# Patient Record
Sex: FEMALE | Race: WHITE | Employment: OTHER
[De-identification: names, ages, dates, MRNs, and addresses within clinical notes are randomized per-mention and may not be internally consistent; named-entity substitution may affect disease eponyms.]

---

## 2017-01-05 ENCOUNTER — PROCEDURE VISIT (OUTPATIENT)
Dept: FAMILY MEDICINE CLINIC | Facility: CLINIC | Age: 72
End: 2017-01-05

## 2017-01-05 VITALS
DIASTOLIC BLOOD PRESSURE: 82 MMHG | SYSTOLIC BLOOD PRESSURE: 134 MMHG | HEIGHT: 63 IN | OXYGEN SATURATION: 98 % | HEART RATE: 68 BPM | WEIGHT: 171 LBS | BODY MASS INDEX: 30.3 KG/M2

## 2017-01-05 DIAGNOSIS — H61.23 BILATERAL IMPACTED CERUMEN: ICD-10-CM

## 2017-01-05 DIAGNOSIS — J18.9 PNEUMONIA OF RIGHT LOWER LOBE DUE TO INFECTIOUS ORGANISM: Primary | ICD-10-CM

## 2017-01-05 PROCEDURE — 69210 REMOVE IMPACTED EAR WAX UNI: CPT | Performed by: INTERNAL MEDICINE

## 2017-01-05 PROCEDURE — 99213 OFFICE O/P EST LOW 20 MIN: CPT | Performed by: INTERNAL MEDICINE

## 2017-01-05 RX ORDER — CEPHALEXIN 500 MG/1
500 CAPSULE ORAL 3 TIMES DAILY
Qty: 30 CAPSULE | Refills: 0 | Status: SHIPPED | OUTPATIENT
Start: 2017-01-05 | End: 2017-02-20 | Stop reason: ALTCHOICE

## 2017-01-05 RX ORDER — AZITHROMYCIN 250 MG/1
TABLET, FILM COATED ORAL
Qty: 11 TABLET | Refills: 0 | Status: SHIPPED | OUTPATIENT
Start: 2017-01-05 | End: 2017-02-20 | Stop reason: ALTCHOICE

## 2017-01-05 ASSESSMENT — ENCOUNTER SYMPTOMS
RHINORRHEA: 0
ABDOMINAL PAIN: 0
CHEST TIGHTNESS: 0
CONSTIPATION: 0
SHORTNESS OF BREATH: 0
BLOOD IN STOOL: 0
DIARRHEA: 0
COUGH: 1
SORE THROAT: 0
NAUSEA: 0

## 2017-01-17 DIAGNOSIS — E78.2 MIXED HYPERLIPIDEMIA: ICD-10-CM

## 2017-01-17 DIAGNOSIS — J43.9 PULMONARY EMPHYSEMA, UNSPECIFIED EMPHYSEMA TYPE (HCC): ICD-10-CM

## 2017-01-17 DIAGNOSIS — M85.80 OSTEOPENIA: ICD-10-CM

## 2017-01-17 RX ORDER — ALBUTEROL SULFATE 90 UG/1
2 AEROSOL, METERED RESPIRATORY (INHALATION) 4 TIMES DAILY
Qty: 1 INHALER | Refills: 5 | Status: SHIPPED | OUTPATIENT
Start: 2017-01-17 | End: 2017-04-05

## 2017-01-17 RX ORDER — PRAVASTATIN SODIUM 20 MG
20 TABLET ORAL DAILY
Qty: 90 TABLET | Refills: 3 | Status: SHIPPED | OUTPATIENT
Start: 2017-01-17 | End: 2017-12-26

## 2017-01-17 RX ORDER — ALENDRONATE SODIUM 70 MG/1
70 TABLET ORAL
Qty: 12 TABLET | Refills: 3 | Status: SHIPPED | OUTPATIENT
Start: 2017-01-17 | End: 2017-02-18 | Stop reason: SDUPTHER

## 2017-02-18 DIAGNOSIS — I10 ESSENTIAL HYPERTENSION: ICD-10-CM

## 2017-02-18 DIAGNOSIS — M85.80 OSTEOPENIA: ICD-10-CM

## 2017-02-18 RX ORDER — OLMESARTAN MEDOXOMIL AND HYDROCHLOROTHIAZIDE 20/12.5 20; 12.5 MG/1; MG/1
1 TABLET ORAL DAILY
Qty: 90 TABLET | Refills: 3 | Status: SHIPPED | OUTPATIENT
Start: 2017-02-18 | End: 2017-12-26

## 2017-02-18 RX ORDER — ALENDRONATE SODIUM 70 MG/1
70 TABLET ORAL
Qty: 12 TABLET | Refills: 3 | Status: SHIPPED | OUTPATIENT
Start: 2017-02-18 | End: 2018-08-09

## 2017-02-20 ENCOUNTER — OFFICE VISIT (OUTPATIENT)
Dept: FAMILY MEDICINE CLINIC | Facility: CLINIC | Age: 72
End: 2017-02-20

## 2017-02-20 VITALS
HEART RATE: 64 BPM | WEIGHT: 170 LBS | BODY MASS INDEX: 30.12 KG/M2 | SYSTOLIC BLOOD PRESSURE: 134 MMHG | DIASTOLIC BLOOD PRESSURE: 60 MMHG | HEIGHT: 63 IN

## 2017-02-20 DIAGNOSIS — M81.0 OSTEOPOROSIS: ICD-10-CM

## 2017-02-20 DIAGNOSIS — I10 ESSENTIAL HYPERTENSION: Primary | ICD-10-CM

## 2017-02-20 DIAGNOSIS — E78.2 MIXED HYPERLIPIDEMIA: ICD-10-CM

## 2017-02-20 DIAGNOSIS — I82.5Y2 CHRONIC DEEP VEIN THROMBOSIS (DVT) OF PROXIMAL VEIN OF LEFT LOWER EXTREMITY (HCC): ICD-10-CM

## 2017-02-20 DIAGNOSIS — I35.0 NONRHEUMATIC AORTIC VALVE STENOSIS: ICD-10-CM

## 2017-02-20 DIAGNOSIS — E55.9 VITAMIN D DEFICIENCY: ICD-10-CM

## 2017-02-20 PROCEDURE — 99214 OFFICE O/P EST MOD 30 MIN: CPT | Performed by: INTERNAL MEDICINE

## 2017-02-20 PROCEDURE — G8484 FLU IMMUNIZE NO ADMIN: HCPCS | Performed by: INTERNAL MEDICINE

## 2017-02-20 PROCEDURE — G8417 CALC BMI ABV UP PARAM F/U: HCPCS | Performed by: INTERNAL MEDICINE

## 2017-02-20 PROCEDURE — 1090F PRES/ABSN URINE INCON ASSESS: CPT | Performed by: INTERNAL MEDICINE

## 2017-02-20 PROCEDURE — 4040F PNEUMOC VAC/ADMIN/RCVD: CPT | Performed by: INTERNAL MEDICINE

## 2017-02-20 PROCEDURE — G8427 DOCREV CUR MEDS BY ELIG CLIN: HCPCS | Performed by: INTERNAL MEDICINE

## 2017-02-20 PROCEDURE — 4005F PHARM THX FOR OP RXD: CPT | Performed by: INTERNAL MEDICINE

## 2017-02-20 PROCEDURE — 1123F ACP DISCUSS/DSCN MKR DOCD: CPT | Performed by: INTERNAL MEDICINE

## 2017-02-20 PROCEDURE — G8399 PT W/DXA RESULTS DOCUMENT: HCPCS | Performed by: INTERNAL MEDICINE

## 2017-02-20 PROCEDURE — 1036F TOBACCO NON-USER: CPT | Performed by: INTERNAL MEDICINE

## 2017-02-20 PROCEDURE — 3017F COLORECTAL CA SCREEN DOC REV: CPT | Performed by: INTERNAL MEDICINE

## 2017-02-20 PROCEDURE — 3014F SCREEN MAMMO DOC REV: CPT | Performed by: INTERNAL MEDICINE

## 2017-02-20 ASSESSMENT — ENCOUNTER SYMPTOMS
RHINORRHEA: 0
ABDOMINAL PAIN: 0
CHEST TIGHTNESS: 0
COUGH: 0
CONSTIPATION: 0
SHORTNESS OF BREATH: 0
DIARRHEA: 0
SORE THROAT: 0
BLOOD IN STOOL: 0
NAUSEA: 0

## 2017-03-06 ENCOUNTER — TELEPHONE (OUTPATIENT)
Dept: FAMILY MEDICINE CLINIC | Facility: CLINIC | Age: 72
End: 2017-03-06
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
## 2017-03-21 RX ORDER — TRIAMCINOLONE ACETONIDE 1 MG/G
CREAM TOPICAL
Qty: 80 G | Refills: 1 | Status: SHIPPED | OUTPATIENT
Start: 2017-03-21 | End: 2017-04-05

## 2017-03-31 ENCOUNTER — HOSPITAL ENCOUNTER (INPATIENT)
Age: 72
LOS: 3 days | Discharge: HOME | DRG: 066 | End: 2017-04-03
Attending: EMERGENCY MEDICINE | Admitting: INTERNAL MEDICINE
Payer: MEDICARE

## 2017-03-31 ENCOUNTER — APPOINTMENT (OUTPATIENT)
Dept: MRI IMAGING | Age: 72
DRG: 066 | End: 2017-03-31
Payer: MEDICARE

## 2017-03-31 ENCOUNTER — HOSPITAL ENCOUNTER (EMERGENCY)
Age: 72
Discharge: OTHER INSTITUTION | End: 2017-03-31
Attending: EMERGENCY MEDICINE
Payer: MEDICARE

## 2017-03-31 ENCOUNTER — APPOINTMENT (OUTPATIENT)
Dept: CT IMAGING | Age: 72
End: 2017-03-31
Payer: MEDICARE

## 2017-03-31 ENCOUNTER — APPOINTMENT (OUTPATIENT)
Dept: CT IMAGING | Age: 72
DRG: 066 | End: 2017-03-31
Payer: MEDICARE

## 2017-03-31 ENCOUNTER — APPOINTMENT (OUTPATIENT)
Dept: GENERAL RADIOLOGY | Age: 72
End: 2017-03-31
Payer: MEDICARE

## 2017-03-31 VITALS
HEIGHT: 63 IN | RESPIRATION RATE: 18 BRPM | DIASTOLIC BLOOD PRESSURE: 47 MMHG | BODY MASS INDEX: 30.12 KG/M2 | WEIGHT: 170 LBS | TEMPERATURE: 97.9 F | HEART RATE: 63 BPM | OXYGEN SATURATION: 100 % | SYSTOLIC BLOOD PRESSURE: 132 MMHG

## 2017-03-31 DIAGNOSIS — R20.2 PARESTHESIA: Primary | ICD-10-CM

## 2017-03-31 DIAGNOSIS — I63.9 CEREBROVASCULAR ACCIDENT (CVA), UNSPECIFIED MECHANISM (HCC): ICD-10-CM

## 2017-03-31 DIAGNOSIS — M81.0 OSTEOPOROSIS: ICD-10-CM

## 2017-03-31 DIAGNOSIS — R20.0 RIGHT FACIAL NUMBNESS: ICD-10-CM

## 2017-03-31 DIAGNOSIS — D18.00 CAVERNOMA: ICD-10-CM

## 2017-03-31 DIAGNOSIS — R20.0 NUMBNESS OF RIGHT LOWER EXTREMITY: Primary | ICD-10-CM

## 2017-03-31 DIAGNOSIS — G45.9 TRANSIENT CEREBRAL ISCHEMIA, UNSPECIFIED TYPE: ICD-10-CM

## 2017-03-31 PROBLEM — Z86.718 HISTORY OF DVT (DEEP VEIN THROMBOSIS): Status: ACTIVE | Noted: 2017-03-31

## 2017-03-31 LAB
ABSOLUTE EOS #: 0.1 K/UL (ref 0–0.4)
ABSOLUTE LYMPH #: 1.6 K/UL (ref 1.1–2.7)
ABSOLUTE MONO #: 0.5 K/UL (ref 0–1)
ANION GAP SERPL CALCULATED.3IONS-SCNC: 11 MMOL/L (ref 9–17)
BASOPHILS # BLD: 1 % (ref 0–2)
BASOPHILS ABSOLUTE: 0 K/UL (ref 0–0.2)
BUN BLDV-MCNC: 20 MG/DL (ref 8–23)
BUN/CREAT BLD: 24 (ref 9–20)
CALCIUM SERPL-MCNC: 8.9 MG/DL (ref 8.6–10.4)
CHLORIDE BLD-SCNC: 102 MMOL/L (ref 98–107)
CO2: 27 MMOL/L (ref 20–31)
CREAT SERPL-MCNC: 0.82 MG/DL (ref 0.5–0.9)
DIFFERENTIAL TYPE: YES
EOSINOPHILS RELATIVE PERCENT: 2 % (ref 0–5)
GFR AFRICAN AMERICAN: >60 ML/MIN
GFR NON-AFRICAN AMERICAN: >60 ML/MIN
GFR SERPL CREATININE-BSD FRML MDRD: ABNORMAL ML/MIN/{1.73_M2}
GFR SERPL CREATININE-BSD FRML MDRD: ABNORMAL ML/MIN/{1.73_M2}
GLUCOSE BLD-MCNC: 103 MG/DL (ref 70–99)
HCT VFR BLD CALC: 37.3 % (ref 36–46)
HEMOGLOBIN: 12.5 G/DL (ref 12–16)
INR BLD: 1
LYMPHOCYTES # BLD: 32 % (ref 15–40)
MCH RBC QN AUTO: 29.1 PG (ref 26–34)
MCHC RBC AUTO-ENTMCNC: 33.5 G/DL (ref 31–37)
MCV RBC AUTO: 87 FL (ref 80–100)
MONOCYTES # BLD: 10 % (ref 4–8)
PARTIAL THROMBOPLASTIN TIME: 24.2 SEC (ref 21–33)
PDW BLD-RTO: 13.7 % (ref 12.1–15.2)
PLATELET # BLD: 156 K/UL (ref 140–450)
PLATELET ESTIMATE: ABNORMAL
PMV BLD AUTO: ABNORMAL FL (ref 6–12)
POTASSIUM SERPL-SCNC: 4.3 MMOL/L (ref 3.7–5.3)
PROTHROMBIN TIME: 10.7 SEC (ref 9–11.6)
RBC # BLD: 4.28 M/UL (ref 4–5.2)
RBC # BLD: ABNORMAL 10*6/UL
SEG NEUTROPHILS: 55 % (ref 47–75)
SEGMENTED NEUTROPHILS ABSOLUTE COUNT: 2.8 K/UL (ref 2.5–7)
SODIUM BLD-SCNC: 140 MMOL/L (ref 135–144)
WBC # BLD: 5 K/UL (ref 3.5–11)
WBC # BLD: ABNORMAL 10*3/UL

## 2017-03-31 PROCEDURE — 71010 XR CHEST LIMITED ONE VIEW: CPT

## 2017-03-31 PROCEDURE — 85610 PROTHROMBIN TIME: CPT

## 2017-03-31 PROCEDURE — 80048 BASIC METABOLIC PNL TOTAL CA: CPT

## 2017-03-31 PROCEDURE — 99223 1ST HOSP IP/OBS HIGH 75: CPT | Performed by: PSYCHIATRY & NEUROLOGY

## 2017-03-31 PROCEDURE — 70551 MRI BRAIN STEM W/O DYE: CPT

## 2017-03-31 PROCEDURE — 99285 EMERGENCY DEPT VISIT HI MDM: CPT

## 2017-03-31 PROCEDURE — 93005 ELECTROCARDIOGRAM TRACING: CPT

## 2017-03-31 PROCEDURE — 6360000004 HC RX CONTRAST MEDICATION: Performed by: EMERGENCY MEDICINE

## 2017-03-31 PROCEDURE — 2060000000 HC ICU INTERMEDIATE R&B

## 2017-03-31 PROCEDURE — 70498 CT ANGIOGRAPHY NECK: CPT

## 2017-03-31 PROCEDURE — 70450 CT HEAD/BRAIN W/O DYE: CPT

## 2017-03-31 PROCEDURE — 99223 1ST HOSP IP/OBS HIGH 75: CPT | Performed by: INTERNAL MEDICINE

## 2017-03-31 PROCEDURE — 85025 COMPLETE CBC W/AUTO DIFF WBC: CPT

## 2017-03-31 PROCEDURE — 2580000003 HC RX 258: Performed by: INTERNAL MEDICINE

## 2017-03-31 PROCEDURE — 70496 CT ANGIOGRAPHY HEAD: CPT

## 2017-03-31 PROCEDURE — 85730 THROMBOPLASTIN TIME PARTIAL: CPT

## 2017-03-31 PROCEDURE — 6370000000 HC RX 637 (ALT 250 FOR IP): Performed by: INTERNAL MEDICINE

## 2017-03-31 PROCEDURE — 2580000003 HC RX 258: Performed by: EMERGENCY MEDICINE

## 2017-03-31 RX ORDER — SODIUM CHLORIDE 9 MG/ML
INJECTION, SOLUTION INTRAVENOUS CONTINUOUS
Status: DISCONTINUED | OUTPATIENT
Start: 2017-03-31 | End: 2017-03-31 | Stop reason: HOSPADM

## 2017-03-31 RX ORDER — ONDANSETRON 2 MG/ML
4 INJECTION INTRAMUSCULAR; INTRAVENOUS EVERY 6 HOURS PRN
Status: DISCONTINUED | OUTPATIENT
Start: 2017-03-31 | End: 2017-04-03 | Stop reason: HOSPADM

## 2017-03-31 RX ORDER — ASPIRIN 81 MG/1
81 TABLET ORAL DAILY
Status: DISCONTINUED | OUTPATIENT
Start: 2017-04-01 | End: 2017-04-02

## 2017-03-31 RX ORDER — ATORVASTATIN CALCIUM 20 MG/1
20 TABLET, FILM COATED ORAL NIGHTLY
Status: DISCONTINUED | OUTPATIENT
Start: 2017-03-31 | End: 2017-04-03

## 2017-03-31 RX ORDER — ACETAMINOPHEN 325 MG/1
650 TABLET ORAL EVERY 4 HOURS PRN
Status: DISCONTINUED | OUTPATIENT
Start: 2017-03-31 | End: 2017-04-03 | Stop reason: HOSPADM

## 2017-03-31 RX ORDER — LEVETIRACETAM 500 MG/1
500 TABLET ORAL 2 TIMES DAILY
Status: DISCONTINUED | OUTPATIENT
Start: 2017-03-31 | End: 2017-04-03 | Stop reason: HOSPADM

## 2017-03-31 RX ORDER — ASPIRIN 81 MG/1
81 TABLET ORAL DAILY
Status: DISCONTINUED | OUTPATIENT
Start: 2017-04-01 | End: 2017-03-31

## 2017-03-31 RX ORDER — SODIUM CHLORIDE 0.9 % (FLUSH) 0.9 %
10 SYRINGE (ML) INJECTION EVERY 12 HOURS SCHEDULED
Status: DISCONTINUED | OUTPATIENT
Start: 2017-03-31 | End: 2017-03-31

## 2017-03-31 RX ORDER — SODIUM CHLORIDE 0.9 % (FLUSH) 0.9 %
10 SYRINGE (ML) INJECTION PRN
Status: DISCONTINUED | OUTPATIENT
Start: 2017-03-31 | End: 2017-03-31

## 2017-03-31 RX ORDER — SODIUM CHLORIDE 0.9 % (FLUSH) 0.9 %
10 SYRINGE (ML) INJECTION EVERY 12 HOURS SCHEDULED
Status: DISCONTINUED | OUTPATIENT
Start: 2017-03-31 | End: 2017-04-03 | Stop reason: HOSPADM

## 2017-03-31 RX ORDER — ONDANSETRON 2 MG/ML
4 INJECTION INTRAMUSCULAR; INTRAVENOUS EVERY 6 HOURS PRN
Status: DISCONTINUED | OUTPATIENT
Start: 2017-03-31 | End: 2017-03-31

## 2017-03-31 RX ORDER — PRAVASTATIN SODIUM 20 MG
20 TABLET ORAL NIGHTLY
Status: DISCONTINUED | OUTPATIENT
Start: 2017-03-31 | End: 2017-03-31

## 2017-03-31 RX ORDER — ACETAMINOPHEN 325 MG/1
650 TABLET ORAL EVERY 4 HOURS PRN
Status: DISCONTINUED | OUTPATIENT
Start: 2017-03-31 | End: 2017-03-31

## 2017-03-31 RX ORDER — SODIUM CHLORIDE 0.9 % (FLUSH) 0.9 %
10 SYRINGE (ML) INJECTION PRN
Status: DISCONTINUED | OUTPATIENT
Start: 2017-03-31 | End: 2017-04-03 | Stop reason: HOSPADM

## 2017-03-31 RX ADMIN — Medication 10 ML: at 23:50

## 2017-03-31 RX ADMIN — LEVETIRACETAM 500 MG: 500 TABLET, FILM COATED ORAL at 23:49

## 2017-03-31 RX ADMIN — IOHEXOL 90 ML: 350 INJECTION, SOLUTION INTRAVENOUS at 20:20

## 2017-03-31 RX ADMIN — ATORVASTATIN CALCIUM 20 MG: 20 TABLET, FILM COATED ORAL at 23:49

## 2017-03-31 ASSESSMENT — ENCOUNTER SYMPTOMS
VOMITING: 0
ABDOMINAL PAIN: 0
SHORTNESS OF BREATH: 0
PHOTOPHOBIA: 0
COUGH: 0
DIARRHEA: 0
WHEEZING: 0
CHOKING: 0
COLOR CHANGE: 0
NAUSEA: 0
BACK PAIN: 0

## 2017-03-31 ASSESSMENT — PAIN SCALES - GENERAL: PAINLEVEL_OUTOF10: 0

## 2017-04-01 LAB
ANION GAP SERPL CALCULATED.3IONS-SCNC: 12 MMOL/L (ref 9–17)
ANION GAP SERPL CALCULATED.3IONS-SCNC: NORMAL MMOL/L (ref 9–17)
BUN BLDV-MCNC: 14 MG/DL (ref 8–23)
BUN BLDV-MCNC: NORMAL MG/DL (ref 8–23)
BUN/CREAT BLD: ABNORMAL (ref 9–20)
BUN/CREAT BLD: NORMAL (ref 9–20)
CALCIUM SERPL-MCNC: 8.1 MG/DL (ref 8.6–10.4)
CALCIUM SERPL-MCNC: NORMAL MG/DL (ref 8.6–10.4)
CHLORIDE BLD-SCNC: 104 MMOL/L (ref 98–107)
CHLORIDE BLD-SCNC: NORMAL MMOL/L (ref 98–107)
CHOLESTEROL/HDL RATIO: 3.1
CHOLESTEROL/HDL RATIO: NORMAL
CHOLESTEROL: 146 MG/DL
CHOLESTEROL: NORMAL MG/DL
CO2: 24 MMOL/L (ref 20–31)
CO2: NORMAL MMOL/L (ref 20–31)
CREAT SERPL-MCNC: 0.76 MG/DL (ref 0.5–0.9)
CREAT SERPL-MCNC: NORMAL MG/DL (ref 0.5–0.9)
EKG ATRIAL RATE: 63 BPM
EKG P AXIS: 51 DEGREES
EKG P-R INTERVAL: 180 MS
EKG Q-T INTERVAL: 416 MS
EKG QRS DURATION: 82 MS
EKG QTC CALCULATION (BAZETT): 425 MS
EKG R AXIS: -2 DEGREES
EKG T AXIS: 10 DEGREES
EKG VENTRICULAR RATE: 63 BPM
GFR AFRICAN AMERICAN: >60 ML/MIN
GFR AFRICAN AMERICAN: NORMAL ML/MIN
GFR NON-AFRICAN AMERICAN: >60 ML/MIN
GFR NON-AFRICAN AMERICAN: NORMAL ML/MIN
GFR SERPL CREATININE-BSD FRML MDRD: ABNORMAL ML/MIN/{1.73_M2}
GFR SERPL CREATININE-BSD FRML MDRD: ABNORMAL ML/MIN/{1.73_M2}
GFR SERPL CREATININE-BSD FRML MDRD: NORMAL ML/MIN/{1.73_M2}
GFR SERPL CREATININE-BSD FRML MDRD: NORMAL ML/MIN/{1.73_M2}
GLUCOSE BLD-MCNC: 147 MG/DL (ref 65–105)
GLUCOSE BLD-MCNC: 82 MG/DL (ref 65–105)
GLUCOSE BLD-MCNC: 98 MG/DL (ref 70–99)
GLUCOSE BLD-MCNC: NORMAL MG/DL (ref 70–99)
HCT VFR BLD CALC: 33 % (ref 36–46)
HCT VFR BLD CALC: NORMAL % (ref 36–46)
HDLC SERPL-MCNC: 47 MG/DL
HDLC SERPL-MCNC: NORMAL MG/DL
HEMOGLOBIN: 11.4 G/DL (ref 12–16)
HEMOGLOBIN: NORMAL G/DL (ref 12–16)
LDL CHOLESTEROL: 83 MG/DL (ref 0–130)
LDL CHOLESTEROL: NORMAL MG/DL (ref 0–130)
LV EF: 55 %
LVEF MODALITY: NORMAL
MCH RBC QN AUTO: 29.4 PG (ref 26–34)
MCH RBC QN AUTO: NORMAL PG (ref 26–34)
MCHC RBC AUTO-ENTMCNC: 34.6 G/DL (ref 31–37)
MCHC RBC AUTO-ENTMCNC: NORMAL G/DL (ref 31–37)
MCV RBC AUTO: 85 FL (ref 80–100)
MCV RBC AUTO: NORMAL FL (ref 80–100)
PDW BLD-RTO: 14.4 % (ref 12.5–15.4)
PDW BLD-RTO: NORMAL % (ref 12.5–15.4)
PLATELET # BLD: 147 K/UL (ref 140–450)
PLATELET # BLD: NORMAL K/UL (ref 140–450)
PMV BLD AUTO: 7.9 FL (ref 6–12)
PMV BLD AUTO: NORMAL FL (ref 6–12)
POTASSIUM SERPL-SCNC: 3.7 MMOL/L (ref 3.7–5.3)
POTASSIUM SERPL-SCNC: NORMAL MMOL/L (ref 3.7–5.3)
RBC # BLD: 3.88 M/UL (ref 4–5.2)
RBC # BLD: NORMAL M/UL (ref 4–5.2)
SODIUM BLD-SCNC: 140 MMOL/L (ref 135–144)
SODIUM BLD-SCNC: NORMAL MMOL/L (ref 135–144)
TRIGL SERPL-MCNC: 82 MG/DL
TRIGL SERPL-MCNC: NORMAL MG/DL
VLDLC SERPL CALC-MCNC: NORMAL MG/DL (ref 1–30)
VLDLC SERPL CALC-MCNC: NORMAL MG/DL (ref 1–30)
WBC # BLD: 4.3 K/UL (ref 3.5–11)
WBC # BLD: NORMAL K/UL (ref 3.5–11)

## 2017-04-01 PROCEDURE — 36415 COLL VENOUS BLD VENIPUNCTURE: CPT

## 2017-04-01 PROCEDURE — 99222 1ST HOSP IP/OBS MODERATE 55: CPT

## 2017-04-01 PROCEDURE — G8979 MOBILITY GOAL STATUS: HCPCS

## 2017-04-01 PROCEDURE — G8978 MOBILITY CURRENT STATUS: HCPCS

## 2017-04-01 PROCEDURE — 82947 ASSAY GLUCOSE BLOOD QUANT: CPT

## 2017-04-01 PROCEDURE — 6370000000 HC RX 637 (ALT 250 FOR IP): Performed by: INTERNAL MEDICINE

## 2017-04-01 PROCEDURE — 80048 BASIC METABOLIC PNL TOTAL CA: CPT

## 2017-04-01 PROCEDURE — 80061 LIPID PANEL: CPT

## 2017-04-01 PROCEDURE — 85027 COMPLETE CBC AUTOMATED: CPT

## 2017-04-01 PROCEDURE — 97116 GAIT TRAINING THERAPY: CPT

## 2017-04-01 PROCEDURE — 2060000000 HC ICU INTERMEDIATE R&B

## 2017-04-01 PROCEDURE — 83036 HEMOGLOBIN GLYCOSYLATED A1C: CPT

## 2017-04-01 PROCEDURE — 2580000003 HC RX 258: Performed by: INTERNAL MEDICINE

## 2017-04-01 PROCEDURE — G8988 SELF CARE GOAL STATUS: HCPCS

## 2017-04-01 PROCEDURE — 97165 OT EVAL LOW COMPLEX 30 MIN: CPT

## 2017-04-01 PROCEDURE — 97162 PT EVAL MOD COMPLEX 30 MIN: CPT

## 2017-04-01 PROCEDURE — G8987 SELF CARE CURRENT STATUS: HCPCS

## 2017-04-01 PROCEDURE — 93306 TTE W/DOPPLER COMPLETE: CPT

## 2017-04-01 PROCEDURE — 97535 SELF CARE MNGMENT TRAINING: CPT

## 2017-04-01 PROCEDURE — 2580000003 HC RX 258: Performed by: HOSPITALIST

## 2017-04-01 RX ORDER — SODIUM CHLORIDE 9 MG/ML
INJECTION, SOLUTION INTRAVENOUS CONTINUOUS
Status: DISCONTINUED | OUTPATIENT
Start: 2017-04-01 | End: 2017-04-03 | Stop reason: HOSPADM

## 2017-04-01 RX ADMIN — SODIUM CHLORIDE: 9 INJECTION, SOLUTION INTRAVENOUS at 11:47

## 2017-04-01 RX ADMIN — LEVETIRACETAM 500 MG: 500 TABLET, FILM COATED ORAL at 08:22

## 2017-04-01 RX ADMIN — Medication 10 ML: at 11:47

## 2017-04-01 RX ADMIN — LEVETIRACETAM 500 MG: 500 TABLET, FILM COATED ORAL at 21:58

## 2017-04-01 RX ADMIN — ATORVASTATIN CALCIUM 20 MG: 20 TABLET, FILM COATED ORAL at 21:58

## 2017-04-01 RX ADMIN — ASPIRIN 81 MG: 81 TABLET, COATED ORAL at 08:22

## 2017-04-01 RX ADMIN — ACETAMINOPHEN 650 MG: 325 TABLET ORAL at 08:22

## 2017-04-01 ASSESSMENT — PAIN SCALES - GENERAL
PAINLEVEL_OUTOF10: 8
PAINLEVEL_OUTOF10: 8

## 2017-04-01 ASSESSMENT — PAIN DESCRIPTION - LOCATION: LOCATION: HEAD

## 2017-04-01 ASSESSMENT — PAIN DESCRIPTION - PAIN TYPE: TYPE: ACUTE PAIN

## 2017-04-02 LAB
ESTIMATED AVERAGE GLUCOSE: 120 MG/DL
HBA1C MFR BLD: 5.8 % (ref 4–6)

## 2017-04-02 PROCEDURE — 2580000003 HC RX 258: Performed by: INTERNAL MEDICINE

## 2017-04-02 PROCEDURE — 6370000000 HC RX 637 (ALT 250 FOR IP): Performed by: INTERNAL MEDICINE

## 2017-04-02 PROCEDURE — 99232 SBSQ HOSP IP/OBS MODERATE 35: CPT | Performed by: INTERNAL MEDICINE

## 2017-04-02 PROCEDURE — 99231 SBSQ HOSP IP/OBS SF/LOW 25: CPT

## 2017-04-02 PROCEDURE — 2060000000 HC ICU INTERMEDIATE R&B

## 2017-04-02 PROCEDURE — 6370000000 HC RX 637 (ALT 250 FOR IP)

## 2017-04-02 RX ADMIN — ASPIRIN 81 MG: 81 TABLET, COATED ORAL at 09:01

## 2017-04-02 RX ADMIN — Medication 10 ML: at 20:24

## 2017-04-02 RX ADMIN — Medication 10 ML: at 11:23

## 2017-04-02 RX ADMIN — ASPIRIN 325 MG: 325 TABLET, COATED ORAL at 15:41

## 2017-04-02 RX ADMIN — LEVETIRACETAM 500 MG: 500 TABLET, FILM COATED ORAL at 20:24

## 2017-04-02 RX ADMIN — ATORVASTATIN CALCIUM 20 MG: 20 TABLET, FILM COATED ORAL at 20:24

## 2017-04-02 RX ADMIN — LEVETIRACETAM 500 MG: 500 TABLET, FILM COATED ORAL at 09:01

## 2017-04-03 VITALS
HEART RATE: 66 BPM | SYSTOLIC BLOOD PRESSURE: 132 MMHG | TEMPERATURE: 97.3 F | RESPIRATION RATE: 19 BRPM | OXYGEN SATURATION: 96 % | HEIGHT: 63 IN | DIASTOLIC BLOOD PRESSURE: 44 MMHG | WEIGHT: 170 LBS | BODY MASS INDEX: 30.12 KG/M2

## 2017-04-03 PROBLEM — I63.81 THALAMIC INFARCTION (HCC): Status: ACTIVE | Noted: 2017-04-03

## 2017-04-03 PROCEDURE — 97530 THERAPEUTIC ACTIVITIES: CPT

## 2017-04-03 PROCEDURE — 97110 THERAPEUTIC EXERCISES: CPT

## 2017-04-03 PROCEDURE — 6370000000 HC RX 637 (ALT 250 FOR IP): Performed by: INTERNAL MEDICINE

## 2017-04-03 PROCEDURE — 6370000000 HC RX 637 (ALT 250 FOR IP)

## 2017-04-03 PROCEDURE — 2580000003 HC RX 258: Performed by: INTERNAL MEDICINE

## 2017-04-03 PROCEDURE — 99232 SBSQ HOSP IP/OBS MODERATE 35: CPT | Performed by: INTERNAL MEDICINE

## 2017-04-03 PROCEDURE — 99232 SBSQ HOSP IP/OBS MODERATE 35: CPT | Performed by: PSYCHIATRY & NEUROLOGY

## 2017-04-03 PROCEDURE — 97535 SELF CARE MNGMENT TRAINING: CPT

## 2017-04-03 RX ORDER — PRAVASTATIN SODIUM 20 MG
20 TABLET ORAL NIGHTLY
Status: DISCONTINUED | OUTPATIENT
Start: 2017-04-03 | End: 2017-04-03 | Stop reason: HOSPADM

## 2017-04-03 RX ADMIN — ASPIRIN 325 MG: 325 TABLET, COATED ORAL at 08:34

## 2017-04-03 RX ADMIN — Medication 10 ML: at 08:35

## 2017-04-03 RX ADMIN — ACETAMINOPHEN 650 MG: 325 TABLET ORAL at 06:31

## 2017-04-03 RX ADMIN — LEVETIRACETAM 500 MG: 500 TABLET, FILM COATED ORAL at 08:34

## 2017-04-03 ASSESSMENT — PAIN SCALES - GENERAL: PAINLEVEL_OUTOF10: 5

## 2017-04-04 ENCOUNTER — TELEPHONE (OUTPATIENT)
Dept: PHARMACY | Age: 72
End: 2017-04-04

## 2017-04-04 ENCOUNTER — CARE COORDINATION (OUTPATIENT)
Dept: CASE MANAGEMENT | Age: 72
End: 2017-04-04

## 2017-04-05 ENCOUNTER — TELEPHONE (OUTPATIENT)
Dept: PHARMACY | Age: 72
End: 2017-04-05

## 2017-04-05 RX ORDER — TRIAMCINOLONE ACETONIDE 1 MG/G
CREAM TOPICAL 2 TIMES DAILY PRN
COMMUNITY
End: 2020-06-12 | Stop reason: SDUPTHER

## 2017-04-05 RX ORDER — ALBUTEROL SULFATE 90 UG/1
2 AEROSOL, METERED RESPIRATORY (INHALATION) 4 TIMES DAILY PRN
COMMUNITY
End: 2017-11-27

## 2017-04-05 RX ORDER — TITANIUM DIOXIDE, OCTINOXATE, ZINC OXIDE 4.61; 1.6; .78 G/40ML; G/40ML; G/40ML
400 CREAM TOPICAL
COMMUNITY
End: 2022-02-25 | Stop reason: ALTCHOICE

## 2017-04-05 RX ORDER — ACETAMINOPHEN 160 MG
2000 TABLET,DISINTEGRATING ORAL
COMMUNITY
End: 2019-09-23 | Stop reason: ALTCHOICE

## 2017-04-05 RX ORDER — ACETAMINOPHEN 500 MG
500 TABLET ORAL PRN
COMMUNITY

## 2017-04-07 ENCOUNTER — CARE COORDINATION (OUTPATIENT)
Dept: CASE MANAGEMENT | Age: 72
End: 2017-04-07

## 2017-04-10 ENCOUNTER — OFFICE VISIT (OUTPATIENT)
Dept: FAMILY MEDICINE CLINIC | Age: 72
End: 2017-04-10

## 2017-04-10 VITALS — DIASTOLIC BLOOD PRESSURE: 58 MMHG | SYSTOLIC BLOOD PRESSURE: 133 MMHG | HEART RATE: 67 BPM

## 2017-04-10 DIAGNOSIS — I10 ESSENTIAL HYPERTENSION: Primary | ICD-10-CM

## 2017-04-10 PROCEDURE — 99999 PR OFFICE/OUTPT VISIT,PROCEDURE ONLY: CPT | Performed by: INTERNAL MEDICINE

## 2017-04-10 PROCEDURE — 1036F TOBACCO NON-USER: CPT | Performed by: INTERNAL MEDICINE

## 2017-04-11 ENCOUNTER — OFFICE VISIT (OUTPATIENT)
Dept: FAMILY MEDICINE CLINIC | Age: 72
End: 2017-04-11
Payer: MEDICARE

## 2017-04-11 ENCOUNTER — HOSPITAL ENCOUNTER (OUTPATIENT)
Dept: PHYSICAL THERAPY | Age: 72
Setting detail: THERAPIES SERIES
Discharge: HOME | End: 2017-04-11
Payer: MEDICARE

## 2017-04-11 VITALS
HEART RATE: 66 BPM | HEIGHT: 63 IN | BODY MASS INDEX: 30.48 KG/M2 | DIASTOLIC BLOOD PRESSURE: 64 MMHG | WEIGHT: 172 LBS | SYSTOLIC BLOOD PRESSURE: 132 MMHG

## 2017-04-11 DIAGNOSIS — G40.909 SEIZURE DISORDER (HCC): ICD-10-CM

## 2017-04-11 DIAGNOSIS — G45.9 TRANSIENT CEREBRAL ISCHEMIA, UNSPECIFIED TYPE: Primary | ICD-10-CM

## 2017-04-11 PROCEDURE — 99495 TRANSJ CARE MGMT MOD F2F 14D: CPT | Performed by: INTERNAL MEDICINE

## 2017-04-11 PROCEDURE — G8979 MOBILITY GOAL STATUS: HCPCS

## 2017-04-11 PROCEDURE — 97161 PT EVAL LOW COMPLEX 20 MIN: CPT

## 2017-04-11 PROCEDURE — G8978 MOBILITY CURRENT STATUS: HCPCS

## 2017-04-11 ASSESSMENT — ENCOUNTER SYMPTOMS
BLOOD IN STOOL: 0
RHINORRHEA: 0
SHORTNESS OF BREATH: 0
NAUSEA: 0
COUGH: 0
CHEST TIGHTNESS: 0
CONSTIPATION: 0
SORE THROAT: 0
ABDOMINAL PAIN: 0
DIARRHEA: 0

## 2017-04-12 ENCOUNTER — CARE COORDINATION (OUTPATIENT)
Dept: CASE MANAGEMENT | Age: 72
End: 2017-04-12

## 2017-04-13 ENCOUNTER — CARE COORDINATION (OUTPATIENT)
Dept: CASE MANAGEMENT | Age: 72
End: 2017-04-13

## 2017-04-14 ENCOUNTER — HOSPITAL ENCOUNTER (OUTPATIENT)
Dept: PHYSICAL THERAPY | Age: 72
Setting detail: THERAPIES SERIES
Discharge: HOME | End: 2017-04-14
Payer: MEDICARE

## 2017-04-14 PROCEDURE — 97110 THERAPEUTIC EXERCISES: CPT

## 2017-04-18 ENCOUNTER — HOSPITAL ENCOUNTER (OUTPATIENT)
Dept: PHYSICAL THERAPY | Age: 72
Setting detail: THERAPIES SERIES
Discharge: HOME | End: 2017-04-18
Payer: MEDICARE

## 2017-04-18 ENCOUNTER — CARE COORDINATION (OUTPATIENT)
Dept: CASE MANAGEMENT | Age: 72
End: 2017-04-18

## 2017-04-18 PROCEDURE — 97110 THERAPEUTIC EXERCISES: CPT

## 2017-04-19 ENCOUNTER — CARE COORDINATION (OUTPATIENT)
Dept: CASE MANAGEMENT | Age: 72
End: 2017-04-19

## 2017-04-20 ENCOUNTER — CARE COORDINATION (OUTPATIENT)
Dept: CARE COORDINATION | Age: 72
End: 2017-04-20

## 2017-04-20 ENCOUNTER — CARE COORDINATION (OUTPATIENT)
Dept: CASE MANAGEMENT | Age: 72
End: 2017-04-20

## 2017-04-20 ENCOUNTER — APPOINTMENT (OUTPATIENT)
Dept: PHYSICAL THERAPY | Age: 72
End: 2017-04-20
Payer: MEDICARE

## 2017-04-21 ENCOUNTER — HOSPITAL ENCOUNTER (OUTPATIENT)
Dept: PHYSICAL THERAPY | Age: 72
Setting detail: THERAPIES SERIES
Discharge: HOME | End: 2017-04-21
Payer: MEDICARE

## 2017-04-21 PROCEDURE — 97110 THERAPEUTIC EXERCISES: CPT

## 2017-04-24 ENCOUNTER — TELEPHONE (OUTPATIENT)
Dept: FAMILY MEDICINE CLINIC | Age: 72
End: 2017-04-24

## 2017-04-24 DIAGNOSIS — Z12.31 VISIT FOR SCREENING MAMMOGRAM: Primary | ICD-10-CM

## 2017-04-25 ENCOUNTER — HOSPITAL ENCOUNTER (OUTPATIENT)
Dept: PHYSICAL THERAPY | Age: 72
Setting detail: THERAPIES SERIES
Discharge: HOME | End: 2017-04-25
Payer: MEDICARE

## 2017-04-25 PROCEDURE — 97110 THERAPEUTIC EXERCISES: CPT

## 2017-04-26 ENCOUNTER — HOSPITAL ENCOUNTER (OUTPATIENT)
Dept: MAMMOGRAPHY | Age: 72
Discharge: HOME | End: 2017-04-26
Payer: MEDICARE

## 2017-04-26 DIAGNOSIS — Z12.31 VISIT FOR SCREENING MAMMOGRAM: ICD-10-CM

## 2017-04-26 PROCEDURE — G0202 SCR MAMMO BI INCL CAD: HCPCS

## 2017-04-28 ENCOUNTER — HOSPITAL ENCOUNTER (OUTPATIENT)
Dept: PHYSICAL THERAPY | Age: 72
Setting detail: THERAPIES SERIES
Discharge: HOME | End: 2017-04-28
Payer: MEDICARE

## 2017-04-28 PROCEDURE — G8979 MOBILITY GOAL STATUS: HCPCS

## 2017-04-28 PROCEDURE — G8980 MOBILITY D/C STATUS: HCPCS

## 2017-04-28 PROCEDURE — 97110 THERAPEUTIC EXERCISES: CPT

## 2017-08-25 ENCOUNTER — OFFICE VISIT (OUTPATIENT)
Dept: FAMILY MEDICINE CLINIC | Age: 72
End: 2017-08-25
Payer: MEDICARE

## 2017-08-25 VITALS
BODY MASS INDEX: 30.48 KG/M2 | SYSTOLIC BLOOD PRESSURE: 138 MMHG | DIASTOLIC BLOOD PRESSURE: 60 MMHG | HEIGHT: 63 IN | HEART RATE: 57 BPM | WEIGHT: 172 LBS

## 2017-08-25 DIAGNOSIS — M81.0 AGE-RELATED OSTEOPOROSIS WITHOUT CURRENT PATHOLOGICAL FRACTURE: ICD-10-CM

## 2017-08-25 DIAGNOSIS — I35.0 NONRHEUMATIC AORTIC VALVE STENOSIS: ICD-10-CM

## 2017-08-25 DIAGNOSIS — Z11.59 ENCOUNTER FOR HEPATITIS C SCREENING TEST FOR LOW RISK PATIENT: ICD-10-CM

## 2017-08-25 DIAGNOSIS — E55.9 VITAMIN D DEFICIENCY: ICD-10-CM

## 2017-08-25 DIAGNOSIS — E78.2 MIXED HYPERLIPIDEMIA: ICD-10-CM

## 2017-08-25 DIAGNOSIS — I10 ESSENTIAL HYPERTENSION: Primary | ICD-10-CM

## 2017-08-25 DIAGNOSIS — G45.9 TRANSIENT CEREBRAL ISCHEMIA, UNSPECIFIED TYPE: ICD-10-CM

## 2017-08-25 PROCEDURE — 4040F PNEUMOC VAC/ADMIN/RCVD: CPT | Performed by: INTERNAL MEDICINE

## 2017-08-25 PROCEDURE — 1090F PRES/ABSN URINE INCON ASSESS: CPT | Performed by: INTERNAL MEDICINE

## 2017-08-25 PROCEDURE — G8427 DOCREV CUR MEDS BY ELIG CLIN: HCPCS | Performed by: INTERNAL MEDICINE

## 2017-08-25 PROCEDURE — 3014F SCREEN MAMMO DOC REV: CPT | Performed by: INTERNAL MEDICINE

## 2017-08-25 PROCEDURE — 1036F TOBACCO NON-USER: CPT | Performed by: INTERNAL MEDICINE

## 2017-08-25 PROCEDURE — 99214 OFFICE O/P EST MOD 30 MIN: CPT | Performed by: INTERNAL MEDICINE

## 2017-08-25 PROCEDURE — G8598 ASA/ANTIPLAT THER USED: HCPCS | Performed by: INTERNAL MEDICINE

## 2017-08-25 PROCEDURE — G8399 PT W/DXA RESULTS DOCUMENT: HCPCS | Performed by: INTERNAL MEDICINE

## 2017-08-25 PROCEDURE — 3017F COLORECTAL CA SCREEN DOC REV: CPT | Performed by: INTERNAL MEDICINE

## 2017-08-25 PROCEDURE — 4005F PHARM THX FOR OP RXD: CPT | Performed by: INTERNAL MEDICINE

## 2017-08-25 PROCEDURE — G8417 CALC BMI ABV UP PARAM F/U: HCPCS | Performed by: INTERNAL MEDICINE

## 2017-08-25 PROCEDURE — 1123F ACP DISCUSS/DSCN MKR DOCD: CPT | Performed by: INTERNAL MEDICINE

## 2017-08-25 RX ORDER — DIVALPROEX SODIUM 500 MG/1
TABLET, DELAYED RELEASE ORAL
Refills: 3 | COMMUNITY
Start: 2017-08-22 | End: 2018-02-09 | Stop reason: ALTCHOICE

## 2017-08-25 ASSESSMENT — ENCOUNTER SYMPTOMS
RHINORRHEA: 0
DIARRHEA: 0
CHEST TIGHTNESS: 0
COUGH: 0
BLOOD IN STOOL: 0
SORE THROAT: 0
NAUSEA: 0
ABDOMINAL PAIN: 0
SHORTNESS OF BREATH: 0
CONSTIPATION: 0

## 2017-08-25 ASSESSMENT — PATIENT HEALTH QUESTIONNAIRE - PHQ9
2. FEELING DOWN, DEPRESSED OR HOPELESS: 1
SUM OF ALL RESPONSES TO PHQ QUESTIONS 1-9: 1
1. LITTLE INTEREST OR PLEASURE IN DOING THINGS: 0
SUM OF ALL RESPONSES TO PHQ9 QUESTIONS 1 & 2: 1

## 2017-08-26 LAB
EKG ATRIAL RATE: 68 BPM
EKG P AXIS: 52 DEGREES
EKG P-R INTERVAL: 158 MS
EKG Q-T INTERVAL: 406 MS
EKG QRS DURATION: 82 MS
EKG QTC CALCULATION (BAZETT): 431 MS
EKG R AXIS: 7 DEGREES
EKG T AXIS: 55 DEGREES
EKG VENTRICULAR RATE: 68 BPM

## 2017-09-01 DIAGNOSIS — S39.012A BACK STRAIN, INITIAL ENCOUNTER: Primary | ICD-10-CM

## 2017-09-01 RX ORDER — PREDNISONE 20 MG/1
TABLET ORAL
Qty: 15 TABLET | Refills: 0 | Status: SHIPPED | OUTPATIENT
Start: 2017-09-01 | End: 2017-09-11

## 2017-09-11 ENCOUNTER — HOSPITAL ENCOUNTER (OUTPATIENT)
Age: 72
Discharge: HOME | End: 2017-09-11
Attending: INTERNAL MEDICINE
Payer: MEDICARE

## 2017-09-11 ENCOUNTER — HOSPITAL ENCOUNTER (OUTPATIENT)
Dept: GENERAL RADIOLOGY | Age: 72
Discharge: HOME | End: 2017-09-11
Attending: INTERNAL MEDICINE
Payer: MEDICARE

## 2017-09-11 ENCOUNTER — HOSPITAL ENCOUNTER (OUTPATIENT)
Dept: NON INVASIVE DIAGNOSTICS | Age: 72
Discharge: HOME | End: 2017-09-11
Attending: INTERNAL MEDICINE | Admitting: INTERNAL MEDICINE
Payer: MEDICARE

## 2017-09-11 DIAGNOSIS — I35.0 NONRHEUMATIC AORTIC VALVE STENOSIS: ICD-10-CM

## 2017-09-11 DIAGNOSIS — E55.9 VITAMIN D DEFICIENCY: ICD-10-CM

## 2017-09-11 DIAGNOSIS — E78.2 MIXED HYPERLIPIDEMIA: ICD-10-CM

## 2017-09-11 DIAGNOSIS — I10 ESSENTIAL HYPERTENSION: ICD-10-CM

## 2017-09-11 DIAGNOSIS — R01.1 CARDIAC MURMUR: ICD-10-CM

## 2017-09-11 LAB
ABSOLUTE EOS #: 0.1 K/UL (ref 0–0.4)
ABSOLUTE LYMPH #: 1.4 K/UL (ref 1–4.8)
ABSOLUTE MONO #: 0.4 K/UL (ref 0–1)
ALBUMIN SERPL-MCNC: 4 G/DL (ref 3.5–5.2)
ALBUMIN/GLOBULIN RATIO: ABNORMAL (ref 1–2.5)
ALP BLD-CCNC: 49 U/L (ref 35–104)
ALT SERPL-CCNC: 9 U/L (ref 5–33)
ANION GAP SERPL CALCULATED.3IONS-SCNC: 9 MMOL/L (ref 9–17)
AST SERPL-CCNC: 15 U/L
BASOPHILS # BLD: 1 %
BASOPHILS ABSOLUTE: 0 K/UL (ref 0–0.2)
BILIRUB SERPL-MCNC: 0.37 MG/DL (ref 0.3–1.2)
BUN BLDV-MCNC: 25 MG/DL (ref 8–23)
BUN/CREAT BLD: 30 (ref 9–20)
CALCIUM SERPL-MCNC: 9.4 MG/DL (ref 8.6–10.4)
CHLORIDE BLD-SCNC: 102 MMOL/L (ref 98–107)
CHOLESTEROL/HDL RATIO: 3
CHOLESTEROL: 164 MG/DL
CO2: 31 MMOL/L (ref 20–31)
CREAT SERPL-MCNC: 0.84 MG/DL (ref 0.5–0.9)
DIFFERENTIAL TYPE: YES
EOSINOPHILS RELATIVE PERCENT: 2 %
GFR AFRICAN AMERICAN: >60 ML/MIN
GFR NON-AFRICAN AMERICAN: >60 ML/MIN
GFR SERPL CREATININE-BSD FRML MDRD: ABNORMAL ML/MIN/{1.73_M2}
GFR SERPL CREATININE-BSD FRML MDRD: ABNORMAL ML/MIN/{1.73_M2}
GLUCOSE BLD-MCNC: 100 MG/DL (ref 70–99)
HCT VFR BLD CALC: 36.5 % (ref 36–46)
HDLC SERPL-MCNC: 55 MG/DL
HEMOGLOBIN: 12.3 G/DL (ref 12–16)
LDL CHOLESTEROL: 94 MG/DL (ref 0–130)
LV EF: 50 %
LVEF MODALITY: NORMAL
LYMPHOCYTES # BLD: 27 %
MAGNESIUM: 1.9 MG/DL (ref 1.6–2.6)
MCH RBC QN AUTO: 29.7 PG (ref 26–34)
MCHC RBC AUTO-ENTMCNC: 33.7 G/DL (ref 31–37)
MCV RBC AUTO: 88.2 FL (ref 80–100)
MONOCYTES # BLD: 8 %
PATIENT FASTING?: YES
PDW BLD-RTO: 13.6 % (ref 12.1–15.2)
PLATELET # BLD: 155 K/UL (ref 140–450)
PLATELET ESTIMATE: NORMAL
PMV BLD AUTO: NORMAL FL (ref 6–12)
POTASSIUM SERPL-SCNC: 4.5 MMOL/L (ref 3.7–5.3)
RBC # BLD: 4.14 M/UL (ref 4–5.2)
RBC # BLD: NORMAL 10*6/UL
SEG NEUTROPHILS: 62 %
SEGMENTED NEUTROPHILS ABSOLUTE COUNT: 3.2 K/UL (ref 2.5–7)
SODIUM BLD-SCNC: 142 MMOL/L (ref 135–144)
TOTAL PROTEIN: 6.9 G/DL (ref 6.4–8.3)
TRIGL SERPL-MCNC: 73 MG/DL
TSH SERPL DL<=0.05 MIU/L-ACNC: 1.31 MIU/L (ref 0.3–5)
VITAMIN D 25-HYDROXY: 44.1 NG/ML (ref 30–100)
VLDLC SERPL CALC-MCNC: NORMAL MG/DL (ref 1–30)
WBC # BLD: 5.2 K/UL (ref 3.5–11)
WBC # BLD: NORMAL 10*3/UL

## 2017-09-11 PROCEDURE — 82306 VITAMIN D 25 HYDROXY: CPT

## 2017-09-11 PROCEDURE — 71020 XR CHEST STANDARD TWO VW: CPT

## 2017-09-11 PROCEDURE — 84443 ASSAY THYROID STIM HORMONE: CPT

## 2017-09-11 PROCEDURE — 93306 TTE W/DOPPLER COMPLETE: CPT

## 2017-09-11 PROCEDURE — 36415 COLL VENOUS BLD VENIPUNCTURE: CPT

## 2017-09-11 PROCEDURE — 80061 LIPID PANEL: CPT

## 2017-09-11 PROCEDURE — 93005 ELECTROCARDIOGRAM TRACING: CPT

## 2017-09-11 PROCEDURE — 83735 ASSAY OF MAGNESIUM: CPT

## 2017-09-11 PROCEDURE — 85025 COMPLETE CBC W/AUTO DIFF WBC: CPT

## 2017-09-11 PROCEDURE — 80053 COMPREHEN METABOLIC PANEL: CPT

## 2017-09-12 LAB
EKG ATRIAL RATE: 64 BPM
EKG P AXIS: 54 DEGREES
EKG P-R INTERVAL: 156 MS
EKG Q-T INTERVAL: 408 MS
EKG QRS DURATION: 76 MS
EKG QTC CALCULATION (BAZETT): 420 MS
EKG R AXIS: 26 DEGREES
EKG T AXIS: 48 DEGREES
EKG VENTRICULAR RATE: 64 BPM

## 2017-09-14 ENCOUNTER — OFFICE VISIT (OUTPATIENT)
Dept: UROLOGY | Age: 72
End: 2017-09-14
Payer: MEDICARE

## 2017-09-14 ENCOUNTER — HOSPITAL ENCOUNTER (OUTPATIENT)
Age: 72
Setting detail: SPECIMEN
Discharge: HOME | End: 2017-09-14
Payer: MEDICARE

## 2017-09-14 VITALS
DIASTOLIC BLOOD PRESSURE: 62 MMHG | SYSTOLIC BLOOD PRESSURE: 112 MMHG | WEIGHT: 168 LBS | BODY MASS INDEX: 29.77 KG/M2 | HEIGHT: 63 IN

## 2017-09-14 DIAGNOSIS — R31.29 MICROHEMATURIA: ICD-10-CM

## 2017-09-14 DIAGNOSIS — N39.0 FREQUENT UTI: ICD-10-CM

## 2017-09-14 DIAGNOSIS — N39.0 FREQUENT UTI: Primary | ICD-10-CM

## 2017-09-14 LAB
-: ABNORMAL
AMORPHOUS: ABNORMAL
BACTERIA: ABNORMAL
BILIRUBIN URINE: NEGATIVE
CASTS UA: ABNORMAL /LPF
COLOR: YELLOW
COMMENT UA: ABNORMAL
CRYSTALS, UA: ABNORMAL /HPF
EPITHELIAL CELLS UA: ABNORMAL /HPF
GLUCOSE URINE: NEGATIVE
KETONES, URINE: NEGATIVE
LEUKOCYTE ESTERASE, URINE: ABNORMAL
MUCUS: ABNORMAL
NITRITE, URINE: NEGATIVE
OTHER OBSERVATIONS UA: ABNORMAL
PH UA: 6.5 (ref 5–8)
PROTEIN UA: NEGATIVE
RBC UA: ABNORMAL /HPF (ref 0–2)
RENAL EPITHELIAL, UA: ABNORMAL /HPF
SPECIFIC GRAVITY UA: 1.01 (ref 1–1.03)
TRICHOMONAS: ABNORMAL
TURBIDITY: CLEAR
URINE HGB: ABNORMAL
UROBILINOGEN, URINE: NORMAL
WBC UA: ABNORMAL /HPF
YEAST: ABNORMAL

## 2017-09-14 PROCEDURE — 81001 URINALYSIS AUTO W/SCOPE: CPT

## 2017-09-14 PROCEDURE — 3014F SCREEN MAMMO DOC REV: CPT | Performed by: NURSE PRACTITIONER

## 2017-09-14 PROCEDURE — G8399 PT W/DXA RESULTS DOCUMENT: HCPCS | Performed by: NURSE PRACTITIONER

## 2017-09-14 PROCEDURE — 4040F PNEUMOC VAC/ADMIN/RCVD: CPT | Performed by: NURSE PRACTITIONER

## 2017-09-14 PROCEDURE — 1123F ACP DISCUSS/DSCN MKR DOCD: CPT | Performed by: NURSE PRACTITIONER

## 2017-09-14 PROCEDURE — G8427 DOCREV CUR MEDS BY ELIG CLIN: HCPCS | Performed by: NURSE PRACTITIONER

## 2017-09-14 PROCEDURE — 1036F TOBACCO NON-USER: CPT | Performed by: NURSE PRACTITIONER

## 2017-09-14 PROCEDURE — 99213 OFFICE O/P EST LOW 20 MIN: CPT | Performed by: NURSE PRACTITIONER

## 2017-09-14 PROCEDURE — 87086 URINE CULTURE/COLONY COUNT: CPT

## 2017-09-14 PROCEDURE — G8417 CALC BMI ABV UP PARAM F/U: HCPCS | Performed by: NURSE PRACTITIONER

## 2017-09-14 PROCEDURE — 1090F PRES/ABSN URINE INCON ASSESS: CPT | Performed by: NURSE PRACTITIONER

## 2017-09-14 PROCEDURE — 3017F COLORECTAL CA SCREEN DOC REV: CPT | Performed by: NURSE PRACTITIONER

## 2017-09-14 PROCEDURE — G8598 ASA/ANTIPLAT THER USED: HCPCS | Performed by: NURSE PRACTITIONER

## 2017-09-14 ASSESSMENT — ENCOUNTER SYMPTOMS
ABDOMINAL PAIN: 0
VOMITING: 0
SHORTNESS OF BREATH: 0
CONSTIPATION: 0
EYE REDNESS: 0
EYE PAIN: 0
COUGH: 0
NAUSEA: 0
COLOR CHANGE: 0
BACK PAIN: 0
WHEEZING: 0

## 2017-09-15 LAB
CULTURE: NORMAL
CULTURE: NORMAL
Lab: NORMAL
SPECIMEN DESCRIPTION: NORMAL
SPECIMEN DESCRIPTION: NORMAL
STATUS: NORMAL

## 2017-09-18 DIAGNOSIS — Z11.59 ENCOUNTER FOR HEPATITIS C SCREENING TEST FOR LOW RISK PATIENT: ICD-10-CM

## 2017-09-22 ENCOUNTER — OFFICE VISIT (OUTPATIENT)
Dept: CARDIOLOGY CLINIC | Age: 72
End: 2017-09-22
Payer: MEDICARE

## 2017-09-22 VITALS
SYSTOLIC BLOOD PRESSURE: 130 MMHG | OXYGEN SATURATION: 97 % | BODY MASS INDEX: 29.76 KG/M2 | HEART RATE: 72 BPM | DIASTOLIC BLOOD PRESSURE: 60 MMHG | WEIGHT: 168 LBS

## 2017-09-22 DIAGNOSIS — E78.2 MIXED HYPERLIPIDEMIA: ICD-10-CM

## 2017-09-22 DIAGNOSIS — I63.9 CEREBROVASCULAR ACCIDENT (CVA), UNSPECIFIED MECHANISM (HCC): ICD-10-CM

## 2017-09-22 DIAGNOSIS — E55.9 VITAMIN D DEFICIENCY: Primary | ICD-10-CM

## 2017-09-22 DIAGNOSIS — I35.0 NONRHEUMATIC AORTIC VALVE STENOSIS: ICD-10-CM

## 2017-09-22 PROCEDURE — 3014F SCREEN MAMMO DOC REV: CPT | Performed by: INTERNAL MEDICINE

## 2017-09-22 PROCEDURE — 1123F ACP DISCUSS/DSCN MKR DOCD: CPT | Performed by: INTERNAL MEDICINE

## 2017-09-22 PROCEDURE — 3017F COLORECTAL CA SCREEN DOC REV: CPT | Performed by: INTERNAL MEDICINE

## 2017-09-22 PROCEDURE — 1090F PRES/ABSN URINE INCON ASSESS: CPT | Performed by: INTERNAL MEDICINE

## 2017-09-22 PROCEDURE — G8427 DOCREV CUR MEDS BY ELIG CLIN: HCPCS | Performed by: INTERNAL MEDICINE

## 2017-09-22 PROCEDURE — G8399 PT W/DXA RESULTS DOCUMENT: HCPCS | Performed by: INTERNAL MEDICINE

## 2017-09-22 PROCEDURE — G8417 CALC BMI ABV UP PARAM F/U: HCPCS | Performed by: INTERNAL MEDICINE

## 2017-09-22 PROCEDURE — 1036F TOBACCO NON-USER: CPT | Performed by: INTERNAL MEDICINE

## 2017-09-22 PROCEDURE — 99214 OFFICE O/P EST MOD 30 MIN: CPT | Performed by: INTERNAL MEDICINE

## 2017-09-22 PROCEDURE — 4040F PNEUMOC VAC/ADMIN/RCVD: CPT | Performed by: INTERNAL MEDICINE

## 2017-09-22 PROCEDURE — G8598 ASA/ANTIPLAT THER USED: HCPCS | Performed by: INTERNAL MEDICINE

## 2017-11-24 DIAGNOSIS — J43.9 PULMONARY EMPHYSEMA, UNSPECIFIED EMPHYSEMA TYPE (HCC): ICD-10-CM

## 2017-11-27 NOTE — TELEPHONE ENCOUNTER
Health Maintenance   Topic Date Due    Smoker: low dose lung CT screening  04/08/2001    Zostavax vaccine  04/08/2006    Flu vaccine (1) 09/01/2017    Breast cancer screen  04/26/2018    Colon cancer screen colonoscopy  01/10/2019    Lipid screen  09/11/2022    DTaP/Tdap/Td vaccine (2 - Td) 11/08/2022    DEXA (modify frequency per FRAX score)  Completed    Pneumococcal low/med risk  Completed    Hepatitis C screen  Completed             (applicable per patient's age: Cancer Screenings, Depression Screening, Fall Risk Screening, Immunizations)    Hemoglobin A1C (%)   Date Value   04/01/2017 5.8     LDL Cholesterol (mg/dL)   Date Value   09/11/2017 94     LDL Calculated (mg/dL)   Date Value   08/06/2013 83     AST (U/L)   Date Value   09/11/2017 15     ALT (U/L)   Date Value   09/11/2017 9     BUN (mg/dL)   Date Value   09/11/2017 25 (H)      (goal A1C is < 7)   (goal LDL is <100) need 30-50% reduction from baseline     BP Readings from Last 3 Encounters:   09/22/17 130/60   09/14/17 112/62   08/25/17 138/60    (goal /80)      All Future Testing planned in [facility]:  Lab Frequency Next Occurrence   TSH with Reflex Once 10/22/2018   CBC Auto Differential Once 10/22/2018   Comprehensive Metabolic Panel Once 08/83/5682   Vitamin D 25 Hydroxy Once 10/22/2018   Lipid Panel Once 10/22/2018   Magnesium Once 10/22/2018   EKG 12 Lead Once 10/22/2018   ECHO Complete 2D W Doppler W Color Once 10/22/2018   XR CHEST STANDARD (2 VW) Once 10/22/2018       Next Visit Date:  Future Appointments  Date Time Provider Melo Blake   9/14/2018 9:30 AM [facility] ECHO ROOM [facility] ECHO Oscar Troncoso   9/20/2018 1:00 PM Jazlyn Prather MD Will Urol [facility]   9/21/2018 9:30 AM MD Amando Ordonez [facility]            Patient Active Problem List:     Hypertension     Aortic stenosis     Hyperlipidemia     Diverticulosis     Cardiac murmur     Intracranial bleed (HCC)     Vitamin D deficiency     Rectocele

## 2017-12-24 DIAGNOSIS — I10 ESSENTIAL HYPERTENSION: ICD-10-CM

## 2017-12-24 DIAGNOSIS — E78.2 MIXED HYPERLIPIDEMIA: ICD-10-CM

## 2017-12-26 RX ORDER — OLMESARTAN MEDOXOMIL AND HYDROCHLOROTHIAZIDE 20/12.5 20; 12.5 MG/1; MG/1
TABLET ORAL
Qty: 90 TABLET | Refills: 3 | Status: SHIPPED | OUTPATIENT
Start: 2017-12-26 | End: 2018-10-10 | Stop reason: SDUPTHER

## 2017-12-26 RX ORDER — PRAVASTATIN SODIUM 20 MG
20 TABLET ORAL DAILY
Qty: 90 TABLET | Refills: 3 | Status: SHIPPED | OUTPATIENT
Start: 2017-12-26 | End: 2018-10-10 | Stop reason: SDUPTHER

## 2018-01-26 ENCOUNTER — NURSE ONLY (OUTPATIENT)
Dept: FAMILY MEDICINE CLINIC | Age: 73
End: 2018-01-26
Payer: MEDICARE

## 2018-01-26 DIAGNOSIS — Z23 NEED FOR INFLUENZA VACCINATION: Primary | ICD-10-CM

## 2018-01-26 PROCEDURE — G0008 ADMIN INFLUENZA VIRUS VAC: HCPCS | Performed by: INTERNAL MEDICINE

## 2018-01-26 PROCEDURE — 90686 IIV4 VACC NO PRSV 0.5 ML IM: CPT | Performed by: INTERNAL MEDICINE

## 2018-02-09 ENCOUNTER — OFFICE VISIT (OUTPATIENT)
Dept: FAMILY MEDICINE CLINIC | Age: 73
End: 2018-02-09
Payer: MEDICARE

## 2018-02-09 VITALS
BODY MASS INDEX: 28.53 KG/M2 | WEIGHT: 161 LBS | HEART RATE: 67 BPM | DIASTOLIC BLOOD PRESSURE: 58 MMHG | HEIGHT: 63 IN | SYSTOLIC BLOOD PRESSURE: 112 MMHG

## 2018-02-09 DIAGNOSIS — I35.0 NONRHEUMATIC AORTIC VALVE STENOSIS: ICD-10-CM

## 2018-02-09 DIAGNOSIS — E78.2 MIXED HYPERLIPIDEMIA: ICD-10-CM

## 2018-02-09 DIAGNOSIS — J43.9 PULMONARY EMPHYSEMA, UNSPECIFIED EMPHYSEMA TYPE (HCC): ICD-10-CM

## 2018-02-09 DIAGNOSIS — I10 ESSENTIAL HYPERTENSION: Primary | ICD-10-CM

## 2018-02-09 PROCEDURE — G8926 SPIRO NO PERF OR DOC: HCPCS | Performed by: INTERNAL MEDICINE

## 2018-02-09 PROCEDURE — G8399 PT W/DXA RESULTS DOCUMENT: HCPCS | Performed by: INTERNAL MEDICINE

## 2018-02-09 PROCEDURE — 4040F PNEUMOC VAC/ADMIN/RCVD: CPT | Performed by: INTERNAL MEDICINE

## 2018-02-09 PROCEDURE — 3017F COLORECTAL CA SCREEN DOC REV: CPT | Performed by: INTERNAL MEDICINE

## 2018-02-09 PROCEDURE — 1036F TOBACCO NON-USER: CPT | Performed by: INTERNAL MEDICINE

## 2018-02-09 PROCEDURE — G8484 FLU IMMUNIZE NO ADMIN: HCPCS | Performed by: INTERNAL MEDICINE

## 2018-02-09 PROCEDURE — 3014F SCREEN MAMMO DOC REV: CPT | Performed by: INTERNAL MEDICINE

## 2018-02-09 PROCEDURE — G8417 CALC BMI ABV UP PARAM F/U: HCPCS | Performed by: INTERNAL MEDICINE

## 2018-02-09 PROCEDURE — 1090F PRES/ABSN URINE INCON ASSESS: CPT | Performed by: INTERNAL MEDICINE

## 2018-02-09 PROCEDURE — G8599 NO ASA/ANTIPLAT THER USE RNG: HCPCS | Performed by: INTERNAL MEDICINE

## 2018-02-09 PROCEDURE — G8427 DOCREV CUR MEDS BY ELIG CLIN: HCPCS | Performed by: INTERNAL MEDICINE

## 2018-02-09 PROCEDURE — 1123F ACP DISCUSS/DSCN MKR DOCD: CPT | Performed by: INTERNAL MEDICINE

## 2018-02-09 PROCEDURE — 3023F SPIROM DOC REV: CPT | Performed by: INTERNAL MEDICINE

## 2018-02-09 PROCEDURE — 99213 OFFICE O/P EST LOW 20 MIN: CPT | Performed by: INTERNAL MEDICINE

## 2018-02-09 RX ORDER — ALBUTEROL SULFATE 90 UG/1
AEROSOL, METERED RESPIRATORY (INHALATION)
Qty: 8.5 G | Refills: 3 | Status: SHIPPED | OUTPATIENT
Start: 2018-02-09 | End: 2018-06-28 | Stop reason: SDUPTHER

## 2018-02-09 ASSESSMENT — ENCOUNTER SYMPTOMS
BLOOD IN STOOL: 0
SHORTNESS OF BREATH: 0
CHEST TIGHTNESS: 0
COUGH: 0
SORE THROAT: 0
DIARRHEA: 0
ABDOMINAL PAIN: 0
RHINORRHEA: 0
NAUSEA: 0
CONSTIPATION: 0

## 2018-02-09 NOTE — PROGRESS NOTES
Visit Information    Have you changed or started any medications since your last visit including any over-the-counter medicines, vitamins, or herbal medicines? no   Are you having any side effects from any of your medications? -  no  Have you stopped taking any of your medications? Is so, why? -  no    Have you seen any other physician or provider since your last visit? Yes - Records Obtained  Have you had any other diagnostic tests since your last visit? No  Have you been seen in the emergency room and/or had an admission to a hospital since we last saw you? No  Have you had your routine dental cleaning in the past 6 months? no    Have you activated your [employer] account? If not, what are your barriers?  Yes     Patient Care Team:  Makayla Wasserman MD as PCP - General (Internal Medicine)  Makayla Wasserman MD as PCP - [facility] Attributed Provider    Medical History Review  Past Medical, Family, and Social History reviewed and does contribute to the patient presenting condition    Health Maintenance   Topic Date Due    Smoker: low dose lung CT screening  04/08/2001    Zostavax vaccine  04/08/2006    A1C test (Diabetic or Prediabetic)  04/01/2018    Breast cancer screen  04/26/2018    Potassium monitoring  09/11/2018    Creatinine monitoring  09/11/2018    Colon cancer screen colonoscopy  01/10/2019    Lipid screen  09/11/2022    DTaP/Tdap/Td vaccine (2 - Td) 11/08/2022    DEXA (modify frequency per FRAX score)  Completed    Flu vaccine  Completed    Pneumococcal low/med risk  Completed    Hepatitis C screen  Completed

## 2018-02-09 NOTE — PROGRESS NOTES
Subjective:      Patient ID: Levi Oneil is a 71 y.o. female. China Villalba presents for a check up on her medical conditions. China Villalba denies new problems. Medications were reviewed with China Villalba, she is  tolerating the medication. Bowels are regular (with fiber). There has not been rectal bleeding. China Villalba denies urinary complications, the urine stream is good. China Villalba denies chest pain and denies increasing shortness of breath. Labs from 9/17. China Villalba continues to follow with Neurology. She states she has been seizure free since her last evaluation. Her current medication has been making her feel \"bad\" with hair loss. She is going to be changing medication. Geri  Continues to follow with Dr. Raoul Stallings yearly for aortic stenosis.       Lab Results       Component                Value               Date                       NA                       142                 09/11/2017                 K                        4.5                 09/11/2017                 CL                       102                 09/11/2017                 CO2                      31                  09/11/2017                 BUN                      25 (H)              09/11/2017                 CREATININE               0.84                09/11/2017                 GLUCOSE                  100 (H)             09/11/2017                 CALCIUM                  9.4                 09/11/2017                 PROT                     6.9                 09/11/2017                 LABALBU                  4.0                 09/11/2017                 BILITOT                  0.37                09/11/2017                 ALKPHOS                  49                  09/11/2017                 AST                      15                  09/11/2017                 ALT                      9                   09/11/2017                 LABGLOM                  >60                 09/11/2017                 GFRAA aortic valve stenosis  Continue to follow with Dr. Jana Grigsby yearly. 4. Mixed hyperlipidemia  Yvonne Kidd was instructed to continue her current medication regimen. Labs through Dr. Jana Grigsby. Yvonne Kidd was instructed to follow up in the clinic in 6 months for check up or as needed with any medical issues.

## 2018-04-10 ENCOUNTER — HOSPITAL ENCOUNTER (OUTPATIENT)
Dept: VASCULAR LAB | Age: 73
Discharge: HOME | End: 2018-04-12
Payer: MEDICARE

## 2018-04-10 ENCOUNTER — OFFICE VISIT (OUTPATIENT)
Dept: FAMILY MEDICINE CLINIC | Age: 73
End: 2018-04-10
Payer: MEDICARE

## 2018-04-10 VITALS
WEIGHT: 165 LBS | DIASTOLIC BLOOD PRESSURE: 64 MMHG | BODY MASS INDEX: 29.23 KG/M2 | SYSTOLIC BLOOD PRESSURE: 130 MMHG | HEART RATE: 76 BPM | HEIGHT: 63 IN

## 2018-04-10 DIAGNOSIS — M79.605 LEG PAIN, LEFT: Primary | ICD-10-CM

## 2018-04-10 DIAGNOSIS — M79.605 LEFT LEG PAIN: ICD-10-CM

## 2018-04-10 DIAGNOSIS — M79.605 LEG PAIN, LEFT: ICD-10-CM

## 2018-04-10 PROCEDURE — 1036F TOBACCO NON-USER: CPT | Performed by: INTERNAL MEDICINE

## 2018-04-10 PROCEDURE — 1123F ACP DISCUSS/DSCN MKR DOCD: CPT | Performed by: INTERNAL MEDICINE

## 2018-04-10 PROCEDURE — G8599 NO ASA/ANTIPLAT THER USE RNG: HCPCS | Performed by: INTERNAL MEDICINE

## 2018-04-10 PROCEDURE — 3014F SCREEN MAMMO DOC REV: CPT | Performed by: INTERNAL MEDICINE

## 2018-04-10 PROCEDURE — 93971 EXTREMITY STUDY: CPT

## 2018-04-10 PROCEDURE — G8427 DOCREV CUR MEDS BY ELIG CLIN: HCPCS | Performed by: INTERNAL MEDICINE

## 2018-04-10 PROCEDURE — G8399 PT W/DXA RESULTS DOCUMENT: HCPCS | Performed by: INTERNAL MEDICINE

## 2018-04-10 PROCEDURE — 99213 OFFICE O/P EST LOW 20 MIN: CPT | Performed by: INTERNAL MEDICINE

## 2018-04-10 PROCEDURE — 1090F PRES/ABSN URINE INCON ASSESS: CPT | Performed by: INTERNAL MEDICINE

## 2018-04-10 PROCEDURE — G8417 CALC BMI ABV UP PARAM F/U: HCPCS | Performed by: INTERNAL MEDICINE

## 2018-04-10 PROCEDURE — 3017F COLORECTAL CA SCREEN DOC REV: CPT | Performed by: INTERNAL MEDICINE

## 2018-04-10 PROCEDURE — 4040F PNEUMOC VAC/ADMIN/RCVD: CPT | Performed by: INTERNAL MEDICINE

## 2018-04-10 RX ORDER — ESLICARBAZEPINE ACETATE 400 MG/1
TABLET ORAL
Refills: 3 | COMMUNITY
Start: 2018-03-31 | End: 2019-09-23 | Stop reason: ALTCHOICE

## 2018-04-10 ASSESSMENT — ENCOUNTER SYMPTOMS
BLOOD IN STOOL: 0
SHORTNESS OF BREATH: 0
SORE THROAT: 0
NAUSEA: 0
RHINORRHEA: 0
ABDOMINAL PAIN: 0
CONSTIPATION: 0
COUGH: 0
DIARRHEA: 0
CHEST TIGHTNESS: 0

## 2018-04-16 DIAGNOSIS — Z12.31 VISIT FOR SCREENING MAMMOGRAM: Primary | ICD-10-CM

## 2018-04-27 ENCOUNTER — HOSPITAL ENCOUNTER (OUTPATIENT)
Dept: MAMMOGRAPHY | Age: 73
Discharge: HOME | End: 2018-04-29
Payer: MEDICARE

## 2018-04-27 DIAGNOSIS — Z12.31 VISIT FOR SCREENING MAMMOGRAM: ICD-10-CM

## 2018-04-27 PROCEDURE — 77067 SCR MAMMO BI INCL CAD: CPT

## 2018-06-28 DIAGNOSIS — J43.9 PULMONARY EMPHYSEMA, UNSPECIFIED EMPHYSEMA TYPE (HCC): ICD-10-CM

## 2018-06-28 RX ORDER — ALBUTEROL SULFATE 90 UG/1
AEROSOL, METERED RESPIRATORY (INHALATION)
Qty: 8.5 G | Refills: 3 | Status: SHIPPED | OUTPATIENT
Start: 2018-06-28 | End: 2020-02-12 | Stop reason: SDUPTHER

## 2018-08-09 ENCOUNTER — HOSPITAL ENCOUNTER (OUTPATIENT)
Age: 73
Discharge: HOME | End: 2018-08-09
Payer: MEDICARE

## 2018-08-09 ENCOUNTER — OFFICE VISIT (OUTPATIENT)
Dept: FAMILY MEDICINE CLINIC | Age: 73
End: 2018-08-09
Payer: MEDICARE

## 2018-08-09 VITALS
BODY MASS INDEX: 29.06 KG/M2 | DIASTOLIC BLOOD PRESSURE: 56 MMHG | HEART RATE: 60 BPM | SYSTOLIC BLOOD PRESSURE: 137 MMHG | WEIGHT: 164 LBS | HEIGHT: 63 IN

## 2018-08-09 DIAGNOSIS — I35.0 NONRHEUMATIC AORTIC VALVE STENOSIS: ICD-10-CM

## 2018-08-09 DIAGNOSIS — E55.9 VITAMIN D DEFICIENCY: ICD-10-CM

## 2018-08-09 DIAGNOSIS — G40.909 SEIZURE DISORDER (HCC): ICD-10-CM

## 2018-08-09 DIAGNOSIS — E78.2 MIXED HYPERLIPIDEMIA: ICD-10-CM

## 2018-08-09 DIAGNOSIS — Z12.11 COLON CANCER SCREENING: Primary | ICD-10-CM

## 2018-08-09 DIAGNOSIS — Z01.818 PRE-OP TESTING: ICD-10-CM

## 2018-08-09 LAB
EKG ATRIAL RATE: 63 BPM
EKG P AXIS: 68 DEGREES
EKG P-R INTERVAL: 192 MS
EKG Q-T INTERVAL: 388 MS
EKG QRS DURATION: 80 MS
EKG QTC CALCULATION (BAZETT): 397 MS
EKG R AXIS: 11 DEGREES
EKG T AXIS: 12 DEGREES
EKG VENTRICULAR RATE: 63 BPM

## 2018-08-09 PROCEDURE — 99214 OFFICE O/P EST MOD 30 MIN: CPT | Performed by: INTERNAL MEDICINE

## 2018-08-09 PROCEDURE — G8427 DOCREV CUR MEDS BY ELIG CLIN: HCPCS | Performed by: INTERNAL MEDICINE

## 2018-08-09 PROCEDURE — 1090F PRES/ABSN URINE INCON ASSESS: CPT | Performed by: INTERNAL MEDICINE

## 2018-08-09 PROCEDURE — G8599 NO ASA/ANTIPLAT THER USE RNG: HCPCS | Performed by: INTERNAL MEDICINE

## 2018-08-09 PROCEDURE — 1036F TOBACCO NON-USER: CPT | Performed by: INTERNAL MEDICINE

## 2018-08-09 PROCEDURE — 3017F COLORECTAL CA SCREEN DOC REV: CPT | Performed by: INTERNAL MEDICINE

## 2018-08-09 PROCEDURE — 93005 ELECTROCARDIOGRAM TRACING: CPT

## 2018-08-09 PROCEDURE — 1101F PT FALLS ASSESS-DOCD LE1/YR: CPT | Performed by: INTERNAL MEDICINE

## 2018-08-09 PROCEDURE — 4040F PNEUMOC VAC/ADMIN/RCVD: CPT | Performed by: INTERNAL MEDICINE

## 2018-08-09 PROCEDURE — G8417 CALC BMI ABV UP PARAM F/U: HCPCS | Performed by: INTERNAL MEDICINE

## 2018-08-09 PROCEDURE — G8399 PT W/DXA RESULTS DOCUMENT: HCPCS | Performed by: INTERNAL MEDICINE

## 2018-08-09 PROCEDURE — 1123F ACP DISCUSS/DSCN MKR DOCD: CPT | Performed by: INTERNAL MEDICINE

## 2018-08-09 RX ORDER — SODIUM, POTASSIUM,MAG SULFATES 17.5-3.13G
SOLUTION, RECONSTITUTED, ORAL ORAL
Qty: 1 BOTTLE | Refills: 0
Start: 2018-08-09 | End: 2018-09-20 | Stop reason: ALTCHOICE

## 2018-08-09 ASSESSMENT — PATIENT HEALTH QUESTIONNAIRE - PHQ9
2. FEELING DOWN, DEPRESSED OR HOPELESS: 0
SUM OF ALL RESPONSES TO PHQ9 QUESTIONS 1 & 2: 0
SUM OF ALL RESPONSES TO PHQ QUESTIONS 1-9: 0
1. LITTLE INTEREST OR PLEASURE IN DOING THINGS: 0
SUM OF ALL RESPONSES TO PHQ QUESTIONS 1-9: 0

## 2018-08-09 ASSESSMENT — ENCOUNTER SYMPTOMS
DIARRHEA: 0
SORE THROAT: 0
SHORTNESS OF BREATH: 0
ABDOMINAL PAIN: 0
RHINORRHEA: 0
CHEST TIGHTNESS: 0
BLOOD IN STOOL: 0
NAUSEA: 0
COUGH: 0
CONSTIPATION: 0

## 2018-08-09 NOTE — PROGRESS NOTES
Subjective:      Patient ID: Jessica Shepherd is a 68 y.o. female. Jones Matos presents for a check up on her medical conditions. Jones Matos denies new problems. Medications were reviewed with Jones Matos, she is  tolerating the medication. Bowels are regular. Using prune juice daily. There has not been rectal bleeding. Jones Matos denies urinary complications, the urine stream is good. Jones Matos denies chest pain and denies increasing shortness of breath. Jones Matos a patient of mine presents to be evaluated for a colonoscopy. Jones Matos is 68 y.o. and has had a colonoscopy in the past.  There is not a history of colon polyps or colon cancer. Currently Jones Matos denies rectal bleeding, denies bowel habit changes, and denies abdominal pain. There is not a family history of colon cancer.     Lab Results       Component                Value               Date                       NA                       142                 09/11/2017                 K                        4.5                 09/11/2017                 CL                       102                 09/11/2017                 CO2                      31                  09/11/2017                 BUN                      25 (H)              09/11/2017                 CREATININE               0.84                09/11/2017                 GLUCOSE                  100 (H)             09/11/2017                 CALCIUM                  9.4                 09/11/2017                 PROT                     6.9                 09/11/2017                 LABALBU                  4.0                 09/11/2017                 BILITOT                  0.37                09/11/2017                 ALKPHOS                  49                  09/11/2017                 AST                      15                  09/11/2017                 ALT                      9                   09/11/2017                 LABGLOM                  >60                 09/11/2017 Results       Component                Value               Date                       VLDL                     NOT REPORTED        09/11/2017                 VLDL                     NOT REPORTED        04/01/2017                 VLDL                     NOT REPORTED        04/01/2017            Lab Results       Component                Value               Date                       CHOLHDLRATIO             3.0                 09/11/2017                 CHOLHDLRATIO             3.1                 04/01/2017                 CHOLHDLRATIO                                 04/01/2017             CANNOT BE CALCULATED                    Hypertension   This is a chronic problem. The current episode started more than 1 year ago. The problem is unchanged. The problem is controlled. Pertinent negatives include no chest pain, neck pain, palpitations or shortness of breath. Past treatments include diuretics and angiotensin blockers. The current treatment provides significant improvement. There are no compliance problems. There is no history of angina. Hyperlipidemia   This is a chronic problem. The current episode started more than 1 year ago. The problem is controlled. Recent lipid tests were reviewed and are normal. Pertinent negatives include no chest pain, myalgias or shortness of breath. Current antihyperlipidemic treatment includes statins. The current treatment provides significant improvement of lipids. There are no compliance problems. Review of Systems   Constitutional: Negative. HENT: Negative for congestion, ear pain, rhinorrhea, sneezing and sore throat. Eyes: Negative for visual disturbance. Respiratory: Negative for cough, chest tightness and shortness of breath. Cardiovascular: Negative for chest pain and palpitations. Gastrointestinal: Negative for abdominal pain, blood in stool, constipation, diarrhea and nausea.    Genitourinary: Negative for difficulty urinating, dysuria, frequency, menstrual problem and urgency. Musculoskeletal: Negative for arthralgias, joint swelling, myalgias and neck pain. Skin: Negative. Neurological: Negative for syncope. Psychiatric/Behavioral: Negative. Objective:   Physical Exam   Constitutional: She appears well-developed and well-nourished. She is cooperative. Non-toxic appearance. She does not have a sickly appearance. She does not appear ill. HENT:   Head: Atraumatic. Right Ear: Hearing normal.   Left Ear: Hearing normal.   Nose: Nose normal.   Eyes: Conjunctivae are normal.   Neck: Trachea normal and normal range of motion. Neck supple. Carotid bruit is not present. No thyroid mass present. Cardiovascular: Normal rate, regular rhythm, S1 normal and S2 normal.    Murmur heard. Pulmonary/Chest: Effort normal and breath sounds normal. No respiratory distress. Abdominal: Bowel sounds are normal. There is no tenderness. Musculoskeletal: Normal range of motion. She exhibits no edema. Lymphadenopathy:     She has no cervical adenopathy. Neurological: She is alert. She is not disoriented. Skin: Skin is warm and dry. No rash noted. No pallor. Psychiatric: She has a normal mood and affect. Her speech is normal and behavior is normal. Judgment and thought content normal. Cognition and memory are normal.   Nursing note and vitals reviewed. Assessment:       Diagnosis Orders   1. Colon cancer screening  COLONOSCOPY W/ OR W/O BIOPSY   2. Pre-op testing  EKG 12 Lead   3. Nonrheumatic aortic valve stenosis     4. Vitamin D deficiency     5. Mixed hyperlipidemia     6. Seizure disorder ([address]             Plan:      1. Pre-op testing  ECG prior to the colonoscopy. - EKG 12 Lead    2. Colon cancer screening  Set up for a screening colonoscopy. Risk and benefits explained, informed consent obtained. The bowel prep was explained to Bri Antunez and she was instructed to start the prep the day before the procedure (printed directions were given). Avoid corn 1 week prior to the procedure as this can cause suctioning difficulties during the procedure. Avoid eating or drinking at least 8 hours prior to the procedure. Charbel Bowden was encouraged to call the clinic if she has any questions prior to the procedure. - COLONOSCOPY W/ OR W/O BIOPSY; Future    3. Nonrheumatic aortic valve stenosis  Continue to follow up with Dr. Maral Schmitt yearly. 4. Vitamin D deficiency  Continue on Vitamin D replacement. 5. Mixed hyperlipidemia  Charbel Bowden was instructed to continue her current medication regimen. Fasting labs through Dr. Maral Schmitt. 6. Seizure disorder ([facility] [address]  Continue to follow with Neurology. Follow up after the colonoscopy. Charbel Bowden was instructed to follow up in the clinic in 6 months for check up or as needed with any medical issues.                       Neal Singh MD

## 2018-08-21 ENCOUNTER — ANESTHESIA EVENT (OUTPATIENT)
Dept: OPERATING ROOM | Age: 73
End: 2018-08-21
Payer: MEDICARE

## 2018-08-26 ENCOUNTER — PREP FOR PROCEDURE (OUTPATIENT)
Dept: INTERNAL MEDICINE CLINIC | Age: 73
End: 2018-08-26

## 2018-08-26 RX ORDER — SODIUM CHLORIDE, SODIUM LACTATE, POTASSIUM CHLORIDE, CALCIUM CHLORIDE 600; 310; 30; 20 MG/100ML; MG/100ML; MG/100ML; MG/100ML
INJECTION, SOLUTION INTRAVENOUS CONTINUOUS
Status: CANCELLED | OUTPATIENT
Start: 2018-08-26 | End: 2019-08-26

## 2018-08-28 ENCOUNTER — ANESTHESIA (OUTPATIENT)
Dept: OPERATING ROOM | Age: 73
End: 2018-08-28
Payer: MEDICARE

## 2018-08-28 ENCOUNTER — HOSPITAL ENCOUNTER (OUTPATIENT)
Age: 73
Setting detail: OUTPATIENT SURGERY
Discharge: HOME | End: 2018-08-28
Attending: INTERNAL MEDICINE | Admitting: INTERNAL MEDICINE
Payer: MEDICARE

## 2018-08-28 VITALS
HEIGHT: 63 IN | DIASTOLIC BLOOD PRESSURE: 48 MMHG | HEART RATE: 60 BPM | WEIGHT: 162 LBS | OXYGEN SATURATION: 97 % | RESPIRATION RATE: 16 BRPM | BODY MASS INDEX: 28.7 KG/M2 | SYSTOLIC BLOOD PRESSURE: 135 MMHG | TEMPERATURE: 98.2 F

## 2018-08-28 VITALS — DIASTOLIC BLOOD PRESSURE: 35 MMHG | OXYGEN SATURATION: 94 % | SYSTOLIC BLOOD PRESSURE: 80 MMHG

## 2018-08-28 PROCEDURE — 2580000003 HC RX 258: Performed by: INTERNAL MEDICINE

## 2018-08-28 PROCEDURE — 6360000002 HC RX W HCPCS: Performed by: NURSE ANESTHETIST, CERTIFIED REGISTERED

## 2018-08-28 PROCEDURE — 2709999900 HC NON-CHARGEABLE SUPPLY: Performed by: INTERNAL MEDICINE

## 2018-08-28 PROCEDURE — 7100000011 HC PHASE II RECOVERY - ADDTL 15 MIN: Performed by: INTERNAL MEDICINE

## 2018-08-28 PROCEDURE — 7100000010 HC PHASE II RECOVERY - FIRST 15 MIN: Performed by: INTERNAL MEDICINE

## 2018-08-28 PROCEDURE — 3700000001 HC ADD 15 MINUTES (ANESTHESIA): Performed by: INTERNAL MEDICINE

## 2018-08-28 PROCEDURE — 2500000003 HC RX 250 WO HCPCS: Performed by: NURSE ANESTHETIST, CERTIFIED REGISTERED

## 2018-08-28 PROCEDURE — 88305 TISSUE EXAM BY PATHOLOGIST: CPT

## 2018-08-28 PROCEDURE — 45380 COLONOSCOPY AND BIOPSY: CPT | Performed by: INTERNAL MEDICINE

## 2018-08-28 PROCEDURE — 3609009300 HC COLONOSCOPY BIOPSY/STOMA: Performed by: INTERNAL MEDICINE

## 2018-08-28 PROCEDURE — 3700000000 HC ANESTHESIA ATTENDED CARE: Performed by: INTERNAL MEDICINE

## 2018-08-28 PROCEDURE — 6370000000 HC RX 637 (ALT 250 FOR IP): Performed by: INTERNAL MEDICINE

## 2018-08-28 RX ORDER — 0.9 % SODIUM CHLORIDE 0.9 %
10 VIAL (ML) INJECTION PRN
Status: DISCONTINUED | OUTPATIENT
Start: 2018-08-28 | End: 2018-08-28 | Stop reason: HOSPADM

## 2018-08-28 RX ORDER — 0.9 % SODIUM CHLORIDE 0.9 %
10 VIAL (ML) INJECTION EVERY 12 HOURS SCHEDULED
Status: DISCONTINUED | OUTPATIENT
Start: 2018-08-28 | End: 2018-08-28 | Stop reason: HOSPADM

## 2018-08-28 RX ORDER — PROPOFOL 10 MG/ML
INJECTION, EMULSION INTRAVENOUS PRN
Status: DISCONTINUED | OUTPATIENT
Start: 2018-08-28 | End: 2018-08-28 | Stop reason: SDUPTHER

## 2018-08-28 RX ORDER — PROPOFOL 10 MG/ML
INJECTION, EMULSION INTRAVENOUS CONTINUOUS PRN
Status: DISCONTINUED | OUTPATIENT
Start: 2018-08-28 | End: 2018-08-28 | Stop reason: SDUPTHER

## 2018-08-28 RX ORDER — FENTANYL CITRATE 50 UG/ML
INJECTION, SOLUTION INTRAMUSCULAR; INTRAVENOUS PRN
Status: DISCONTINUED | OUTPATIENT
Start: 2018-08-28 | End: 2018-08-28 | Stop reason: SDUPTHER

## 2018-08-28 RX ORDER — LIDOCAINE HYDROCHLORIDE 20 MG/ML
INJECTION, SOLUTION EPIDURAL; INFILTRATION; INTRACAUDAL; PERINEURAL PRN
Status: DISCONTINUED | OUTPATIENT
Start: 2018-08-28 | End: 2018-08-28 | Stop reason: SDUPTHER

## 2018-08-28 RX ORDER — MIDAZOLAM HYDROCHLORIDE 1 MG/ML
INJECTION INTRAMUSCULAR; INTRAVENOUS PRN
Status: DISCONTINUED | OUTPATIENT
Start: 2018-08-28 | End: 2018-08-28 | Stop reason: SDUPTHER

## 2018-08-28 RX ORDER — SODIUM CHLORIDE, SODIUM LACTATE, POTASSIUM CHLORIDE, CALCIUM CHLORIDE 600; 310; 30; 20 MG/100ML; MG/100ML; MG/100ML; MG/100ML
INJECTION, SOLUTION INTRAVENOUS CONTINUOUS
Status: DISCONTINUED | OUTPATIENT
Start: 2018-08-28 | End: 2018-08-28 | Stop reason: HOSPADM

## 2018-08-28 RX ADMIN — LIDOCAINE HYDROCHLORIDE 20 MG: 20 INJECTION, SOLUTION EPIDURAL; INFILTRATION; INTRACAUDAL; PERINEURAL at 07:37

## 2018-08-28 RX ADMIN — PROPOFOL 100 MCG/KG/MIN: 10 INJECTION, EMULSION INTRAVENOUS at 07:50

## 2018-08-28 RX ADMIN — SODIUM CHLORIDE, POTASSIUM CHLORIDE, SODIUM LACTATE AND CALCIUM CHLORIDE: 600; 310; 30; 20 INJECTION, SOLUTION INTRAVENOUS at 07:36

## 2018-08-28 RX ADMIN — SODIUM CHLORIDE, POTASSIUM CHLORIDE, SODIUM LACTATE AND CALCIUM CHLORIDE: 600; 310; 30; 20 INJECTION, SOLUTION INTRAVENOUS at 07:30

## 2018-08-28 RX ADMIN — PROPOFOL 100 MG: 10 INJECTION, EMULSION INTRAVENOUS at 07:35

## 2018-08-28 RX ADMIN — FENTANYL CITRATE 100 MCG: 50 INJECTION, SOLUTION INTRAMUSCULAR; INTRAVENOUS at 07:35

## 2018-08-28 RX ADMIN — MIDAZOLAM HYDROCHLORIDE 2 MG: 2 INJECTION, SOLUTION INTRAMUSCULAR; INTRAVENOUS at 07:30

## 2018-08-28 RX ADMIN — PROPOFOL 100 MG: 10 INJECTION, EMULSION INTRAVENOUS at 07:50

## 2018-08-28 ASSESSMENT — PAIN SCALES - GENERAL
PAINLEVEL_OUTOF10: 0

## 2018-08-28 ASSESSMENT — PAIN - FUNCTIONAL ASSESSMENT: PAIN_FUNCTIONAL_ASSESSMENT: 0-10

## 2018-08-28 NOTE — OP NOTE
[facility]                              [id number] [address]                                 OPERATIVE REPORT    PATIENT NAME: Porfirio Isaacs                  :        1945  MED REC NO:   [id number]                              ROOM:  ACCOUNT NO:   [de-identified]                           ADMIT DATE: 2018  PROVIDER:     Tristen Young    DATE OF PROCEDURE:  2018    ATTENDING PHYSICIAN:  Jane Mckoy MD    PRIMARY CARE PROVIDER:  Jane Mckoy MD    PROCEDURES:  1.  Video-assisted colonoscopy to the cecum. 2.  Descending colon biopsy with cold cup biopsy forceps. PREOPERATIVE DIAGNOSIS:  Colon cancer screening. POSTOPERATIVE DIAGNOSES:  1. Colon cancer screening. 2.  Nonspecific descending colon colitis. 3.  Severe sigmoid diverticulosis. 4.  External hemorrhoids. ANESTHESIA:  Per Vale Magdaleno CRNA, MAC anesthetic. ASSISTANTS:  Mamie Solis CST, Enoch Vargas RN    PHYSICIAN:  Dr. Roderick Carey. COMPLICATIONS:  None. PROCEDURE NOTE:  The patient was brought to the operating room where  procedure was explained along with possible complications and informed  consent was obtained. She was then taken to the minor procedure room where  continuous cardiopulmonary monitor was placed along with O2 via simple  mask. She was then placed flat in bed, positioned on her left side, made  comfortable, and IV sedation was given per Vale Magdaleno CRNA. Once the  patient was adequately sedated, the colonoscope was inserted into rectum  with air insufflation being performed. Scope was slowly advanced up to the  sigmoid colon where several diverticula were noted, none looked to be  actively inflamed or bleeding. Scope was advanced into the descending  colon where mucosa appeared erythematous and some areas appeared edematous.   Scope was then advanced past the splenic flexure through the transverse  colon which

## 2018-08-28 NOTE — PROGRESS NOTES

## 2018-08-28 NOTE — BRIEF OP NOTE
Brief Postoperative Note  ______________________________________________________________    Patient: Jayna Tucker  YOB: 1945  MRN: [id number]  Date of Procedure: 8/28/2018    Pre-Op Diagnosis:  Colon cancer screening. Post-Op Diagnosis:  Same     Nonspecific descending colon colitis. Severe sigmoid diverticulosis. External hemorrhoids. Procedure(s):  COLONOSCOPY to the Cecum. Descending colon biopsy. Anesthesia:   Karoline Dorsey CRNA. Mac Anesthesia. Surgeon(s):  Haley Mak MD    Staff:  [employer]: Deja Katz CST. Rosalie Clancy RN.     Estimated Blood Loss: * No values recorded between 8/28/2018  7:38 AM and 8/28/2018  8:11 AM * None    Complications: None    Specimens:   ID Type Source Tests Collected by Time Destination   A :  Tissue Colon-Descending SURGICAL PATHOLOGY Haley Mak MD 8/28/2018 3572        Implants:  * No implants in log *      Drains:          Haley Mak MD  Date: 8/28/2018  Time: 8:11 AM

## 2018-08-28 NOTE — ANESTHESIA PRE PROCEDURE
Department of Anesthesiology  Preprocedure Note       Name:  Meryl Okeefe   Age:  68 y.o.  :  1945                                          MRN:  [id number]         Date:  2018      Surgeon: Jeremy Mcpherson):  Jimi Young MD    Procedure: Procedure(s):  COLONOSCOPY    Medications prior to admission:   Prior to Admission medications    Medication Sig Start Date End Date Taking? Authorizing Provider   Na Sulfate-K Sulfate-Mg Sulf (SUPREP BOWEL PREP KIT) 17.5-3.13-1.6 GM/180ML SOLN Use as directed. 18  Yes Audi Young MD   albuterol sulfate HFA (PROAIR HFA) 108 (90 Base) MCG/ACT inhaler Inhale 2 puffs 4 times daily  Patient taking differently: every 6 hours as needed Inhale 2 puffs 4 times daily 18  Yes Audi Young MD   APTIOM 400 MG TABS TAKE 1 TABLET BY MOUTH ONCE DAILY 3/31/18  Yes Historical Provider, MD   pravastatin (PRAVACHOL) 20 MG tablet Take 1 tablet by mouth daily 17  Yes Audi Young MD   olmesartan-hydrochlorothiazide (BENICAR HCT) 20-12.5 MG per tablet TAKE 1 TABLET EVERY DAY 17  Yes Audi Young MD   acetaminophen (TYLENOL) 500 MG tablet Take 500 mg by mouth as needed for Pain   Yes Historical Provider, MD   Cholecalciferol (VITAMIN D3) 2000 UNITS CAPS Take 2,000 Units by mouth every 7 days   Yes Historical Provider, MD   Cranberry 400 MG CAPS Take 400 mg by mouth daily (with breakfast)   Yes Historical Provider, MD   aspirin 325 MG EC tablet Take 1 tablet by mouth daily 4/3/17  Yes Ruth Morgan MD   triamcinolone (KENALOG) 0.1 % cream Apply topically 2 times daily as needed Apply topically 2 times daily.     Historical Provider, MD   Psyllium (METAMUCIL PO) Take by mouth daily as needed     Historical Provider, MD       Current medications:    Current Facility-Administered Medications   Medication Dose Route Frequency Provider Last Rate Last Dose    lactated ringers infusion   Intravenous Continuous Audi Young MD        sodium chloride (PF) 0.9 % injection 10 mL  10 mL Intravenous 2 times per day Haley Mak MD        sodium chloride (PF) 0.9 % injection 10 mL  10 mL Intravenous PRN Audi Young MD           Allergies:  No Known Allergies    Problem List:    Patient Active Problem List   Diagnosis Code    Hypertension I10    Aortic stenosis I35.0    Hyperlipidemia E78.5    Diverticulosis K57.90    Intracranial bleed (HCC) I62.9    Vitamin D deficiency E55.9    Rectocele N81.6    Osteoporosis M81.0    History of DVT (deep vein thrombosis) Z86.718    Cavernoma D18.01    TIA (transient ischemic attack) G45.9    Cerebrovascular accident (CVA) ([address] I63.9    Thalamic infarction ([address] I63.9    Paresthesia R20.2    Microhematuria R31.29    Seizure disorder ([address] G40.909       Past Medical History:        Diagnosis Date    Aortic stenosis     Arthritis     Benign cyst of right breast in female     Cardiac murmur     Cataract     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    Deep vein blood clot of left lower extremity ([address] 2016    Diverticulosis     Head injury     Hx of blood clots     Hyperlipidemia     Hypertension     Migraine headache     Seizures ([address]     Stroke (cerebrum) ([address]     Unspecified cerebral artery occlusion with cerebral infarction     TIA       Past Surgical History:        Procedure Laterality Date    BLEPHAROPLASTY      BREAST SURGERY      b/l breast cyst removal     CATARACT REMOVAL      COLONOSCOPY  2009    HYSTERECTOMY      MAMMO IMPLANT DIGITAL DIAG BI      TUBAL LIGATION         Social History:    Social History   Substance Use Topics    Smoking status: Former Smoker     Packs/day: 1.00     Years: 30.00     Quit date: 1/1/2005    Smokeless tobacco: Never Used    Alcohol use No                                Counseling given: Not Answered      Vital Signs (Current):   Vitals:    08/28/18 0634   BP: (!) 121/44   Pulse: 77   Resp: 16   Temp: 36.8 °C (98.2 °F)   SpO2: 94%   Weight: 162 lb (73.5 kg)   Height: 5' 3\" (1.6 m)                                              BP Readings from Last 3 Encounters:   08/28/18 (!) 121/44   08/09/18 (!) 137/56   04/10/18 130/64       NPO Status: Time of last liquid consumption: 2200                        Time of last solid consumption: 0900                        Date of last liquid consumption: 08/27/18                        Date of last solid food consumption: 08/27/18    BMI:   Wt Readings from Last 3 Encounters:   08/28/18 162 lb (73.5 kg)   08/09/18 164 lb (74.4 kg)   04/10/18 165 lb (74.8 kg)     Body mass index is 28.7 kg/m². CBC:   Lab Results   Component Value Date    WBC 5.2 09/11/2017    RBC 4.14 09/11/2017    HGB 12.3 09/11/2017    HCT 36.5 09/11/2017    MCV 88.2 09/11/2017    RDW 13.6 09/11/2017     09/11/2017       CMP:   Lab Results   Component Value Date     09/11/2017    K 4.5 09/11/2017     09/11/2017    CO2 31 09/11/2017    BUN 25 09/11/2017    CREATININE 0.84 09/11/2017    GFRAA >60 09/11/2017    LABGLOM >60 09/11/2017    GLUCOSE 100 09/11/2017    GLUCOSE 96 10/14/2011    PROT 6.9 09/11/2017    CALCIUM 9.4 09/11/2017    BILITOT 0.37 09/11/2017    ALKPHOS 49 09/11/2017    AST 15 09/11/2017    ALT 9 09/11/2017       POC Tests: No results for input(s): POCGLU, POCNA, POCK, POCCL, POCBUN, POCHEMO, POCHCT in the last 72 hours.     Coags:   Lab Results   Component Value Date    PROTIME 10.7 03/31/2017    INR 1.0 03/31/2017    APTT 24.2 03/31/2017       HCG (If Applicable): No results found for: PREGTESTUR, PREGSERUM, HCG, HCGQUANT     ABGs: No results found for: PHART, PO2ART, FJZ2CCY, UQR9UEP, BEART, J4RSGHDJ     Type & Screen (If Applicable):  No results found for: LABABO, LABRH    Anesthesia Evaluation    Airway: Mallampati: II        Dental:          Pulmonary:Negative Pulmonary ROS breath sounds clear to auscultation                             Cardiovascular:            Rhythm: regular  Rate: normal                    Neuro/Psych:

## 2018-08-28 NOTE — H&P
09/11/2017                 GFRAA                    >60                 09/11/2017               Lab Results       Component                Value               Date                       LABA1C                   5.8                 04/01/2017            Lab Results       Component                Value               Date                       EAG                      120                 04/01/2017               Lab Results       Component                Value               Date                       CHOL                     164                 09/11/2017                 CHOL                     146                 04/01/2017                 CHOL                                         04/01/2017                        DISREGARD RESULTS. SPECIMEN SUBMITTED WAS INCORRECTLY IDENTIFIED. TEST CREDITED. Lab Results       Component                Value               Date                       TRIG                     73                  09/11/2017                 TRIG                     82                  04/01/2017                 TRIG                                         04/01/2017                        DISREGARD RESULTS. SPECIMEN SUBMITTED WAS INCORRECTLY IDENTIFIED. TEST CREDITED. Lab Results       Component                Value               Date                       HDL                      55                  09/11/2017                 HDL                      47                  04/01/2017                 HDL                                          04/01/2017                        DISREGARD RESULTS. SPECIMEN SUBMITTED WAS INCORRECTLY IDENTIFIED. TEST CREDITED.   Lab Results       Component                Value               Date                       LDLCHOLESTEROL           94                  09/11/2017                 LDLCHOLESTEROL           83                  04/01/2017                 LDLCHOLESTEROL                               04/01/2017                        CANNOT BE CALCULATED       LDLCALC nausea. Genitourinary: Negative for difficulty urinating, dysuria, frequency, menstrual problem and urgency. Musculoskeletal: Negative for arthralgias, joint swelling, myalgias and neck pain. Skin: Negative. Neurological: Negative for syncope. Psychiatric/Behavioral: Negative.          Objective:   Physical Exam   Constitutional: She appears well-developed and well-nourished. She is cooperative. Non-toxic appearance. She does not have a sickly appearance. She does not appear ill. HENT:   Head: Atraumatic. Right Ear: Hearing normal.   Left Ear: Hearing normal.   Nose: Nose normal.   Eyes: Conjunctivae are normal.   Neck: Trachea normal and normal range of motion. Neck supple. Carotid bruit is not present. No thyroid mass present. Cardiovascular: Normal rate, regular rhythm, S1 normal and S2 normal.    Murmur heard. Pulmonary/Chest: Effort normal and breath sounds normal. No respiratory distress. Abdominal: Bowel sounds are normal. There is no tenderness. Musculoskeletal: Normal range of motion. She exhibits no edema. Lymphadenopathy:     She has no cervical adenopathy. Neurological: She is alert. She is not disoriented. Skin: Skin is warm and dry. No rash noted. No pallor. Psychiatric: She has a normal mood and affect. Her speech is normal and behavior is normal. Judgment and thought content normal. Cognition and memory are normal.   Nursing note and vitals reviewed.        Assessment:     Diagnosis Orders   1. Colon cancer screening  COLONOSCOPY W/ OR W/O BIOPSY   2. Pre-op testing  EKG 12 Lead   3. Nonrheumatic aortic valve stenosis      4. Vitamin D deficiency      5. Mixed hyperlipidemia      6. Seizure disorder ([address]                          Plan:   1. Pre-op testing  ECG prior to the colonoscopy. - EKG 12 Lead     2. Colon cancer screening  Set up for a screening colonoscopy. Risk and benefits explained, informed consent obtained.   The bowel prep was explained to [place] and she

## 2018-08-30 DIAGNOSIS — K52.9 COLITIS: Primary | ICD-10-CM

## 2018-08-30 LAB — SURGICAL PATHOLOGY REPORT: NORMAL

## 2018-08-30 RX ORDER — PREDNISONE 20 MG/1
TABLET ORAL
Qty: 18 TABLET | Refills: 0 | Status: SHIPPED | OUTPATIENT
Start: 2018-08-30 | End: 2018-09-20 | Stop reason: ALTCHOICE

## 2018-09-14 ENCOUNTER — HOSPITAL ENCOUNTER (OUTPATIENT)
Age: 73
Discharge: HOME | End: 2018-09-14
Payer: MEDICARE

## 2018-09-14 ENCOUNTER — HOSPITAL ENCOUNTER (OUTPATIENT)
Dept: GENERAL RADIOLOGY | Age: 73
Discharge: HOME | End: 2018-09-16
Payer: MEDICARE

## 2018-09-14 ENCOUNTER — HOSPITAL ENCOUNTER (OUTPATIENT)
Age: 73
Discharge: HOME | End: 2018-09-16
Payer: MEDICARE

## 2018-09-14 ENCOUNTER — HOSPITAL ENCOUNTER (OUTPATIENT)
Dept: NON INVASIVE DIAGNOSTICS | Age: 73
Discharge: HOME | End: 2018-09-14
Payer: MEDICARE

## 2018-09-14 DIAGNOSIS — I35.0 NONRHEUMATIC AORTIC VALVE STENOSIS: ICD-10-CM

## 2018-09-14 DIAGNOSIS — E78.2 MIXED HYPERLIPIDEMIA: ICD-10-CM

## 2018-09-14 DIAGNOSIS — E55.9 VITAMIN D DEFICIENCY: ICD-10-CM

## 2018-09-14 DIAGNOSIS — I63.9 CEREBROVASCULAR ACCIDENT (CVA), UNSPECIFIED MECHANISM (HCC): ICD-10-CM

## 2018-09-14 LAB
ABSOLUTE EOS #: 0.1 K/UL (ref 0–0.4)
ABSOLUTE IMMATURE GRANULOCYTE: ABNORMAL K/UL (ref 0–0.3)
ABSOLUTE LYMPH #: 1.4 K/UL (ref 1–4.8)
ABSOLUTE MONO #: 0.6 K/UL (ref 0–1)
ALBUMIN SERPL-MCNC: 3.9 G/DL (ref 3.5–5.2)
ALBUMIN/GLOBULIN RATIO: ABNORMAL (ref 1–2.5)
ALP BLD-CCNC: 60 U/L (ref 35–104)
ALT SERPL-CCNC: 11 U/L (ref 5–33)
ANION GAP SERPL CALCULATED.3IONS-SCNC: 10 MMOL/L (ref 9–17)
AST SERPL-CCNC: 14 U/L
BASOPHILS # BLD: 1 % (ref 0–2)
BASOPHILS ABSOLUTE: 0 K/UL (ref 0–0.2)
BILIRUB SERPL-MCNC: 0.66 MG/DL (ref 0.3–1.2)
BUN BLDV-MCNC: 18 MG/DL (ref 8–23)
BUN/CREAT BLD: 21 (ref 9–20)
CALCIUM SERPL-MCNC: 8.8 MG/DL (ref 8.6–10.4)
CHLORIDE BLD-SCNC: 102 MMOL/L (ref 98–107)
CHOLESTEROL/HDL RATIO: 3.2
CHOLESTEROL: 191 MG/DL
CO2: 30 MMOL/L (ref 20–31)
CREAT SERPL-MCNC: 0.87 MG/DL (ref 0.5–0.9)
DIFFERENTIAL TYPE: YES
EOSINOPHILS RELATIVE PERCENT: 1 % (ref 0–5)
GFR AFRICAN AMERICAN: >60 ML/MIN
GFR NON-AFRICAN AMERICAN: >60 ML/MIN
GFR SERPL CREATININE-BSD FRML MDRD: ABNORMAL ML/MIN/{1.73_M2}
GFR SERPL CREATININE-BSD FRML MDRD: ABNORMAL ML/MIN/{1.73_M2}
GLUCOSE BLD-MCNC: 101 MG/DL (ref 70–99)
HCT VFR BLD CALC: 35.6 % (ref 36–46)
HDLC SERPL-MCNC: 59 MG/DL
HEMOGLOBIN: 12 G/DL (ref 12–16)
IMMATURE GRANULOCYTES: ABNORMAL %
LDL CHOLESTEROL: 110 MG/DL (ref 0–130)
LV EF: 55 %
LVEF MODALITY: NORMAL
LYMPHOCYTES # BLD: 24 % (ref 15–40)
MAGNESIUM: 2.3 MG/DL (ref 1.6–2.6)
MCH RBC QN AUTO: 29.7 PG (ref 26–34)
MCHC RBC AUTO-ENTMCNC: 33.8 G/DL (ref 31–37)
MCV RBC AUTO: 88.1 FL (ref 80–100)
MONOCYTES # BLD: 10 % (ref 4–8)
NRBC AUTOMATED: ABNORMAL PER 100 WBC
PATIENT FASTING?: YES
PDW BLD-RTO: 14.4 % (ref 12.1–15.2)
PLATELET # BLD: 144 K/UL (ref 140–450)
PLATELET ESTIMATE: ABNORMAL
PMV BLD AUTO: ABNORMAL FL (ref 6–12)
POTASSIUM SERPL-SCNC: 4.5 MMOL/L (ref 3.7–5.3)
RBC # BLD: 4.04 M/UL (ref 4–5.2)
RBC # BLD: ABNORMAL 10*6/UL
SEG NEUTROPHILS: 64 % (ref 47–75)
SEGMENTED NEUTROPHILS ABSOLUTE COUNT: 3.9 K/UL (ref 2.5–7)
SODIUM BLD-SCNC: 142 MMOL/L (ref 135–144)
TOTAL PROTEIN: 6.3 G/DL (ref 6.4–8.3)
TRIGL SERPL-MCNC: 111 MG/DL
TSH SERPL DL<=0.05 MIU/L-ACNC: 1.18 MIU/L (ref 0.3–5)
VITAMIN D 25-HYDROXY: 53.9 NG/ML (ref 30–100)
VLDLC SERPL CALC-MCNC: NORMAL MG/DL (ref 1–30)
WBC # BLD: 6 K/UL (ref 3.5–11)
WBC # BLD: ABNORMAL 10*3/UL

## 2018-09-14 PROCEDURE — 83735 ASSAY OF MAGNESIUM: CPT

## 2018-09-14 PROCEDURE — 71046 X-RAY EXAM CHEST 2 VIEWS: CPT

## 2018-09-14 PROCEDURE — 82306 VITAMIN D 25 HYDROXY: CPT

## 2018-09-14 PROCEDURE — 80061 LIPID PANEL: CPT

## 2018-09-14 PROCEDURE — 93306 TTE W/DOPPLER COMPLETE: CPT

## 2018-09-14 PROCEDURE — 80053 COMPREHEN METABOLIC PANEL: CPT

## 2018-09-14 PROCEDURE — 36415 COLL VENOUS BLD VENIPUNCTURE: CPT

## 2018-09-14 PROCEDURE — 85025 COMPLETE CBC W/AUTO DIFF WBC: CPT

## 2018-09-14 PROCEDURE — 84443 ASSAY THYROID STIM HORMONE: CPT

## 2018-09-20 ENCOUNTER — HOSPITAL ENCOUNTER (OUTPATIENT)
Age: 73
Setting detail: SPECIMEN
Discharge: HOME | End: 2018-09-20
Payer: MEDICARE

## 2018-09-20 ENCOUNTER — OFFICE VISIT (OUTPATIENT)
Dept: UROLOGY | Age: 73
End: 2018-09-20
Payer: MEDICARE

## 2018-09-20 VITALS — SYSTOLIC BLOOD PRESSURE: 114 MMHG | DIASTOLIC BLOOD PRESSURE: 60 MMHG | BODY MASS INDEX: 29.41 KG/M2 | WEIGHT: 166 LBS

## 2018-09-20 DIAGNOSIS — R31.29 MICROHEMATURIA: ICD-10-CM

## 2018-09-20 DIAGNOSIS — R31.29 MICROHEMATURIA: Primary | ICD-10-CM

## 2018-09-20 DIAGNOSIS — N39.0 FREQUENT UTI: ICD-10-CM

## 2018-09-20 PROCEDURE — G8599 NO ASA/ANTIPLAT THER USE RNG: HCPCS | Performed by: UROLOGY

## 2018-09-20 PROCEDURE — 4040F PNEUMOC VAC/ADMIN/RCVD: CPT | Performed by: UROLOGY

## 2018-09-20 PROCEDURE — G8399 PT W/DXA RESULTS DOCUMENT: HCPCS | Performed by: UROLOGY

## 2018-09-20 PROCEDURE — 99213 OFFICE O/P EST LOW 20 MIN: CPT | Performed by: UROLOGY

## 2018-09-20 PROCEDURE — 1123F ACP DISCUSS/DSCN MKR DOCD: CPT | Performed by: UROLOGY

## 2018-09-20 PROCEDURE — 1090F PRES/ABSN URINE INCON ASSESS: CPT | Performed by: UROLOGY

## 2018-09-20 PROCEDURE — 81001 URINALYSIS AUTO W/SCOPE: CPT

## 2018-09-20 PROCEDURE — 1036F TOBACCO NON-USER: CPT | Performed by: UROLOGY

## 2018-09-20 PROCEDURE — G8417 CALC BMI ABV UP PARAM F/U: HCPCS | Performed by: UROLOGY

## 2018-09-20 PROCEDURE — G8427 DOCREV CUR MEDS BY ELIG CLIN: HCPCS | Performed by: UROLOGY

## 2018-09-20 PROCEDURE — 87086 URINE CULTURE/COLONY COUNT: CPT

## 2018-09-20 PROCEDURE — 3017F COLORECTAL CA SCREEN DOC REV: CPT | Performed by: UROLOGY

## 2018-09-20 PROCEDURE — 1101F PT FALLS ASSESS-DOCD LE1/YR: CPT | Performed by: UROLOGY

## 2018-09-20 ASSESSMENT — ENCOUNTER SYMPTOMS
BACK PAIN: 0
COLOR CHANGE: 0
NAUSEA: 0
COUGH: 0
EYE PAIN: 0
VOMITING: 0
SHORTNESS OF BREATH: 0
WHEEZING: 0
ABDOMINAL PAIN: 0
EYE REDNESS: 0

## 2018-09-20 NOTE — PROGRESS NOTES
Subjective:      Patient ID: Marino Rogers is a 68 y.o. female. HPI  Patient is a 68 y.o. female in no acute distress. She is alert and oriented to person, place, and time. History  Referred for recurrent UTI and microhematuria. Treated with 3 rounds abx in Mar/Apr 2016. Symptoms finally resolved, but microhematuria persisted. Previous smoker, 1 PPD x 30 yrs. No hx frequent UTIs in the past. Does not leak urine. Did have hysterectomy. CT and cysto May 2016 normal. Pelvic did show large rectocele. Referred to GYN for this. They offered a pessary versus surgery, patient declined both. Currently  Patient is here today for annual follow-up. She has had no issues with urinary tract infections over the past year. Patient has been doing well. No recent gross hematuria dysuria. No pain today. Patient does have slight urge incontinence. This is mainly when she drinks dark soda. Patient is not bothered by this. She does not currently wear protective undergarment.     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    Deep vein blood clot of left lower extremity ([address] 2016    Diverticulosis     Head injury     Hx of blood clots     Hyperlipidemia     Hypertension     Migraine headache     Seizures ([address]     Stroke (cerebrum) (HCC)     Unspecified cerebral artery occlusion with cerebral infarction     TIA     Past Surgical History:   Procedure Laterality Date    BLEPHAROPLASTY      BREAST SURGERY      b/l breast cyst removal     CATARACT REMOVAL      COLONOSCOPY  2009     COLONOSCOPY BIOPSY/STOMA N/A 8/28/2018    COLONOSCOPY BIOPSY/STOMA, Dx: External Hemorrhoids and Severe Diverticulosis performed by Alvenia Boxer, MD at [address] [place] [address] BI      TUBAL LIGATION       Family History Problem Relation Age of Onset    Breast Cancer Sister     Diabetes Sister     Migraines Sister     Clotting Disorder Mother     Diabetes Mother     Hypertension Mother     Migraines Mother     Heart Attack Father     Hypertension Father     Diabetes Father     Heart Disease Brother     Diabetes Brother     Migraines Brother     Hypertension Maternal Grandmother     Hypertension Maternal Grandfather     Hypertension Paternal Grandmother     Hypertension Paternal Grandfather      Current Outpatient Prescriptions on File Prior to Visit   Medication Sig Dispense Refill    predniSONE (DELTASONE) 20 MG tablet 3 tablets daily x 3 days then 2 tablets daily x 3 days then 1 tablet daily x 3 days. 18 tablet 0    Na Sulfate-K Sulfate-Mg Sulf (SUPREP BOWEL PREP KIT) 17.5-3.13-1.6 GM/180ML SOLN Use as directed. 1 Bottle 0    albuterol sulfate HFA (PROAIR HFA) 108 (90 Base) MCG/ACT inhaler Inhale 2 puffs 4 times daily (Patient taking differently: every 6 hours as needed Inhale 2 puffs 4 times daily) 8.5 g 3    APTIOM 400 MG TABS TAKE 1 TABLET BY MOUTH ONCE DAILY  3    pravastatin (PRAVACHOL) 20 MG tablet Take 1 tablet by mouth daily 90 tablet 3    olmesartan-hydrochlorothiazide (BENICAR HCT) 20-12.5 MG per tablet TAKE 1 TABLET EVERY DAY 90 tablet 3    acetaminophen (TYLENOL) 500 MG tablet Take 500 mg by mouth as needed for Pain      Cholecalciferol (VITAMIN D3) 2000 UNITS CAPS Take 2,000 Units by mouth every 7 days      triamcinolone (KENALOG) 0.1 % cream Apply topically 2 times daily as needed Apply topically 2 times daily.  Cranberry 400 MG CAPS Take 400 mg by mouth daily (with breakfast)      aspirin 325 MG EC tablet Take 1 tablet by mouth daily 30 tablet 3    Psyllium (METAMUCIL PO) Take by mouth daily as needed        No current facility-administered medications on file prior to visit.        Outpatient Encounter Prescriptions as of 9/20/2018   Medication Sig Dispense Refill    predniSONE (DELTASONE) 20 MG tablet 3 tablets daily x 3 days then 2 tablets daily x 3 days then 1 tablet daily x 3 days. 18 tablet 0    Na Sulfate-K Sulfate-Mg Sulf (SUPREP BOWEL PREP KIT) 17.5-3.13-1.6 GM/180ML SOLN Use as directed. 1 Bottle 0    albuterol sulfate HFA (PROAIR HFA) 108 (90 Base) MCG/ACT inhaler Inhale 2 puffs 4 times daily (Patient taking differently: every 6 hours as needed Inhale 2 puffs 4 times daily) 8.5 g 3    APTIOM 400 MG TABS TAKE 1 TABLET BY MOUTH ONCE DAILY  3    pravastatin (PRAVACHOL) 20 MG tablet Take 1 tablet by mouth daily 90 tablet 3    olmesartan-hydrochlorothiazide (BENICAR HCT) 20-12.5 MG per tablet TAKE 1 TABLET EVERY DAY 90 tablet 3    acetaminophen (TYLENOL) 500 MG tablet Take 500 mg by mouth as needed for Pain      Cholecalciferol (VITAMIN D3) 2000 UNITS CAPS Take 2,000 Units by mouth every 7 days      triamcinolone (KENALOG) 0.1 % cream Apply topically 2 times daily as needed Apply topically 2 times daily.  Cranberry 400 MG CAPS Take 400 mg by mouth daily (with breakfast)      aspirin 325 MG EC tablet Take 1 tablet by mouth daily 30 tablet 3    Psyllium (METAMUCIL PO) Take by mouth daily as needed        No facility-administered encounter medications on file as of 9/20/2018. Patient has no known allergies. History   Smoking Status    Former Smoker    Packs/day: 1.00    Years: 30.00    Quit date: 1/1/2005   Smokeless Tobacco    Never Used     History   Alcohol Use No       There were no vitals filed for this visit. PHYSICAL EXAM:  Constitutional: Patient resting comfortably, in no acute distress. Neuro: Alert and oriented to person place and time. Cranial nerves grossly intact.   Psych: Mood and affect normal.  Skin: Warm, dry  HEENT: normocephalic, atraumatic  Lymphatics: No palpable lymphadenopathy  Lungs: Respiratory effort normal, unlabored  Cardiovascular:  Normal peripheral pulses  Abdomen: Soft, non-tender, non-distended with no organomegaly or palpable

## 2018-09-21 ENCOUNTER — OFFICE VISIT (OUTPATIENT)
Dept: CARDIOLOGY CLINIC | Age: 73
End: 2018-09-21
Payer: MEDICARE

## 2018-09-21 ENCOUNTER — OFFICE VISIT (OUTPATIENT)
Dept: FAMILY MEDICINE CLINIC | Age: 73
End: 2018-09-21
Payer: MEDICARE

## 2018-09-21 VITALS
DIASTOLIC BLOOD PRESSURE: 60 MMHG | OXYGEN SATURATION: 99 % | SYSTOLIC BLOOD PRESSURE: 140 MMHG | BODY MASS INDEX: 29.41 KG/M2 | HEART RATE: 70 BPM | WEIGHT: 166 LBS

## 2018-09-21 VITALS
HEART RATE: 65 BPM | HEIGHT: 63 IN | WEIGHT: 166 LBS | SYSTOLIC BLOOD PRESSURE: 136 MMHG | DIASTOLIC BLOOD PRESSURE: 70 MMHG | BODY MASS INDEX: 29.41 KG/M2

## 2018-09-21 DIAGNOSIS — K57.32 SIGMOID DIVERTICULITIS: ICD-10-CM

## 2018-09-21 DIAGNOSIS — I10 ESSENTIAL HYPERTENSION: Primary | ICD-10-CM

## 2018-09-21 DIAGNOSIS — I35.0 NONRHEUMATIC AORTIC VALVE STENOSIS: ICD-10-CM

## 2018-09-21 DIAGNOSIS — Z23 NEED FOR INFLUENZA VACCINATION: ICD-10-CM

## 2018-09-21 DIAGNOSIS — E78.2 MIXED HYPERLIPIDEMIA: ICD-10-CM

## 2018-09-21 DIAGNOSIS — E55.9 VITAMIN D DEFICIENCY: ICD-10-CM

## 2018-09-21 DIAGNOSIS — K52.9 NONSPECIFIC COLITIS: Primary | ICD-10-CM

## 2018-09-21 LAB
CULTURE: NORMAL
Lab: NORMAL
SPECIMEN DESCRIPTION: NORMAL
STATUS: NORMAL

## 2018-09-21 PROCEDURE — 1101F PT FALLS ASSESS-DOCD LE1/YR: CPT | Performed by: INTERNAL MEDICINE

## 2018-09-21 PROCEDURE — 3017F COLORECTAL CA SCREEN DOC REV: CPT | Performed by: INTERNAL MEDICINE

## 2018-09-21 PROCEDURE — 1036F TOBACCO NON-USER: CPT | Performed by: INTERNAL MEDICINE

## 2018-09-21 PROCEDURE — 1090F PRES/ABSN URINE INCON ASSESS: CPT | Performed by: INTERNAL MEDICINE

## 2018-09-21 PROCEDURE — 99213 OFFICE O/P EST LOW 20 MIN: CPT | Performed by: INTERNAL MEDICINE

## 2018-09-21 PROCEDURE — G8427 DOCREV CUR MEDS BY ELIG CLIN: HCPCS | Performed by: INTERNAL MEDICINE

## 2018-09-21 PROCEDURE — 1123F ACP DISCUSS/DSCN MKR DOCD: CPT | Performed by: INTERNAL MEDICINE

## 2018-09-21 PROCEDURE — 4040F PNEUMOC VAC/ADMIN/RCVD: CPT | Performed by: INTERNAL MEDICINE

## 2018-09-21 PROCEDURE — G8599 NO ASA/ANTIPLAT THER USE RNG: HCPCS | Performed by: INTERNAL MEDICINE

## 2018-09-21 PROCEDURE — 90686 IIV4 VACC NO PRSV 0.5 ML IM: CPT | Performed by: INTERNAL MEDICINE

## 2018-09-21 PROCEDURE — G8417 CALC BMI ABV UP PARAM F/U: HCPCS | Performed by: INTERNAL MEDICINE

## 2018-09-21 PROCEDURE — G8399 PT W/DXA RESULTS DOCUMENT: HCPCS | Performed by: INTERNAL MEDICINE

## 2018-09-21 PROCEDURE — G0008 ADMIN INFLUENZA VIRUS VAC: HCPCS | Performed by: INTERNAL MEDICINE

## 2018-09-21 PROCEDURE — 99214 OFFICE O/P EST MOD 30 MIN: CPT | Performed by: INTERNAL MEDICINE

## 2018-09-21 ASSESSMENT — ENCOUNTER SYMPTOMS
CHEST TIGHTNESS: 0
SHORTNESS OF BREATH: 0
CONSTIPATION: 0
RHINORRHEA: 0
NAUSEA: 0
BLOOD IN STOOL: 0
DIARRHEA: 0
COUGH: 0
ABDOMINAL PAIN: 0
SORE THROAT: 0

## 2018-09-21 NOTE — LETTER
Jonna Honeycutt M.D. [address] [place] [address] [phone]        2018        Gloria Guerra MD  [address] [place] [address]    RE:   Darren Pastures  :      Dear Dr. Kang Lux:    Yaneth Sainz have no idea what a pleasure it was to see Mrs. Parks in our office on 2018. It is refreshing to talk about \"the old days\" with her concerning her extended family. Getting a glimpse of photos from a number of years ago, makes me look at our present world in an entirely different way. I believe some of her photographs are actually worth a very significant amount of money. I, in fact, offered my house, wife, children and 3 cats for one of her pictures. She will think about the offer. .. CHIEF COMPLAINT:  Aortic stenosis. HISTORY OF PRESENT ILLNESS:  She is a pleasant 80-year-old female, who has a history of aortic stenosis and aortic insufficiency with mitral regurgitation. Her aortic stenosis is most significant valve issue that we are following. We get yearly echocardiograms. Her aortic stenosis increased from last year. Her mean gradient last year was 25 mmHg. This year her mean gradient was 33 mmHg. She has mild to moderate mitral regurgitation and mild tricuspid regurgitation with an EF of 55%. We follow her with yearly echocardiograms. She has a history of intracerebral hemorrhage with resultant CVA on 2013, which was treated conservatively with aspirin being stopped. We saw her on 2016, she had pain in both calves and an ultrasound showed a DVT. She was not a candidate for anticoagulation. She saw Dr. Patterson July, who recommended conservative management with no filter. She made a nice recovery from this. On 2017, she developed numbness of the right lower extremity and the right facial numbness consistent with a CVA with left-sided weakness.   She

## 2018-09-24 ENCOUNTER — TELEPHONE (OUTPATIENT)
Dept: UROLOGY | Age: 73
End: 2018-09-24

## 2018-10-01 NOTE — PROGRESS NOTES
Ov DR Bennie Dent 1 year follow up  Echo  Done  occ sharp pain last few  Minutes goes away. No chest pain or sob  Had colonoscopy done   2 week ago did bx. Per   DR Young. Follow up with him today  Walks 2 miles outside   Pain more in legs. occ ankle edema.   See again in 1 year with echo
09/21/2018 10:05:12     T: 09/22/2018 1:39:55     [id number]  Job#: [id number]   Doc#: [id number]

## 2018-10-10 DIAGNOSIS — E78.2 MIXED HYPERLIPIDEMIA: ICD-10-CM

## 2018-10-10 DIAGNOSIS — I10 ESSENTIAL HYPERTENSION: ICD-10-CM

## 2018-10-11 RX ORDER — PRAVASTATIN SODIUM 20 MG
20 TABLET ORAL DAILY
Qty: 90 TABLET | Refills: 1 | Status: SHIPPED | OUTPATIENT
Start: 2018-10-11 | End: 2019-06-12 | Stop reason: SDUPTHER

## 2018-10-11 RX ORDER — OLMESARTAN MEDOXOMIL AND HYDROCHLOROTHIAZIDE 20/12.5 20; 12.5 MG/1; MG/1
TABLET ORAL
Qty: 90 TABLET | Refills: 1 | Status: SHIPPED | OUTPATIENT
Start: 2018-10-11 | End: 2019-06-12 | Stop reason: SDUPTHER

## 2018-11-27 ENCOUNTER — TELEPHONE (OUTPATIENT)
Dept: FAMILY MEDICINE CLINIC | Age: 73
End: 2018-11-27

## 2018-11-27 DIAGNOSIS — J01.90 ACUTE BACTERIAL SINUSITIS: Primary | ICD-10-CM

## 2018-11-27 DIAGNOSIS — B96.89 ACUTE BACTERIAL SINUSITIS: Primary | ICD-10-CM

## 2018-11-27 RX ORDER — CEPHALEXIN 500 MG/1
500 CAPSULE ORAL 3 TIMES DAILY
Qty: 30 CAPSULE | Refills: 0 | Status: SHIPPED | OUTPATIENT
Start: 2018-11-27 | End: 2019-02-12 | Stop reason: ALTCHOICE

## 2019-01-29 ENCOUNTER — TELEPHONE (OUTPATIENT)
Dept: FAMILY MEDICINE CLINIC | Age: 74
End: 2019-01-29

## 2019-01-29 DIAGNOSIS — M54.50 ACUTE MIDLINE LOW BACK PAIN WITHOUT SCIATICA: Primary | ICD-10-CM

## 2019-01-29 RX ORDER — PREDNISONE 20 MG/1
TABLET ORAL
Qty: 15 TABLET | Refills: 0 | Status: SHIPPED | OUTPATIENT
Start: 2019-01-29 | End: 2019-02-08

## 2019-02-12 ENCOUNTER — OFFICE VISIT (OUTPATIENT)
Dept: FAMILY MEDICINE CLINIC | Age: 74
End: 2019-02-12
Payer: MEDICARE

## 2019-02-12 VITALS
SYSTOLIC BLOOD PRESSURE: 139 MMHG | WEIGHT: 167 LBS | HEART RATE: 69 BPM | BODY MASS INDEX: 29.59 KG/M2 | DIASTOLIC BLOOD PRESSURE: 53 MMHG | HEIGHT: 63 IN

## 2019-02-12 DIAGNOSIS — M67.449 GANGLION CYST OF FINGER: ICD-10-CM

## 2019-02-12 DIAGNOSIS — M81.0 AGE-RELATED OSTEOPOROSIS WITHOUT CURRENT PATHOLOGICAL FRACTURE: ICD-10-CM

## 2019-02-12 DIAGNOSIS — I35.0 NONRHEUMATIC AORTIC VALVE STENOSIS: ICD-10-CM

## 2019-02-12 DIAGNOSIS — E78.2 MIXED HYPERLIPIDEMIA: ICD-10-CM

## 2019-02-12 DIAGNOSIS — E55.9 VITAMIN D DEFICIENCY: ICD-10-CM

## 2019-02-12 DIAGNOSIS — Z12.31 VISIT FOR SCREENING MAMMOGRAM: ICD-10-CM

## 2019-02-12 DIAGNOSIS — I10 ESSENTIAL HYPERTENSION: Primary | ICD-10-CM

## 2019-02-12 PROCEDURE — 99214 OFFICE O/P EST MOD 30 MIN: CPT | Performed by: INTERNAL MEDICINE

## 2019-02-12 ASSESSMENT — PATIENT HEALTH QUESTIONNAIRE - PHQ9
SUM OF ALL RESPONSES TO PHQ QUESTIONS 1-9: 0
SUM OF ALL RESPONSES TO PHQ QUESTIONS 1-9: 0
1. LITTLE INTEREST OR PLEASURE IN DOING THINGS: 0
2. FEELING DOWN, DEPRESSED OR HOPELESS: 0
SUM OF ALL RESPONSES TO PHQ9 QUESTIONS 1 & 2: 0

## 2019-02-12 ASSESSMENT — ENCOUNTER SYMPTOMS
ABDOMINAL PAIN: 0
SHORTNESS OF BREATH: 0
BACK PAIN: 1
BLOOD IN STOOL: 0
DIARRHEA: 0
CONSTIPATION: 0
COUGH: 0
NAUSEA: 0
SORE THROAT: 0
RHINORRHEA: 0
CHEST TIGHTNESS: 0

## 2019-03-08 ENCOUNTER — OFFICE VISIT (OUTPATIENT)
Dept: FAMILY MEDICINE CLINIC | Age: 74
End: 2019-03-08
Payer: MEDICARE

## 2019-03-08 VITALS
WEIGHT: 163 LBS | BODY MASS INDEX: 28.88 KG/M2 | DIASTOLIC BLOOD PRESSURE: 60 MMHG | HEART RATE: 86 BPM | OXYGEN SATURATION: 96 % | HEIGHT: 63 IN | TEMPERATURE: 98.8 F | SYSTOLIC BLOOD PRESSURE: 110 MMHG

## 2019-03-08 DIAGNOSIS — J20.9 BRONCHITIS WITH BRONCHOSPASM: Primary | ICD-10-CM

## 2019-03-08 DIAGNOSIS — G40.909 SEIZURE DISORDER (HCC): ICD-10-CM

## 2019-03-08 DIAGNOSIS — J11.1 INFLUENZA-LIKE ILLNESS: ICD-10-CM

## 2019-03-08 LAB
INFLUENZA A ANTIBODY: NORMAL
INFLUENZA B ANTIBODY: NORMAL

## 2019-03-08 PROCEDURE — 99213 OFFICE O/P EST LOW 20 MIN: CPT | Performed by: INTERNAL MEDICINE

## 2019-03-08 PROCEDURE — 87804 INFLUENZA ASSAY W/OPTIC: CPT | Performed by: INTERNAL MEDICINE

## 2019-03-08 RX ORDER — DOXYCYCLINE HYCLATE 100 MG
100 TABLET ORAL 2 TIMES DAILY
Qty: 20 TABLET | Refills: 0 | Status: SHIPPED | OUTPATIENT
Start: 2019-03-08 | End: 2019-03-18

## 2019-03-08 ASSESSMENT — ENCOUNTER SYMPTOMS
BLOOD IN STOOL: 0
ABDOMINAL PAIN: 0
WHEEZING: 1
RHINORRHEA: 0
COUGH: 1
DIARRHEA: 0
SHORTNESS OF BREATH: 0
CONSTIPATION: 0
CHEST TIGHTNESS: 0
NAUSEA: 0
SORE THROAT: 0

## 2019-03-29 ENCOUNTER — NURSE ONLY (OUTPATIENT)
Dept: FAMILY MEDICINE CLINIC | Age: 74
End: 2019-03-29
Payer: MEDICARE

## 2019-03-29 DIAGNOSIS — N30.00 ACUTE CYSTITIS WITHOUT HEMATURIA: Primary | ICD-10-CM

## 2019-03-29 DIAGNOSIS — R30.0 DYSURIA: ICD-10-CM

## 2019-03-29 LAB
BILIRUBIN, POC: ABNORMAL
BLOOD URINE, POC: ABNORMAL
CLARITY, POC: ABNORMAL
COLOR, POC: ABNORMAL
GLUCOSE URINE, POC: ABNORMAL
KETONES, POC: ABNORMAL
LEUKOCYTE EST, POC: ABNORMAL
NITRITE, POC: ABNORMAL
PH, POC: 6
PROTEIN, POC: ABNORMAL
SPECIFIC GRAVITY, POC: 1.03
UROBILINOGEN, POC: 0.2

## 2019-03-29 PROCEDURE — 81002 URINALYSIS NONAUTO W/O SCOPE: CPT | Performed by: INTERNAL MEDICINE

## 2019-03-29 PROCEDURE — 99211 OFF/OP EST MAY X REQ PHY/QHP: CPT | Performed by: INTERNAL MEDICINE

## 2019-03-29 RX ORDER — CEPHALEXIN 500 MG/1
500 CAPSULE ORAL 2 TIMES DAILY
Qty: 14 CAPSULE | Refills: 0 | Status: SHIPPED | OUTPATIENT
Start: 2019-03-29 | End: 2019-05-11

## 2019-04-29 ENCOUNTER — HOSPITAL ENCOUNTER (OUTPATIENT)
Dept: BONE DENSITY | Age: 74
Discharge: HOME | End: 2019-05-01
Payer: MEDICARE

## 2019-04-29 ENCOUNTER — HOSPITAL ENCOUNTER (OUTPATIENT)
Dept: MAMMOGRAPHY | Age: 74
Discharge: HOME | End: 2019-05-01
Payer: MEDICARE

## 2019-04-29 DIAGNOSIS — Z12.31 VISIT FOR SCREENING MAMMOGRAM: ICD-10-CM

## 2019-04-29 DIAGNOSIS — M81.0 AGE-RELATED OSTEOPOROSIS WITHOUT CURRENT PATHOLOGICAL FRACTURE: ICD-10-CM

## 2019-04-29 PROCEDURE — 77067 SCR MAMMO BI INCL CAD: CPT

## 2019-04-29 PROCEDURE — 77080 DXA BONE DENSITY AXIAL: CPT

## 2019-05-11 RX ORDER — BENZONATATE 100 MG/1
100 CAPSULE ORAL 3 TIMES DAILY PRN
Qty: 30 CAPSULE | Refills: 0 | Status: SHIPPED | OUTPATIENT
Start: 2019-05-11 | End: 2019-05-18

## 2019-05-11 RX ORDER — DOXYCYCLINE HYCLATE 100 MG
100 TABLET ORAL 2 TIMES DAILY
Qty: 20 TABLET | Refills: 0 | Status: SHIPPED | OUTPATIENT
Start: 2019-05-11 | End: 2019-05-21

## 2019-06-12 DIAGNOSIS — E78.2 MIXED HYPERLIPIDEMIA: ICD-10-CM

## 2019-06-12 DIAGNOSIS — I10 ESSENTIAL HYPERTENSION: ICD-10-CM

## 2019-06-13 RX ORDER — OLMESARTAN MEDOXOMIL AND HYDROCHLOROTHIAZIDE 20/12.5 20; 12.5 MG/1; MG/1
TABLET ORAL
Qty: 90 TABLET | Refills: 1 | Status: SHIPPED | OUTPATIENT
Start: 2019-06-13 | End: 2019-10-01 | Stop reason: SDUPTHER

## 2019-06-13 RX ORDER — PRAVASTATIN SODIUM 20 MG
20 TABLET ORAL DAILY
Qty: 90 TABLET | Refills: 1 | Status: SHIPPED | OUTPATIENT
Start: 2019-06-13 | End: 2019-10-01 | Stop reason: SDUPTHER

## 2019-06-13 NOTE — TELEPHONE ENCOUNTER
Active Problem List:     Hypertension     Aortic stenosis     Hyperlipidemia     Diverticulosis     Intracranial bleed (HCC)     Vitamin D deficiency     Rectocele     Osteoporosis     History of DVT (deep vein thrombosis)     Cavernoma     TIA (transient ischemic attack)     Cerebrovascular accident (CVA) ([facility]     Thalamic infarction ([facility]     Paresthesia     Microhematuria     Seizure disorder ([facility]     Frequent UTI

## 2019-08-27 ENCOUNTER — NURSE ONLY (OUTPATIENT)
Dept: FAMILY MEDICINE CLINIC | Age: 74
End: 2019-08-27
Payer: MEDICARE

## 2019-08-27 DIAGNOSIS — N30.00 ACUTE CYSTITIS WITHOUT HEMATURIA: Primary | ICD-10-CM

## 2019-08-27 DIAGNOSIS — R30.0 DYSURIA: ICD-10-CM

## 2019-08-27 LAB
BILIRUBIN, POC: ABNORMAL
BLOOD URINE, POC: ABNORMAL
CLARITY, POC: CLEAR
COLOR, POC: YELLOW
GLUCOSE URINE, POC: ABNORMAL
KETONES, POC: ABNORMAL
LEUKOCYTE EST, POC: ABNORMAL
NITRITE, POC: ABNORMAL
PH, POC: 7
PROTEIN, POC: ABNORMAL
SPECIFIC GRAVITY, POC: 1.01
UROBILINOGEN, POC: 1

## 2019-08-27 PROCEDURE — 81002 URINALYSIS NONAUTO W/O SCOPE: CPT | Performed by: INTERNAL MEDICINE

## 2019-08-27 PROCEDURE — 99211 OFF/OP EST MAY X REQ PHY/QHP: CPT | Performed by: INTERNAL MEDICINE

## 2019-08-27 RX ORDER — CEPHALEXIN 500 MG/1
500 CAPSULE ORAL 2 TIMES DAILY
Qty: 20 CAPSULE | Refills: 0 | Status: SHIPPED | OUTPATIENT
Start: 2019-08-27 | End: 2019-09-23 | Stop reason: ALTCHOICE

## 2019-09-05 ENCOUNTER — OFFICE VISIT (OUTPATIENT)
Dept: UROLOGY | Age: 74
End: 2019-09-05
Payer: MEDICARE

## 2019-09-05 ENCOUNTER — HOSPITAL ENCOUNTER (OUTPATIENT)
Age: 74
Setting detail: SPECIMEN
Discharge: HOME | End: 2019-09-05
Payer: MEDICARE

## 2019-09-05 VITALS
SYSTOLIC BLOOD PRESSURE: 104 MMHG | WEIGHT: 167 LBS | BODY MASS INDEX: 29.58 KG/M2 | DIASTOLIC BLOOD PRESSURE: 52 MMHG | TEMPERATURE: 97.6 F

## 2019-09-05 DIAGNOSIS — R35.1 NOCTURIA: ICD-10-CM

## 2019-09-05 DIAGNOSIS — R31.29 MICROHEMATURIA: Primary | ICD-10-CM

## 2019-09-05 DIAGNOSIS — R30.0 DYSURIA: ICD-10-CM

## 2019-09-05 DIAGNOSIS — N39.0 FREQUENT UTI: ICD-10-CM

## 2019-09-05 DIAGNOSIS — R31.29 MICROHEMATURIA: ICD-10-CM

## 2019-09-05 LAB
-: ABNORMAL
AMORPHOUS: ABNORMAL
BACTERIA: ABNORMAL
BILIRUBIN URINE: NEGATIVE
CASTS UA: ABNORMAL /LPF
COLOR: YELLOW
COMMENT UA: ABNORMAL
CRYSTALS, UA: ABNORMAL /HPF
EPITHELIAL CELLS UA: ABNORMAL /HPF
GLUCOSE URINE: NEGATIVE
KETONES, URINE: NEGATIVE
LEUKOCYTE ESTERASE, URINE: ABNORMAL
MUCUS: ABNORMAL
NITRITE, URINE: NEGATIVE
OTHER OBSERVATIONS UA: ABNORMAL
PH UA: 7 (ref 5–8)
PROTEIN UA: NEGATIVE
RBC UA: ABNORMAL /HPF (ref 0–2)
RENAL EPITHELIAL, UA: ABNORMAL /HPF
SPECIFIC GRAVITY UA: 1 (ref 1–1.03)
TRICHOMONAS: ABNORMAL
TURBIDITY: CLEAR
URINE HGB: ABNORMAL
UROBILINOGEN, URINE: NORMAL
WBC UA: ABNORMAL /HPF
YEAST: ABNORMAL

## 2019-09-05 PROCEDURE — 1090F PRES/ABSN URINE INCON ASSESS: CPT | Performed by: NURSE PRACTITIONER

## 2019-09-05 PROCEDURE — 51798 US URINE CAPACITY MEASURE: CPT | Performed by: NURSE PRACTITIONER

## 2019-09-05 PROCEDURE — G8399 PT W/DXA RESULTS DOCUMENT: HCPCS | Performed by: NURSE PRACTITIONER

## 2019-09-05 PROCEDURE — 4040F PNEUMOC VAC/ADMIN/RCVD: CPT | Performed by: NURSE PRACTITIONER

## 2019-09-05 PROCEDURE — G8417 CALC BMI ABV UP PARAM F/U: HCPCS | Performed by: NURSE PRACTITIONER

## 2019-09-05 PROCEDURE — 3017F COLORECTAL CA SCREEN DOC REV: CPT | Performed by: NURSE PRACTITIONER

## 2019-09-05 PROCEDURE — G8599 NO ASA/ANTIPLAT THER USE RNG: HCPCS | Performed by: NURSE PRACTITIONER

## 2019-09-05 PROCEDURE — 1036F TOBACCO NON-USER: CPT | Performed by: NURSE PRACTITIONER

## 2019-09-05 PROCEDURE — 1123F ACP DISCUSS/DSCN MKR DOCD: CPT | Performed by: NURSE PRACTITIONER

## 2019-09-05 PROCEDURE — 87086 URINE CULTURE/COLONY COUNT: CPT

## 2019-09-05 PROCEDURE — 99214 OFFICE O/P EST MOD 30 MIN: CPT | Performed by: NURSE PRACTITIONER

## 2019-09-05 PROCEDURE — 81001 URINALYSIS AUTO W/SCOPE: CPT

## 2019-09-05 PROCEDURE — G8428 CUR MEDS NOT DOCUMENT: HCPCS | Performed by: NURSE PRACTITIONER

## 2019-09-05 ASSESSMENT — ENCOUNTER SYMPTOMS
WHEEZING: 0
EYE REDNESS: 0
NAUSEA: 0
ABDOMINAL PAIN: 0
CONSTIPATION: 0
VOMITING: 0
SHORTNESS OF BREATH: 0
BACK PAIN: 0
COLOR CHANGE: 0
EYE PAIN: 0
COUGH: 0

## 2019-09-05 NOTE — PROGRESS NOTES
HPI:    Patient is a 69 y.o. female in no acute distress. She is alert and oriented to person, place, and time. History  2016 Referred for recurrent UTI and microhematuria. Treated with 3 rounds abx in Mar/Apr 2016. Symptoms finally resolved, but microhematuria persisted. Previous smoker, 1 PPD x 30 yrs. CT and cysto normal.    Frequent UTIs in the past. Does not leak urine. Did have hysterectomy. Pelvic did show large rectocele. Referred to GYN for this. They offered a pessary versus surgery, patient declined both. Today  Here today for an annual follow-up for frequent UTI and microhematuria. Patient has been doing well from a LUTS standpoint until March. She did have dysuria, frequency and urgency so her PCP check a POC urine on March and again in August. No culture was performed. She was treated with antibiotics. When she does not feel she has a UTI she denies frequency, urgency, nocturia or incontinence. She denies any gross hematuria. PVR is 0ml. She is having daily BMs.      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    Deep vein blood clot of left lower extremity ([address] 2016    Diverticulosis     Head injury     Hx of blood clots     Hyperlipidemia     Hypertension     Migraine headache     Seizures ([address]     Stroke [facility]cerebrum) [facility]     Unspecified cerebral artery occlusion with cerebral infarction     TIA     Past Surgical History:   Procedure Laterality Date    BLEPHAROPLASTY      BREAST SURGERY      b/l breast cyst removal     CATARACT REMOVAL      COLONOSCOPY  2009     COLONOSCOPY BIOPSY/STOMA N/A 8/28/2018    COLONOSCOPY BIOPSY/STOMA, Dx: External Hemorrhoids and Severe Diverticulosis performed by Pedro Luis Arce MD at [address] [place] [address]       Outpatient Encounter Medications as of 9/5/2019   Medication Sig Dispense Refill    cephALEXin (KEFLEX) 500 MG capsule Take 1 capsule by mouth 2 times daily 20 capsule 0    olmesartan-hydrochlorothiazide (BENICAR HCT) 20-12.5 MG per tablet TAKE 1 TABLET EVERY DAY 90 tablet 1    pravastatin (PRAVACHOL) 20 MG tablet Take 1 tablet by mouth daily 90 tablet 1    albuterol sulfate HFA (PROAIR HFA) 108 (90 Base) MCG/ACT inhaler Inhale 2 puffs 4 times daily (Patient taking differently: every 6 hours as needed Inhale 2 puffs 4 times daily) 8.5 g 3    APTIOM 400 MG TABS TAKE 1 TABLET BY MOUTH ONCE DAILY  3    acetaminophen (TYLENOL) 500 MG tablet Take 500 mg by mouth as needed for Pain      Cholecalciferol (VITAMIN D3) 2000 UNITS CAPS Take 2,000 Units by mouth every 7 days      triamcinolone (KENALOG) 0.1 % cream Apply topically 2 times daily as needed Apply topically 2 times daily.  Cranberry 400 MG CAPS Take 400 mg by mouth daily (with breakfast)      aspirin 325 MG EC tablet Take 1 tablet by mouth daily 30 tablet 3    Psyllium (METAMUCIL PO) Take by mouth daily as needed        No facility-administered encounter medications on file as of 9/5/2019.        Current Outpatient Medications on File Prior to Visit   Medication Sig Dispense Refill    cephALEXin (KEFLEX) 500 MG capsule Take 1 capsule by mouth 2 times daily 20 capsule 0    olmesartan-hydrochlorothiazide (BENICAR HCT) 20-12.5 MG per tablet TAKE 1 TABLET EVERY DAY 90 tablet 1    pravastatin (PRAVACHOL) 20 MG tablet Take 1 tablet by mouth daily 90 tablet 1    albuterol sulfate HFA (PROAIR HFA) 108 (90 Base) MCG/ACT inhaler Inhale 2 puffs 4 times daily (Patient taking differently: every 6 hours as needed Inhale 2 puffs 4 times daily) 8.5 g 3    APTIOM 400 MG TABS TAKE 1 TABLET BY MOUTH ONCE DAILY  3    acetaminophen (TYLENOL) 500 MG tablet Take 500 mg by mouth as needed for Pain      Cholecalciferol (VITAMIN D3) 2000 UNITS CAPS Take 2,000 Units by mouth every 7 days      triamcinolone (KENALOG) 0.1 % cream Apply topically 2 times daily as needed Apply topically 2 times daily.  Cranberry 400 MG CAPS Take 400 mg by mouth daily (with breakfast)      aspirin 325 MG EC tablet Take 1 tablet by mouth daily 30 tablet 3    Psyllium (METAMUCIL PO) Take by mouth daily as needed        No current facility-administered medications on file prior to visit. Tape [place] tape]  Family History   Problem Relation Age of Onset    Breast Cancer Sister     Diabetes Sister     Migraines Sister     Clotting Disorder Mother     Diabetes Mother     Hypertension Mother     Migraines Mother     Heart Attack Father     Hypertension Father     Diabetes Father     Heart Disease Brother     Diabetes Brother     Migraines Brother     Hypertension Maternal Grandmother     Hypertension Maternal Grandfather     Hypertension Paternal Grandmother     Hypertension Paternal Grandfather      Social History     Tobacco Use   Smoking Status Former Smoker    Packs/day: 1.00    Years: 30.00    Pack years: 30.00    Last attempt to quit: 2005    Years since quittin.6   Smokeless Tobacco Never Used       Social History     Substance and Sexual Activity   Alcohol Use No       Review of Systems   Constitutional: Negative for appetite change, chills and fever. Eyes: Negative for pain, redness and visual disturbance. Respiratory: Negative for cough, shortness of breath and wheezing. Cardiovascular: Negative for chest pain and leg swelling. Gastrointestinal: Negative for abdominal pain, constipation, nausea and vomiting. Genitourinary: Negative for difficulty urinating, dysuria, flank pain, frequency, hematuria, pelvic pain, urgency, vaginal bleeding and vaginal discharge. Musculoskeletal: Negative for back pain, joint swelling and myalgias. Skin: Negative for color change, rash and wound. Neurological: Negative for dizziness, tremors and numbness.

## 2019-09-06 LAB
CULTURE: NO GROWTH
Lab: NORMAL
SPECIMEN DESCRIPTION: NORMAL

## 2019-09-09 ENCOUNTER — TELEPHONE (OUTPATIENT)
Dept: UROLOGY | Age: 74
End: 2019-09-09

## 2019-09-09 NOTE — RESULT ENCOUNTER NOTE
Call pt - urine cx reviewed and negative for UTI. If you continue to have bothersome urinary symptoms call our office.

## 2019-09-12 ENCOUNTER — HOSPITAL ENCOUNTER (OUTPATIENT)
Age: 74
Discharge: HOME | End: 2019-09-12
Payer: MEDICARE

## 2019-09-12 ENCOUNTER — HOSPITAL ENCOUNTER (OUTPATIENT)
Dept: GENERAL RADIOLOGY | Age: 74
Discharge: HOME | End: 2019-09-14
Payer: MEDICARE

## 2019-09-12 ENCOUNTER — HOSPITAL ENCOUNTER (OUTPATIENT)
Age: 74
Discharge: HOME | End: 2019-09-14
Payer: MEDICARE

## 2019-09-12 ENCOUNTER — HOSPITAL ENCOUNTER (OUTPATIENT)
Dept: NON INVASIVE DIAGNOSTICS | Age: 74
Discharge: HOME | End: 2019-09-12
Payer: MEDICARE

## 2019-09-12 DIAGNOSIS — I10 ESSENTIAL HYPERTENSION: ICD-10-CM

## 2019-09-12 DIAGNOSIS — E55.9 VITAMIN D DEFICIENCY: ICD-10-CM

## 2019-09-12 DIAGNOSIS — I35.0 NONRHEUMATIC AORTIC VALVE STENOSIS: ICD-10-CM

## 2019-09-12 DIAGNOSIS — E78.2 MIXED HYPERLIPIDEMIA: ICD-10-CM

## 2019-09-12 LAB
ABSOLUTE EOS #: 0.1 K/UL (ref 0–0.4)
ABSOLUTE IMMATURE GRANULOCYTE: ABNORMAL K/UL (ref 0–0.3)
ABSOLUTE LYMPH #: 1.2 K/UL (ref 1–4.8)
ABSOLUTE MONO #: 0.4 K/UL (ref 0–1)
ALBUMIN SERPL-MCNC: 4.1 G/DL (ref 3.5–5.2)
ALBUMIN/GLOBULIN RATIO: ABNORMAL (ref 1–2.5)
ALP BLD-CCNC: 57 U/L (ref 35–104)
ALT SERPL-CCNC: 11 U/L (ref 5–33)
ANION GAP SERPL CALCULATED.3IONS-SCNC: 11 MMOL/L (ref 9–17)
AST SERPL-CCNC: 17 U/L
BASOPHILS # BLD: 1 % (ref 0–2)
BASOPHILS ABSOLUTE: 0 K/UL (ref 0–0.2)
BILIRUB SERPL-MCNC: 0.61 MG/DL (ref 0.3–1.2)
BUN BLDV-MCNC: 20 MG/DL (ref 8–23)
BUN/CREAT BLD: 24 (ref 9–20)
CALCIUM SERPL-MCNC: 10.2 MG/DL (ref 8.6–10.4)
CHLORIDE BLD-SCNC: 104 MMOL/L (ref 98–107)
CHOLESTEROL/HDL RATIO: 2.8
CHOLESTEROL: 153 MG/DL
CO2: 30 MMOL/L (ref 20–31)
CREAT SERPL-MCNC: 0.85 MG/DL (ref 0.5–0.9)
DIFFERENTIAL TYPE: YES
EOSINOPHILS RELATIVE PERCENT: 2 % (ref 0–5)
GFR AFRICAN AMERICAN: >60 ML/MIN
GFR NON-AFRICAN AMERICAN: >60 ML/MIN
GFR SERPL CREATININE-BSD FRML MDRD: ABNORMAL ML/MIN/{1.73_M2}
GFR SERPL CREATININE-BSD FRML MDRD: ABNORMAL ML/MIN/{1.73_M2}
GLUCOSE BLD-MCNC: 115 MG/DL (ref 70–99)
HCT VFR BLD CALC: 35.2 % (ref 36–46)
HDLC SERPL-MCNC: 54 MG/DL
HEMOGLOBIN: 11.8 G/DL (ref 12–16)
IMMATURE GRANULOCYTES: ABNORMAL %
LDL CHOLESTEROL: 83 MG/DL (ref 0–130)
LV EF: 55 %
LVEF MODALITY: NORMAL
LYMPHOCYTES # BLD: 26 % (ref 15–40)
MAGNESIUM: 2 MG/DL (ref 1.6–2.6)
MCH RBC QN AUTO: 29.9 PG (ref 26–34)
MCHC RBC AUTO-ENTMCNC: 33.4 G/DL (ref 31–37)
MCV RBC AUTO: 89.4 FL (ref 80–100)
MONOCYTES # BLD: 9 % (ref 4–8)
NRBC AUTOMATED: ABNORMAL PER 100 WBC
PATIENT FASTING?: YES
PDW BLD-RTO: 13.5 % (ref 12.1–15.2)
PLATELET # BLD: 165 K/UL (ref 140–450)
PLATELET ESTIMATE: ABNORMAL
PMV BLD AUTO: ABNORMAL FL (ref 6–12)
POTASSIUM SERPL-SCNC: 4.4 MMOL/L (ref 3.7–5.3)
RBC # BLD: 3.94 M/UL (ref 4–5.2)
RBC # BLD: ABNORMAL 10*6/UL
SEG NEUTROPHILS: 62 % (ref 47–75)
SEGMENTED NEUTROPHILS ABSOLUTE COUNT: 2.7 K/UL (ref 2.5–7)
SODIUM BLD-SCNC: 145 MMOL/L (ref 135–144)
TOTAL PROTEIN: 7.4 G/DL (ref 6.4–8.3)
TRIGL SERPL-MCNC: 78 MG/DL
TSH SERPL DL<=0.05 MIU/L-ACNC: 2.12 MIU/L (ref 0.3–5)
VITAMIN D 25-HYDROXY: 51.4 NG/ML (ref 30–100)
VLDLC SERPL CALC-MCNC: NORMAL MG/DL (ref 1–30)
WBC # BLD: 4.4 K/UL (ref 3.5–11)
WBC # BLD: ABNORMAL 10*3/UL

## 2019-09-12 PROCEDURE — 84443 ASSAY THYROID STIM HORMONE: CPT

## 2019-09-12 PROCEDURE — 71046 X-RAY EXAM CHEST 2 VIEWS: CPT

## 2019-09-12 PROCEDURE — 82306 VITAMIN D 25 HYDROXY: CPT

## 2019-09-12 PROCEDURE — 80061 LIPID PANEL: CPT

## 2019-09-12 PROCEDURE — 83735 ASSAY OF MAGNESIUM: CPT

## 2019-09-12 PROCEDURE — 80053 COMPREHEN METABOLIC PANEL: CPT

## 2019-09-12 PROCEDURE — 36415 COLL VENOUS BLD VENIPUNCTURE: CPT

## 2019-09-12 PROCEDURE — 85025 COMPLETE CBC W/AUTO DIFF WBC: CPT

## 2019-09-12 PROCEDURE — 93306 TTE W/DOPPLER COMPLETE: CPT

## 2019-09-12 PROCEDURE — 93005 ELECTROCARDIOGRAM TRACING: CPT

## 2019-09-13 LAB
EKG ATRIAL RATE: 63 BPM
EKG P AXIS: 58 DEGREES
EKG P-R INTERVAL: 180 MS
EKG Q-T INTERVAL: 412 MS
EKG QRS DURATION: 84 MS
EKG QTC CALCULATION (BAZETT): 421 MS
EKG R AXIS: 7 DEGREES
EKG T AXIS: 39 DEGREES
EKG VENTRICULAR RATE: 63 BPM

## 2019-09-13 PROCEDURE — 93010 ELECTROCARDIOGRAM REPORT: CPT | Performed by: INTERNAL MEDICINE

## 2019-09-23 ENCOUNTER — OFFICE VISIT (OUTPATIENT)
Dept: CARDIOLOGY CLINIC | Age: 74
End: 2019-09-23
Payer: MEDICARE

## 2019-09-23 VITALS
WEIGHT: 165 LBS | DIASTOLIC BLOOD PRESSURE: 60 MMHG | OXYGEN SATURATION: 95 % | HEART RATE: 71 BPM | SYSTOLIC BLOOD PRESSURE: 148 MMHG | BODY MASS INDEX: 29.23 KG/M2

## 2019-09-23 DIAGNOSIS — I35.0 NONRHEUMATIC AORTIC VALVE STENOSIS: ICD-10-CM

## 2019-09-23 DIAGNOSIS — E55.9 VITAMIN D DEFICIENCY: ICD-10-CM

## 2019-09-23 DIAGNOSIS — E78.2 MIXED HYPERLIPIDEMIA: ICD-10-CM

## 2019-09-23 DIAGNOSIS — I10 ESSENTIAL HYPERTENSION: Primary | ICD-10-CM

## 2019-09-23 PROCEDURE — 1123F ACP DISCUSS/DSCN MKR DOCD: CPT | Performed by: INTERNAL MEDICINE

## 2019-09-23 PROCEDURE — G8599 NO ASA/ANTIPLAT THER USE RNG: HCPCS | Performed by: INTERNAL MEDICINE

## 2019-09-23 PROCEDURE — G8427 DOCREV CUR MEDS BY ELIG CLIN: HCPCS | Performed by: INTERNAL MEDICINE

## 2019-09-23 PROCEDURE — 3017F COLORECTAL CA SCREEN DOC REV: CPT | Performed by: INTERNAL MEDICINE

## 2019-09-23 PROCEDURE — G8399 PT W/DXA RESULTS DOCUMENT: HCPCS | Performed by: INTERNAL MEDICINE

## 2019-09-23 PROCEDURE — 1090F PRES/ABSN URINE INCON ASSESS: CPT | Performed by: INTERNAL MEDICINE

## 2019-09-23 PROCEDURE — 4040F PNEUMOC VAC/ADMIN/RCVD: CPT | Performed by: INTERNAL MEDICINE

## 2019-09-23 PROCEDURE — 1036F TOBACCO NON-USER: CPT | Performed by: INTERNAL MEDICINE

## 2019-09-23 PROCEDURE — 99214 OFFICE O/P EST MOD 30 MIN: CPT | Performed by: INTERNAL MEDICINE

## 2019-09-23 PROCEDURE — G8417 CALC BMI ABV UP PARAM F/U: HCPCS | Performed by: INTERNAL MEDICINE

## 2019-09-23 NOTE — LETTER
PSYCHIATRIC EXAM:  Within normal limits. LABORATORY DATA:  From 09/12/2019, sodium 145, potassium 4.4, BUN 20, creatinine 0.5, GFR greater than 60. Magnesium 2.0, calcium was 10.2. Cholesterol 153 with an HDL 54, LDL 83, triglycerides 78. ALT was 11, AST was 17. TSH was 2. 12. Vitamin D was 51.4. White count 4.4, hemoglobin 11.8 with a platelet count 583,837. EKG showed sinus rhythm and was normal.  Echocardiogram on 09/12/2019, showed normal EF at 55% with moderate mitral regurgitation. She had a peak gradient of 52 mmHg and a mean gradient of 35 mmHg across the aortic valve, which is up slightly from last year with the aortic valve area estimated at 1.0 cm2. IMPRESSION:  1. Moderate-to-severe aortic stenosis with an aortic valve area estimated at 1.0 cm2 with a mean gradient of 35 mmHg and a peak gradient of 52 mmHg. 2.  EF 50% to 55%. 3.  Hypertension, well controlled. 4.  Hyperlipidemia. 5.  CVA 21 years ago with a right subcortical cavernous bleed on 08/12/2013, with complete resolution of symptoms, but no systemic anticoagulation recommended. 6.  Possible factor V Leiden deficiency as an etiology for clots. 7.  Bilateral DVT on 09/23/2016, no anticoagulation, no filter placed. 8.  CVA on 03/31/2017, unknown etiology with mild weakness of left leg, treated with full-dose aspirin. PLAN:  1. Continue to monitor. 2.  Echocardiogram in 1 year. 3.  If she would have any unusual shortness of breath, loss of energy, or chest pain, I would of course want to see her and we would proceed with an evaluation for possible aortic valve replacement. DISCUSSION:  Mrs. Parks overall is doing well. She continues to walk 1 to 2 miles 4 to 5 days per week. Thus, it is an excellent way to monitor her aortic stenosis.   At this point, she is asymptomatic with no unusual shortness of breath, loss of energy and certainly, no chest pain or chest discomfort to indicate we needed to do any other evaluation such as cardiac catheterization for aortic stenosis. She is approaching the threshold of needing replacement. Her aortic valve area is estimated at 1.0 cm2. Her mean gradient is 35 mmHg. We will continue to watch her on a yearly basis. However, if she would have any change in her exercise capacity, then we would proceed with a cardiac catheterization to define her anatomy. I made no change in her medications. I will see her in 1 year in followup. If you have any questions on my thoughts, please do not hesitate to contact me. Thank you very much.     Sincerely,        Manisha Powers    D: 09/23/2019 11:57:11     T: 09/23/2019 12:01:20     [email or web address]  Job#: [id number]   Doc#: [id number]

## 2019-10-01 ENCOUNTER — OFFICE VISIT (OUTPATIENT)
Dept: FAMILY MEDICINE CLINIC | Age: 74
End: 2019-10-01
Payer: MEDICARE

## 2019-10-01 VITALS
SYSTOLIC BLOOD PRESSURE: 112 MMHG | DIASTOLIC BLOOD PRESSURE: 58 MMHG | HEIGHT: 63 IN | HEART RATE: 68 BPM | WEIGHT: 164 LBS | BODY MASS INDEX: 29.06 KG/M2

## 2019-10-01 DIAGNOSIS — I10 ESSENTIAL HYPERTENSION: ICD-10-CM

## 2019-10-01 DIAGNOSIS — E78.2 MIXED HYPERLIPIDEMIA: ICD-10-CM

## 2019-10-01 DIAGNOSIS — K59.01 SLOW TRANSIT CONSTIPATION: Primary | ICD-10-CM

## 2019-10-01 DIAGNOSIS — I35.0 NONRHEUMATIC AORTIC VALVE STENOSIS: ICD-10-CM

## 2019-10-01 DIAGNOSIS — Z23 NEED FOR INFLUENZA VACCINATION: ICD-10-CM

## 2019-10-01 PROCEDURE — 1036F TOBACCO NON-USER: CPT | Performed by: INTERNAL MEDICINE

## 2019-10-01 PROCEDURE — G8599 NO ASA/ANTIPLAT THER USE RNG: HCPCS | Performed by: INTERNAL MEDICINE

## 2019-10-01 PROCEDURE — 1123F ACP DISCUSS/DSCN MKR DOCD: CPT | Performed by: INTERNAL MEDICINE

## 2019-10-01 PROCEDURE — G8427 DOCREV CUR MEDS BY ELIG CLIN: HCPCS | Performed by: INTERNAL MEDICINE

## 2019-10-01 PROCEDURE — G0008 ADMIN INFLUENZA VIRUS VAC: HCPCS | Performed by: INTERNAL MEDICINE

## 2019-10-01 PROCEDURE — G8482 FLU IMMUNIZE ORDER/ADMIN: HCPCS | Performed by: INTERNAL MEDICINE

## 2019-10-01 PROCEDURE — G8399 PT W/DXA RESULTS DOCUMENT: HCPCS | Performed by: INTERNAL MEDICINE

## 2019-10-01 PROCEDURE — 4040F PNEUMOC VAC/ADMIN/RCVD: CPT | Performed by: INTERNAL MEDICINE

## 2019-10-01 PROCEDURE — G8417 CALC BMI ABV UP PARAM F/U: HCPCS | Performed by: INTERNAL MEDICINE

## 2019-10-01 PROCEDURE — 3017F COLORECTAL CA SCREEN DOC REV: CPT | Performed by: INTERNAL MEDICINE

## 2019-10-01 PROCEDURE — 1090F PRES/ABSN URINE INCON ASSESS: CPT | Performed by: INTERNAL MEDICINE

## 2019-10-01 PROCEDURE — 90686 IIV4 VACC NO PRSV 0.5 ML IM: CPT | Performed by: INTERNAL MEDICINE

## 2019-10-01 PROCEDURE — 99214 OFFICE O/P EST MOD 30 MIN: CPT | Performed by: INTERNAL MEDICINE

## 2019-10-01 RX ORDER — PRAVASTATIN SODIUM 20 MG
20 TABLET ORAL DAILY
Qty: 90 TABLET | Refills: 1 | Status: SHIPPED | OUTPATIENT
Start: 2019-10-01 | End: 2020-06-19

## 2019-10-01 RX ORDER — OLMESARTAN MEDOXOMIL AND HYDROCHLOROTHIAZIDE 20/12.5 20; 12.5 MG/1; MG/1
TABLET ORAL
Qty: 90 TABLET | Refills: 1 | Status: SHIPPED | OUTPATIENT
Start: 2019-10-01 | End: 2020-06-12

## 2019-10-01 ASSESSMENT — ENCOUNTER SYMPTOMS
DIARRHEA: 0
COUGH: 0
CONSTIPATION: 1
SHORTNESS OF BREATH: 0
RHINORRHEA: 0
SORE THROAT: 0
BLOOD IN STOOL: 0
NAUSEA: 0
CHEST TIGHTNESS: 0
ABDOMINAL PAIN: 0

## 2019-11-05 DIAGNOSIS — K59.09 CHRONIC CONSTIPATION: Primary | ICD-10-CM

## 2019-11-05 DIAGNOSIS — T14.8XXA MUSCLE STRAIN: ICD-10-CM

## 2019-11-05 RX ORDER — PREDNISONE 20 MG/1
TABLET ORAL
Qty: 12 TABLET | Refills: 0 | Status: SHIPPED | OUTPATIENT
Start: 2019-11-05 | End: 2019-11-14

## 2019-11-14 ENCOUNTER — OFFICE VISIT (OUTPATIENT)
Dept: FAMILY MEDICINE CLINIC | Age: 74
End: 2019-11-14
Payer: MEDICARE

## 2019-11-14 VITALS
DIASTOLIC BLOOD PRESSURE: 60 MMHG | WEIGHT: 165 LBS | HEIGHT: 63 IN | HEART RATE: 73 BPM | SYSTOLIC BLOOD PRESSURE: 113 MMHG | BODY MASS INDEX: 29.23 KG/M2

## 2019-11-14 DIAGNOSIS — K59.09 CHRONIC CONSTIPATION: ICD-10-CM

## 2019-11-14 DIAGNOSIS — J40 BRONCHITIS: Primary | ICD-10-CM

## 2019-11-14 PROCEDURE — G8427 DOCREV CUR MEDS BY ELIG CLIN: HCPCS | Performed by: INTERNAL MEDICINE

## 2019-11-14 PROCEDURE — G8482 FLU IMMUNIZE ORDER/ADMIN: HCPCS | Performed by: INTERNAL MEDICINE

## 2019-11-14 PROCEDURE — G8399 PT W/DXA RESULTS DOCUMENT: HCPCS | Performed by: INTERNAL MEDICINE

## 2019-11-14 PROCEDURE — 4040F PNEUMOC VAC/ADMIN/RCVD: CPT | Performed by: INTERNAL MEDICINE

## 2019-11-14 PROCEDURE — 99213 OFFICE O/P EST LOW 20 MIN: CPT | Performed by: INTERNAL MEDICINE

## 2019-11-14 PROCEDURE — 1036F TOBACCO NON-USER: CPT | Performed by: INTERNAL MEDICINE

## 2019-11-14 PROCEDURE — 1123F ACP DISCUSS/DSCN MKR DOCD: CPT | Performed by: INTERNAL MEDICINE

## 2019-11-14 PROCEDURE — 3017F COLORECTAL CA SCREEN DOC REV: CPT | Performed by: INTERNAL MEDICINE

## 2019-11-14 PROCEDURE — G8599 NO ASA/ANTIPLAT THER USE RNG: HCPCS | Performed by: INTERNAL MEDICINE

## 2019-11-14 PROCEDURE — G8417 CALC BMI ABV UP PARAM F/U: HCPCS | Performed by: INTERNAL MEDICINE

## 2019-11-14 PROCEDURE — 1090F PRES/ABSN URINE INCON ASSESS: CPT | Performed by: INTERNAL MEDICINE

## 2019-11-14 RX ORDER — DOXYCYCLINE HYCLATE 100 MG
100 TABLET ORAL 2 TIMES DAILY
Qty: 20 TABLET | Refills: 0 | Status: SHIPPED | OUTPATIENT
Start: 2019-11-14 | End: 2019-11-24

## 2019-11-14 ASSESSMENT — ENCOUNTER SYMPTOMS
ABDOMINAL PAIN: 0
CHEST TIGHTNESS: 0
SORE THROAT: 0
SHORTNESS OF BREATH: 0
DIARRHEA: 0
COUGH: 1
CONSTIPATION: 0
BLOOD IN STOOL: 0
NAUSEA: 0
RHINORRHEA: 0

## 2020-02-03 ENCOUNTER — OFFICE VISIT (OUTPATIENT)
Dept: FAMILY MEDICINE CLINIC | Age: 75
End: 2020-02-03
Payer: MEDICARE

## 2020-02-03 VITALS
WEIGHT: 167 LBS | SYSTOLIC BLOOD PRESSURE: 121 MMHG | DIASTOLIC BLOOD PRESSURE: 61 MMHG | HEIGHT: 63 IN | BODY MASS INDEX: 29.59 KG/M2 | HEART RATE: 81 BPM | TEMPERATURE: 99.4 F

## 2020-02-03 LAB
INFLUENZA A ANTIBODY: POSITIVE
INFLUENZA B ANTIBODY: NEGATIVE

## 2020-02-03 PROCEDURE — 87804 INFLUENZA ASSAY W/OPTIC: CPT | Performed by: INTERNAL MEDICINE

## 2020-02-03 PROCEDURE — G8482 FLU IMMUNIZE ORDER/ADMIN: HCPCS | Performed by: INTERNAL MEDICINE

## 2020-02-03 PROCEDURE — 4040F PNEUMOC VAC/ADMIN/RCVD: CPT | Performed by: INTERNAL MEDICINE

## 2020-02-03 PROCEDURE — G8427 DOCREV CUR MEDS BY ELIG CLIN: HCPCS | Performed by: INTERNAL MEDICINE

## 2020-02-03 PROCEDURE — 1036F TOBACCO NON-USER: CPT | Performed by: INTERNAL MEDICINE

## 2020-02-03 PROCEDURE — 99213 OFFICE O/P EST LOW 20 MIN: CPT | Performed by: INTERNAL MEDICINE

## 2020-02-03 PROCEDURE — 3017F COLORECTAL CA SCREEN DOC REV: CPT | Performed by: INTERNAL MEDICINE

## 2020-02-03 PROCEDURE — 1123F ACP DISCUSS/DSCN MKR DOCD: CPT | Performed by: INTERNAL MEDICINE

## 2020-02-03 PROCEDURE — G8399 PT W/DXA RESULTS DOCUMENT: HCPCS | Performed by: INTERNAL MEDICINE

## 2020-02-03 PROCEDURE — G8417 CALC BMI ABV UP PARAM F/U: HCPCS | Performed by: INTERNAL MEDICINE

## 2020-02-03 PROCEDURE — 1090F PRES/ABSN URINE INCON ASSESS: CPT | Performed by: INTERNAL MEDICINE

## 2020-02-03 RX ORDER — OSELTAMIVIR PHOSPHATE 75 MG/1
75 CAPSULE ORAL 2 TIMES DAILY
Qty: 10 CAPSULE | Refills: 0 | Status: SHIPPED | OUTPATIENT
Start: 2020-02-03 | End: 2020-02-08

## 2020-02-03 RX ORDER — PREDNISONE 20 MG/1
TABLET ORAL
Qty: 12 TABLET | Refills: 0 | Status: SHIPPED | OUTPATIENT
Start: 2020-02-03 | End: 2020-02-12 | Stop reason: SDUPTHER

## 2020-02-03 RX ORDER — LEVOFLOXACIN 500 MG/1
500 TABLET, FILM COATED ORAL DAILY
Qty: 7 TABLET | Refills: 0 | Status: SHIPPED | OUTPATIENT
Start: 2020-02-03 | End: 2020-02-03

## 2020-02-03 SDOH — ECONOMIC STABILITY: FOOD INSECURITY: WITHIN THE PAST 12 MONTHS, YOU WORRIED THAT YOUR FOOD WOULD RUN OUT BEFORE YOU GOT MONEY TO BUY MORE.: NEVER TRUE

## 2020-02-03 SDOH — ECONOMIC STABILITY: FOOD INSECURITY: WITHIN THE PAST 12 MONTHS, THE FOOD YOU BOUGHT JUST DIDN'T LAST AND YOU DIDN'T HAVE MONEY TO GET MORE.: NEVER TRUE

## 2020-02-03 SDOH — ECONOMIC STABILITY: TRANSPORTATION INSECURITY
IN THE PAST 12 MONTHS, HAS LACK OF TRANSPORTATION KEPT YOU FROM MEETINGS, WORK, OR FROM GETTING THINGS NEEDED FOR DAILY LIVING?: NO

## 2020-02-03 SDOH — ECONOMIC STABILITY: INCOME INSECURITY: HOW HARD IS IT FOR YOU TO PAY FOR THE VERY BASICS LIKE FOOD, HOUSING, MEDICAL CARE, AND HEATING?: NOT HARD AT ALL

## 2020-02-03 SDOH — ECONOMIC STABILITY: TRANSPORTATION INSECURITY
IN THE PAST 12 MONTHS, HAS THE LACK OF TRANSPORTATION KEPT YOU FROM MEDICAL APPOINTMENTS OR FROM GETTING MEDICATIONS?: NO

## 2020-02-03 ASSESSMENT — PATIENT HEALTH QUESTIONNAIRE - PHQ9
SUM OF ALL RESPONSES TO PHQ QUESTIONS 1-9: 0
SUM OF ALL RESPONSES TO PHQ QUESTIONS 1-9: 0
SUM OF ALL RESPONSES TO PHQ9 QUESTIONS 1 & 2: 0
1. LITTLE INTEREST OR PLEASURE IN DOING THINGS: 0
2. FEELING DOWN, DEPRESSED OR HOPELESS: 0

## 2020-02-03 ASSESSMENT — ENCOUNTER SYMPTOMS
CONSTIPATION: 0
RHINORRHEA: 0
SORE THROAT: 0
BLOOD IN STOOL: 0
CHEST TIGHTNESS: 0
NAUSEA: 0
SHORTNESS OF BREATH: 0
SINUS PAIN: 1
COUGH: 1
ABDOMINAL PAIN: 0
DIARRHEA: 0

## 2020-02-03 NOTE — PROGRESS NOTES
Visit Information    Have you changed or started any medications since your last visit including any over-the-counter medicines, vitamins, or herbal medicines? no   Are you having any side effects from any of your medications? -  no  Have you stopped taking any of your medications? Is so, why? -  no    Have you seen any other physician or provider since your last visit? No  Have you had any other diagnostic tests since your last visit? No  Have you been seen in the emergency room and/or had an admission to a hospital since we last saw you? No  Have you had your routine dental cleaning in the past 6 months? no    Have you activated your [employer] account? If not, what are your barriers?  Yes     Patient Care Team:  Emily Steel MD as PCP - General (Internal Medicine)  Emily Steel MD as PCP - [facility]  Abdiel Santos MD as Consulting Physician (Neurology)  Dasia Camilo MD as Consulting Physician (Cardiology)  Antione Patel MD as Consulting Physician (Urology)    Medical History Review  Past Medical, Family, and Social History reviewed and does contribute to the patient presenting condition    Health Maintenance   Topic Date Due    Shingles Vaccine (1 of 2) 04/08/1996    A1C test (Diabetic or Prediabetic)  04/01/2018    Annual Wellness Visit (AWV)  05/29/2019    Breast cancer screen  04/29/2020    Lipid screen  09/12/2020    Potassium monitoring  09/12/2020    Creatinine monitoring  09/12/2020    DTaP/Tdap/Td vaccine (2 - Td) 11/08/2022    Colon cancer screen colonoscopy  08/28/2028    DEXA (modify frequency per FRAX score)  Completed    Flu vaccine  Completed    Pneumococcal 65+ years Vaccine  Completed    Hepatitis C screen  Completed

## 2020-02-03 NOTE — PATIENT INSTRUCTIONS
SURVEY:    You may be receiving a survey from [employer] regarding your visit today. Please complete the survey to enable us to provide the highest quality of care to you and your family. If you cannot score us a very good on any question, please call the office to discuss how we could of made your experience a very good one. Thank you. 1. Bronchitis with bronchospasm  Secondary to influenza. Take the prednisone taper as directed on the prescription. The prednisone is very bitter so swallow the tablets quickly to prevent  dissolving in the mouth. After taking, don't lay  down for 2 hours to help prevent reflux. - predniSONE (DELTASONE) 20 MG tablet; 3 tabs daily for 2 days then 2 tabs daily for 2 days then 1 tab daily for 2 days. Dispense: 12 tablet; Refill: 0    2. Influenza-like illness  Positive for influenza A.  - POCT Influenza A/B    3. Influenza A  Take Tamiflu 75 mg two times a day x 5 days. - oseltamivir (TAMIFLU) 75 MG capsule; Take 1 capsule by mouth 2 times daily for 5 days  Dispense: 10 capsule; Refill: 0    Rios Garza is instructed to return to the clinic if the symptoms continue or worsen. Rios Garza  was also instructed to go to the emergency room department if the symptoms significantly worsen before an appointment can be made.

## 2020-02-03 NOTE — PROGRESS NOTES
Subjective:      Patient ID: Ethan Smith is a 69 y.o. female. URI    This is a new problem. The current episode started in the past 7 days. The problem has been gradually worsening Mike Manuel got worse yesterday\"). Maximum temperature: feverish. Associated symptoms include congestion, coughing, headaches and sinus pain. Pertinent negatives include no abdominal pain, chest pain, diarrhea, dysuria, ear pain, nausea, neck pain, rhinorrhea, sneezing or sore throat. She has tried acetaminophen for the symptoms. The treatment provided no relief. Review of Systems   Constitutional: Negative. HENT: Positive for congestion and sinus pain. Negative for ear pain, rhinorrhea, sneezing and sore throat. Eyes: Negative for visual disturbance. Respiratory: Positive for cough. Negative for chest tightness and shortness of breath. Cardiovascular: Negative for chest pain and palpitations. Gastrointestinal: Negative for abdominal pain, blood in stool, constipation, diarrhea and nausea. Genitourinary: Negative for difficulty urinating, dysuria, frequency, menstrual problem and urgency. Musculoskeletal: Negative for arthralgias, joint swelling, myalgias and neck pain. Skin: Negative. Neurological: Positive for headaches. Negative for syncope. Psychiatric/Behavioral: Negative. Objective:   Physical Exam  Constitutional:       Appearance: She is well-developed. HENT:      Head: Atraumatic. Nose: Congestion present. Mouth/Throat:      Pharynx: Posterior oropharyngeal erythema present. No oropharyngeal exudate. Comments: Cobblestoning of the posterior pharynx. Eyes:      Conjunctiva/sclera: Conjunctivae normal.   Neck:      Musculoskeletal: Normal range of motion and neck supple. Cardiovascular:      Rate and Rhythm: Normal rate and regular rhythm. Heart sounds: Murmur present. Pulmonary:      Effort: Pulmonary effort is normal.      Breath sounds: Wheezing present.

## 2020-02-12 RX ORDER — DOXYCYCLINE HYCLATE 100 MG
100 TABLET ORAL 2 TIMES DAILY
Qty: 20 TABLET | Refills: 0 | Status: SHIPPED | OUTPATIENT
Start: 2020-02-12 | End: 2020-02-22

## 2020-02-12 RX ORDER — PREDNISONE 20 MG/1
TABLET ORAL
Qty: 12 TABLET | Refills: 0 | Status: SHIPPED | OUTPATIENT
Start: 2020-02-12 | End: 2020-02-22

## 2020-02-12 RX ORDER — ALBUTEROL SULFATE 90 UG/1
2 AEROSOL, METERED RESPIRATORY (INHALATION) EVERY 4 HOURS PRN
Qty: 1 INHALER | Refills: 3 | Status: SHIPPED | OUTPATIENT
Start: 2020-02-12

## 2020-04-03 ENCOUNTER — OFFICE VISIT (OUTPATIENT)
Dept: FAMILY MEDICINE CLINIC | Age: 75
End: 2020-04-03
Payer: MEDICARE

## 2020-04-03 ENCOUNTER — HOSPITAL ENCOUNTER (OUTPATIENT)
Age: 75
Setting detail: SPECIMEN
Discharge: HOME | End: 2020-04-03
Payer: MEDICARE

## 2020-04-03 VITALS
SYSTOLIC BLOOD PRESSURE: 158 MMHG | TEMPERATURE: 95.6 F | DIASTOLIC BLOOD PRESSURE: 62 MMHG | HEIGHT: 63 IN | BODY MASS INDEX: 29.59 KG/M2 | WEIGHT: 167 LBS

## 2020-04-03 LAB
ABSOLUTE EOS #: 0.1 K/UL (ref 0–0.4)
ABSOLUTE IMMATURE GRANULOCYTE: ABNORMAL K/UL (ref 0–0.3)
ABSOLUTE LYMPH #: 1.4 K/UL (ref 1–4.8)
ABSOLUTE MONO #: 0.5 K/UL (ref 0–1)
ALBUMIN SERPL-MCNC: 4.3 G/DL (ref 3.5–5.2)
ALBUMIN/GLOBULIN RATIO: ABNORMAL (ref 1–2.5)
ALP BLD-CCNC: 57 U/L (ref 35–104)
ALT SERPL-CCNC: 10 U/L (ref 5–33)
ANION GAP SERPL CALCULATED.3IONS-SCNC: 9 MMOL/L (ref 9–17)
AST SERPL-CCNC: 20 U/L
BASOPHILS # BLD: 1 % (ref 0–2)
BASOPHILS ABSOLUTE: 0 K/UL (ref 0–0.2)
BILIRUB SERPL-MCNC: 0.61 MG/DL (ref 0.3–1.2)
BUN BLDV-MCNC: 22 MG/DL (ref 8–23)
BUN/CREAT BLD: 23 (ref 9–20)
CALCIUM SERPL-MCNC: 10.1 MG/DL (ref 8.6–10.4)
CHLORIDE BLD-SCNC: 102 MMOL/L (ref 98–107)
CO2: 30 MMOL/L (ref 20–31)
CREAT SERPL-MCNC: 0.97 MG/DL (ref 0.5–0.9)
DIFFERENTIAL TYPE: YES
EOSINOPHILS RELATIVE PERCENT: 1 % (ref 0–5)
GFR AFRICAN AMERICAN: >60 ML/MIN
GFR NON-AFRICAN AMERICAN: 56 ML/MIN
GFR SERPL CREATININE-BSD FRML MDRD: ABNORMAL ML/MIN/{1.73_M2}
GFR SERPL CREATININE-BSD FRML MDRD: ABNORMAL ML/MIN/{1.73_M2}
GLUCOSE BLD-MCNC: 109 MG/DL (ref 70–99)
HCT VFR BLD CALC: 36.9 % (ref 36–46)
HEMOGLOBIN: 12.4 G/DL (ref 12–16)
IMMATURE GRANULOCYTES: ABNORMAL %
LYMPHOCYTES # BLD: 26 % (ref 15–40)
MCH RBC QN AUTO: 29.1 PG (ref 26–34)
MCHC RBC AUTO-ENTMCNC: 33.8 G/DL (ref 31–37)
MCV RBC AUTO: 86.2 FL (ref 80–100)
MONOCYTES # BLD: 9 % (ref 4–8)
NRBC AUTOMATED: ABNORMAL PER 100 WBC
PDW BLD-RTO: 14.3 % (ref 12.1–15.2)
PLATELET # BLD: 167 K/UL (ref 140–450)
PLATELET ESTIMATE: ABNORMAL
PMV BLD AUTO: ABNORMAL FL (ref 6–12)
POTASSIUM SERPL-SCNC: 4.8 MMOL/L (ref 3.7–5.3)
RBC # BLD: 4.28 M/UL (ref 4–5.2)
RBC # BLD: ABNORMAL 10*6/UL
SEG NEUTROPHILS: 63 % (ref 47–75)
SEGMENTED NEUTROPHILS ABSOLUTE COUNT: 3.5 K/UL (ref 2.5–7)
SODIUM BLD-SCNC: 141 MMOL/L (ref 135–144)
TOTAL PROTEIN: 7.4 G/DL (ref 6.4–8.3)
TSH SERPL DL<=0.05 MIU/L-ACNC: 1.69 MIU/L (ref 0.3–5)
WBC # BLD: 5.5 K/UL (ref 3.5–11)
WBC # BLD: ABNORMAL 10*3/UL

## 2020-04-03 PROCEDURE — 1090F PRES/ABSN URINE INCON ASSESS: CPT | Performed by: INTERNAL MEDICINE

## 2020-04-03 PROCEDURE — G8427 DOCREV CUR MEDS BY ELIG CLIN: HCPCS | Performed by: INTERNAL MEDICINE

## 2020-04-03 PROCEDURE — G8399 PT W/DXA RESULTS DOCUMENT: HCPCS | Performed by: INTERNAL MEDICINE

## 2020-04-03 PROCEDURE — 1123F ACP DISCUSS/DSCN MKR DOCD: CPT | Performed by: INTERNAL MEDICINE

## 2020-04-03 PROCEDURE — 84443 ASSAY THYROID STIM HORMONE: CPT

## 2020-04-03 PROCEDURE — 1036F TOBACCO NON-USER: CPT | Performed by: INTERNAL MEDICINE

## 2020-04-03 PROCEDURE — 99214 OFFICE O/P EST MOD 30 MIN: CPT | Performed by: INTERNAL MEDICINE

## 2020-04-03 PROCEDURE — 80053 COMPREHEN METABOLIC PANEL: CPT

## 2020-04-03 PROCEDURE — 3017F COLORECTAL CA SCREEN DOC REV: CPT | Performed by: INTERNAL MEDICINE

## 2020-04-03 PROCEDURE — 85025 COMPLETE CBC W/AUTO DIFF WBC: CPT

## 2020-04-03 PROCEDURE — G8417 CALC BMI ABV UP PARAM F/U: HCPCS | Performed by: INTERNAL MEDICINE

## 2020-04-03 PROCEDURE — 4040F PNEUMOC VAC/ADMIN/RCVD: CPT | Performed by: INTERNAL MEDICINE

## 2020-04-03 ASSESSMENT — ENCOUNTER SYMPTOMS
CHEST TIGHTNESS: 0
ABDOMINAL PAIN: 0
SHORTNESS OF BREATH: 0
DIARRHEA: 0
SORE THROAT: 0
NAUSEA: 0
BLOOD IN STOOL: 0
RHINORRHEA: 0
CONSTIPATION: 0
COUGH: 0

## 2020-04-03 NOTE — PROGRESS NOTES
education level: Not on file    Occupational History      Occupation: retired    Social Needs      Financial resource strain: Not hard at [employer] insecurity        Worry: Never true        Inability: Never true      Transportation needs        Medical: No        Non-medical: No    Tobacco Use      Smoking status: Former Smoker        Packs/day: 1.00        Years: 30.00        Pack years: 27        Quit date: 1/1/2005        Years since quitting: 15.2      Smokeless tobacco: Never Used    Substance and Sexual Activity      Alcohol use: No      Drug use: No      Sexual activity: Not on file    Lifestyle      Physical activity        Days per week: Not on file        Minutes per session: Not on file      Stress: Not on file    Relationships      Social connections        Talks on phone: Not on file        Gets together: Not on file        Attends Yazidi service: Not on file        Active member of club or organization: Not on file        Attends meetings of clubs or organizations: Not on file        Relationship status: Not on file      Intimate partner violence        Fear of current or ex partner: Not on file        Emotionally abused: Not on file        Physically abused: Not on file        Forced sexual activity: Not on file    Other Topics      Concerns:        Not on file    Social History Narrative      Not on file      Current Outpatient Medications on File Prior to Visit:  albuterol sulfate HFA (PROAIR HFA) 108 (90 Base) MCG/ACT inhaler, Inhale 2 puffs into the lungs every 4 hours as needed for Wheezing or Shortness of Breath Inhale 2 puffs 4 times daily, Disp: 1 Inhaler, Rfl: 3  linaCLOtide (LINZESS) 72 MCG CAPS capsule, Take 1 capsule by mouth every morning (before breakfast), Disp: 30 capsule, Rfl: 5  olmesartan-hydrochlorothiazide (BENICAR HCT) 20-12.5 MG per tablet, TAKE 1 TABLET EVERY DAY, Disp: 90 tablet, Rfl: 1  pravastatin (PRAVACHOL) 20 MG tablet, Take 1 tablet by mouth daily, Disp: 90

## 2020-04-03 NOTE — PATIENT INSTRUCTIONS
1. Essential hypertension  Since Sulema Aschoff had an elevated blood pressure in the clinic today, she is instructed to follow up in the clinic in 2 weeks for a repeat blood pressure check. Sulema Aschoff was instructed to take her blood pressure medication at least 2 hours before the appointment. Instructions were also given to avoid caffeine before the blood pressure evaluation and to follow a sodium restricted diet. Attempt to obtain blood pressure measurements outside of the clinic and bring these readings to your office appointment. - Comprehensive Metabolic Panel; Future    2. Nonrheumatic aortic valve stenosis  Follows with Dr. Eloina Liriano yearly with ECHO yearly. 3. Mixed hyperlipidemia  Controlled on Pravastatin. - Comprehensive Metabolic Panel; Future    4. Seizure disorder ([address]  No problems off seizure medication. Following with Dr. Farrah Ivey as needed. 5. Lower leg edema  TSH today with CMP and CBC.  - TSH with Reflex; Future    6. Anemia, unspecified type  CBC today. - CBC Auto Differential; Future    Sulema Aschoff was instructed to follow up in the clinic in 6 months for check up or as needed with any medical issues.

## 2020-04-17 ENCOUNTER — NURSE ONLY (OUTPATIENT)
Dept: FAMILY MEDICINE CLINIC | Age: 75
End: 2020-04-17

## 2020-04-17 VITALS — HEART RATE: 64 BPM | SYSTOLIC BLOOD PRESSURE: 136 MMHG | TEMPERATURE: 96.2 F | DIASTOLIC BLOOD PRESSURE: 71 MMHG

## 2020-04-17 NOTE — PATIENT INSTRUCTIONS
SURVEY:    You may be receiving a survey from [employer] regarding your visit today. Please complete the survey to enable us to provide the highest quality of care to you and your family. If you cannot score us a very good on any question, please call the office to discuss how we could of made your experience a very good one. Thank you.

## 2020-05-11 ENCOUNTER — OFFICE VISIT (OUTPATIENT)
Dept: FAMILY MEDICINE CLINIC | Age: 75
End: 2020-05-11
Payer: MEDICARE

## 2020-05-11 VITALS
HEIGHT: 63 IN | HEART RATE: 62 BPM | TEMPERATURE: 96.5 F | WEIGHT: 167 LBS | BODY MASS INDEX: 29.59 KG/M2 | DIASTOLIC BLOOD PRESSURE: 60 MMHG | SYSTOLIC BLOOD PRESSURE: 132 MMHG

## 2020-05-11 PROCEDURE — 1123F ACP DISCUSS/DSCN MKR DOCD: CPT | Performed by: INTERNAL MEDICINE

## 2020-05-11 PROCEDURE — 3017F COLORECTAL CA SCREEN DOC REV: CPT | Performed by: INTERNAL MEDICINE

## 2020-05-11 PROCEDURE — 4040F PNEUMOC VAC/ADMIN/RCVD: CPT | Performed by: INTERNAL MEDICINE

## 2020-05-11 PROCEDURE — G0438 PPPS, INITIAL VISIT: HCPCS | Performed by: INTERNAL MEDICINE

## 2020-05-11 ASSESSMENT — LIFESTYLE VARIABLES: HOW OFTEN DO YOU HAVE A DRINK CONTAINING ALCOHOL: 0

## 2020-05-11 ASSESSMENT — PATIENT HEALTH QUESTIONNAIRE - PHQ9
SUM OF ALL RESPONSES TO PHQ QUESTIONS 1-9: 1
SUM OF ALL RESPONSES TO PHQ QUESTIONS 1-9: 1

## 2020-05-11 NOTE — PROGRESS NOTES
2016    Diverticulosis     Head injury     Hx of blood clots     Hyperlipidemia     Hypertension     Migraine headache     Seizures ([address]     Stroke (cerebrum) ([address]     Unspecified cerebral artery occlusion with cerebral infarction     TIA       Past Surgical History:   Procedure Laterality Date    BLEPHAROPLASTY      BREAST SURGERY      b/l breast cyst removal     CATARACT REMOVAL      COLONOSCOPY  2009     COLONOSCOPY BIOPSY/STOMA N/A 8/28/2018    COLONOSCOPY BIOPSY/STOMA, Dx: External Hemorrhoids and Severe Diverticulosis performed by Jenna Quezada MD at [facility] BI      TUBAL LIGATION         Family History   Problem Relation Age of Onset    Breast Cancer Sister     Diabetes Sister     Migraines Sister     Clotting Disorder Mother     Diabetes Mother     Hypertension Mother     Migraines Mother     Heart Attack Father     Hypertension Father     Diabetes Father     Heart Disease Brother     Diabetes Brother     Migraines Brother     Hypertension Maternal Grandmother     Hypertension Maternal Grandfather     Hypertension Paternal Grandmother     Hypertension Paternal Grandfather        CareTeam (Including outside providers/suppliers regularly involved in providing care):   Patient Care Team:  Jenna Quezada MD as PCP - General (Internal Medicine)  Jenna Quezada MD as PCP - [address] [place] [address] Provider  Garry Cope MD as Consulting Physician (Neurology)  Stefano Kruger MD as Consulting Physician (Cardiology)  Gunner Dong MD as Consulting Physician (Urology)    Wt Readings from Last 3 Encounters:   05/11/20 167 lb (75.8 kg)   04/03/20 167 lb (75.8 kg)   02/03/20 167 lb (75.8 kg)     Vitals:    05/11/20 0810   BP: 132/60   Pulse: 62   Temp: 96.5 °F (35.8 °C)   TempSrc: Tympanic   Weight: 167 lb (75.8 kg)   Height: 5' 3\" (1.6 m)     Body mass index is 29.58 kg/m².     Based upon direct observation of the patient, evaluation of

## 2020-05-13 ENCOUNTER — HOSPITAL ENCOUNTER (OUTPATIENT)
Dept: MAMMOGRAPHY | Age: 75
Discharge: HOME | End: 2020-05-15
Payer: MEDICARE

## 2020-05-13 PROCEDURE — 77067 SCR MAMMO BI INCL CAD: CPT

## 2020-05-21 ENCOUNTER — HOSPITAL ENCOUNTER (OUTPATIENT)
Dept: ULTRASOUND IMAGING | Age: 75
Discharge: HOME | End: 2020-05-23
Payer: MEDICARE

## 2020-05-21 ENCOUNTER — HOSPITAL ENCOUNTER (OUTPATIENT)
Dept: MAMMOGRAPHY | Age: 75
Discharge: HOME | End: 2020-05-23
Payer: MEDICARE

## 2020-05-21 PROCEDURE — 77065 DX MAMMO INCL CAD UNI: CPT

## 2020-05-21 PROCEDURE — 76641 ULTRASOUND BREAST COMPLETE: CPT

## 2020-06-12 ENCOUNTER — TELEPHONE (OUTPATIENT)
Dept: FAMILY MEDICINE CLINIC | Age: 75
End: 2020-06-12

## 2020-06-12 RX ORDER — TRIAMCINOLONE ACETONIDE 1 MG/G
CREAM TOPICAL 2 TIMES DAILY
Qty: 1 TUBE | Refills: 5 | Status: SHIPPED | OUTPATIENT
Start: 2020-06-12 | End: 2021-08-20 | Stop reason: SDUPTHER

## 2020-06-12 RX ORDER — LOSARTAN POTASSIUM 100 MG/1
100 TABLET ORAL DAILY
Qty: 90 TABLET | Refills: 1 | Status: CANCELLED | OUTPATIENT
Start: 2020-06-12

## 2020-06-12 RX ORDER — LOSARTAN POTASSIUM AND HYDROCHLOROTHIAZIDE 12.5; 1 MG/1; MG/1
1 TABLET ORAL DAILY
Qty: 90 TABLET | Refills: 1 | Status: SHIPPED | OUTPATIENT
Start: 2020-06-12 | End: 2020-09-21

## 2020-06-12 RX ORDER — HYDROCHLOROTHIAZIDE 12.5 MG/1
12.5 CAPSULE, GELATIN COATED ORAL EVERY MORNING
Qty: 90 CAPSULE | Refills: 1 | Status: CANCELLED | OUTPATIENT
Start: 2020-06-12

## 2020-06-12 RX ORDER — OLMESARTAN MEDOXOMIL AND HYDROCHLOROTHIAZIDE 20/12.5 20; 12.5 MG/1; MG/1
TABLET ORAL
Qty: 90 TABLET | Refills: 1 | Status: SHIPPED | OUTPATIENT
Start: 2020-06-12 | End: 2020-06-12

## 2020-06-12 NOTE — TELEPHONE ENCOUNTER
MD brayden Boyer urol [facility]   9/15/2020  9:30 AM [facility] ECHO ROOM [facility] BRAYDEN [facility] ECHO [facility]   9/21/2020 10:00 AM MD Lissette Yeh [facility]   10/5/2020 11:00 [facility]   11/19/2020  1[facility]   11/19/2020  2:00  [facility]   5/17/2021 10:30 AM [facility] Karo Marrufo            Patient Active Problem List:     Hypertension     Aortic stenosis     Hyperlipidemia     Diverticulosis     Intracranial bleed (HCC)     Vitamin D deficiency     Rectocele     Osteoporosis     History of DVT (deep vein thrombosis)     Cavernoma     TIA (transient ischemic attack)     Cerebrovascular accident (CVA) ([address]     Thalamic infarction ([address]     Paresthesia     Microhematuria     Seizure disorder ([address]     Frequent UTI

## 2020-06-12 NOTE — TELEPHONE ENCOUNTER
Health Maintenance   Topic Date Due    Shingles Vaccine (1 of 2) 04/08/1996    A1C test (Diabetic or Prediabetic)  04/01/2018    Lipid screen  09/12/2020    Potassium monitoring  04/03/2021    Creatinine monitoring  04/03/2021    Annual Wellness Visit (AWV)  05/12/2021    Breast cancer screen  05/21/2021    DTaP/Tdap/Td vaccine (2 - Td) 11/08/2022    Colon cancer screen colonoscopy  08/28/2028    DEXA (modify frequency per FRAX score)  Completed    Flu vaccine  Completed    Pneumococcal 65+ years Vaccine  Completed    Hepatitis C screen  Completed    Hepatitis A vaccine  Aged Out    Hepatitis B vaccine  Aged Out    Hib vaccine  Aged Out    Meningococcal (ACWY) vaccine  Aged Out             (applicable per patient's age: Cancer Screenings, Depression Screening, Fall Risk Screening, Immunizations)    Hemoglobin A1C (%)   Date Value   04/01/2017 5.8     LDL Cholesterol (mg/dL)   Date Value   09/12/2019 83     LDL Calculated (mg/dL)   Date Value   08/06/2013 83     AST (U/L)   Date Value   04/03/2020 20     ALT (U/L)   Date Value   04/03/2020 10     BUN (mg/dL)   Date Value   04/03/2020 22      (goal A1C is < 7)   (goal LDL is <100) need 30-50% reduction from baseline     BP Readings from Last 3 Encounters:   05/11/20 132/60   04/17/20 136/71   04/03/20 (!) 158/62    (goal /80)      All Future Testing planned in [facility]:  Lab Frequency Next Occurrence   CBC Auto Differential Once 09/23/2020   Comprehensive Metabolic Panel Once 49/20/9533   Vitamin D 25 Hydroxy Once 09/23/2020   Lipid Panel Once 09/23/2020   TSH with Reflex Once 09/23/2020   Magnesium Once 09/23/2020   EKG 12 Lead Once 09/23/2020   ECHO Complete 2D W Doppler W Color Once 09/23/2020   XR CHEST STANDARD (2 VW) Once 09/23/2020   US BREAST COMPLETE RIGHT Once 11/22/2020   TERENCE DIGITAL DIAGNOSTIC W OR WO CAD RIGHT Once 11/22/2020       Next Visit Date:  Future Appointments   Date Time Provider Melo Blake   9/3/2020  1:15 PM

## 2020-06-19 RX ORDER — PRAVASTATIN SODIUM 20 MG
20 TABLET ORAL DAILY
Qty: 90 TABLET | Refills: 1 | Status: SHIPPED | OUTPATIENT
Start: 2020-06-19 | End: 2020-11-30

## 2020-08-13 RX ORDER — PREDNISONE 20 MG/1
TABLET ORAL
Qty: 12 TABLET | Refills: 0 | Status: SHIPPED | OUTPATIENT
Start: 2020-08-13 | End: 2020-08-23

## 2020-09-03 ENCOUNTER — OFFICE VISIT (OUTPATIENT)
Dept: UROLOGY | Age: 75
End: 2020-09-03
Payer: MEDICARE

## 2020-09-03 ENCOUNTER — HOSPITAL ENCOUNTER (OUTPATIENT)
Age: 75
Setting detail: SPECIMEN
Discharge: HOME | End: 2020-09-03
Payer: MEDICARE

## 2020-09-03 VITALS
SYSTOLIC BLOOD PRESSURE: 138 MMHG | BODY MASS INDEX: 29.41 KG/M2 | DIASTOLIC BLOOD PRESSURE: 64 MMHG | HEIGHT: 63 IN | WEIGHT: 166 LBS

## 2020-09-03 LAB
-: ABNORMAL
AMORPHOUS: ABNORMAL
BACTERIA: ABNORMAL
BILIRUBIN URINE: NEGATIVE
CASTS UA: ABNORMAL /LPF
CASTS UA: ABNORMAL /LPF
COLOR: YELLOW
COMMENT UA: ABNORMAL
CRYSTALS, UA: ABNORMAL /HPF
EPITHELIAL CELLS UA: ABNORMAL /HPF
GLUCOSE URINE: NEGATIVE
KETONES, URINE: NEGATIVE
LEUKOCYTE ESTERASE, URINE: ABNORMAL
MUCUS: ABNORMAL
NITRITE, URINE: NEGATIVE
OTHER OBSERVATIONS UA: ABNORMAL
PH UA: 6 (ref 5–8)
PROTEIN UA: NEGATIVE
RBC UA: ABNORMAL /HPF (ref 0–2)
RENAL EPITHELIAL, UA: ABNORMAL /HPF
SPECIFIC GRAVITY UA: 1.02 (ref 1–1.03)
TRICHOMONAS: ABNORMAL
TURBIDITY: CLEAR
URINE HGB: ABNORMAL
UROBILINOGEN, URINE: NORMAL
WBC UA: ABNORMAL /HPF
YEAST: ABNORMAL

## 2020-09-03 PROCEDURE — G8399 PT W/DXA RESULTS DOCUMENT: HCPCS | Performed by: UROLOGY

## 2020-09-03 PROCEDURE — G8417 CALC BMI ABV UP PARAM F/U: HCPCS | Performed by: UROLOGY

## 2020-09-03 PROCEDURE — 1090F PRES/ABSN URINE INCON ASSESS: CPT | Performed by: UROLOGY

## 2020-09-03 PROCEDURE — 99213 OFFICE O/P EST LOW 20 MIN: CPT | Performed by: UROLOGY

## 2020-09-03 PROCEDURE — 4040F PNEUMOC VAC/ADMIN/RCVD: CPT | Performed by: UROLOGY

## 2020-09-03 PROCEDURE — 81001 URINALYSIS AUTO W/SCOPE: CPT

## 2020-09-03 PROCEDURE — 1036F TOBACCO NON-USER: CPT | Performed by: UROLOGY

## 2020-09-03 PROCEDURE — 3017F COLORECTAL CA SCREEN DOC REV: CPT | Performed by: UROLOGY

## 2020-09-03 PROCEDURE — G8427 DOCREV CUR MEDS BY ELIG CLIN: HCPCS | Performed by: UROLOGY

## 2020-09-03 PROCEDURE — 87086 URINE CULTURE/COLONY COUNT: CPT

## 2020-09-03 PROCEDURE — 1123F ACP DISCUSS/DSCN MKR DOCD: CPT | Performed by: UROLOGY

## 2020-09-03 RX ORDER — HYDROCHLOROTHIAZIDE 12.5 MG/1
TABLET ORAL
COMMUNITY
Start: 2020-06-15 | End: 2020-11-30

## 2020-09-03 RX ORDER — LOSARTAN POTASSIUM 100 MG/1
TABLET ORAL
COMMUNITY
Start: 2020-06-15 | End: 2020-11-30

## 2020-09-03 NOTE — PROGRESS NOTES
HPI:    Patient is a 77 y.o. female in no acute distress. She is alert and oriented to person, place, and time. History  2016 Referred for recurrent UTI and microhematuria. Treated with 3 rounds abx in Mar/Apr 2016. Symptoms finally resolved, but microhematuria persisted.  Previous smoker, 1 PPD x 30 yrs. CT and cysto normal.     Frequent UTIs in the past. Does not leak urine. Did have hysterectomy. Pelvic did show large rectocele. Referred to GYN for this. They offered a pessary versus surgery, patient declined both    Currently  Patient is here today for annual follow-up. We have been seeing her for frequent urinary tract infections. In reviewing her labs for the past year. She has had no frequent urinary tract infections. We are also following the patient for microscopic hematuria. Patient is very concerned today about her urinary tract infection because her sister had a urinary tract infection and was septic. We did explain to her there is very little concern for this. Otherwise she has been doing well. No flank pain nausea vomiting. She has had no gross hematuria or dysuria recently. She has had no episodes of dysuria in the past year.     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    Deep vein blood clot of left lower extremity ([address] 2016    Diverticulosis     Head injury     Hx of blood clots     Hyperlipidemia     Hypertension     Migraine headache     Seizures ([address]     Stroke (cerebrum) ([address]     Unspecified cerebral artery occlusion with cerebral infarction     TIA     Past Surgical History:   Procedure Laterality Date    BLEPHAROPLASTY      BREAST SURGERY      b/l breast cyst removal     CATARACT REMOVAL      COLONOSCOPY  2009     COLONOSCOPY BIOPSY/STOMA N/A 8/28/2018    COLONOSCOPY BIOPSY/STOMA, Dx: External Hemorrhoids and Severe Diverticulosis performed by Olive Gupta MD at [facility] BI      TUBAL LIGATION       Outpatient Encounter Medications as of 9/3/2020   Medication Sig Dispense Refill    pravastatin (PRAVACHOL) 20 MG tablet TAKE 1 TABLET BY MOUTH DAILY 90 tablet 1    triamcinolone (KENALOG) 0.1 % cream Apply topically 2 times daily 1 Tube 5    losartan-hydrochlorothiazide (HYZAAR) 100-12.5 MG per tablet Take 1 tablet by mouth daily 90 tablet 1    albuterol sulfate HFA (PROAIR HFA) 108 (90 Base) MCG/ACT inhaler Inhale 2 puffs into the lungs every 4 hours as needed for Wheezing or Shortness of Breath Inhale 2 puffs 4 times daily 1 Inhaler 3    linaCLOtide (LINZESS) 72 MCG CAPS capsule Take 1 capsule by mouth every morning (before breakfast) 30 capsule 5    acetaminophen (TYLENOL) 500 MG tablet Take 500 mg by mouth as needed for Pain      Cranberry 400 MG CAPS Take 400 mg by mouth daily (with breakfast)      aspirin 325 MG EC tablet Take 1 tablet by mouth daily 30 tablet 3    Psyllium (METAMUCIL PO) Take by mouth daily as needed        No facility-administered encounter medications on file as of 9/3/2020.        Current Outpatient Medications on File Prior to Visit   Medication Sig Dispense Refill    pravastatin (PRAVACHOL) 20 MG tablet TAKE 1 TABLET BY MOUTH DAILY 90 tablet 1    triamcinolone (KENALOG) 0.1 % cream Apply topically 2 times daily 1 Tube 5    losartan-hydrochlorothiazide (HYZAAR) 100-12.5 MG per tablet Take 1 tablet by mouth daily 90 tablet 1    albuterol sulfate HFA (PROAIR HFA) 108 (90 Base) MCG/ACT inhaler Inhale 2 puffs into the lungs every 4 hours as needed for Wheezing or Shortness of Breath Inhale 2 puffs 4 times daily 1 Inhaler 3    linaCLOtide (LINZESS) 72 MCG CAPS capsule Take 1 capsule by mouth every morning (before breakfast) 30 capsule 5    acetaminophen (TYLENOL) 500 MG tablet Take 500 mg by mouth as needed for Pain      Cranberry 400 MG CAPS Take 400 mg by mouth daily (with breakfast)      aspirin 325 MG EC tablet Take 1 tablet by mouth daily 30 tablet 3    Psyllium (METAMUCIL PO) Take by mouth daily as needed        No current facility-administered medications on file prior to visit. Tape Merna Mandujano tape]  Family History   Problem Relation Age of Onset    Breast Cancer Sister     Diabetes Sister     Migraines Sister     Clotting Disorder Mother     Diabetes Mother     Hypertension Mother     Migraines Mother     Heart Attack Father     Hypertension Father     Diabetes Father     Heart Disease Brother     Diabetes Brother     Migraines Brother     Hypertension Maternal Grandmother     Hypertension Maternal Grandfather     Hypertension Paternal Grandmother     Hypertension Paternal Grandfather      Social History     Tobacco Use   Smoking Status Former Smoker    Packs/day: 1.00    Years: 30.00    Pack years: 30.00    Last attempt to quit: 1/1/2005    Years since quitting: 15.6   Smokeless Tobacco Never Used       Social History     Substance and Sexual Activity   Alcohol Use No       Review of Systems    There were no vitals taken for this visit. PHYSICAL EXAM:  Constitutional: Patient resting comfortably, in no acute distress. Neuro: Alert and oriented to person place and time. Cranial nerves grossly intact. Psych: Mood and affect normal.  Skin: Warm, dry  HEENT: normocephalic, atraumatic  Lymphatics: No palpable lymphadenopathy  Lungs: Respiratory effort normal, unlabored  Cardiovascular:  Normal peripheral pulses  Abdomen: Soft, non-tender, non-distended with no organomegaly or palpable masses. : No CVA tenderness. Bladder non-tender and not distended.   Pelvic: Deferred    Lab Results   Component Value Date    BUN 22 04/03/2020     Lab Results   Component Value Date    CREATININE 0.97 (H) 04/03/2020       ASSESSMENT:  This is a 77 y.o. female with the following diagnoses:   Diagnosis Orders   1. Microhematuria  Urinalysis with Microscopic    Culture, Urine   2. Frequent UTI  Urinalysis with Microscopic    Culture, Urine         PLAN:  We will check urine for culture and sensitivity today. She will follow-up with us in 1 year. In 1 year should be at the 5-year tino from her previous microscopic hematuria work-up. We will need to repeat her work-up if she is positive stone. She will follow-up with us in the interim with urinary issues.

## 2020-09-03 NOTE — PATIENT INSTRUCTIONS
SURVEY:    You may be receiving a survey from [employer] regarding your visit today. Please complete the survey to enable us to provide the highest quality of care to you and your family. If you cannot score us a very good on any question, please call the office to discuss how we could have made your experience a very good one. Thank you.

## 2020-09-04 LAB
CULTURE: NORMAL
Lab: NORMAL
SPECIMEN DESCRIPTION: NORMAL

## 2020-09-08 ENCOUNTER — TELEPHONE (OUTPATIENT)
Dept: UROLOGY | Age: 75
End: 2020-09-08

## 2020-09-08 NOTE — TELEPHONE ENCOUNTER
----- Message from JUDE Tadeo - CNP sent at 9/8/2020 12:01 PM EDT -----  Call pt - urine cx reviewed and negative for UTI

## 2020-09-15 ENCOUNTER — HOSPITAL ENCOUNTER (OUTPATIENT)
Age: 75
Discharge: HOME | End: 2020-09-15
Payer: MEDICARE

## 2020-09-15 ENCOUNTER — HOSPITAL ENCOUNTER (OUTPATIENT)
Dept: NON INVASIVE DIAGNOSTICS | Age: 75
Discharge: HOME | End: 2020-09-15
Payer: MEDICARE

## 2020-09-15 ENCOUNTER — HOSPITAL ENCOUNTER (OUTPATIENT)
Age: 75
Discharge: HOME | End: 2020-09-17
Payer: MEDICARE

## 2020-09-15 ENCOUNTER — HOSPITAL ENCOUNTER (OUTPATIENT)
Dept: GENERAL RADIOLOGY | Age: 75
Discharge: HOME | End: 2020-09-17
Payer: MEDICARE

## 2020-09-15 LAB
ABSOLUTE EOS #: 0.1 K/UL (ref 0–0.4)
ABSOLUTE IMMATURE GRANULOCYTE: ABNORMAL K/UL (ref 0–0.3)
ABSOLUTE LYMPH #: 1.8 K/UL (ref 1–4.8)
ABSOLUTE MONO #: 0.5 K/UL (ref 0–1)
ALBUMIN SERPL-MCNC: 4.1 G/DL (ref 3.5–5.2)
ALBUMIN/GLOBULIN RATIO: ABNORMAL (ref 1–2.5)
ALP BLD-CCNC: 62 U/L (ref 35–104)
ALT SERPL-CCNC: 10 U/L (ref 5–33)
ANION GAP SERPL CALCULATED.3IONS-SCNC: 10 MMOL/L (ref 9–17)
AST SERPL-CCNC: 19 U/L
BASOPHILS # BLD: 1 % (ref 0–2)
BASOPHILS ABSOLUTE: 0 K/UL (ref 0–0.2)
BILIRUB SERPL-MCNC: 0.37 MG/DL (ref 0.3–1.2)
BUN BLDV-MCNC: 26 MG/DL (ref 8–23)
BUN/CREAT BLD: 27 (ref 9–20)
CALCIUM SERPL-MCNC: 9.4 MG/DL (ref 8.6–10.4)
CHLORIDE BLD-SCNC: 105 MMOL/L (ref 98–107)
CHOLESTEROL/HDL RATIO: 3.3
CHOLESTEROL: 176 MG/DL
CO2: 30 MMOL/L (ref 20–31)
CREAT SERPL-MCNC: 0.97 MG/DL (ref 0.5–0.9)
DIFFERENTIAL TYPE: YES
EOSINOPHILS RELATIVE PERCENT: 2 % (ref 0–5)
GFR AFRICAN AMERICAN: >60 ML/MIN
GFR NON-AFRICAN AMERICAN: 56 ML/MIN
GFR SERPL CREATININE-BSD FRML MDRD: ABNORMAL ML/MIN/{1.73_M2}
GFR SERPL CREATININE-BSD FRML MDRD: ABNORMAL ML/MIN/{1.73_M2}
GLUCOSE BLD-MCNC: 116 MG/DL (ref 70–99)
HCT VFR BLD CALC: 34.7 % (ref 36–46)
HDLC SERPL-MCNC: 53 MG/DL
HEMOGLOBIN: 11.6 G/DL (ref 12–16)
IMMATURE GRANULOCYTES: ABNORMAL %
LDL CHOLESTEROL: 109 MG/DL (ref 0–130)
LV EF: 55 %
LVEF MODALITY: NORMAL
LYMPHOCYTES # BLD: 37 % (ref 15–40)
MAGNESIUM: 2 MG/DL (ref 1.6–2.6)
MCH RBC QN AUTO: 28.8 PG (ref 26–34)
MCHC RBC AUTO-ENTMCNC: 33.4 G/DL (ref 31–37)
MCV RBC AUTO: 86.4 FL (ref 80–100)
MONOCYTES # BLD: 11 % (ref 4–8)
NRBC AUTOMATED: ABNORMAL PER 100 WBC
PATIENT FASTING?: YES
PDW BLD-RTO: 14 % (ref 12.1–15.2)
PLATELET # BLD: 196 K/UL (ref 140–450)
PLATELET ESTIMATE: ABNORMAL
PMV BLD AUTO: ABNORMAL FL (ref 6–12)
POTASSIUM SERPL-SCNC: 3.7 MMOL/L (ref 3.7–5.3)
RBC # BLD: 4.01 M/UL (ref 4–5.2)
RBC # BLD: ABNORMAL 10*6/UL
SEG NEUTROPHILS: 49 % (ref 47–75)
SEGMENTED NEUTROPHILS ABSOLUTE COUNT: 2.3 K/UL (ref 2.5–7)
SODIUM BLD-SCNC: 145 MMOL/L (ref 135–144)
TOTAL PROTEIN: 8 G/DL (ref 6.4–8.3)
TRIGL SERPL-MCNC: 68 MG/DL
TSH SERPL DL<=0.05 MIU/L-ACNC: 2.9 MIU/L (ref 0.3–5)
VITAMIN D 25-HYDROXY: 75.9 NG/ML (ref 30–100)
VLDLC SERPL CALC-MCNC: NORMAL MG/DL (ref 1–30)
WBC # BLD: 4.7 K/UL (ref 3.5–11)
WBC # BLD: ABNORMAL 10*3/UL

## 2020-09-15 PROCEDURE — 71046 X-RAY EXAM CHEST 2 VIEWS: CPT

## 2020-09-15 PROCEDURE — 84443 ASSAY THYROID STIM HORMONE: CPT

## 2020-09-15 PROCEDURE — 80053 COMPREHEN METABOLIC PANEL: CPT

## 2020-09-15 PROCEDURE — 83735 ASSAY OF MAGNESIUM: CPT

## 2020-09-15 PROCEDURE — 93306 TTE W/DOPPLER COMPLETE: CPT

## 2020-09-15 PROCEDURE — 93005 ELECTROCARDIOGRAM TRACING: CPT

## 2020-09-15 PROCEDURE — 82306 VITAMIN D 25 HYDROXY: CPT

## 2020-09-15 PROCEDURE — 36415 COLL VENOUS BLD VENIPUNCTURE: CPT

## 2020-09-15 PROCEDURE — 80061 LIPID PANEL: CPT

## 2020-09-15 PROCEDURE — 85025 COMPLETE CBC W/AUTO DIFF WBC: CPT

## 2020-09-16 LAB
EKG ATRIAL RATE: 68 BPM
EKG P AXIS: 63 DEGREES
EKG P-R INTERVAL: 198 MS
EKG Q-T INTERVAL: 398 MS
EKG QRS DURATION: 80 MS
EKG QTC CALCULATION (BAZETT): 423 MS
EKG R AXIS: 6 DEGREES
EKG T AXIS: 43 DEGREES
EKG VENTRICULAR RATE: 68 BPM

## 2020-09-16 PROCEDURE — 93010 ELECTROCARDIOGRAM REPORT: CPT | Performed by: INTERNAL MEDICINE

## 2020-09-21 ENCOUNTER — OFFICE VISIT (OUTPATIENT)
Dept: CARDIOLOGY CLINIC | Age: 75
End: 2020-09-21
Payer: MEDICARE

## 2020-09-21 VITALS
DIASTOLIC BLOOD PRESSURE: 60 MMHG | OXYGEN SATURATION: 95 % | BODY MASS INDEX: 29.94 KG/M2 | HEART RATE: 70 BPM | WEIGHT: 169 LBS | SYSTOLIC BLOOD PRESSURE: 160 MMHG

## 2020-09-21 PROCEDURE — G8399 PT W/DXA RESULTS DOCUMENT: HCPCS | Performed by: INTERNAL MEDICINE

## 2020-09-21 PROCEDURE — 1036F TOBACCO NON-USER: CPT | Performed by: INTERNAL MEDICINE

## 2020-09-21 PROCEDURE — G8417 CALC BMI ABV UP PARAM F/U: HCPCS | Performed by: INTERNAL MEDICINE

## 2020-09-21 PROCEDURE — 1090F PRES/ABSN URINE INCON ASSESS: CPT | Performed by: INTERNAL MEDICINE

## 2020-09-21 PROCEDURE — 4040F PNEUMOC VAC/ADMIN/RCVD: CPT | Performed by: INTERNAL MEDICINE

## 2020-09-21 PROCEDURE — 1123F ACP DISCUSS/DSCN MKR DOCD: CPT | Performed by: INTERNAL MEDICINE

## 2020-09-21 PROCEDURE — 99214 OFFICE O/P EST MOD 30 MIN: CPT | Performed by: INTERNAL MEDICINE

## 2020-09-21 PROCEDURE — G8427 DOCREV CUR MEDS BY ELIG CLIN: HCPCS | Performed by: INTERNAL MEDICINE

## 2020-09-21 PROCEDURE — 3017F COLORECTAL CA SCREEN DOC REV: CPT | Performed by: INTERNAL MEDICINE

## 2020-09-21 NOTE — PROGRESS NOTES
Ov DR Ana Clement 1 year follow up  With echo. Some sob with asthma   Some epigastric pain after   Eating. Ankle edema if eats   Too much salt. Seen DR Back in May   Added HCTZ . Will repeat echo in  6mths at [facility]. Call with results  See in 1 year.

## 2020-09-21 NOTE — LETTER
Any Jackson M.D. [address] [place] [address]  [phone]        2020        MD Ashley Hicks [address]    RE:   Tiff Monday  :      Dear Dr. Parish Mtz:    [address]:  1.  Moderate-to-severe aortic stenosis. 2.  Her nephew. .. HISTORY OF PRESENT ILLNESS:  I had the pleasure of seeing Mrs. Lazara Headley and her  in our office on 2020. She is a very pleasant 72-year-old female who is a wealth of information. .. She has a history of aortic stenosis and aortic insufficiency with mild mitral regurgitation. Her aortic stenosis does appear to be in a moderate-to-severe category and we get yearly echocardiograms. Her last echocardiogram showed a mean gradient of approximately 38 mmHg. Her ejection fraction has always been normal in the 50% to 55% range. On 09/15/2020, her peak gradient was 56 mmHg and mean gradient was 38 mmHg with aortic valve area in the 0.9 to 1.0 cm2 range. She has not been exercising because of COVID. She is hoping to get back to [employer] shortly to start a walking program.  She is perhaps more tired than what she was last year, although again she attributes this to being unable to walk on a regular basis. She does have asthma and she has some exacerbations with further shortness of breath in general; however, she has had no change in her underlying breathing. She has chronic mild ankle edema, which is worse if she eats more salt than usual (for example, last night she had sea salt cashew nuts and sausage. ..). She has a history of intracerebral hemorrhage resulting in a CVA on 2013, treated conservatively. She is not a candidate for any anticoagulation. She has had no hospitalizations or procedures.   She does have a breast nodule, with a recommendation for follow up with right mammogram and right breast ultrasound in 6 months, which will be pending in November. She has never had a myocardial infarction, never had a cardiac catheterization. CARDIAC RISK FACTORS:  Prior MI:  Negative. Other Family Members:  Positive. Hyperlipidemia:  Positive. Hypertension:  Positive. Peripheral Vascular Disease:  Negative. Smoking:  Negative. Diabetes:  Borderline. MEDICATION AT THIS TIME:  She is on Tylenol p.r.n., albuterol 2 puffs every 4 hours p.r.n., aspirin 5 grains daily, HydroDIURIL 12.5 mg daily, Linzess 72 mcg daily, Cozaar 100 mg daily, Pravachol 20 mg daily. PAST MEDICAL HISTORY:  1. Breast surgery on the left side for a cyst.  2.  Hysterectomy in 1995 in [address] [place] [address]. 3.  CVA 22 years ago with cerebral hemorrhage in 08/2013 with CVA with some weakness on the left side. 4.  Migraine headaches. 5.  Cataract surgery. 6.  Blepharoplasty. 7.  DVT in 09/2016, treated conservatively because of cerebral hemorrhage in 09/2013. FAMILY HISTORY:  Mother, brother had MI. Father had MI.    SOCIAL HISTORY:  She is 77years old, . Two children. Does not smoke, drink alcohol. She was going through pictures from long ago. She did find another picture of her favorite nephew when he was approximately 10years old. It appeared to her that he was wearing a size B cup. Same nephew had a birthday party last year that was beautiful. His favorite present was flowered short-shorts. She thought the requested 2-piece bathing suit present was a bit much. .. I cannot wait to hear about his upcoming birthday party. REVIEW OF SYSTEMS:  Cardiac as above. Other systems reviewed including constitutional, eyes, ears, nose and throat, cardiovascular, respiratory, GI, , musculoskeletal, integumentary, neurologic, endocrine, hematologic and allergic/immunologic are negative except for what is described above. No weight loss or weight gain. No change in bowel habits.   No blood in stool.  No fevers, sweats or chills. PHYSICAL EXAMINATION:  VITAL SIGNS:  Blood pressure was 160/60 with a heart rate of 70 and regular. Respirations were 18. O2 sat was 95%. Weight 169 pounds. GENERAL:  She is a pleasant 23-year-old female. Denied pain. She was oriented to person, place and time. Answered questions appropriately. SKIN:  No unusual skin changes. HEENT:  The pupils are equally round and intact. Mucous membranes were dry. NECK:  No JVD. Good carotid pulses. No carotid bruits. No lymphadenopathy or thyromegaly. CARDIOVASCULAR EXAM:  S1 and S2 were normal.  No S3 or S4. Mild grade 3/6 systolic ejection murmur, which radiated to both carotid arteries. No diastolic murmur. She also had a grade 2 to 3/6 blowing type murmur at the apex. LUNGS:  Quite clear to auscultation and percussion. ABDOMEN:  Soft and nontender. Good bowel sounds. EXTREMITIES:  Good femoral pulses. Good pedal pulses. She had mild pedal edema. Skin was warm and dry. No calf tenderness. Nail beds pink. Good cap refill. PULSES:  Bilateral symmetrical radial, brachial and carotid pulses. No carotid bruits. Good femoral and pedal pulses. NEUROLOGIC EXAM:  Within normal limits. PSYCHIATRIC EXAM:  Within normal limits. LABORATORY DATA:  From 09/15/2020, sodium 145, potassium 2.7, BUN 26, creatinine 0.97, GFR was 56. Glucose was 116, calcium 9.4. Cholesterol was 176 with an HDL of 53, , triglycerides 68. ALT was 10, AST was 19. TSH was 2.90. Vitamin D 75.9. White count 4.7, hemoglobin 11.6 with a platelet count 434,250. EKG showed normal sinus rhythm with nonspecific ST changes with no change from previous EKGs. Echocardiogram showed an EF of 55%.   She had calcification of, what appeared to be, moderate-to-severe aortic stenosis with a peak gradient of 56 mmHg and a mean gradient 38 mmHg with an aortic valve area of approximately 0.9 to 1.0 cm2, although it was difficult to get a good clear window. She had moderate mitral regurgitation. IMPRESSION:  1. Moderate-to-severe aortic stenosis with a peak gradient of 56 mmHg and a mean gradient of 38 mmHg. 2.  EF of 50% to 55%. 3.  Hypertension, elevated in our office but generally fairly well controlled. 4.  Hyperlipidemia. 5.  CVA 22 years ago with right subcortical cavernous bleed on 08/12/2013, with complete resolution of symptoms. 6.  Possible factor V Leiden deficiency as an etiology for clots. 7.  Bilateral DVT on 09/23/2016, no anticoagulation. 8.  CVA on 03/31/2017 of unknown etiology, with mild weakness of the left leg, treated with full-dose aspirin. PLAN:  1. We will do an echocardiogram in 6 months at [facility] in [facility] to crosscheck our measurements and I will call her with the results. 2.  If it does appear that she has aortic stenosis in the severe range, then we would bring her back for discussion concerning TAVR and intervention. 3.  Otherwise, we will plan on seeing her in 1 year with another echocardiogram in 1 year. DISCUSSION:  Mrs. Parks overall is doing well. She has really had no unusual shortness of breath, loss of energy or chest pain or chest discomfort. I made no changes in her medications. Her gradient does appear to be worsening with a mean gradient of 38 mmHg. To crosscheck our measurements, we will do an echo in 6 months in [facility] on their machine. If this is essentially unchanged, then I will call her with the results. I think again we are probably getting closer to needing to have aortic valve replaced, although at this time she appears to be asymptomatic. Thank you very much for allowing me the privilege of seeing Mrs. Parks. If you have any questions on my thoughts, please do not hesitate to contact me.      Sincerely,        Shelbi Galvez    D: 09/21/2020 10:40:07     T: 09/21/2020 10:47:01     [email or web address]  Job#: [id number]   Doc#: [id number]

## 2020-09-23 NOTE — PROGRESS NOTES
Emilia Teran M.D. [address] [place] [address] [place]   [phone]        2020        Colletta Heater, MD  [facility] [address]    RE:   Moncho Gaines  :      Dear Dr. Monica Shay:    [address]:  1.  Moderate-to-severe aortic stenosis. 2.  Her nephew. .. HISTORY OF PRESENT ILLNESS:  I had the pleasure of seeing Mrs. Maryanne Maxwell and her  in our office on 2020. She is a very pleasant 52-year-old female who is a wealth of information. .. She has a history of aortic stenosis and aortic insufficiency with mild mitral regurgitation. Her aortic stenosis does appear to be in a moderate-to-severe category and we get yearly echocardiograms. Her last echocardiogram showed a mean gradient of approximately 38 mmHg. Her ejection fraction has always been normal in the 50% to 55% range. On 09/15/2020, her peak gradient was 56 mmHg and mean gradient was 38 mmHg with aortic valve area in the 0.9 to 1.0 cm2 range. She has not been exercising because of COVID. She is hoping to get back to [employer] shortly to start a walking program.  She is perhaps more tired than what she was last year, although again she attributes this to being unable to walk on a regular basis. She does have asthma and she has some exacerbations with further shortness of breath in general; however, she has had no change in her underlying breathing. She has chronic mild ankle edema, which is worse if she eats more salt than usual (for example, last night she had sea salt cashew nuts and sausage. ..). She has a history of intracerebral hemorrhage resulting in a CVA on 2013, treated conservatively. She is not a candidate for any anticoagulation. She has had no hospitalizations or procedures.   She does have a breast nodule, with a recommendation for follow up with right mammogram and right breast ultrasound in 6 months, which will be pending in November. She has never had a myocardial infarction, never had a cardiac catheterization. CARDIAC RISK FACTORS:  Prior MI:  Negative. Other Family Members:  Positive. Hyperlipidemia:  Positive. Hypertension:  Positive. Peripheral Vascular Disease:  Negative. Smoking:  Negative. Diabetes:  Borderline. MEDICATION AT THIS TIME:  She is on Tylenol p.r.n., albuterol 2 puffs every 4 hours p.r.n., aspirin 5 grains daily, HydroDIURIL 12.5 mg daily, Linzess 72 mcg daily, Cozaar 100 mg daily, Pravachol 20 mg daily. PAST MEDICAL HISTORY:  1. Breast surgery on the left side for a cyst.  2.  Hysterectomy in 1995 in [address] [place] [address]. 3.  CVA 22 years ago with cerebral hemorrhage in 08/2013 with CVA with some weakness on the left side. 4.  Migraine headaches. 5.  Cataract surgery. 6.  Blepharoplasty. 7.  DVT in 09/2016, treated conservatively because of cerebral hemorrhage in 09/2013. FAMILY HISTORY:  Mother, brother had MI. Father had MI.    SOCIAL HISTORY:  She is 77years old, . Two children. Does not smoke, drink alcohol. She was going through pictures from long ago. She did find another picture of her favorite nephew when he was approximately 10years old. It appeared to her that he was wearing a size B cup. Same nephew had a birthday party last year that was beautiful. His favorite present was flowered short-shorts. She thought the requested 2-piece bathing suit present was a bit much. .. I cannot wait to hear about his upcoming birthday party. REVIEW OF SYSTEMS:  Cardiac as above. Other systems reviewed including constitutional, eyes, ears, nose and throat, cardiovascular, respiratory, GI, , musculoskeletal, integumentary, neurologic, endocrine, hematologic and allergic/immunologic are negative except for what is described above. No weight loss or weight gain. No change in bowel habits. No blood in stool.   No fevers, sweats or chills. PHYSICAL EXAMINATION:  VITAL SIGNS:  Blood pressure was 160/60 with a heart rate of 70 and regular. Respirations were 18. O2 sat was 95%. Weight 169 pounds. GENERAL:  She is a pleasant 67-year-old female. Denied pain. She was oriented to person, place and time. Answered questions appropriately. SKIN:  No unusual skin changes. HEENT:  The pupils are equally round and intact. Mucous membranes were dry. NECK:  No JVD. Good carotid pulses. No carotid bruits. No lymphadenopathy or thyromegaly. CARDIOVASCULAR EXAM:  S1 and S2 were normal.  No S3 or S4. Mild grade 3/6 systolic ejection murmur, which radiated to both carotid arteries. No diastolic murmur. She also had a grade 2 to 3/6 blowing type murmur at the apex. LUNGS:  Quite clear to auscultation and percussion. ABDOMEN:  Soft and nontender. Good bowel sounds. EXTREMITIES:  Good femoral pulses. Good pedal pulses. She had mild pedal edema. Skin was warm and dry. No calf tenderness. Nail beds pink. Good cap refill. PULSES:  Bilateral symmetrical radial, brachial and carotid pulses. No carotid bruits. Good femoral and pedal pulses. NEUROLOGIC EXAM:  Within normal limits. PSYCHIATRIC EXAM:  Within normal limits. LABORATORY DATA:  From 09/15/2020, sodium 145, potassium 2.7, BUN 26, creatinine 0.97, GFR was 56. Glucose was 116, calcium 9.4. Cholesterol was 176 with an HDL of 53, , triglycerides 68. ALT was 10, AST was 19. TSH was 2.90. Vitamin D 75.9. White count 4.7, hemoglobin 11.6 with a platelet count 630,093. EKG showed normal sinus rhythm with nonspecific ST changes with no change from previous EKGs. Echocardiogram showed an EF of 55%. She had calcification of, what appeared to be, moderate-to-severe aortic stenosis with a peak gradient of 56 mmHg and a mean gradient 38 mmHg with an aortic valve area of approximately 0.9 to 1.0 cm2, although it was difficult to get a good clear window.   She had moderate mitral regurgitation. IMPRESSION:  1. Moderate-to-severe aortic stenosis with a peak gradient of 56 mmHg and a mean gradient of 38 mmHg. 2.  EF of 50% to 55%. 3.  Hypertension, elevated in our office but generally fairly well controlled. 4.  Hyperlipidemia. 5.  CVA 22 years ago with right subcortical cavernous bleed on 08/12/2013, with complete resolution of symptoms. 6.  Possible factor V Leiden deficiency as an etiology for clots. 7.  Bilateral DVT on 09/23/2016, no anticoagulation. 8.  CVA on 03/31/2017 of unknown etiology, with mild weakness of the left leg, treated with full-dose aspirin. PLAN:  1. We will do an echocardiogram in 6 months at [facility] in [place] to crosscheck our measurements and I will call her with the results. 2.  If it does appear that she has aortic stenosis in the severe range, then we would bring her back for discussion concerning TAVR and intervention. 3.  Otherwise, we will plan on seeing her in 1 year with another echocardiogram in 1 year. DISCUSSION:  Mrs. Parks overall is doing well. She has really had no unusual shortness of breath, loss of energy or chest pain or chest discomfort. I made no changes in her medications. Her gradient does appear to be worsening with a mean gradient of 38 mmHg. To crosscheck our measurements, we will do an echo in 6 months in [facility] in [place] on their machine. If this is essentially unchanged, then I will call her with the results. I think again we are probably getting closer to needing to have aortic valve replaced, although at this time she appears to be asymptomatic. Thank you very much for allowing me the privilege of seeing Mrs. Parks. If you have any questions on my thoughts, please do not hesitate to contact me.      Sincerely,        Shruthi Spears    D: 09/21/2020 10:40:07     T: 09/21/2020 10:47:01     [email or web address]  Job#: [id number]   Doc#: [id number]

## 2020-10-05 ENCOUNTER — OFFICE VISIT (OUTPATIENT)
Dept: FAMILY MEDICINE CLINIC | Age: 75
End: 2020-10-05
Payer: MEDICARE

## 2020-10-05 VITALS
WEIGHT: 167 LBS | SYSTOLIC BLOOD PRESSURE: 134 MMHG | DIASTOLIC BLOOD PRESSURE: 60 MMHG | HEART RATE: 66 BPM | TEMPERATURE: 97.3 F | BODY MASS INDEX: 29.59 KG/M2 | HEIGHT: 63 IN

## 2020-10-05 PROBLEM — J43.9 PULMONARY EMPHYSEMA (HCC): Status: ACTIVE | Noted: 2020-10-05

## 2020-10-05 LAB — HBA1C MFR BLD: 5.5 %

## 2020-10-05 PROCEDURE — 1090F PRES/ABSN URINE INCON ASSESS: CPT | Performed by: INTERNAL MEDICINE

## 2020-10-05 PROCEDURE — G8417 CALC BMI ABV UP PARAM F/U: HCPCS | Performed by: INTERNAL MEDICINE

## 2020-10-05 PROCEDURE — 4040F PNEUMOC VAC/ADMIN/RCVD: CPT | Performed by: INTERNAL MEDICINE

## 2020-10-05 PROCEDURE — 90686 IIV4 VACC NO PRSV 0.5 ML IM: CPT | Performed by: INTERNAL MEDICINE

## 2020-10-05 PROCEDURE — 1036F TOBACCO NON-USER: CPT | Performed by: INTERNAL MEDICINE

## 2020-10-05 PROCEDURE — G8926 SPIRO NO PERF OR DOC: HCPCS | Performed by: INTERNAL MEDICINE

## 2020-10-05 PROCEDURE — G8482 FLU IMMUNIZE ORDER/ADMIN: HCPCS | Performed by: INTERNAL MEDICINE

## 2020-10-05 PROCEDURE — 83036 HEMOGLOBIN GLYCOSYLATED A1C: CPT | Performed by: INTERNAL MEDICINE

## 2020-10-05 PROCEDURE — 1123F ACP DISCUSS/DSCN MKR DOCD: CPT | Performed by: INTERNAL MEDICINE

## 2020-10-05 PROCEDURE — G8399 PT W/DXA RESULTS DOCUMENT: HCPCS | Performed by: INTERNAL MEDICINE

## 2020-10-05 PROCEDURE — 99214 OFFICE O/P EST MOD 30 MIN: CPT | Performed by: INTERNAL MEDICINE

## 2020-10-05 PROCEDURE — 3023F SPIROM DOC REV: CPT | Performed by: INTERNAL MEDICINE

## 2020-10-05 PROCEDURE — G0008 ADMIN INFLUENZA VIRUS VAC: HCPCS | Performed by: INTERNAL MEDICINE

## 2020-10-05 PROCEDURE — 3017F COLORECTAL CA SCREEN DOC REV: CPT | Performed by: INTERNAL MEDICINE

## 2020-10-05 PROCEDURE — G8427 DOCREV CUR MEDS BY ELIG CLIN: HCPCS | Performed by: INTERNAL MEDICINE

## 2020-10-05 ASSESSMENT — ENCOUNTER SYMPTOMS
DIARRHEA: 0
COUGH: 0
RHINORRHEA: 0
ABDOMINAL PAIN: 0
CHEST TIGHTNESS: 0
CONSTIPATION: 0
SORE THROAT: 0
NAUSEA: 0
BLOOD IN STOOL: 0
SHORTNESS OF BREATH: 0

## 2020-10-05 NOTE — PROGRESS NOTES
Occupational History      Occupation: retired    Social Needs      Financial resource strain: Not hard at [employer] insecurity        Worry: Never true        Inability: Never true      Transportation needs        Medical: No        Non-medical: No    Tobacco Use      Smoking status: Former Smoker        Packs/day: 1.00        Years: 30.00        Pack years: 27        Quit date: 1/1/2005        Years since quitting: 15.7      Smokeless tobacco: Never Used    Substance and Sexual Activity      Alcohol use: No      Drug use: No      Sexual activity: Not on file    Lifestyle      Physical activity        Days per week: Not on file        Minutes per session: Not on file      Stress: Not on file    Relationships      Social connections        Talks on phone: Not on file        Gets together: Not on file        Attends [employer] service: Not on file        Active member of club or organization: Not on file        Attends meetings of clubs or organizations: Not on file        Relationship status: Not on file      Intimate partner violence        Fear of current or ex partner: Not on file        Emotionally abused: Not on file        Physically abused: Not on file        Forced sexual activity: Not on file    Other Topics      Concerns:        Not on file    Social History Narrative      Not on file      Review of patient's family history indicates:  Problem: Breast Cancer      Relation: Sister          Age of Onset: (Not Specified)  Problem: Diabetes      Relation: Sister          Age of Onset: (Not Specified)  Problem: Migraines      Relation: Sister          Age of Onset: (Not Specified)  Problem: Clotting Disorder      Relation: Mother          Age of Onset: (Not Specified)  Problem: Diabetes      Relation: Mother          Age of Onset: (Not Specified)  Problem: Hypertension      Relation: Mother          Age of Onset: (Not Specified)  Problem: Migraines      Relation: Mother          Age of Onset: (Not Specified)  Problem: Heart Attack      Relation: Father          Age of Onset: (Not Specified)  Problem: Hypertension      Relation: Father          Age of Onset: (Not Specified)  Problem: Diabetes      Relation: Father          Age of Onset: (Not Specified)  Problem: Heart Disease      Relation: Brother          Age of Onset: (Not Specified)  Problem: Diabetes      Relation: Brother          Age of Onset: (Not Specified)  Problem: Migraines      Relation: Brother          Age of Onset: (Not Specified)  Problem: Hypertension      Relation: Maternal Grandmother          Age of Onset: (Not Specified)  Problem: Hypertension      Relation: Maternal Grandfather          Age of Onset: (Not Specified)  Problem: Hypertension      Relation: Paternal Grandmother          Age of Onset: (Not Specified)  Problem: Hypertension      Relation: Paternal Grandfather          Age of Onset: (Not Specified)      Current Outpatient Medications on File Prior to Visit:  hydroCHLOROthiazide (HYDRODIURIL) 12.5 MG tablet, TAKE 1 TABLET BY MOUTH EVERY DAY, Disp: , Rfl:   losartan (COZAAR) 100 MG tablet, TAKE 1 TABLET BY MOUTH EVERY DAY, Disp: , Rfl:   pravastatin (PRAVACHOL) 20 MG tablet, TAKE 1 TABLET BY MOUTH DAILY, Disp: 90 tablet, Rfl: 1  albuterol sulfate HFA (PROAIR HFA) 108 (90 Base) MCG/ACT inhaler, Inhale 2 puffs into the lungs every 4 hours as needed for Wheezing or Shortness of Breath Inhale 2 puffs 4 times daily, Disp: 1 Inhaler, Rfl: 3  linaCLOtide (LINZESS) 72 MCG CAPS capsule, Take 1 capsule by mouth every morning (before breakfast), Disp: 30 capsule, Rfl: 5  Cranberry 400 MG CAPS, Take 400 mg by mouth daily (with breakfast), Disp: , Rfl:   aspirin 325 MG EC tablet, Take 1 tablet by mouth daily, Disp: 30 tablet, Rfl: 3  Psyllium (METAMUCIL PO), Take by mouth daily as needed , Disp: , Rfl:   triamcinolone (KENALOG) 0.1 % cream, Apply topically 2 times daily, Disp: 1 Tube, Rfl: 5  acetaminophen (TYLENOL) 500 MG tablet, Take 500 mg by mouth as needed for Pain, Disp: , Rfl:     No current facility-administered medications on file prior to visit.         -- Tape (Adhesive Tape) -- Rash      Lab Results       Component                Value               Date                       NA                       145 (H)             09/15/2020                 K                        3.7                 09/15/2020                 CL                       105                 09/15/2020                 CO2                      30                  09/15/2020                 BUN                      26 (H)              09/15/2020                 CREATININE               0.97 (H)            09/15/2020                 GLUCOSE                  116 (H)             09/15/2020                 CALCIUM                  9.4                 09/15/2020                 PROT                     8.0                 09/15/2020                 LABALBU                  4.1                 09/15/2020                 BILITOT                  0.37                09/15/2020                 ALKPHOS                  62                  09/15/2020                 AST                      19                  09/15/2020                 ALT                      10                  09/15/2020                 LABGLOM                  56 (L)              09/15/2020                 GFRAA                    >60                 09/15/2020              Lab Results       Component                Value               Date                       LABA1C                   5.8                 04/01/2017            Lab Results       Component                Value               Date                       EAG                      120                 04/01/2017              Lab Results       Component                Value               Date                       CHOL                     176                 09/15/2020                 CHOL                     153                 09/12/2019 CHOL                     191                 09/14/2018            Lab Results       Component                Value               Date                       TRIG                     68                  09/15/2020                 TRIG                     78                  09/12/2019                 TRIG                     111                 09/14/2018            Lab Results       Component                Value               Date                       HDL                      53                  09/15/2020                 HDL                      54                  09/12/2019                 HDL                      59                  09/14/2018            Lab Results       Component                Value               Date                       LDLCHOLESTEROL           109                 09/15/2020                 LDLCHOLESTEROL           83                  09/12/2019                 LDLCHOLESTEROL           110                 09/14/2018                 LDLCALC                  83                  08/06/2013            Lab Results       Component                Value               Date                       VLDL                     NOT REPORTED        09/15/2020                 VLDL                     NOT REPORTED        09/12/2019                 VLDL                     NOT REPORTED        09/14/2018            Lab Results       Component                Value               Date                       CHOLHDLRATIO             3.3                 09/15/2020                 CHOLHDLRATIO             2.8                 09/12/2019                 CHOLHDLRATIO             3.2                 09/14/2018                              Hypertension   This is a chronic problem. The current episode started more than 1 year ago. The problem is unchanged. The problem is controlled. Associated symptoms include palpitations. Pertinent negatives include no chest pain, neck pain or shortness of breath.  Past treatments include diuretics and angiotensin blockers. The current treatment provides significant improvement. There are no compliance problems. There is no history of angina. Hyperlipidemia   This is a chronic problem. The current episode started more than 1 year ago. The problem is controlled. Recent lipid tests were reviewed and are normal. Pertinent negatives include no chest pain, myalgias or shortness of breath. Current antihyperlipidemic treatment includes statins. The current treatment provides significant improvement of lipids. There are no compliance problems. Review of Systems   Constitutional: Negative. HENT: Negative for congestion, ear pain, rhinorrhea, sneezing and sore throat. Eyes: Negative for visual disturbance. Respiratory: Negative for cough, chest tightness and shortness of breath. Cardiovascular: Positive for palpitations. Negative for chest pain. Gastrointestinal: Negative for abdominal pain, blood in stool, constipation, diarrhea and nausea. Genitourinary: Negative for difficulty urinating, dysuria, frequency, menstrual problem and urgency. Musculoskeletal: Negative for arthralgias, joint swelling, myalgias and neck pain. Skin: Negative. Neurological: Negative for syncope. Psychiatric/Behavioral: Negative. Objective:   Physical Exam  Constitutional:       Appearance: She is well-developed. HENT:      Head: Atraumatic. Eyes:      Conjunctiva/sclera: Conjunctivae normal.   Neck:      Musculoskeletal: Normal range of motion and neck supple. Comments: Cardiac murmur radiated to the carotids. Cardiovascular:      Rate and Rhythm: Normal rate and regular rhythm. Heart sounds: Murmur present. Pulmonary:      Effort: Pulmonary effort is normal.      Breath sounds: Normal breath sounds. Abdominal:      Palpations: Abdomen is soft. Tenderness: There is no abdominal tenderness. Musculoskeletal: Normal range of motion.    Lymphadenopathy:      Cervical: No

## 2020-10-09 ENCOUNTER — HOSPITAL ENCOUNTER (OUTPATIENT)
Dept: NON INVASIVE DIAGNOSTICS | Age: 75
Discharge: HOME | End: 2020-10-09
Payer: MEDICARE

## 2020-10-09 PROCEDURE — 93270 REMOTE 30 DAY ECG REV/REPORT: CPT

## 2020-10-09 NOTE — PROGRESS NOTES
The patient was educated on the use of an event monitor. The patient's comprehension was high. The patient was able to verbalize recall. The patient was instructed on how and when to return the monitor.

## 2020-11-03 PROBLEM — I63.9 CEREBROVASCULAR ACCIDENT (CVA) (HCC): Status: RESOLVED | Noted: 2020-11-03 | Resolved: 2020-11-03

## 2020-11-10 NOTE — PROCEDURES
[address]                                 EVENT MONITOR    PATIENT NAME: Lit Johnson                  :        1945  MED REC NO:   [id number]                              ROOM:  ACCOUNT NO:   [de-identified]                           ADMIT DATE: 10/09/2020  PROVIDER:     Penny Manuel      NAME OF TEST:  A 30-day event recorder. INDICATIONS:  Palpitations. The patient wore an event recorder from 10/09/2020 to 2020. We  received a total of 8 transmissions. Each transmission showed sinus  rhythm. Her lowest rate was 50, highest rate was 120. She had  occasional PACs and occasional PVCs, although quite rare. She had no  significant tachycardia or bradycardia. This was unremarkable 30-day  event recorder with no significant arrhythmias detected.         Merissa Cutler    D: 11/10/2020 4:38:15       T: 11/10/2020 6:44:45     [id number]  Job#: [id number]     Doc#: [id number]    CC:  Audi Young

## 2020-11-19 ENCOUNTER — HOSPITAL ENCOUNTER (OUTPATIENT)
Dept: MAMMOGRAPHY | Age: 75
Discharge: HOME | End: 2020-11-21
Payer: MEDICARE

## 2020-11-19 ENCOUNTER — HOSPITAL ENCOUNTER (OUTPATIENT)
Dept: ULTRASOUND IMAGING | Age: 75
Discharge: HOME | End: 2020-11-21
Payer: MEDICARE

## 2020-11-19 PROCEDURE — 77065 DX MAMMO INCL CAD UNI: CPT

## 2020-11-19 PROCEDURE — 76641 ULTRASOUND BREAST COMPLETE: CPT

## 2020-11-30 RX ORDER — HYDROCHLOROTHIAZIDE 12.5 MG/1
TABLET ORAL
Qty: 90 TABLET | Refills: 1 | Status: SHIPPED | OUTPATIENT
Start: 2020-11-30 | End: 2020-12-03 | Stop reason: SDUPTHER

## 2020-11-30 RX ORDER — LOSARTAN POTASSIUM 100 MG/1
TABLET ORAL
Qty: 90 TABLET | Refills: 1 | Status: SHIPPED | OUTPATIENT
Start: 2020-11-30 | End: 2021-01-05

## 2020-11-30 RX ORDER — PRAVASTATIN SODIUM 20 MG
TABLET ORAL
Qty: 90 TABLET | Refills: 1 | Status: SHIPPED | OUTPATIENT
Start: 2020-11-30 | End: 2021-05-24

## 2020-11-30 NOTE — TELEPHONE ENCOUNTER
Health Maintenance   Topic Date Due    Shingles Vaccine (1 of 2) 04/08/1996    Annual Wellness Visit (AWV)  05/12/2021    Lipid screen  09/15/2021    Potassium monitoring  09/15/2021    Creatinine monitoring  09/15/2021    Breast cancer screen  11/19/2021    DTaP/Tdap/Td vaccine (2 - Td) 11/08/2022    Colon cancer screen colonoscopy  08/28/2028    DEXA (modify frequency per FRAX score)  Completed    Flu vaccine  Completed    Pneumococcal 65+ years Vaccine  Completed    Hepatitis C screen  Completed    Hepatitis A vaccine  Aged Out    Hepatitis B vaccine  Aged Out    Hib vaccine  Aged Out    Meningococcal (ACWY) vaccine  Aged Out             (applicable per patient's age: Cancer Screenings, Depression Screening, Fall Risk Screening, Immunizations)    Hemoglobin A1C (%)   Date Value   10/05/2020 5.5   04/01/2017 5.8     LDL Cholesterol (mg/dL)   Date Value   09/15/2020 109     LDL Calculated (mg/dL)   Date Value   08/06/2013 83     AST (U/L)   Date Value   09/15/2020 19     ALT (U/L)   Date Value   09/15/2020 10     BUN (mg/dL)   Date Value   09/15/2020 26 (H)      (goal A1C is < 7)   (goal LDL is <100) need 30-50% reduction from baseline     BP Readings from Last 3 Encounters:   10/05/20 134/60   09/21/20 (!) 160/60   09/03/20 138/64    (goal /80)      All Future Testing planned in [facility]:  Lab Frequency Next Occurrence   TSH with Reflex Once 09/21/2021   CBC Auto Differential Once 09/21/2021   Comprehensive Metabolic Panel Once 68/76/4429   Vitamin D 25 Hydroxy Once 09/21/2021   Lipid Panel Once 09/21/2021   Magnesium Once 09/21/2021   EKG 12 Lead Once 09/21/2021   XR CHEST (2 VW) Once 09/21/2021   ECHO Complete 2D W Doppler W Color Once 03/21/2021   ECHO Complete 2D W Doppler W Color Once 09/21/2021       Next Visit Date:  Future Appointments   Date Time Provider Melo Blake   [facility]2021 10:15 [facility]   5/17/2021 10:30 AM MD Darron Rendon  [address] [facility] 9/9/2021  1:00 PM MD brayden Puentes [facility]   9/13/2021 11:30 AM MD Edy Ellis [facility]            Patient Active Problem List:     Hypertension     Aortic stenosis     Hyperlipidemia     Diverticulosis     Intracranial bleed (HCC)     Vitamin D deficiency     Rectocele     Osteoporosis     History of DVT (deep vein thrombosis)     Cavernoma     TIA (transient ischemic attack)     Thalamic infarction ([address]     Paresthesia     Microhematuria     Seizure disorder ([facility]     Frequent UTI     Pulmonary emphysema ([facility] [address]

## 2020-12-03 ENCOUNTER — TELEPHONE (OUTPATIENT)
Dept: FAMILY MEDICINE CLINIC | Age: 75
End: 2020-12-03

## 2020-12-03 RX ORDER — HYDROCHLOROTHIAZIDE 12.5 MG/1
12.5 TABLET ORAL DAILY
Qty: 90 TABLET | Refills: 2 | Status: SHIPPED | OUTPATIENT
Start: 2020-12-03 | End: 2021-05-17

## 2021-01-05 ENCOUNTER — OFFICE VISIT (OUTPATIENT)
Dept: FAMILY MEDICINE CLINIC | Age: 76
End: 2021-01-05
Payer: MEDICARE

## 2021-01-05 VITALS
DIASTOLIC BLOOD PRESSURE: 68 MMHG | SYSTOLIC BLOOD PRESSURE: 139 MMHG | TEMPERATURE: 96.9 F | HEIGHT: 63 IN | BODY MASS INDEX: 30.65 KG/M2 | HEART RATE: 64 BPM | WEIGHT: 173 LBS

## 2021-01-05 DIAGNOSIS — I10 ESSENTIAL HYPERTENSION: ICD-10-CM

## 2021-01-05 DIAGNOSIS — G40.909 SEIZURE DISORDER (HCC): Primary | ICD-10-CM

## 2021-01-05 PROCEDURE — 99214 OFFICE O/P EST MOD 30 MIN: CPT | Performed by: INTERNAL MEDICINE

## 2021-01-05 PROCEDURE — 1111F DSCHRG MED/CURRENT MED MERGE: CPT | Performed by: INTERNAL MEDICINE

## 2021-01-05 RX ORDER — OLMESARTAN MEDOXOMIL AND HYDROCHLOROTHIAZIDE 20/12.5 20; 12.5 MG/1; MG/1
1 TABLET ORAL DAILY
Qty: 90 TABLET | Refills: 1 | Status: SHIPPED | OUTPATIENT
Start: 2021-01-05 | End: 2021-06-29

## 2021-01-05 RX ORDER — DIVALPROEX SODIUM 500 MG/1
500 TABLET, DELAYED RELEASE ORAL DAILY
COMMUNITY
Start: 2020-12-30

## 2021-01-05 ASSESSMENT — PATIENT HEALTH QUESTIONNAIRE - PHQ9
1. LITTLE INTEREST OR PLEASURE IN DOING THINGS: 1
SUM OF ALL RESPONSES TO PHQ QUESTIONS 1-9: 2
SUM OF ALL RESPONSES TO PHQ QUESTIONS 1-9: 2

## 2021-01-05 NOTE — PROGRESS NOTES
Drug Administration Record    Prior to injection, verified patient identity using patient's name and date of birth.  Due to injection administration, patient instructed to remain in clinic for 15 minutes  afterwards, and to report any adverse reaction to me immediately.    Drug Name: triamcinolone acetonide(kenalog)  Dose: 2mL of triamcinolone 10mg/mL, 20mg dose  Route administered: ID  NDC #: [id number]: Kenalog-10 ([id number])  Amount of waste(mL):3  Reason for waste: Multi dose vial    LOT #: [id number]  SITE: body  : nGame  EXPIRATION DATE: MAY2021     tablet by mouth daily             linaCLOtide (LINZESS) 72 MCG CAPS capsule  Take 1 capsule by mouth every morning (before breakfast)             losartan (COZAAR) 100 MG tablet  TAKE 1 TABLET BY MOUTH EVERY DAY             pravastatin (PRAVACHOL) 20 MG tablet  TAKE 1 TABLET BY MOUTH EVERY DAY             Psyllium (METAMUCIL PO)  Take by mouth daily as needed              triamcinolone (KENALOG) 0.1 % cream  Apply topically 2 times daily                   Medications marked \"taking\" at this time  Outpatient Medications Marked as Taking for the 1/5/21 encounter (Office Visit) with Hayde Kelly MD   Medication Sig Dispense Refill    divalproex (DEPAKOTE) 500 MG DR tablet Take 500 mg by mouth daily       hydroCHLOROthiazide (HYDRODIURIL) 12.5 MG tablet Take 1 tablet by mouth daily 90 tablet 2    pravastatin (PRAVACHOL) 20 MG tablet TAKE 1 TABLET BY MOUTH EVERY DAY 90 tablet 1    losartan (COZAAR) 100 MG tablet TAKE 1 TABLET BY MOUTH EVERY DAY 90 tablet 1    albuterol sulfate HFA (PROAIR HFA) 108 (90 Base) MCG/ACT inhaler Inhale 2 puffs into the lungs every 4 hours as needed for Wheezing or Shortness of Breath Inhale 2 puffs 4 times daily 1 Inhaler 3    linaCLOtide (LINZESS) 72 MCG CAPS capsule Take 1 capsule by mouth every morning (before breakfast) 30 capsule 5    Cranberry 400 MG CAPS Take 400 mg by mouth daily (with breakfast)      aspirin 325 MG EC tablet Take 1 tablet by mouth daily 30 tablet 3    Psyllium (METAMUCIL PO) Take by mouth daily as needed           Medications patient taking as of now reconciled against medications ordered at time of hospital discharge: Yes    Chief Complaint   Patient presents with    Follow-Up from [facility], seizures. Started back on depakote. To follow with Neurology, Dr Aric Cruz, 2/2021       History of Present illness - Follow up of Hospital diagnosis(es): Seizure    Inpatient course: Discharge summary reviewed- see chart.     Interval history/Current status: Estefany Fiore was admitted to [facility] after having a seizure. EMS took her to the ER secondary to numbness of the face and headache. She had been off anti seizure medication for some time. Work up reviewed including MRI / MRA of the head. She was placed on Depakote with no further episodes after discharge. She denies any other change in medication. Since being home she has not noticed any problems. She is not receiving home care. Follow up appt with Neurology in February. A comprehensive review of systems was negative except for what was noted in the HPI. Vitals:    01/05/21 0957   BP: 139/68   Pulse: 64   Temp: 96.9 °F (36.1 °C)   Weight: 173 lb (78.5 kg)   Height: 5' 3\" (1.6 m)     Body mass index is 30.65 kg/m².    Wt Readings from Last 3 Encounters:   01/05/21 173 lb (78.5 kg)   10/05/20 167 lb (75.8 kg)   09/21/20 169 lb (76.7 kg)     BP Readings from Last 3 Encounters:   01/05/21 139/68   10/05/20 134/60   09/21/20 (!) 160/60        Physical Exam:  General Appearance: alert and oriented to person, place and time, well developed and well- nourished, in no acute distress  Skin: warm and dry, no rash or erythema  Head: normocephalic and atraumatic  Eyes: pupils equal, round, and reactive to light, extraocular eye movements intact, conjunctivae normal  ENT: tympanic membrane, external ear and ear canal normal bilaterally, nose without deformity, nasal mucosa and turbinates normal without polyps  Neck: supple and non-tender without mass, no thyromegaly or thyroid nodules, no cervical lymphadenopathy  Pulmonary/Chest: clear to auscultation bilaterally- no wheezes, rales or rhonchi, normal air movement, no respiratory distress  Cardiovascular: normal rate, regular rhythm, normal S1 and S2, no murmurs, rubs, clicks, or gallops, distal pulses intact, no carotid bruits  Abdomen: soft, non-tender, non-distended, normal bowel sounds, no masses or organomegaly  Extremities: no cyanosis, clubbing or edema  Musculoskeletal: normal range of motion, no joint swelling, deformity or tenderness  Neurologic: reflexes normal and symmetric, no cranial nerve deficit, gait, coordination and speech normal    Assessment/Plan:  1. Seizure disorder ([address]  Doing well on Depakote with no further episodes of seizures. 2. Essential hypertension  Complains of swelling on Losartan. Had done well on Benicar in the past.  Will send in Rx to see if insurance will cover. - olmesartan-hydroCHLOROthiazide (BENICAR HCT) 20-12.5 MG per tablet; Take 1 tablet by mouth daily  Dispense: 90 tablet; Refill: 1      Follow up on next scheduled appt or as needed.      Medical Decision Making: moderate complexity

## 2021-01-05 NOTE — PATIENT INSTRUCTIONS
Survey: You may be receiving a survey from [email or web address] regarding your visit today. You may get this in the mail, through your MyChart or in your email. Please complete the survey to enable us to provide the highest quality of care to you and your family. Please also, mention our names. If you cannot score us as very good (5 Stars) on any question, please feel free to call the office to discuss how we could have made your experience exceptional.      Thank You! Dr. Adonay Martines, MD    [facility], [address] [place] [address], [address] [place] [address], [facility]    Adonay Glass, [facility]      1. Seizure disorder ([facility] [address]  Doing well on Depakote with no further episodes of seizures. 2. Essential hypertension  Complains of swelling on Losartan. Had done well on Benicar in the past.  Will send in Rx to see if insurance will cover. - olmesartan-hydroCHLOROthiazide (BENICAR HCT) 20-12.5 MG per tablet; Take 1 tablet by mouth daily  Dispense: 90 tablet; Refill: 1      Follow up on next scheduled appt or as needed.

## 2021-03-22 DIAGNOSIS — I35.0 NONRHEUMATIC AORTIC VALVE STENOSIS: ICD-10-CM

## 2021-03-23 ENCOUNTER — TELEPHONE (OUTPATIENT)
Dept: CARDIOLOGY CLINIC | Age: 76
End: 2021-03-23

## 2021-03-30 ENCOUNTER — OFFICE VISIT (OUTPATIENT)
Dept: FAMILY MEDICINE CLINIC | Age: 76
End: 2021-03-30
Payer: MEDICARE

## 2021-03-30 VITALS
HEART RATE: 62 BPM | HEIGHT: 63 IN | BODY MASS INDEX: 30.12 KG/M2 | DIASTOLIC BLOOD PRESSURE: 68 MMHG | WEIGHT: 170 LBS | SYSTOLIC BLOOD PRESSURE: 134 MMHG

## 2021-03-30 DIAGNOSIS — E55.9 VITAMIN D DEFICIENCY: ICD-10-CM

## 2021-03-30 DIAGNOSIS — J43.9 PULMONARY EMPHYSEMA, UNSPECIFIED EMPHYSEMA TYPE (HCC): ICD-10-CM

## 2021-03-30 DIAGNOSIS — E28.39 OVARIAN FAILURE: ICD-10-CM

## 2021-03-30 DIAGNOSIS — G40.909 SEIZURE DISORDER (HCC): ICD-10-CM

## 2021-03-30 DIAGNOSIS — I35.0 NONRHEUMATIC AORTIC VALVE STENOSIS: ICD-10-CM

## 2021-03-30 DIAGNOSIS — I10 ESSENTIAL HYPERTENSION: Primary | ICD-10-CM

## 2021-03-30 DIAGNOSIS — Z12.31 OTHER SCREENING MAMMOGRAM: ICD-10-CM

## 2021-03-30 PROCEDURE — G8417 CALC BMI ABV UP PARAM F/U: HCPCS | Performed by: INTERNAL MEDICINE

## 2021-03-30 PROCEDURE — G8427 DOCREV CUR MEDS BY ELIG CLIN: HCPCS | Performed by: INTERNAL MEDICINE

## 2021-03-30 PROCEDURE — 1036F TOBACCO NON-USER: CPT | Performed by: INTERNAL MEDICINE

## 2021-03-30 PROCEDURE — G8399 PT W/DXA RESULTS DOCUMENT: HCPCS | Performed by: INTERNAL MEDICINE

## 2021-03-30 PROCEDURE — G8482 FLU IMMUNIZE ORDER/ADMIN: HCPCS | Performed by: INTERNAL MEDICINE

## 2021-03-30 PROCEDURE — 3023F SPIROM DOC REV: CPT | Performed by: INTERNAL MEDICINE

## 2021-03-30 PROCEDURE — 99214 OFFICE O/P EST MOD 30 MIN: CPT | Performed by: INTERNAL MEDICINE

## 2021-03-30 PROCEDURE — 1090F PRES/ABSN URINE INCON ASSESS: CPT | Performed by: INTERNAL MEDICINE

## 2021-03-30 PROCEDURE — 1123F ACP DISCUSS/DSCN MKR DOCD: CPT | Performed by: INTERNAL MEDICINE

## 2021-03-30 PROCEDURE — 4040F PNEUMOC VAC/ADMIN/RCVD: CPT | Performed by: INTERNAL MEDICINE

## 2021-03-30 PROCEDURE — G8926 SPIRO NO PERF OR DOC: HCPCS | Performed by: INTERNAL MEDICINE

## 2021-03-30 PROCEDURE — 3017F COLORECTAL CA SCREEN DOC REV: CPT | Performed by: INTERNAL MEDICINE

## 2021-03-30 ASSESSMENT — ENCOUNTER SYMPTOMS
SHORTNESS OF BREATH: 0
COUGH: 0
RHINORRHEA: 0
DIARRHEA: 0
SORE THROAT: 0
CONSTIPATION: 0
ABDOMINAL PAIN: 0
BLOOD IN STOOL: 0
NAUSEA: 0
CHEST TIGHTNESS: 0

## 2021-03-30 NOTE — PROGRESS NOTES
Byron Clarke (:  1945) is a 77 y.o. female,Established patient, here for evaluation of the following chief complaint(s):  Hypertension, Hyperlipidemia, Seizures (Dr Mckeon started back on depakote), and Coronary Artery Disease (Aortic valve stenosis, follows with Dr Julio Aleman. Echo at [facility] 3/22/21)      ASSESSMENT/PLAN:  1. Essential hypertension  2. Pulmonary emphysema, unspecified emphysema type ([address]  3. Nonrheumatic aortic valve stenosis  4. Vitamin D deficiency  5. Seizure disorder ([address]  6. Other screening mammogram  7. Ovarian failure  -     DEXA BONE DENSITY 2 SITES; Future      Plan:  1. Pulmonary emphysema, unspecified emphysema type ([address]  No complaints of shortness of breath. 2. Essential hypertension  Controlled on the current medication. 3. Nonrheumatic aortic valve stenosis  Follows with Dr. Julio Aleman. Recent ECHO reviewed. 4. Vitamin D deficiency  Controlled on Vitamin D replacement. 5. Seizure disorder ([address]  Follows with Dr. Mckeon in Neurology. 6. Ovarian failure  Set up for Dexa scan. - DEXA BONE DENSITY 2 SITES; Future    Jae Joy was instructed to follow up in the clinic in 6 months for check up or as needed with any medical issues. SUBJECTIVE/OBJECTIVE:  Jae Joy presents for a check up on her medical conditions HTN, Hyperlipidemia, Sz D/O, CAD. Jae Joy denies new problems. Medications were reviewed with Jae Joy, she is  tolerating the medication. Bowels are regular. There has not been rectal bleeding. Jae Joy denies urinary complications, the urine stream is good. Jae Joy denies chest pain and denies increasing shortness of breath. Labs from  reviewed. Past Medical History:  No date:  Aortic stenosis  No date: Arthritis  No date: Benign cyst of right breast in female  No date: Cardiac murmur  No date: Cataract  : Cerebral brain hemorrhage [facility])      Comment:  Right thalamic  2016: Deep vein blood clot of left lower extremity Attends meetings of clubs or organizations: Not on file        Relationship status: Not on file      Intimate partner violence        Fear of current or ex partner: Not on file        Emotionally abused: Not on file        Physically abused: Not on file        Forced sexual activity: Not on file    Other Topics      Concerns:        Not on file    Social History Narrative      Not on file      Review of patient's family history indicates:  Problem: Breast Cancer      Relation: Sister          Age of Onset: (Not Specified)  Problem: Diabetes      Relation: Sister          Age of Onset: (Not Specified)  Problem: Migraines      Relation: Sister          Age of Onset: (Not Specified)  Problem: Clotting Disorder      Relation: Mother          Age of Onset: (Not Specified)  Problem: Diabetes      Relation: Mother          Age of Onset: (Not Specified)  Problem: Hypertension      Relation: Mother          Age of Onset: (Not Specified)  Problem: Migraines      Relation: Mother          Age of Onset: (Not Specified)  Problem: Heart Attack      Relation: Father          Age of Onset: (Not Specified)  Problem: Hypertension      Relation: Father          Age of Onset: (Not Specified)  Problem: Diabetes      Relation: Father          Age of Onset: (Not Specified)  Problem: Heart Disease      Relation: Brother          Age of Onset: (Not Specified)  Problem: Diabetes      Relation: Brother          Age of Onset: (Not Specified)  Problem: Migraines      Relation: Brother          Age of Onset: (Not Specified)  Problem: Hypertension      Relation: Maternal Grandmother          Age of Onset: (Not Specified)  Problem: Hypertension      Relation: Maternal Grandfather          Age of Onset: (Not Specified)  Problem: Hypertension      Relation: Paternal Grandmother          Age of Onset: (Not Specified)  Problem: Hypertension      Relation: Paternal Grandfather          Age of Onset: (Not Specified)      Current Outpatient Medications 09/15/2020                 PROT                     8.0                 09/15/2020                 LABALBU                  4.1                 09/15/2020                 BILITOT                  0.37                09/15/2020                 ALKPHOS                  62                  09/15/2020                 AST                      19                  09/15/2020                 ALT                      10                  09/15/2020                 LABGLOM                  56 (L)              09/15/2020                 GFRAA                    >60                 09/15/2020              Lab Results       Component                Value               Date                       LABA1C                   5.5                 10/05/2020            Lab Results       Component                Value               Date                       EAG                      120                 04/01/2017              Lab Results       Component                Value               Date                       CHOL                     176                 09/15/2020                 CHOL                     153                 09/12/2019                 CHOL                     191                 09/14/2018            Lab Results       Component                Value               Date                       TRIG                     68                  09/15/2020                 TRIG                     78                  09/12/2019                 TRIG                     111                 09/14/2018            Lab Results       Component                Value               Date                       HDL                      53                  09/15/2020                 HDL                      54                  09/12/2019                 HDL                      59                  09/14/2018            Lab Results       Component                Value               Date                       LDLCHOLESTEROL           109 09/15/2020                 LDLCHOLESTEROL           83                  09/12/2019                 LDLCHOLESTEROL           110                 09/14/2018                 LDLCALC                  83                  08/06/2013            Lab Results       Component                Value               Date                       VLDL                     NOT REPORTED        09/15/2020                 VLDL                     NOT REPORTED        09/12/2019                 VLDL                     NOT REPORTED        09/14/2018            Lab Results       Component                Value               Date                       CHOLHDLRATIO             3.3                 09/15/2020                 CHOLHDLRATIO             2.8                 09/12/2019                 CHOLHDLRATIO             3.2                 09/14/2018                              Hypertension  This is a chronic problem. The current episode started more than 1 year ago. The problem is unchanged. The problem is controlled. Pertinent negatives include no chest pain, neck pain, palpitations or shortness of breath. Past treatments include angiotensin blockers and diuretics. The current treatment provides significant improvement. There are no compliance problems. There is no history of angina. Hyperlipidemia  This is a chronic problem. The current episode started more than 1 year ago. The problem is controlled. Recent lipid tests were reviewed and are normal. Pertinent negatives include no chest pain, myalgias or shortness of breath. Current antihyperlipidemic treatment includes statins. The current treatment provides significant improvement of lipids. There are no compliance problems. Review of Systems   Constitutional: Negative. HENT: Negative for congestion, ear pain, rhinorrhea, sneezing and sore throat. Eyes: Negative for visual disturbance. Respiratory: Negative for cough, chest tightness and shortness of breath. Cardiovascular: Negative for chest pain and palpitations. Gastrointestinal: Negative for abdominal pain, blood in stool, constipation, diarrhea and nausea. Genitourinary: Negative for difficulty urinating, dysuria, frequency, menstrual problem and urgency. Musculoskeletal: Negative for arthralgias, joint swelling, myalgias and neck pain. Skin: Negative. Neurological: Negative for syncope. Psychiatric/Behavioral: Negative. Physical Exam  Constitutional:       Appearance: She is well-developed. HENT:      Head: Atraumatic. Eyes:      Conjunctiva/sclera: Conjunctivae normal.   Neck:      Musculoskeletal: Normal range of motion and neck supple. Cardiovascular:      Rate and Rhythm: Normal rate and regular rhythm. Heart sounds: Murmur present. Pulmonary:      Effort: Pulmonary effort is normal.      Breath sounds: Normal breath sounds. Abdominal:      Palpations: Abdomen is soft. Tenderness: There is no abdominal tenderness. Musculoskeletal: Normal range of motion. Lymphadenopathy:      Cervical: No cervical adenopathy. Skin:     Findings: No rash. Neurological:      Mental Status: She is alert. Psychiatric:         Behavior: Behavior normal.         Thought Content: Thought content normal.                 An electronic signature was used to authenticate this note.     --Vicky Schuster MD

## 2021-04-30 ENCOUNTER — HOSPITAL ENCOUNTER (OUTPATIENT)
Dept: BONE DENSITY | Age: 76
Discharge: HOME | End: 2021-05-02
Payer: MEDICARE

## 2021-04-30 DIAGNOSIS — E28.39 OVARIAN FAILURE: ICD-10-CM

## 2021-04-30 PROCEDURE — 77080 DXA BONE DENSITY AXIAL: CPT

## 2021-05-14 ENCOUNTER — TELEPHONE (OUTPATIENT)
Dept: FAMILY MEDICINE CLINIC | Age: 76
End: 2021-05-14

## 2021-05-14 DIAGNOSIS — L02.211 ABSCESS OF SKIN OF ABDOMEN: Primary | ICD-10-CM

## 2021-05-14 RX ORDER — CEPHALEXIN 500 MG/1
500 CAPSULE ORAL 2 TIMES DAILY
Qty: 14 CAPSULE | Refills: 0 | Status: SHIPPED | OUTPATIENT
Start: 2021-05-14 | End: 2021-08-20 | Stop reason: ALTCHOICE

## 2021-05-14 NOTE — TELEPHONE ENCOUNTER
Please call back to find out where the cyst is located.   I can't remember and didn't document on a cyst.

## 2021-05-14 NOTE — TELEPHONE ENCOUNTER
Pt states it is a boil above her hairline in her panty area. She states it is leaking and itches. She states it does not feel fevered.

## 2021-05-14 NOTE — TELEPHONE ENCOUNTER
Pt states she still has the cyst she spoke to you about in January. She states it is seeping now. Appt on Monday for AWV.         Health Maintenance   Topic Date Due    Shingles Vaccine (1 of 2) Never done   [employer] Visit (AWV)  Never done    Lipid screen  09/15/2021    Potassium monitoring  09/15/2021    Creatinine monitoring  09/15/2021    DTaP/Tdap/Td vaccine (2 - Td) 11/08/2022    Colon cancer screen colonoscopy  08/28/2028    DEXA (modify frequency per FRAX score)  Completed    Flu vaccine  Completed    Pneumococcal 65+ years Vaccine  Completed    COVID-19 Vaccine  Completed    Hepatitis C screen  Completed    Hepatitis A vaccine  Aged Out    Hepatitis B vaccine  Aged Out    Hib vaccine  Aged Out    Meningococcal (ACWY) vaccine  Aged Out             (applicable per patient's age: Cancer Screenings, Depression Screening, Fall Risk Screening, Immunizations)    Hemoglobin A1C (%)   Date Value   10/05/2020 5.5   04/01/2017 5.8     LDL Cholesterol (mg/dL)   Date Value   09/15/2020 109     LDL Calculated (mg/dL)   Date Value   08/06/2013 83     AST (U/L)   Date Value   09/15/2020 19     ALT (U/L)   Date Value   09/15/2020 10     BUN (mg/dL)   Date Value   09/15/2020 26 (H)      (goal A1C is < 7)   (goal LDL is <100) need 30-50% reduction from baseline     BP Readings from Last 3 Encounters:   03/30/21 134/68   01/05/21 139/68   10/05/20 134/60    (goal /80)      All Future Testing planned in [facility]:  Lab Frequency Next Occurrence   TSH with Reflex Once 09/21/2021   CBC Auto Differential Once 09/21/2021   Comprehensive Metabolic Panel Once 98/61/4260   Vitamin D 25 Hydroxy Once 09/21/2021   Lipid Panel Once 09/21/2021   Magnesium Once 09/21/2021   EKG 12 Lead Once 09/21/2021   XR CHEST (2 VW) Once 09/21/2021   ECHO Complete 2D W Doppler W Color Once 09/21/2021       Next Visit Date:  Future Appointments   Date Time Provider Melo Blkae   5/17/2021 10:30 AM MD Hiram [email or web address]   9/9/2021  1:00 PM [facility]   9/13/2021 11:30 AM Jaime Martinez MD [facility]   10/4/2021 11:00 AM Piero Johansen MD [facility]            Patient Active Problem List:     Hypertension     Aortic stenosis     Hyperlipidemia     Diverticulosis     Intracranial bleed (HCC)     Vitamin D deficiency     Rectocele     Osteoporosis     History of DVT (deep vein thrombosis)     Cavernoma     TIA (transient ischemic attack)     Thalamic infarction ([address]     Paresthesia     Microhematuria     Seizure disorder ([address]     Frequent UTI     Pulmonary emphysema ([address]

## 2021-05-17 ENCOUNTER — OFFICE VISIT (OUTPATIENT)
Dept: FAMILY MEDICINE CLINIC | Age: 76
End: 2021-05-17
Payer: MEDICARE

## 2021-05-17 VITALS
HEART RATE: 60 BPM | SYSTOLIC BLOOD PRESSURE: 138 MMHG | HEIGHT: 63 IN | DIASTOLIC BLOOD PRESSURE: 70 MMHG | BODY MASS INDEX: 30.65 KG/M2 | WEIGHT: 173 LBS

## 2021-05-17 DIAGNOSIS — Z00.00 ROUTINE GENERAL MEDICAL EXAMINATION AT A HEALTH CARE FACILITY: Primary | ICD-10-CM

## 2021-05-17 PROCEDURE — 1123F ACP DISCUSS/DSCN MKR DOCD: CPT | Performed by: INTERNAL MEDICINE

## 2021-05-17 PROCEDURE — 4040F PNEUMOC VAC/ADMIN/RCVD: CPT | Performed by: INTERNAL MEDICINE

## 2021-05-17 PROCEDURE — 3017F COLORECTAL CA SCREEN DOC REV: CPT | Performed by: INTERNAL MEDICINE

## 2021-05-17 PROCEDURE — G0439 PPPS, SUBSEQ VISIT: HCPCS | Performed by: INTERNAL MEDICINE

## 2021-05-17 SDOH — ECONOMIC STABILITY: FOOD INSECURITY: WITHIN THE PAST 12 MONTHS, THE FOOD YOU BOUGHT JUST DIDN'T LAST AND YOU DIDN'T HAVE MONEY TO GET MORE.: NEVER TRUE

## 2021-05-17 ASSESSMENT — PATIENT HEALTH QUESTIONNAIRE - PHQ9
2. FEELING DOWN, DEPRESSED OR HOPELESS: 0
SUM OF ALL RESPONSES TO PHQ9 QUESTIONS 1 & 2: 0
1. LITTLE INTEREST OR PLEASURE IN DOING THINGS: 0
SUM OF ALL RESPONSES TO PHQ QUESTIONS 1-9: 0
SUM OF ALL RESPONSES TO PHQ QUESTIONS 1-9: 0

## 2021-05-17 ASSESSMENT — LIFESTYLE VARIABLES: HOW OFTEN DO YOU HAVE A DRINK CONTAINING ALCOHOL: 0

## 2021-05-17 NOTE — PATIENT INSTRUCTIONS
Survey: You may be receiving a survey from [employer] regarding your visit today. You may get this in the mail, through your MyChart or in your email. Please complete the survey to enable us to provide the highest quality of care to you and your family. Please also, mention our names. If you cannot score us as very good (5 Stars) on any question, please feel free to call the office to discuss how we could have made your experience exceptional.      Thank You! MD Magnus Jim, KRISTI Deutsch, Celestina Beard, PATRICION BÁRBARA Wise        Personalized Preventive Plan for [facility] - 5/17/2021  Medicare offers a range of preventive health benefits. Some of the tests and screenings are paid in full while other may be subject to a deductible, co-insurance, and/or copay. Some of these benefits include a comprehensive review of your medical history including lifestyle, illnesses that may run in your family, and various assessments and screenings as appropriate. After reviewing your medical record and screening and assessments performed today your provider may have ordered immunizations, labs, imaging, and/or referrals for you. A list of these orders (if applicable) as well as your Preventive Care list are included within your After Visit Summary for your review. Other Preventive Recommendations:    · A preventive eye exam performed by an eye specialist is recommended every 1-2 years to screen for glaucoma; cataracts, macular degeneration, and other eye disorders. · A preventive dental visit is recommended every 6 months. · Try to get at least 150 minutes of exercise per week or 10,000 steps per day on a pedometer . · Order or download the FREE \"Exercise & Physical Activity: Your Everyday Guide\" from [employer]. Call 1-651.870.7803 or search [employer] online. · You need 7680-8670 mg of calcium and 6999-8778 IU of vitamin D per day.

## 2021-05-17 NOTE — PROGRESS NOTES
Medicare Annual Wellness Visit  Name: Milana Carmona Date: 2021   MRN: [id number] Sex: Female   Age: 77 y.o. Ethnicity: Non-/Non    : 1945 Race: Greg Duverney is here for Medicare AWV and Discuss Medications (patient unsure of bp meds)    Screenings for behavioral, psychosocial and functional/safety risks, and cognitive dysfunction are all negative except as indicated below. These results, as well as other patient data from the [address] form, are documented in Flowsheets linked to this Encounter. Allergies   Allergen Reactions    Tape Bernell Colla Tape] Rash       Prior to Visit Medications    Medication Sig Taking?  Authorizing Provider   cephALEXin (KEFLEX) 500 MG capsule Take 1 capsule by mouth 2 times daily Yes Audi Young MD   divalproex (DEPAKOTE) 500 MG DR tablet Take 500 mg by mouth daily  Yes Historical Provider, MD   pravastatin (PRAVACHOL) 20 MG tablet TAKE 1 TABLET BY MOUTH EVERY DAY Yes Audi Young MD   linaCLOtide (LINZESS) 72 MCG CAPS capsule Take 1 capsule by mouth every morning (before breakfast) Yes Audi Young MD   Cranberry 400 MG CAPS Take 400 mg by mouth daily (with breakfast) Yes Historical Provider, MD   aspirin 325 MG EC tablet Take 1 tablet by mouth daily Yes Loreta Richard MD   Psyllium (METAMUCIL PO) Take by mouth daily as needed  Yes Historical Provider, MD   olmesartan-hydroCHLOROthiazide (BENICAR HCT) 20-12.5 MG per tablet Take 1 tablet by mouth daily  Audi Young MD   hydroCHLOROthiazide (HYDRODIURIL) 12.5 MG tablet Take 1 tablet by mouth daily  Audi Young MD   triamcinolone (KENALOG) 0.1 % cream Apply topically 2 times daily  Audi Young MD   albuterol sulfate HFA (PROAIR HFA) 108 (90 Base) MCG/ACT inhaler Inhale 2 puffs into the lungs every 4 hours as needed for Wheezing or Shortness of Breath Inhale 2 puffs 4 times daily  Audi Young MD   acetaminophen (TYLENOL) 500 MG tablet Take 500 mg by mouth as needed for Pain Historical Provider, MD       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    Deep vein blood clot of left lower extremity ([address] 2016    Diverticulosis     Head injury     Hx of blood clots     Hyperlipidemia     Hypertension     Migraine headache     Pulmonary emphysema ([address] 10/5/2020    Seizures ([address]     Stroke (cerebrum) ([address]     Unspecified cerebral artery occlusion with cerebral infarction     TIA       Past Surgical History:   Procedure Laterality Date    BLEPHAROPLASTY      BREAST SURGERY      b/l breast cyst removal     CATARACT REMOVAL      COLONOSCOPY  2009     COLONOSCOPY BIOPSY/STOMA N/A 8/28/2018    COLONOSCOPY BIOPSY/STOMA, Dx: External Hemorrhoids and Severe Diverticulosis performed by Loel Koyanagi, MD at [address] [place] [address]      TUBAL LIGATION         Family History   Problem Relation Age of Onset    Breast Cancer Sister     Diabetes Sister     Migraines Sister     Clotting Disorder Mother     Diabetes Mother     Hypertension Mother     Migraines Mother     Heart Attack Father     Hypertension Father     Diabetes Father     Heart Disease Brother     Diabetes Brother     Migraines Brother     Hypertension Maternal Grandmother     Hypertension Maternal Grandfather     Hypertension Paternal Grandmother     Hypertension Paternal Grandfather        CareTeam (Including outside providers/suppliers regularly involved in providing care):   Patient Care Team:  Loel Koyanagi, MD as PCP - General (Internal Medicine)  Loel Koyanagi, MD as PCP - [facility]HABILITATION [facility]  Czear Collier MD as Consulting Physician (Neurology)  Chelsea Banegas MD as Consulting Physician (Cardiology)  Leslee Gonzalez MD as Consulting Physician (Urology)    Wt Readings from Last 3 Encounters: had an eye exam within the past year?: Yes  Hearing/Vision Interventions:  · Discussed having an evaluation for hearing aid but doesn't want to do it at this time. Safety:  Safety  Do you have working smoke detectors?: Yes  Have all throw rugs been removed or fastened?: (!) No  Do you have non-slip mats or surfaces in all bathtubs/showers?: (!) No  Do all of your stairways have a railing or banister?: Yes  Are your doorways, halls and stairs free of clutter?: Yes  Do you always fasten your seatbelt when you are in a car?: Yes  Safety Interventions:  · Discussed removing throw rugs to decrease risk of fall. · Discussed placing anti slip mats in the shower or tub. ADL:  ADLs  In the past 7 days, did you need help from others to perform any of the following everyday activities? Eating, dressing, grooming, bathing, toileting, or walking/balance?: None  In the past 7 days, did you need help from others to take care of any of the following? Laundry, housekeeping, banking/finances, shopping, telephone use, food preparation, transportation, or taking medications?: (!) Transportation  ADL Interventions:  ·  drives her where she needs to to.      Personalized Preventive Plan   Current Health Maintenance Status  Immunization History   Administered Date(s) Administered    COVID-19, Pfizer, PF, 30mcg/0.3mL 03/12/2021, 04/01/2021    Influenza Virus Vaccine 10/23/2015    Influenza, High Dose (Fluzone 65 yrs and older) 11/08/2012    Influenza, Aldean Cheese, IM, PF (6 mo and older Fluzone, Flulaval, Fluarix, and 3 yrs and older Afluria) 11/01/2016, 01/26/2018, 09/21/2018, 10/01/2019, 10/05/2020    Pneumococcal Conjugate 13-valent ([id number]) 02/16/2016    Pneumococcal Polysaccharide ([id number]) 11/08/2012    Tdap (Boostrix, Adacel) 11/08/2012        Health Maintenance   Topic Date Due    Shingles Vaccine (1 of 2) Never done   [employer] Visit (AWV)  Never done    Lipid screen  09/15/2021    Potassium monitoring  09/15/2021    Creatinine monitoring  09/15/2021    DTaP/Tdap/Td vaccine (2 - Td) 11/08/2022    Colon cancer screen colonoscopy  08/28/2028    DEXA (modify frequency per FRAX score)  Completed    Flu vaccine  Completed    Pneumococcal 65+ years Vaccine  Completed    COVID-19 Vaccine  Completed    Hepatitis C screen  Completed    Hepatitis A vaccine  Aged Out    Hepatitis B vaccine  Aged Out    Hib vaccine  Aged Out    Meningococcal (ACWY) vaccine  Aged Out     Recommendations for [place] Due: see orders and patient instructions/AVS.  . Recommended screening schedule for the next 5-10 years is provided to the patient in written form: see Patient Instructions/AVS.      1. Routine general medical examination at a health care facility  See above concerns and discussion. Advance Care Planning     Advance Care Planning (ACP) Physician/NP/PA Conversation    Date of Conversation: 5/17/2021  Conducted with: Patient with Decision Making Capacity    Healthcare Decision Maker:        Click here to complete [address] including selection of the Healthcare Decision Maker Relationship (ie \"Primary\")  Today we documented Decision Maker(s) consistent with Legal Next of Kin hierarchy. Care Preferences:    Hospitalization: \"If your health worsens and it becomes clear that your chance of recovery is unlikely, what would be your preference regarding hospitalization? \"  The patient would prefer hospitalization. Ventilation: \"If you were unable to breath on your own and your chance of recovery was unlikely, what would be your preference about the use of a ventilator (breathing machine) if it was available to you? \"  The patient would desire the use of a ventilator. Resuscitation: \"In the event your heart stopped as a result of an underlying serious health condition, would you want attempts made to restart your heart, or would you prefer a natural death? \"  Yes, attempt to resuscitate.     treatment goals    Conversation Outcomes / Follow-Up Plan:  ACP complete - no further action today  Reviewed DNR/DNI and patient elects Full Code (Attempt Resuscitation)    Length of Voluntary ACP Conversation in minutes:  <16 minutes (Non-Billable)    Graciela Young MD

## 2021-05-23 DIAGNOSIS — E78.2 MIXED HYPERLIPIDEMIA: ICD-10-CM

## 2021-05-24 RX ORDER — PRAVASTATIN SODIUM 20 MG
TABLET ORAL
Qty: 90 TABLET | Refills: 1 | Status: SHIPPED | OUTPATIENT
Start: 2021-05-24 | End: 2021-12-14 | Stop reason: SDUPTHER

## 2021-05-24 NOTE — TELEPHONE ENCOUNTER
Health Maintenance   Topic Date Due    Shingles Vaccine (1 of 2) Never done    Lipid screen  09/15/2021    Potassium monitoring  09/15/2021    Creatinine monitoring  09/15/2021    Annual Wellness Visit (AWV)  05/18/2022    DTaP/Tdap/Td vaccine (2 - Td) 11/08/2022    Colon cancer screen colonoscopy  08/28/2028    DEXA (modify frequency per FRAX score)  Completed    Flu vaccine  Completed    Pneumococcal 65+ years Vaccine  Completed    COVID-19 Vaccine  Completed    Hepatitis C screen  Completed    Hepatitis A vaccine  Aged Out    Hepatitis B vaccine  Aged Out    Hib vaccine  Aged Out    Meningococcal (ACWY) vaccine  Aged Out             (applicable per patient's age: Cancer Screenings, Depression Screening, Fall Risk Screening, Immunizations)    Hemoglobin A1C (%)   Date Value   10/05/2020 5.5   04/01/2017 5.8     LDL Cholesterol (mg/dL)   Date Value   09/15/2020 109     LDL Calculated (mg/dL)   Date Value   08/06/2013 83     AST (U/L)   Date Value   09/15/2020 19     ALT (U/L)   Date Value   09/15/2020 10     BUN (mg/dL)   Date Value   09/15/2020 26 (H)      (goal A1C is < 7)   (goal LDL is <100) need 30-50% reduction from baseline     BP Readings from Last 3 Encounters:   05/17/21 138/70   03/30/21 134/68   01/05/21 139/68    (goal /80)      All Future Testing planned in [facility]:  Lab Frequency Next Occurrence   TSH with Reflex Once 09/21/2021   CBC Auto Differential Once 09/21/2021   Comprehensive Metabolic Panel Once 41/83/9364   Vitamin D 25 Hydroxy Once 09/21/2021   Lipid Panel Once 09/21/2021   Magnesium Once 09/21/2021   EKG 12 Lead Once 09/21/2021   XR CHEST (2 VW) Once 09/21/2021   ECHO Complete 2D W Doppler W Color Once 09/21/2021       Next Visit Date:  Future Appointments   Date Time Provider Melo Blake   9/9/2021  1:00 PM MD brayden Mayo urol W   9/13/2021 11:30 AM MD Bia Washington W   10/4/2021 11:00 AM Deborah Nicolas MD [facility] [address] [facility] 5/19/2022 10:30 AM MD Angelica Hernandez [facility]OLPP            Patient Active Problem List:     Hypertension     Aortic stenosis     Hyperlipidemia     Diverticulosis     Intracranial bleed (HCC)     Vitamin D deficiency     Rectocele     Osteoporosis     History of DVT (deep vein thrombosis)     Cavernoma     TIA (transient ischemic attack)     Thalamic infarction ([address]     Paresthesia     Microhematuria     Seizure disorder (HCC)     Frequent UTI     Pulmonary emphysema ([address]

## 2021-06-29 DIAGNOSIS — I10 ESSENTIAL HYPERTENSION: ICD-10-CM

## 2021-06-29 RX ORDER — OLMESARTAN MEDOXOMIL AND HYDROCHLOROTHIAZIDE 20/12.5 20; 12.5 MG/1; MG/1
TABLET ORAL
Qty: 90 TABLET | Refills: 1 | Status: SHIPPED | OUTPATIENT
Start: 2021-06-29 | End: 2021-12-14 | Stop reason: SDUPTHER

## 2021-06-29 NOTE — TELEPHONE ENCOUNTER
Health Maintenance   Topic Date Due    Shingles Vaccine (1 of 2) Never done    Lipid screen  09/15/2021    Potassium monitoring  09/15/2021    Creatinine monitoring  09/15/2021    Annual Wellness Visit (AWV)  05/18/2022    DTaP/Tdap/Td vaccine (2 - Td or Tdap) 11/08/2022    Colon cancer screen colonoscopy  08/28/2028    DEXA (modify frequency per FRAX score)  Completed    Flu vaccine  Completed    Pneumococcal 65+ years Vaccine  Completed    COVID-19 Vaccine  Completed    Hepatitis C screen  Completed    Hepatitis A vaccine  Aged Out    Hepatitis B vaccine  Aged Out    Hib vaccine  Aged Out    Meningococcal (ACWY) vaccine  Aged Out             (applicable per patient's age: Cancer Screenings, Depression Screening, Fall Risk Screening, Immunizations)    Hemoglobin A1C (%)   Date Value   10/05/2020 5.5   04/01/2017 5.8     LDL Cholesterol (mg/dL)   Date Value   09/15/2020 109     LDL Calculated (mg/dL)   Date Value   08/06/2013 83     AST (U/L)   Date Value   09/15/2020 19     ALT (U/L)   Date Value   09/15/2020 10     BUN (mg/dL)   Date Value   09/15/2020 26 (H)      (goal A1C is < 7)   (goal LDL is <100) need 30-50% reduction from baseline     BP Readings from Last 3 Encounters:   05/17/21 138/70   03/30/21 134/68   01/05/21 139/68    (goal /80)      All Future Testing planned in [facility]:  Lab Frequency Next Occurrence   TSH with Reflex Once 09/21/2021   CBC Auto Differential Once 09/21/2021   Comprehensive Metabolic Panel Once 33/23/2308   Vitamin D 25 Hydroxy Once 09/21/2021   Lipid Panel Once 09/21/2021   Magnesium Once 09/21/2021   EKG 12 Lead Once 09/21/2021   XR CHEST (2 VW) Once 09/21/2021   ECHO Complete 2D W Doppler W Color Once 09/21/2021       Next Visit Date:  Future Appointments   Date Time Provider Melo Blake   9/9/2021  1:00 PM MD brayden Novoa urol [facility]   9/13/2021 11:30 AM MD Isabel Branham [facility]   10/4/2021 11:00 AM Monty Kay MD [facility] Anabelle Ortiz   5/19/2022 10:30 AM MD Frances Blair [facility]            Patient Active Problem List:     Hypertension     Aortic stenosis     Hyperlipidemia     Diverticulosis     Intracranial bleed (HCC)     Vitamin D deficiency     Rectocele     Osteoporosis     History of DVT (deep vein thrombosis)     Cavernoma     TIA (transient ischemic attack)     Thalamic infarction ([facility]     Paresthesia     Microhematuria     Seizure disorder (HCC)     Frequent UTI     Pulmonary emphysema ([facility]

## 2021-08-19 ENCOUNTER — HOSPITAL ENCOUNTER (OUTPATIENT)
Dept: PREADMISSION TESTING | Age: 76
Setting detail: SPECIMEN
Discharge: HOME | End: 2021-08-19
Payer: MEDICARE

## 2021-08-19 DIAGNOSIS — Z20.822 SUSPECTED COVID-19 VIRUS INFECTION: Primary | ICD-10-CM

## 2021-08-19 DIAGNOSIS — Z20.822 SUSPECTED COVID-19 VIRUS INFECTION: ICD-10-CM

## 2021-08-19 LAB
SARS-COV-2: NORMAL
SARS-COV-2: NOT DETECTED
SOURCE: NORMAL

## 2021-08-19 PROCEDURE — U0003 INFECTIOUS AGENT DETECTION BY NUCLEIC ACID (DNA OR RNA); SEVERE ACUTE RESPIRATORY SYNDROME CORONAVIRUS 2 (SARS-COV-2) (CORONAVIRUS DISEASE [COVID-19]), AMPLIFIED PROBE TECHNIQUE, MAKING USE OF HIGH THROUGHPUT TECHNOLOGIES AS DESCRIBED BY CMS-2020-01-R: HCPCS

## 2021-08-19 PROCEDURE — C9803 HOPD COVID-19 SPEC COLLECT: HCPCS

## 2021-08-19 PROCEDURE — U0005 INFEC AGEN DETEC AMPLI PROBE: HCPCS

## 2021-08-20 ENCOUNTER — OFFICE VISIT (OUTPATIENT)
Dept: FAMILY MEDICINE CLINIC | Age: 76
End: 2021-08-20
Payer: MEDICARE

## 2021-08-20 VITALS
BODY MASS INDEX: 30.12 KG/M2 | HEART RATE: 72 BPM | DIASTOLIC BLOOD PRESSURE: 60 MMHG | OXYGEN SATURATION: 97 % | WEIGHT: 170 LBS | SYSTOLIC BLOOD PRESSURE: 120 MMHG | HEIGHT: 63 IN

## 2021-08-20 DIAGNOSIS — J20.9 BRONCHITIS WITH BRONCHOSPASM: Primary | ICD-10-CM

## 2021-08-20 PROCEDURE — G8417 CALC BMI ABV UP PARAM F/U: HCPCS | Performed by: INTERNAL MEDICINE

## 2021-08-20 PROCEDURE — 3017F COLORECTAL CA SCREEN DOC REV: CPT | Performed by: INTERNAL MEDICINE

## 2021-08-20 PROCEDURE — 1123F ACP DISCUSS/DSCN MKR DOCD: CPT | Performed by: INTERNAL MEDICINE

## 2021-08-20 PROCEDURE — G8399 PT W/DXA RESULTS DOCUMENT: HCPCS | Performed by: INTERNAL MEDICINE

## 2021-08-20 PROCEDURE — 99213 OFFICE O/P EST LOW 20 MIN: CPT | Performed by: INTERNAL MEDICINE

## 2021-08-20 PROCEDURE — 4040F PNEUMOC VAC/ADMIN/RCVD: CPT | Performed by: INTERNAL MEDICINE

## 2021-08-20 PROCEDURE — G8427 DOCREV CUR MEDS BY ELIG CLIN: HCPCS | Performed by: INTERNAL MEDICINE

## 2021-08-20 PROCEDURE — 1036F TOBACCO NON-USER: CPT | Performed by: INTERNAL MEDICINE

## 2021-08-20 PROCEDURE — 1090F PRES/ABSN URINE INCON ASSESS: CPT | Performed by: INTERNAL MEDICINE

## 2021-08-20 RX ORDER — DOXYCYCLINE HYCLATE 100 MG
100 TABLET ORAL DAILY
Qty: 10 TABLET | Refills: 0 | Status: SHIPPED | OUTPATIENT
Start: 2021-08-20 | End: 2021-08-20 | Stop reason: SDUPTHER

## 2021-08-20 RX ORDER — PREDNISONE 20 MG/1
40 TABLET ORAL DAILY
Qty: 10 TABLET | Refills: 0 | Status: SHIPPED | OUTPATIENT
Start: 2021-08-20 | End: 2021-08-25

## 2021-08-20 RX ORDER — DOXYCYCLINE HYCLATE 100 MG
100 TABLET ORAL 2 TIMES DAILY
Qty: 20 TABLET | Refills: 0 | Status: SHIPPED | OUTPATIENT
Start: 2021-08-20 | End: 2021-08-30

## 2021-08-20 RX ORDER — TRIAMCINOLONE ACETONIDE 1 MG/G
CREAM TOPICAL 2 TIMES DAILY
Qty: 1 TUBE | Refills: 5 | Status: SHIPPED | OUTPATIENT
Start: 2021-08-20

## 2021-08-20 ASSESSMENT — ENCOUNTER SYMPTOMS
ABDOMINAL PAIN: 0
NAUSEA: 0
RHINORRHEA: 0
COUGH: 1
SPUTUM PRODUCTION: 1
SHORTNESS OF BREATH: 1
BLOOD IN STOOL: 0
CONSTIPATION: 0
SORE THROAT: 0
DIARRHEA: 0
CHEST TIGHTNESS: 0
VOMITING: 0

## 2021-08-20 NOTE — PATIENT INSTRUCTIONS
Survey: You may be receiving a survey from [employer] regarding your visit today. You may get this in the mail, through your MyChart or in your email. Please complete the survey to enable us to provide the highest quality of care to you and your family. Please also, mention our names. If you cannot score us as very good (5 Stars) on any question, please feel free to call the office to discuss how we could have made your experience exceptional.      Thank You! MD Jenn El LPN Tammy, Connor Gaston, PATRICION Deirdre Raman      1. Bronchitis with bronchospasm  Take Doxycycline 100 mg two times a day x 10 days. Prednisone 20 m tablets daily x 5 days. Derrill Raegan is instructed to return to the clinic if the symptoms continue or worsen. Derrill Raegan  was also instructed to go to the emergency room department if the symptoms significantly worsen before an appointment can be made.

## 2021-08-20 NOTE — PROGRESS NOTES
palpitations. Gastrointestinal: Negative for abdominal pain, blood in stool, constipation, diarrhea, nausea and vomiting. Genitourinary: Negative for difficulty urinating, dysuria, frequency, menstrual problem and urgency. Musculoskeletal: Negative for arthralgias, joint swelling, myalgias and neck pain. Skin: Negative. Neurological: Positive for light-headedness. Negative for syncope. Psychiatric/Behavioral: Negative. Objective   Physical Exam  Constitutional:       General: Distressed: 0.................. Graylon Emerson Appearance: She is well-developed. HENT:      Head: Atraumatic. Eyes:      Conjunctiva/sclera: Conjunctivae normal.   Cardiovascular:      Rate and Rhythm: Normal rate and regular rhythm. Heart sounds: Normal heart sounds. Pulmonary:      Effort: Pulmonary effort is normal.      Comments: Wheezing in the lower lungs posteriorly. Abdominal:      Palpations: Abdomen is soft. Tenderness: There is no abdominal tenderness. Musculoskeletal:         General: Normal range of motion. Cervical back: Normal range of motion and neck supple. Lymphadenopathy:      Cervical: No cervical adenopathy. Skin:     Findings: No rash. Neurological:      Mental Status: She is alert. Psychiatric:         Behavior: Behavior normal.         Thought Content: Thought content normal.                  An electronic signature was used to authenticate this note.     --Elias Jeff MD

## 2021-09-09 ENCOUNTER — OFFICE VISIT (OUTPATIENT)
Dept: UROLOGY | Age: 76
End: 2021-09-09
Payer: MEDICARE

## 2021-09-09 ENCOUNTER — HOSPITAL ENCOUNTER (OUTPATIENT)
Age: 76
Setting detail: SPECIMEN
Discharge: HOME | End: 2021-09-09
Payer: MEDICARE

## 2021-09-09 VITALS
DIASTOLIC BLOOD PRESSURE: 62 MMHG | SYSTOLIC BLOOD PRESSURE: 122 MMHG | HEART RATE: 73 BPM | HEIGHT: 63 IN | WEIGHT: 171 LBS | OXYGEN SATURATION: 93 % | BODY MASS INDEX: 30.3 KG/M2

## 2021-09-09 DIAGNOSIS — N39.0 FREQUENT UTI: ICD-10-CM

## 2021-09-09 DIAGNOSIS — R31.29 MICROHEMATURIA: ICD-10-CM

## 2021-09-09 DIAGNOSIS — R31.29 MICROHEMATURIA: Primary | ICD-10-CM

## 2021-09-09 LAB
-: ABNORMAL
AMORPHOUS: ABNORMAL
BACTERIA: ABNORMAL
BILIRUBIN URINE: NEGATIVE
CASTS UA: ABNORMAL /LPF
COLOR: YELLOW
COMMENT UA: ABNORMAL
CRYSTALS, UA: ABNORMAL /HPF
EPITHELIAL CELLS UA: ABNORMAL /HPF
GLUCOSE URINE: NEGATIVE
KETONES, URINE: NEGATIVE
LEUKOCYTE ESTERASE, URINE: ABNORMAL
MUCUS: ABNORMAL
NITRITE, URINE: NEGATIVE
OTHER OBSERVATIONS UA: ABNORMAL
PH UA: 5 (ref 5–8)
PROTEIN UA: NEGATIVE
RBC UA: ABNORMAL /HPF (ref 0–2)
RENAL EPITHELIAL, UA: ABNORMAL /HPF
SPECIFIC GRAVITY UA: 1.02 (ref 1–1.03)
TRICHOMONAS: ABNORMAL
TURBIDITY: CLEAR
URINE HGB: ABNORMAL
UROBILINOGEN, URINE: NORMAL
WBC UA: ABNORMAL /HPF
YEAST: ABNORMAL

## 2021-09-09 PROCEDURE — G8427 DOCREV CUR MEDS BY ELIG CLIN: HCPCS | Performed by: PHYSICIAN ASSISTANT

## 2021-09-09 PROCEDURE — G8417 CALC BMI ABV UP PARAM F/U: HCPCS | Performed by: PHYSICIAN ASSISTANT

## 2021-09-09 PROCEDURE — 81001 URINALYSIS AUTO W/SCOPE: CPT

## 2021-09-09 PROCEDURE — 87086 URINE CULTURE/COLONY COUNT: CPT

## 2021-09-09 PROCEDURE — 1090F PRES/ABSN URINE INCON ASSESS: CPT | Performed by: PHYSICIAN ASSISTANT

## 2021-09-09 PROCEDURE — 1036F TOBACCO NON-USER: CPT | Performed by: PHYSICIAN ASSISTANT

## 2021-09-09 PROCEDURE — G8399 PT W/DXA RESULTS DOCUMENT: HCPCS | Performed by: PHYSICIAN ASSISTANT

## 2021-09-09 PROCEDURE — 1123F ACP DISCUSS/DSCN MKR DOCD: CPT | Performed by: PHYSICIAN ASSISTANT

## 2021-09-09 PROCEDURE — 4040F PNEUMOC VAC/ADMIN/RCVD: CPT | Performed by: PHYSICIAN ASSISTANT

## 2021-09-09 PROCEDURE — 99213 OFFICE O/P EST LOW 20 MIN: CPT | Performed by: PHYSICIAN ASSISTANT

## 2021-09-09 PROCEDURE — 3017F COLORECTAL CA SCREEN DOC REV: CPT | Performed by: PHYSICIAN ASSISTANT

## 2021-09-09 ASSESSMENT — ENCOUNTER SYMPTOMS
APNEA: 0
ABDOMINAL PAIN: 0
SHORTNESS OF BREATH: 0
WHEEZING: 0
CONSTIPATION: 1
EYE REDNESS: 0
VOMITING: 0
BACK PAIN: 0
COLOR CHANGE: 0
COUGH: 0
NAUSEA: 0

## 2021-09-09 NOTE — PATIENT INSTRUCTIONS
FOR CONSTIPATION    It is very important to have regular, soft, daily bowel movements, because it WILL improve your urinary symptoms. Take Miralax (or generic equivalent) 17g once daily (one capful). Take every day, DO NOT skip days, as it must be taken daily in order to be effective. DO NOT take just \"as needed\". It is safe to take long term and is recommended for your symptoms. If one dose daily causes loose stools/diarrhea, decrease to 1/2 capful or 1/4 capful. If you cannot tolerate this medication, please notify our office. If one dose daily of Miralax is not sufficient to produce a soft, easy to pass daily stool, you may also add an over-the-counter stool softener capsule. For example: colace (docusate). For Miralax to have maximal effectiveness, be sure to increase your water intake - aim for 80 oz daily unless you are on a fluid-restriction from another provider. SURVEY:    You may be receiving a survey from [employer] regarding your visit today. Please complete the survey to enable us to provide the highest quality of care to you and your family. If you cannot score us a very good on any question, please call the office to discuss how we could have made your experience a very good one. Thank you.

## 2021-09-09 NOTE — PROGRESS NOTES
HPI:    Patient is a 77 y.o. female in no acute distress. She is alert and oriented to person, place, and time. History  2016 Referred for recurrent UTI and microhematuria. Treated with 3 rounds abx in Mar/Apr 2016. Symptoms finally resolved, but microhematuria persisted.  Previous smoker, 1 PPD x 30 yrs. CT and cysto normal.     Frequent UTIs in the past. Does not leak urine. Did have hysterectomy. Pelvic did show large rectocele. Referred to GYN for this. They offered a pessary versus surgery, patient declined both    Today:  Patient is here today for annual follow-up frequent urinary tract infections and microscopic hematuria. She denies any fever, chills, gross hematuria, flank pain, dysuria. She denies any urinary tract infections over the last year. Patient states that she is struggling with constipation. She intermittently takes Linzess and states that when she does take it she does have a bowel move but has to be careful if she is going to travel. She states that she has never tried MiraLAX before.       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    Deep vein blood clot of left lower extremity ([address] 2016    Diverticulosis     Head injury     Hx of blood clots     Hyperlipidemia     Hypertension     Migraine headache     Pulmonary emphysema ([address] 10/5/2020    Seizures ([address]     Stroke (cerebrum) (HCC)     Unspecified cerebral artery occlusion with cerebral infarction     TIA     Past Surgical History:   Procedure Laterality Date    BLEPHAROPLASTY      BREAST SURGERY      b/l breast cyst removal     CATARACT REMOVAL      COLONOSCOPY  2009     COLONOSCOPY BIOPSY/STOMA N/A 8/28/2018    COLONOSCOPY BIOPSY/STOMA, Dx: External Hemorrhoids and Severe Diverticulosis performed by Dwayne Frey MD at [facility]  MAMMO IMPLANT DIGITAL DIAG BI      TUBAL LIGATION       Outpatient Encounter Medications as of 9/9/2021   Medication Sig Dispense Refill    triamcinolone (KENALOG) 0.1 % cream Apply topically 2 times daily 1 Tube 5    olmesartan-hydroCHLOROthiazide (BENICAR HCT) 20-12.5 MG per tablet TAKE 1 TABLET BY MOUTH EVERY DAY 90 tablet 1    pravastatin (PRAVACHOL) 20 MG tablet TAKE 1 TABLET BY MOUTH EVERY DAY 90 tablet 1    divalproex (DEPAKOTE) 500 MG DR tablet Take 500 mg by mouth daily       albuterol sulfate HFA (PROAIR HFA) 108 (90 Base) MCG/ACT inhaler Inhale 2 puffs into the lungs every 4 hours as needed for Wheezing or Shortness of Breath Inhale 2 puffs 4 times daily 1 Inhaler 3    linaCLOtide (LINZESS) 72 MCG CAPS capsule Take 1 capsule by mouth every morning (before breakfast) 30 capsule 5    acetaminophen (TYLENOL) 500 MG tablet Take 500 mg by mouth as needed for Pain      Cranberry 400 MG CAPS Take 400 mg by mouth daily (with breakfast)      aspirin 325 MG EC tablet Take 1 tablet by mouth daily 30 tablet 3    Psyllium (METAMUCIL PO) Take by mouth daily as needed        No facility-administered encounter medications on file as of 9/9/2021.       Current Outpatient Medications on File Prior to Visit   Medication Sig Dispense Refill    triamcinolone (KENALOG) 0.1 % cream Apply topically 2 times daily 1 Tube 5    olmesartan-hydroCHLOROthiazide (BENICAR HCT) 20-12.5 MG per tablet TAKE 1 TABLET BY MOUTH EVERY DAY 90 tablet 1    pravastatin (PRAVACHOL) 20 MG tablet TAKE 1 TABLET BY MOUTH EVERY DAY 90 tablet 1    divalproex (DEPAKOTE) 500 MG DR tablet Take 500 mg by mouth daily       albuterol sulfate HFA (PROAIR HFA) 108 (90 Base) MCG/ACT inhaler Inhale 2 puffs into the lungs every 4 hours as needed for Wheezing or Shortness of Breath Inhale 2 puffs 4 times daily 1 Inhaler 3    linaCLOtide (LINZESS) 72 MCG CAPS capsule Take 1 capsule by mouth every morning (before breakfast) 30 capsule 5    acetaminophen (TYLENOL) 500 MG tablet Take 500 mg by mouth as needed for Pain      Cranberry 400 MG CAPS Take 400 mg by mouth daily (with breakfast)      aspirin 325 MG EC tablet Take 1 tablet by mouth daily 30 tablet 3    Psyllium (METAMUCIL PO) Take by mouth daily as needed        No current facility-administered medications on file prior to visit. Tape Tally Ashing tape]  Family History   Problem Relation Age of Onset    Breast Cancer Sister     Diabetes Sister     Migraines Sister     Clotting Disorder Mother     Diabetes Mother     Hypertension Mother     Migraines Mother     Heart Attack Father     Hypertension Father     Diabetes Father     Heart Disease Brother     Diabetes Brother     Migraines Brother     Hypertension Maternal Grandmother     Hypertension Maternal Grandfather     Hypertension Paternal Grandmother     Hypertension Paternal Grandfather      Social History     Tobacco Use   Smoking Status Former Smoker    Packs/day: 1.00    Years: 30.    Pack years: 30.    Quit date: 2005    Years since quittin.6   Smokeless Tobacco Never Used       Social History     Substance and Sexual Activity   Alcohol Use No       Review of Systems   Constitutional: Negative for appetite change, chills and fever. Eyes: Negative for redness and visual disturbance. Respiratory: Negative for apnea, cough, shortness of breath and wheezing. Cardiovascular: Negative for chest pain and leg swelling. Gastrointestinal: Positive for constipation. Negative for abdominal pain, nausea and vomiting. Genitourinary: Negative for difficulty urinating, dyspareunia, dysuria, enuresis, flank pain, frequency, hematuria, pelvic pain, urgency, vaginal bleeding and vaginal discharge. Musculoskeletal: Negative for back pain, joint swelling and myalgias. Skin: Negative for color change, rash and wound. Neurological: Negative for dizziness, tremors and numbness.    Hematological: Negative for adenopathy. Does not bruise/bleed easily. Psychiatric/Behavioral: Negative for sleep disturbance. /62 (Site: Right Upper Arm, Position: Sitting, Cuff Size: Medium Adult)   Pulse 73   Ht 5' 3\" (1.6 m)   Wt 171 lb (77.6 kg)   LMP  (LMP Unknown)   SpO2 93%   Breastfeeding No   BMI 30.29 kg/m²       PHYSICAL EXAM:  Constitutional: Patient resting comfortably, in no acute distress. Neuro: Alert and oriented to person place and time. Psych: Mood and affect normal.  HEENT: normocephalic, atraumatic  Lungs: Respiratory effort normal, unlabored  Abdomen: Soft, non-tender, non-distended  : No CVA tenderness. Pelvic: deferred     Lab Results   Component Value Date    BUN 26 (H) 09/15/2020     Lab Results   Component Value Date    CREATININE 0.97 (H) 09/15/2020       ASSESSMENT:   Diagnosis Orders   1. Microhematuria  Urinalysis with Microscopic    Culture, Urine   2. Frequent UTI       PLAN:  We will check a urinalysis and culture. We will call her with results. This is for her history of microscopic hematuria. If she has microscopic hematuria again this year we will need to have her back in the office to discuss a repeat hematuria work-up as her initial work-up was 2016. We do recommend that she starts MiraLAX, instructions given, she does not wish us to follow her for her constipation.     Otherwise we will follow up with her in 1 year

## 2021-09-10 LAB
CULTURE: NORMAL
Lab: NORMAL
SPECIMEN DESCRIPTION: NORMAL

## 2021-09-13 ENCOUNTER — OFFICE VISIT (OUTPATIENT)
Dept: CARDIOLOGY CLINIC | Age: 76
End: 2021-09-13
Payer: MEDICARE

## 2021-09-13 ENCOUNTER — HOSPITAL ENCOUNTER (OUTPATIENT)
Age: 76
Discharge: HOME | End: 2021-09-13
Payer: MEDICARE

## 2021-09-13 ENCOUNTER — HOSPITAL ENCOUNTER (OUTPATIENT)
Age: 76
Discharge: HOME | End: 2021-09-15
Payer: MEDICARE

## 2021-09-13 ENCOUNTER — HOSPITAL ENCOUNTER (OUTPATIENT)
Dept: GENERAL RADIOLOGY | Age: 76
Discharge: HOME | End: 2021-09-15
Payer: MEDICARE

## 2021-09-13 VITALS
SYSTOLIC BLOOD PRESSURE: 130 MMHG | HEART RATE: 88 BPM | WEIGHT: 171 LBS | OXYGEN SATURATION: 96 % | BODY MASS INDEX: 30.29 KG/M2 | DIASTOLIC BLOOD PRESSURE: 70 MMHG

## 2021-09-13 DIAGNOSIS — I10 ESSENTIAL HYPERTENSION: ICD-10-CM

## 2021-09-13 DIAGNOSIS — E55.9 VITAMIN D DEFICIENCY: ICD-10-CM

## 2021-09-13 DIAGNOSIS — E78.2 MIXED HYPERLIPIDEMIA: ICD-10-CM

## 2021-09-13 DIAGNOSIS — I35.0 NONRHEUMATIC AORTIC VALVE STENOSIS: ICD-10-CM

## 2021-09-13 DIAGNOSIS — I35.0 NONRHEUMATIC AORTIC VALVE STENOSIS: Primary | ICD-10-CM

## 2021-09-13 LAB
ABSOLUTE EOS #: 0.1 K/UL (ref 0–0.4)
ABSOLUTE IMMATURE GRANULOCYTE: ABNORMAL K/UL (ref 0–0.3)
ABSOLUTE LYMPH #: 1.6 K/UL (ref 1–4.8)
ABSOLUTE MONO #: 0.5 K/UL (ref 0–1)
ALBUMIN SERPL-MCNC: 3.6 G/DL (ref 3.5–5.2)
ALBUMIN/GLOBULIN RATIO: ABNORMAL (ref 1–2.5)
ALP BLD-CCNC: 54 U/L (ref 35–104)
ALT SERPL-CCNC: 5 U/L (ref 5–33)
ANION GAP SERPL CALCULATED.3IONS-SCNC: 8 MMOL/L (ref 9–17)
AST SERPL-CCNC: 12 U/L
BASOPHILS # BLD: 1 % (ref 0–2)
BASOPHILS ABSOLUTE: 0 K/UL (ref 0–0.2)
BILIRUB SERPL-MCNC: 0.49 MG/DL (ref 0.3–1.2)
BUN BLDV-MCNC: 21 MG/DL (ref 8–23)
BUN/CREAT BLD: 23 (ref 9–20)
CALCIUM SERPL-MCNC: 9.2 MG/DL (ref 8.6–10.4)
CHLORIDE BLD-SCNC: 102 MMOL/L (ref 98–107)
CHOLESTEROL/HDL RATIO: 4.2
CHOLESTEROL: 183 MG/DL
CO2: 30 MMOL/L (ref 20–31)
CREAT SERPL-MCNC: 0.9 MG/DL (ref 0.5–0.9)
DIFFERENTIAL TYPE: YES
EOSINOPHILS RELATIVE PERCENT: 2 % (ref 0–5)
GFR AFRICAN AMERICAN: >60 ML/MIN
GFR NON-AFRICAN AMERICAN: >60 ML/MIN
GFR SERPL CREATININE-BSD FRML MDRD: ABNORMAL ML/MIN/{1.73_M2}
GFR SERPL CREATININE-BSD FRML MDRD: ABNORMAL ML/MIN/{1.73_M2}
GLUCOSE BLD-MCNC: 104 MG/DL (ref 70–99)
HCT VFR BLD CALC: 34.3 % (ref 36–46)
HDLC SERPL-MCNC: 44 MG/DL
HEMOGLOBIN: 11.6 G/DL (ref 12–16)
IMMATURE GRANULOCYTES: ABNORMAL %
LDL CHOLESTEROL: 117 MG/DL (ref 0–130)
LYMPHOCYTES # BLD: 34 % (ref 15–40)
MAGNESIUM: 1.9 MG/DL (ref 1.6–2.6)
MCH RBC QN AUTO: 29.6 PG (ref 26–34)
MCHC RBC AUTO-ENTMCNC: 33.8 G/DL (ref 31–37)
MCV RBC AUTO: 87.5 FL (ref 80–100)
MONOCYTES # BLD: 10 % (ref 4–8)
NRBC AUTOMATED: ABNORMAL PER 100 WBC
PATIENT FASTING?: YES
PDW BLD-RTO: 13.6 % (ref 12.1–15.2)
PLATELET # BLD: 172 K/UL (ref 140–450)
PLATELET ESTIMATE: ABNORMAL
PMV BLD AUTO: ABNORMAL FL (ref 6–12)
POTASSIUM SERPL-SCNC: 4.5 MMOL/L (ref 3.7–5.3)
RBC # BLD: 3.92 M/UL (ref 4–5.2)
RBC # BLD: ABNORMAL 10*6/UL
SEG NEUTROPHILS: 53 % (ref 47–75)
SEGMENTED NEUTROPHILS ABSOLUTE COUNT: 2.6 K/UL (ref 2.5–7)
SODIUM BLD-SCNC: 140 MMOL/L (ref 135–144)
TOTAL PROTEIN: 6.4 G/DL (ref 6.4–8.3)
TRIGL SERPL-MCNC: 109 MG/DL
TSH SERPL DL<=0.05 MIU/L-ACNC: 2.23 MIU/L (ref 0.3–5)
VITAMIN D 25-HYDROXY: 83.8 NG/ML (ref 30–100)
VLDLC SERPL CALC-MCNC: NORMAL MG/DL (ref 1–30)
WBC # BLD: 4.9 K/UL (ref 3.5–11)
WBC # BLD: ABNORMAL 10*3/UL

## 2021-09-13 PROCEDURE — 84443 ASSAY THYROID STIM HORMONE: CPT

## 2021-09-13 PROCEDURE — 85025 COMPLETE CBC W/AUTO DIFF WBC: CPT

## 2021-09-13 PROCEDURE — 80053 COMPREHEN METABOLIC PANEL: CPT

## 2021-09-13 PROCEDURE — 93005 ELECTROCARDIOGRAM TRACING: CPT

## 2021-09-13 PROCEDURE — 71046 X-RAY EXAM CHEST 2 VIEWS: CPT

## 2021-09-13 PROCEDURE — 1123F ACP DISCUSS/DSCN MKR DOCD: CPT | Performed by: INTERNAL MEDICINE

## 2021-09-13 PROCEDURE — 1036F TOBACCO NON-USER: CPT | Performed by: INTERNAL MEDICINE

## 2021-09-13 PROCEDURE — 82306 VITAMIN D 25 HYDROXY: CPT

## 2021-09-13 PROCEDURE — G8417 CALC BMI ABV UP PARAM F/U: HCPCS | Performed by: INTERNAL MEDICINE

## 2021-09-13 PROCEDURE — 80061 LIPID PANEL: CPT

## 2021-09-13 PROCEDURE — 4040F PNEUMOC VAC/ADMIN/RCVD: CPT | Performed by: INTERNAL MEDICINE

## 2021-09-13 PROCEDURE — 36415 COLL VENOUS BLD VENIPUNCTURE: CPT

## 2021-09-13 PROCEDURE — 99214 OFFICE O/P EST MOD 30 MIN: CPT | Performed by: INTERNAL MEDICINE

## 2021-09-13 PROCEDURE — G8399 PT W/DXA RESULTS DOCUMENT: HCPCS | Performed by: INTERNAL MEDICINE

## 2021-09-13 PROCEDURE — G8427 DOCREV CUR MEDS BY ELIG CLIN: HCPCS | Performed by: INTERNAL MEDICINE

## 2021-09-13 PROCEDURE — 1090F PRES/ABSN URINE INCON ASSESS: CPT | Performed by: INTERNAL MEDICINE

## 2021-09-13 PROCEDURE — 3017F COLORECTAL CA SCREEN DOC REV: CPT | Performed by: INTERNAL MEDICINE

## 2021-09-13 PROCEDURE — 83735 ASSAY OF MAGNESIUM: CPT

## 2021-09-13 NOTE — LETTER
Catherine Soto M.D. [address] [place] [address] [address]  [phone]    2021    Ledy Avilez MD  [address] [place] [address]      RE:   Nicholas Martinez  :        Dear Dr. Briscoe Breath:    [address]:  1.  Moderate aortic stenosis. 2.  Shortness of breath. 3.  Her doctor. .. HISTORY OF PRESENT ILLNESS:  I had the pleasure of seeing Mrs. Parks and her  in our office on 2021. It is always a very enlightening experience to meet with her and her . I have gotten a very good insight into her family and feel like I know some of her family members too well. .. She has a history of aortic stenosis in the moderate range with an aortic valve area in the 1.1 cm2 range. Her mean gradient, however, has been 38 mmHg. She has a normal ejection fraction. We did an echo on 09/15/2020, which demonstrated a mean gradient of 38 mmHg. We obtained a valve area in the 0.9 to 1.0 range. I had the echo done in [facility] on  and 2021. They had a mean gradient of 37 mmHg, but they measured a valve area of 1.1 cm. She denies any chest pain or chest discomfort. She did develop more severe shortness of breath and had a cough in the last of August.  She saw Dr. Luis Felipe Kong and was COVID negative. She was given an antibiotic for bronchitis and her shortness of breath has markedly improved. She is still active. She has had no syncope or near syncope. She has had several episodes where \"heart was racing. \"  This did seem to last for minutes. She has occasional edema of her lower extremities, but it is usually when she eats salty food. She has had no procedures or hospitalizations. She is under some stress with her younger sister who passed away at age 61 in [facility] with cancer. Mrs. Vickey Gardner is the executor.     Now that she has recovered from her bronchitis, it sounds like she is about back to her baseline from where she had been a year ago as far as shortness of breath. She has never had a myocardial infarction, never had a cardiac catheterization. CARDIAC RISK FACTORS:  Prior MI:  Negative. Other Family Members:  Positive. Hyperlipidemia:  Positive. Hypertension:  Positive. Peripheral Vascular Disease:  Negative. Smoking:  Negative. Diabetes:  Borderline positive. MEDICATIONS:  At home, she is currently on albuterol p.r.n., aspirin 5 grains daily, Depakote 500 mg daily, Linzess 72 mcg daily, Benicar/hydrochlorothiazide 20/12.5 daily, pravastatin 20 mg daily. PAST MEDICAL AND SURGICAL HISTORY:  1. Breast surgery on the left side for a cyst.  2.  Hysterectomy in 1995, in [address] [place] [address]. 3.  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 4.  Migraine headaches. 5.  Blepharoplasty. 6.  Cataract surgery. 7.  DVT in 09/2016, treated conservatively because of cerebral hemorrhage in 2013. FAMILY HISTORY:  Mother had an MI. Brother had an MI. Father had an MI.    SOCIAL HISTORY:  She is 77years old, , two children. Does not smoke or drink alcohol. She happened to be going through family pictures again. She found a picture of her favorite nephew wearing a negligee. .. she believes it was at a birthday party. I forgot to ask when it was taken, but it sounds like it was a recent picture. She does find it interesting that this nephew is interested in former [employer]. .. REVIEW OF SYSTEMS:  Cardiac as above. Other systems reviewed including constitutional, eyes, ears, nose and throat, cardiovascular, respiratory, GI, , musculoskeletal, integumentary, neurologic, psychiatric, endocrine, hematologic and allergic/immunologic are negative except for described above. No weight loss or weight gain. No change in bowel habits. No blood in stool. No fevers, sweats or chills.     PHYSICAL cavernous bleed on 08/12/2013, with resolution of her symptoms. 5.  Bilateral DVT on 09/23/2016, no anticoagulation because of her previous cerebral bleed. PLAN:  I will see her in 6 months and repeat an echocardiogram as it will have been a year from her previous echo. DISCUSSION:  Mrs. Parks clinically is doing well. I had seen that she was getting short of breath in 08/2021, and I was concerned that she was getting symptomatic for aortic stenosis. However, it appears that she had a bronchitis which resolved with antibiotics. She feels her breathing is about back to baseline. We will repeat an echo in 6 months which will be a year from previous echo. Our mean gradient was the same here in 09/2020 as what [facility] got in 03/2020, which is in the 37 to 38 mmHg range. At this point, she is doing well. We will meet with her again in 6 months after an echocardiogram.    I look forward to meeting with her in 6 months. She did ask me to see her favorite nephew for psychological assessment, which I politely declined. Thank you very much.     Sincerely,        Jennifer Carr    D: 09/13/2021 12:01:23     T: 09/13/2021 12:07:31     [email or web address]  Job#: [id number]   Doc#: [id number]

## 2021-09-13 NOTE — PROGRESS NOTES
Ov DR Caitie Cruz 1 year follow up   No chest pain  Couple episodes heart racing. C/o sob and cough in Aug.  Seen DR Young check for   COVID negative   given antibiotic informed it  Was bronchitis. Sob improved. Sister had passed away  Youngest age 61 lived in [place]  Had cancer. No will pt is executer but  Struggling with getting  possessions  Taken care of .unable to find deed   To the house. Some ankle edema when   Drinking coke. Bedside echo done. Will see in 6 mths with echo.

## 2021-09-14 ENCOUNTER — TELEPHONE (OUTPATIENT)
Dept: UROLOGY | Age: 76
End: 2021-09-14

## 2021-09-14 LAB
EKG ATRIAL RATE: 70 BPM
EKG P AXIS: 47 DEGREES
EKG P-R INTERVAL: 164 MS
EKG Q-T INTERVAL: 368 MS
EKG QRS DURATION: 82 MS
EKG QTC CALCULATION (BAZETT): 397 MS
EKG R AXIS: 3 DEGREES
EKG T AXIS: 45 DEGREES
EKG VENTRICULAR RATE: 70 BPM

## 2021-09-14 PROCEDURE — 93010 ELECTROCARDIOGRAM REPORT: CPT | Performed by: INTERNAL MEDICINE

## 2021-09-14 NOTE — RESULT ENCOUNTER NOTE
Please let her know that her urine culture was negative for infection. Please let her know that there again was microscopic blood in her urine. We do need to have her back in the office to discuss repeating her hematuria work-up as it has been 5 years since her last work-up for this.   Please make her an appointment

## 2021-09-15 NOTE — PROGRESS NOTES
Nila Wing M.D. [address] [place] [address] Ron Ball 81  [phone]    2021    Ava Gonzalez MD  [address] [place] [address]      RE:   Krystal Espino  :        Dear Dr. Adam Matias:    [address]:  1.  Moderate aortic stenosis. 2.  Shortness of breath. 3.  Her doctor. .. HISTORY OF PRESENT ILLNESS:  I had the pleasure of seeing Mrs. Parks and her  in our office on 2021. It is always a very enlightening experience to meet with her and her . I have gotten a very good insight into her family and feel like I know some of her family members too well. .. She has a history of aortic stenosis in the moderate range with an aortic valve area in the 1.1 cm2 range. Her mean gradient, however, has been 38 mmHg. She has a normal ejection fraction. We did an echo on 09/15/2020, which demonstrated a mean gradient of 38 mmHg. We obtained a valve area in the 0.9 to 1.0 range. I had the echo done in [address] [place] [address] on  and 2021. They had a mean gradient of 37 mmHg, but they measured a valve area of 1.1 cm. She denies any chest pain or chest discomfort. She did develop more severe shortness of breath and had a cough in the last of August.  She saw Dr. Adam Matias and was COVID negative. She was given an antibiotic for bronchitis and her shortness of breath has markedly improved. She is still active. She has had no syncope or near syncope. She has had several episodes where \"heart was racing. \"  This did seem to last for minutes. She has occasional edema of her lower extremities, but it is usually when she eats salty food. She has had no procedures or hospitalizations. She is under some stress with her younger sister who passed away at age 61 in [place] with cancer. Mrs. Yulisa Castle is the executor.     Now that she has recovered from her bronchitis, it sounds like she is about back to her baseline from where she had been a year ago as far as shortness of breath. She has never had a myocardial infarction, never had a cardiac catheterization. CARDIAC RISK FACTORS:  Prior MI:  Negative. Other Family Members:  Positive. Hyperlipidemia:  Positive. Hypertension:  Positive. Peripheral Vascular Disease:  Negative. Smoking:  Negative. Diabetes:  Borderline positive. MEDICATIONS:  At home, she is currently on albuterol p.r.n., aspirin 5 grains daily, Depakote 500 mg daily, Linzess 72 mcg daily, Benicar/hydrochlorothiazide 20/12.5 daily, pravastatin 20 mg daily. PAST MEDICAL AND SURGICAL HISTORY:  1. Breast surgery on the left side for a cyst.  2.  Hysterectomy in 1995, in [address] [place] [address]. 3.  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 4.  Migraine headaches. 5.  Blepharoplasty. 6.  Cataract surgery. 7.  DVT in 09/2016, treated conservatively because of cerebral hemorrhage in 2013. FAMILY HISTORY:  Mother had an MI. Brother had an MI. Father had an MI.    SOCIAL HISTORY:  She is 77years old, , two children. Does not smoke or drink alcohol. She happened to be going through family pictures again. She found a picture of her favorite nephew wearing a negligee. .. she believes it was at a birthday party. I forgot to ask when it was taken, but it sounds like it was a recent picture. She does find it interesting that this nephew is interested in former [employer]. .. REVIEW OF SYSTEMS:  Cardiac as above. Other systems reviewed including constitutional, eyes, ears, nose and throat, cardiovascular, respiratory, GI, , musculoskeletal, integumentary, neurologic, psychiatric, endocrine, hematologic and allergic/immunologic are negative except for described above. No weight loss or weight gain. No change in bowel habits. No blood in stool. No fevers, sweats or chills.     PHYSICAL EXAMINATION:  VITAL SIGNS:  Her blood pressure was 130/70 with a heart rate of 88 and regular. Respiratory rate 18. O2 saturation was 96%. Weight 171 pounds. GENERAL:  She is a very pleasant 60-year-old female. Denied pain. She was oriented to person, place and time. SKIN:  No unusual skin changes. HEENT:  The pupils are equally round and intact. Mucous membranes were dry. NECK:  No JVD. Good carotid pulses. No carotid bruits. No lymphadenopathy or thyromegaly. CARDIOVASCULAR EXAM:  S1 and S2 were normal.  No S3 or S4. Soft systolic blowing type murmur. No diastolic murmur. PMI was normal.  No lift, thrust, or pericardial friction rub. She did have a loud grade 3/6 systolic ejection murmur. LUNGS:  Quite clear to auscultation and percussion. ABDOMEN:  Soft and nontender. Good bowel sounds. EXTREMITIES:  Good femoral pulses. Good pedal pulses. She had trace pedal edema. Skin was warm and dry. No calf tenderness. Nail beds pink. Good cap refill. PULSES:  Bilateral symmetrical radial, brachial and carotid pulses. No carotid bruits. Good femoral and pedal pulses. NEUROLOGIC EXAM:  Within normal limits. PSYCHIATRIC EXAM:  Within normal limits. LABORATORY DATA:  On 09/13/2021, sodium was 140, potassium 4.5, BUN 21, creatinine 0.90, GFR greater than 60. Magnesium was 1.9, calcium was 9.2. ALT was 5, AST was 12. TSH was 2.23. White count 4.9, hemoglobin 11.6 with a platelet count 215,572. Her EKG showed normal sinus rhythm and was normal.  Chest x-ray was not read as of yet, but did not see any change from previous studies when I reviewed it. IMPRESSION:  1. At least moderate aortic stenosis with a mean gradient of 38 mmHg here in 09/2020 and also 37 mmHg in 03/2021 at [facility], with us getting a valve area of 1.0 and [facility] getting an aortic valve area of 1.1.  2.  Hypertension, fairly well controlled. 3.  Hyperlipidemia.   4.  CVA 22 years ago, with a right subcortical

## 2021-09-30 ENCOUNTER — OFFICE VISIT (OUTPATIENT)
Dept: UROLOGY | Age: 76
End: 2021-09-30
Payer: MEDICARE

## 2021-09-30 VITALS — BODY MASS INDEX: 30.29 KG/M2 | DIASTOLIC BLOOD PRESSURE: 60 MMHG | WEIGHT: 171 LBS | SYSTOLIC BLOOD PRESSURE: 110 MMHG

## 2021-09-30 DIAGNOSIS — N39.0 FREQUENT UTI: ICD-10-CM

## 2021-09-30 DIAGNOSIS — R31.29 MICROHEMATURIA: Primary | ICD-10-CM

## 2021-09-30 PROCEDURE — 1090F PRES/ABSN URINE INCON ASSESS: CPT | Performed by: UROLOGY

## 2021-09-30 PROCEDURE — 1036F TOBACCO NON-USER: CPT | Performed by: UROLOGY

## 2021-09-30 PROCEDURE — G8417 CALC BMI ABV UP PARAM F/U: HCPCS | Performed by: UROLOGY

## 2021-09-30 PROCEDURE — G8427 DOCREV CUR MEDS BY ELIG CLIN: HCPCS | Performed by: UROLOGY

## 2021-09-30 PROCEDURE — 4040F PNEUMOC VAC/ADMIN/RCVD: CPT | Performed by: UROLOGY

## 2021-09-30 PROCEDURE — 99214 OFFICE O/P EST MOD 30 MIN: CPT | Performed by: UROLOGY

## 2021-09-30 PROCEDURE — 1123F ACP DISCUSS/DSCN MKR DOCD: CPT | Performed by: UROLOGY

## 2021-09-30 PROCEDURE — 3017F COLORECTAL CA SCREEN DOC REV: CPT | Performed by: UROLOGY

## 2021-09-30 PROCEDURE — G8399 PT W/DXA RESULTS DOCUMENT: HCPCS | Performed by: UROLOGY

## 2021-09-30 NOTE — PROGRESS NOTES
Encounter Medications as of 9/30/2021   Medication Sig Dispense Refill    triamcinolone (KENALOG) 0.1 % cream Apply topically 2 times daily 1 Tube 5    olmesartan-hydroCHLOROthiazide (BENICAR HCT) 20-12.5 MG per tablet TAKE 1 TABLET BY MOUTH EVERY DAY 90 tablet 1    pravastatin (PRAVACHOL) 20 MG tablet TAKE 1 TABLET BY MOUTH EVERY DAY 90 tablet 1    divalproex (DEPAKOTE) 500 MG DR tablet Take 500 mg by mouth daily       albuterol sulfate HFA (PROAIR HFA) 108 (90 Base) MCG/ACT inhaler Inhale 2 puffs into the lungs every 4 hours as needed for Wheezing or Shortness of Breath Inhale 2 puffs 4 times daily 1 Inhaler 3    linaCLOtide (LINZESS) 72 MCG CAPS capsule Take 1 capsule by mouth every morning (before breakfast) 30 capsule 5    acetaminophen (TYLENOL) 500 MG tablet Take 500 mg by mouth as needed for Pain      Cranberry 400 MG CAPS Take 400 mg by mouth daily (with breakfast)      aspirin 325 MG EC tablet Take 1 tablet by mouth daily 30 tablet 3    Psyllium (METAMUCIL PO) Take by mouth daily as needed  (Patient not taking: Reported on 9/30/2021)       No facility-administered encounter medications on file as of 9/30/2021.       Current Outpatient Medications on File Prior to Visit   Medication Sig Dispense Refill    triamcinolone (KENALOG) 0.1 % cream Apply topically 2 times daily 1 Tube 5    olmesartan-hydroCHLOROthiazide (BENICAR HCT) 20-12.5 MG per tablet TAKE 1 TABLET BY MOUTH EVERY DAY 90 tablet 1    pravastatin (PRAVACHOL) 20 MG tablet TAKE 1 TABLET BY MOUTH EVERY DAY 90 tablet 1    divalproex (DEPAKOTE) 500 MG DR tablet Take 500 mg by mouth daily       albuterol sulfate HFA (PROAIR HFA) 108 (90 Base) MCG/ACT inhaler Inhale 2 puffs into the lungs every 4 hours as needed for Wheezing or Shortness of Breath Inhale 2 puffs 4 times daily 1 Inhaler 3    linaCLOtide (LINZESS) 72 MCG CAPS capsule Take 1 capsule by mouth every morning (before breakfast) 30 capsule 5    acetaminophen (TYLENOL) 500 MG tablet Take 500 mg by mouth as needed for Pain      Cranberry 400 MG CAPS Take 400 mg by mouth daily (with breakfast)      aspirin 325 MG EC tablet Take 1 tablet by mouth daily 30 tablet 3    Psyllium (METAMUCIL PO) Take by mouth daily as needed  (Patient not taking: Reported on 2021)       No current facility-administered medications on file prior to visit. Tape Marcy Lawson tape]  Family History   Problem Relation Age of Onset    Breast Cancer Sister     Diabetes Sister     Migraines Sister     Clotting Disorder Mother     Diabetes Mother     Hypertension Mother     Migraines Mother     Heart Attack Father     Hypertension Father     Diabetes Father     Heart Disease Brother     Diabetes Brother     Migraines Brother     Hypertension Maternal Grandmother     Hypertension Maternal Grandfather     Hypertension Paternal Grandmother     Hypertension Paternal Grandfather      Social History     Tobacco Use   Smoking Status Former Smoker    Packs/day: 1.00    Years: 30.    Pack years: 30.    Quit date: 2005    Years since quittin.7   Smokeless Tobacco Never Used       Social History     Substance and Sexual Activity   Alcohol Use No       Review of Systems    /60 (Site: Left Upper Arm, Position: Sitting, Cuff Size: Medium Adult)   Wt 171 lb (77.6 kg)   LMP  (LMP Unknown)   BMI 30.29 kg/m²       PHYSICAL EXAM:  Constitutional: Patient resting comfortably, in no acute distress. Neuro: Alert and oriented to person place and time. Cranial nerves grossly intact. Psych: Mood and affect normal.  Skin: Warm, dry  HEENT: normocephalic, atraumatic  Lymphatics: No palpable lymphadenopathy  Lungs: Respiratory effort normal, unlabored  Cardiovascular:  Normal peripheral pulses  Abdomen: Soft, non-tender, non-distended with no organomegaly or palpable masses. : No CVA tenderness. Bladder non-tender and not distended.   Pelvic: deferred    Lab Results   Component Value Date

## 2021-10-04 ENCOUNTER — HOSPITAL ENCOUNTER (OUTPATIENT)
Age: 76
Setting detail: SPECIMEN
Discharge: HOME | End: 2021-10-04
Payer: MEDICARE

## 2021-10-04 ENCOUNTER — OFFICE VISIT (OUTPATIENT)
Dept: FAMILY MEDICINE CLINIC | Age: 76
End: 2021-10-04
Payer: MEDICARE

## 2021-10-04 VITALS
DIASTOLIC BLOOD PRESSURE: 72 MMHG | WEIGHT: 169 LBS | BODY MASS INDEX: 29.95 KG/M2 | HEART RATE: 86 BPM | SYSTOLIC BLOOD PRESSURE: 132 MMHG | HEIGHT: 63 IN

## 2021-10-04 DIAGNOSIS — L02.214 ABSCESS OF RIGHT GROIN: ICD-10-CM

## 2021-10-04 DIAGNOSIS — I10 PRIMARY HYPERTENSION: Primary | ICD-10-CM

## 2021-10-04 DIAGNOSIS — Z23 NEED FOR INFLUENZA VACCINATION: ICD-10-CM

## 2021-10-04 DIAGNOSIS — E78.2 MIXED HYPERLIPIDEMIA: ICD-10-CM

## 2021-10-04 DIAGNOSIS — I35.0 NONRHEUMATIC AORTIC VALVE STENOSIS: ICD-10-CM

## 2021-10-04 DIAGNOSIS — E55.9 VITAMIN D DEFICIENCY: ICD-10-CM

## 2021-10-04 DIAGNOSIS — R31.29 MICROHEMATURIA: ICD-10-CM

## 2021-10-04 DIAGNOSIS — G40.909 SEIZURE DISORDER (HCC): ICD-10-CM

## 2021-10-04 PROCEDURE — G8482 FLU IMMUNIZE ORDER/ADMIN: HCPCS | Performed by: INTERNAL MEDICINE

## 2021-10-04 PROCEDURE — 4040F PNEUMOC VAC/ADMIN/RCVD: CPT | Performed by: INTERNAL MEDICINE

## 2021-10-04 PROCEDURE — G8417 CALC BMI ABV UP PARAM F/U: HCPCS | Performed by: INTERNAL MEDICINE

## 2021-10-04 PROCEDURE — 1036F TOBACCO NON-USER: CPT | Performed by: INTERNAL MEDICINE

## 2021-10-04 PROCEDURE — 3017F COLORECTAL CA SCREEN DOC REV: CPT | Performed by: INTERNAL MEDICINE

## 2021-10-04 PROCEDURE — 99214 OFFICE O/P EST MOD 30 MIN: CPT | Performed by: INTERNAL MEDICINE

## 2021-10-04 PROCEDURE — G8399 PT W/DXA RESULTS DOCUMENT: HCPCS | Performed by: INTERNAL MEDICINE

## 2021-10-04 PROCEDURE — 1090F PRES/ABSN URINE INCON ASSESS: CPT | Performed by: INTERNAL MEDICINE

## 2021-10-04 PROCEDURE — G8427 DOCREV CUR MEDS BY ELIG CLIN: HCPCS | Performed by: INTERNAL MEDICINE

## 2021-10-04 PROCEDURE — 87205 SMEAR GRAM STAIN: CPT

## 2021-10-04 PROCEDURE — G0008 ADMIN INFLUENZA VIRUS VAC: HCPCS | Performed by: INTERNAL MEDICINE

## 2021-10-04 PROCEDURE — 90674 CCIIV4 VAC NO PRSV 0.5 ML IM: CPT | Performed by: INTERNAL MEDICINE

## 2021-10-04 PROCEDURE — 87186 SC STD MICRODIL/AGAR DIL: CPT

## 2021-10-04 PROCEDURE — 87070 CULTURE OTHR SPECIMN AEROBIC: CPT

## 2021-10-04 PROCEDURE — 10160 PNXR ASPIR ABSC HMTMA BULLA: CPT | Performed by: INTERNAL MEDICINE

## 2021-10-04 PROCEDURE — 1123F ACP DISCUSS/DSCN MKR DOCD: CPT | Performed by: INTERNAL MEDICINE

## 2021-10-04 PROCEDURE — 87077 CULTURE AEROBIC IDENTIFY: CPT

## 2021-10-04 RX ORDER — DOXYCYCLINE HYCLATE 100 MG
100 TABLET ORAL 2 TIMES DAILY
Qty: 20 TABLET | Refills: 0 | Status: SHIPPED | OUTPATIENT
Start: 2021-10-04 | End: 2021-10-14

## 2021-10-04 ASSESSMENT — ENCOUNTER SYMPTOMS
SHORTNESS OF BREATH: 0
ABDOMINAL PAIN: 0
DIARRHEA: 0
SORE THROAT: 0
CHEST TIGHTNESS: 0
BLOOD IN STOOL: 0
RHINORRHEA: 0
NAUSEA: 0
COUGH: 0
CONSTIPATION: 0

## 2021-10-04 NOTE — PATIENT INSTRUCTIONS
Survey: You may be receiving a survey from [employer] regarding your visit today. You may get this in the mail, through your MyChart or in your email. Please complete the survey to enable us to provide the highest quality of care to you and your family. Please also, mention our names. If you cannot score us as very good (5 Stars) on any question, please feel free to call the office to discuss how we could have made your experience exceptional.      Thank You! MD Lyly Day Staff, KRISTI Deutsch, Booker Patel, BSN RN    [facility], [address]      1. Need for influenza vaccination  Mistynunu Hardy was given an influenza vaccine today. - INFLUENZA, MDCK QUADV, 2 YRS AND OLDER, IM, PF, PREFILL SYR OR SDV, 0.5ML (FLUCELVAX QUADV, PF)    2. Nonrheumatic aortic valve stenosis  Following with Dr. Nilsa Prince in Cardiology with ECHO yearly. 3. Primary hypertension  Controlled on the current medication. 4. Seizure disorder ([address]  No recent seizures. Follows with Neurology. 5. Vitamin D deficiency  Controlled on Vitamin D replacement. 6. Mixed hyperlipidemia  Controlled on Pravachol. 7. Microhematuria  Following with Urology.

## 2021-10-04 NOTE — PROGRESS NOTES
Roger Howell (:  1945) is a 77 y.o. female,Established patient, here for evaluation of the following chief complaint(s):  Hypertension (labs 21 Dr Daniel Proctor), Hyperlipidemia, Seizures (follows with Neurology, Dr Manuel Montes), Coronary Artery Disease, and Lesion(s) (open seeping lesion lower right abdomen)         ASSESSMENT/PLAN:  1. Primary hypertension  2. Need for influenza vaccination  -     INFLUENZA, MDCK QUADV, 2 YRS AND OLDER, IM, PF, PREFILL SYR OR SDV, 0.5ML (FLUCELVAX QUADV, PF)  3. Nonrheumatic aortic valve stenosis  4. Seizure disorder ([address]  5. Vitamin D deficiency  6. Mixed hyperlipidemia  7. Microhematuria  8. Abscess of right groin  -     doxycycline hyclate (VIBRA-TABS) 100 MG tablet; Take 1 tablet by mouth 2 times daily for 10 days, Disp-20 tablet, R-0Normal  -     Culture, Wound; Future  -      - SC PUNCTURE DRAINAGE OF LESION      Plan:  1. Need for influenza vaccination  Therese Conde was given an influenza vaccine today. - INFLUENZA, MDCK QUADV, 2 YRS AND OLDER, IM, PF, PREFILL SYR OR SDV, 0.5ML (FLUCELVAX QUADV, PF)    2. Nonrheumatic aortic valve stenosis  Following with Dr. Daniel Proctor in Cardiology with ECHO yearly. 3. Primary hypertension  Controlled on the current medication. 4. Seizure disorder ([facility] [address]  No recent seizures. Follows with Neurology. 5. Vitamin D deficiency  Controlled on Vitamin D replacement. 6. Mixed hyperlipidemia  Controlled on Pravachol. 7. Microhematuria  Following with Urology. 8.  Abscess right groin  Area prepped and draped in sterile fashion. Area cleansed with Iodine and alcohol  x3. Anesthesia achieved with 1 ml 1% lidocaine with epinephrine. Roof of abscess opened with a 1 cm incision using an 11 blade scalpel. small amounts of bloody and purulent drainage expressed and culture obtained. Cavity copiously lavaged with iodine and normal saline. 4 cm of ¼ inch Iodoform packing placed in the  wound. Bandage applied.   Patient tolerated well without complications. Wound care instructions given. Abscess cultured. Take Doxycycline 100 mg two times a day x 10 days. Evan Tapia was instructed to follow up in the clinic in 6 months for check up or as needed with any medical issues. Subjective   SUBJECTIVE/OBJECTIVE:  Evan Tapia presents for a check up on her medical conditions HTN, Hyperlipidemia, SZ D/O. Evan Tapia denies new problems. Medications were reviewed with Evan Tapia, she is  tolerating the medication. Bowels are regular. There has not been rectal bleeding. Evan Tapia denies urinary complications, the urine stream is good. Evan Tapia denies chest pain and denies increasing shortness of breath. Labs from 9/21 reviewed. Evan Tapia has a boil just above the hair line in the groin area. She states it was lanced but it returns. Past Medical History:  No date:  Aortic stenosis  No date: Arthritis  No date: Benign cyst of right breast in female  No date: Cardiac murmur  No date: Cataract  8/13: Cerebral brain hemorrhage [facility])      Comment:  Right thalamic  2016: Deep vein blood clot of left lower extremity (HCC)  No date: Diverticulosis  No date: Head injury  No date: Hx of blood clots  No date: Hyperlipidemia  No date: Hypertension  No date: Migraine headache  10/5/2020: Pulmonary emphysema (HCC)  No date: Seizures ([facility]  No date: Stroke (cerebrum) ([facility]  No date: Unspecified cerebral artery occlusion with cerebral   infarction      Comment:  TIA    Past Surgical History:  No date: BLEPHAROPLASTY  No date: BREAST SURGERY      Comment:  b/l breast cyst removal   No date: CATARACT REMOVAL  2009: COLONOSCOPY  8/28/2018:  COLONOSCOPY BIOPSY/STOMA; N/A      Comment:  COLONOSCOPY BIOPSY/STOMA, Dx: External Hemorrhoids and                Severe Diverticulosis performed by Sonia Vigil MD at [address] [place] [address]  No date: HYSTERECTOMY  No date: MAMMO IMPLANT DIGITAL DIAG BI  No date: TUBAL LIGATION    Social History Socioeconomic History      Marital status:       Spouse name: Not on file      Number of children: Not on file      Years of education: Not on file      Highest education level: Not on file    Occupational History      Occupation: retired    Tobacco Use      Smoking status: Former Smoker        Packs/day: 1.00        Years: 30.00        Pack years: 27        Quit date: 2005        Years since quittin.7      Smokeless tobacco: Never Used    Vaping Use      Vaping Use: Never used    Substance and Sexual Activity      Alcohol use: No      Drug use: No      Sexual activity: Not on file    Other Topics      Concerns:        Not on file    Social History Narrative      Not on file    Social Determinants of Health  Financial Resource Strain: Low Risk       Difficulty of Paying Living Expenses: Not hard at all  Food Insecurity: No Food Insecurity      Worried About [address] [place] [address] in the Last Year: Never true      Ran Out of Food in the Last Year: Never true  Transportation Needs:       Lack of Transportation (Medical):       Lack of Transportation (Non-Medical):   Physical Activity:       Days of Exercise per Week:       Minutes of Exercise per Session:   Stress:       Feeling of Stress :   Social Connections:       Frequency of Communication with Friends and Family:       Frequency of Social Gatherings with Friends and Family:       Attends [employer] Services:       Active Member of Clubs or Organizations:       Attends Club or Organization Meetings:       Marital Status:   Intimate Partner Violence:       Fear of Current or Ex-Partner:       Emotionally Abused:       Physically Abused:       Sexually Abused:     Review of patient's family history indicates:  Problem: Breast Cancer      Relation: Sister          Age of Onset: (Not Specified)  Problem: Diabetes      Relation: Sister          Age of Onset: (Not Specified)  Problem: Migraines      Relation: Sister          Age of Onset: (Not Specified)  Problem: Clotting Disorder      Relation: Mother          Age of Onset: (Not Specified)  Problem: Diabetes      Relation: Mother          Age of Onset: (Not Specified)  Problem: Hypertension      Relation: Mother          Age of Onset: (Not Specified)  Problem: Migraines      Relation: Mother          Age of Onset: (Not Specified)  Problem: Heart Attack      Relation: Father          Age of Onset: (Not Specified)  Problem: Hypertension      Relation: Father          Age of Onset: (Not Specified)  Problem: Diabetes      Relation: Father          Age of Onset: (Not Specified)  Problem: Heart Disease      Relation: Brother          Age of Onset: (Not Specified)  Problem: Diabetes      Relation: Brother          Age of Onset: (Not Specified)  Problem: Migraines      Relation: Brother          Age of Onset: (Not Specified)  Problem: Hypertension      Relation: Maternal Grandmother          Age of Onset: (Not Specified)  Problem: Hypertension      Relation: Maternal Grandfather          Age of Onset: (Not Specified)  Problem: Hypertension      Relation: Paternal Grandmother          Age of Onset: (Not Specified)  Problem: Hypertension      Relation: Paternal Grandfather          Age of Onset: (Not Specified)      Current Outpatient Medications on File Prior to Visit:  triamcinolone (KENALOG) 0.1 % cream, Apply topically 2 times daily, Disp: 1 Tube, Rfl: 5  olmesartan-hydroCHLOROthiazide (BENICAR HCT) 20-12.5 MG per tablet, TAKE 1 TABLET BY MOUTH EVERY DAY, Disp: 90 tablet, Rfl: 1  pravastatin (PRAVACHOL) 20 MG tablet, TAKE 1 TABLET BY MOUTH EVERY DAY, Disp: 90 tablet, Rfl: 1  divalproex (DEPAKOTE) 500 MG DR tablet, Take 500 mg by mouth daily , Disp: , Rfl:   albuterol sulfate HFA (PROAIR HFA) 108 (90 Base) MCG/ACT inhaler, Inhale 2 puffs into the lungs every 4 hours as needed for Wheezing or Shortness of Breath Inhale 2 puffs 4 times daily, Disp: 1 Inhaler, Rfl: 3  linaCLOtide (LINZESS) 72 MCG CAPS capsule, Take 1 capsule by mouth every morning (before breakfast), Disp: 30 capsule, Rfl: 5  Cranberry 400 MG CAPS, Take 400 mg by mouth daily (with breakfast), Disp: , Rfl:   aspirin 325 MG EC tablet, Take 1 tablet by mouth daily, Disp: 30 tablet, Rfl: 3  acetaminophen (TYLENOL) 500 MG tablet, Take 500 mg by mouth as needed for Pain, Disp: , Rfl:     No current facility-administered medications on file prior to visit.        -- Tape (Adhesive Tape) -- Rash      Lab Results       Component                Value               Date                       NA                       140                 09/13/2021                 K                        4.5                 09/13/2021                 CL                       102                 09/13/2021                 CO2                      30                  09/13/2021                 BUN                      21                  09/13/2021                 CREATININE               0.90                09/13/2021                 GLUCOSE                  104 (H)             09/13/2021                 CALCIUM                  9.2                 09/13/2021                 PROT                     6.4                 09/13/2021                 LABALBU                  3.6                 09/13/2021                 BILITOT                  0.49                09/13/2021                 ALKPHOS                  54                  09/13/2021                 AST                      12                  09/13/2021                 ALT                      5                   09/13/2021                 LABGLOM                  >60                 09/13/2021                 GFRAA                    >60                 09/13/2021              Lab Results       Component                Value               Date                       LABA1C                   5.5                 10/05/2020            Lab Results       Component                Value               Date                       EAG Hypertension  This is a chronic problem. The current episode started more than 1 year ago. The problem is controlled. Pertinent negatives include no chest pain, neck pain, palpitations or shortness of breath. Past treatments include angiotensin blockers and diuretics. The current treatment provides significant improvement. There are no compliance problems. There is no history of angina. Hyperlipidemia  This is a chronic problem. The current episode started more than 1 year ago. The problem is controlled. Recent lipid tests were reviewed and are normal. Pertinent negatives include no chest pain, myalgias or shortness of breath. Current antihyperlipidemic treatment includes statins. The current treatment provides significant improvement of lipids. There are no compliance problems. Seizures  This is a chronic problem. The current episode started more than 1 year ago. The problem occurs rarely. Pertinent negatives include no abdominal pain, arthralgias, chest pain, congestion, coughing, joint swelling, myalgias, nausea, neck pain or sore throat. Nothing aggravates the symptoms. Treatments tried: Depakote. The treatment provided significant relief. Review of Systems   Constitutional: Negative. HENT: Negative for congestion, ear pain, rhinorrhea, sneezing and sore throat. Eyes: Negative for visual disturbance. Respiratory: Negative for cough, chest tightness and shortness of breath. Cardiovascular: Negative for chest pain and palpitations. Gastrointestinal: Negative for abdominal pain, blood in stool, constipation, diarrhea and nausea. Genitourinary: Negative for difficulty urinating, dysuria, frequency, menstrual problem and urgency. Musculoskeletal: Negative for arthralgias, joint swelling, myalgias and neck pain. Skin: Negative. Chronic boil right groin. Neurological: Positive for seizures. Negative for syncope. Psychiatric/Behavioral: Negative. Objective   Physical Exam  Constitutional:       Appearance: She is well-developed. HENT:      Head: Atraumatic. Eyes:      Conjunctiva/sclera: Conjunctivae normal.   Neck:      Comments: Cardiac murmur that radiates to both carotids. Cardiovascular:      Rate and Rhythm: Normal rate and regular rhythm. Heart sounds: Murmur heard. Pulmonary:      Effort: Pulmonary effort is normal.      Breath sounds: Normal breath sounds. Abdominal:      Palpations: Abdomen is soft. Tenderness: There is no abdominal tenderness. Musculoskeletal:         General: Normal range of motion. Cervical back: Normal range of motion and neck supple. Lymphadenopathy:      Cervical: No cervical adenopathy. Skin:     Findings: No rash. Comments: Right groin with a 2 cm nodular area with central fluctuance. Neurological:      Mental Status: She is alert. Psychiatric:         Behavior: Behavior normal.         Thought Content: Thought content normal.                  An electronic signature was used to authenticate this note.     --Mortimer Gentles, MD

## 2021-10-07 DIAGNOSIS — Z79.899 HIGH RISK MEDICATION USE: ICD-10-CM

## 2021-10-07 DIAGNOSIS — L02.214 GROIN ABSCESS: Primary | ICD-10-CM

## 2021-10-07 DIAGNOSIS — A49.8 PROTEUS INFECTION: ICD-10-CM

## 2021-10-07 LAB
CULTURE: ABNORMAL
DIRECT EXAM: ABNORMAL
DIRECT EXAM: ABNORMAL
Lab: ABNORMAL
SPECIMEN DESCRIPTION: ABNORMAL

## 2021-10-07 RX ORDER — SULFAMETHOXAZOLE AND TRIMETHOPRIM 800; 160 MG/1; MG/1
1 TABLET ORAL 2 TIMES DAILY
Qty: 20 TABLET | Refills: 0 | Status: SHIPPED | OUTPATIENT
Start: 2021-10-07 | End: 2021-10-17

## 2021-10-11 ENCOUNTER — HOSPITAL ENCOUNTER (OUTPATIENT)
Age: 76
Discharge: HOME | End: 2021-10-11
Payer: MEDICARE

## 2021-10-11 ENCOUNTER — HOSPITAL ENCOUNTER (OUTPATIENT)
Dept: CT IMAGING | Age: 76
Discharge: HOME | End: 2021-10-13
Payer: MEDICARE

## 2021-10-11 DIAGNOSIS — R31.29 MICROHEMATURIA: ICD-10-CM

## 2021-10-11 DIAGNOSIS — Z79.899 HIGH RISK MEDICATION USE: ICD-10-CM

## 2021-10-11 LAB
ANION GAP SERPL CALCULATED.3IONS-SCNC: 8 MMOL/L (ref 9–17)
BUN BLDV-MCNC: 27 MG/DL (ref 8–23)
BUN/CREAT BLD: 20 (ref 9–20)
CALCIUM SERPL-MCNC: 9.3 MG/DL (ref 8.6–10.4)
CHLORIDE BLD-SCNC: 102 MMOL/L (ref 98–107)
CO2: 28 MMOL/L (ref 20–31)
CREAT SERPL-MCNC: 1.35 MG/DL (ref 0.5–0.9)
GFR AFRICAN AMERICAN: 46 ML/MIN
GFR NON-AFRICAN AMERICAN: 38 ML/MIN
GFR SERPL CREATININE-BSD FRML MDRD: ABNORMAL ML/MIN/{1.73_M2}
GFR SERPL CREATININE-BSD FRML MDRD: ABNORMAL ML/MIN/{1.73_M2}
GLUCOSE BLD-MCNC: 93 MG/DL (ref 70–99)
POTASSIUM SERPL-SCNC: 4.2 MMOL/L (ref 3.7–5.3)
SODIUM BLD-SCNC: 138 MMOL/L (ref 135–144)

## 2021-10-11 PROCEDURE — 80048 BASIC METABOLIC PNL TOTAL CA: CPT

## 2021-10-11 PROCEDURE — 76376 3D RENDER W/INTRP POSTPROCES: CPT

## 2021-10-11 PROCEDURE — G1010 CDSM STANSON: HCPCS

## 2021-10-11 PROCEDURE — 6360000004 HC RX CONTRAST MEDICATION: Performed by: UROLOGY

## 2021-10-11 PROCEDURE — 36415 COLL VENOUS BLD VENIPUNCTURE: CPT

## 2021-10-11 RX ADMIN — IOPAMIDOL 125 ML: 755 INJECTION, SOLUTION INTRAVENOUS at 09:37

## 2021-10-12 DIAGNOSIS — I10 PRIMARY HYPERTENSION: ICD-10-CM

## 2021-10-12 DIAGNOSIS — N28.9 ACUTE RENAL INSUFFICIENCY: Primary | ICD-10-CM

## 2021-10-12 NOTE — RESULT ENCOUNTER NOTE
Appointment 10/21/2021 to review imaging we will discuss findings at that appointment    (Please include in her appointment note that we need to review CT findings-pulmonary nodule)

## 2021-10-13 ENCOUNTER — HOSPITAL ENCOUNTER (OUTPATIENT)
Age: 76
Discharge: HOME | End: 2021-10-13
Payer: MEDICARE

## 2021-10-13 DIAGNOSIS — N28.9 ACUTE RENAL INSUFFICIENCY: ICD-10-CM

## 2021-10-13 DIAGNOSIS — I10 PRIMARY HYPERTENSION: ICD-10-CM

## 2021-10-13 DIAGNOSIS — N28.9 ACUTE RENAL INSUFFICIENCY: Primary | ICD-10-CM

## 2021-10-13 LAB
ANION GAP SERPL CALCULATED.3IONS-SCNC: 8 MMOL/L (ref 9–17)
BUN BLDV-MCNC: 24 MG/DL (ref 8–23)
BUN/CREAT BLD: 16 (ref 9–20)
CALCIUM SERPL-MCNC: 9.4 MG/DL (ref 8.6–10.4)
CHLORIDE BLD-SCNC: 100 MMOL/L (ref 98–107)
CO2: 29 MMOL/L (ref 20–31)
CREAT SERPL-MCNC: 1.5 MG/DL (ref 0.5–0.9)
GFR AFRICAN AMERICAN: 41 ML/MIN
GFR NON-AFRICAN AMERICAN: 34 ML/MIN
GFR SERPL CREATININE-BSD FRML MDRD: ABNORMAL ML/MIN/{1.73_M2}
GFR SERPL CREATININE-BSD FRML MDRD: ABNORMAL ML/MIN/{1.73_M2}
GLUCOSE BLD-MCNC: 95 MG/DL (ref 70–99)
POTASSIUM SERPL-SCNC: 5.3 MMOL/L (ref 3.7–5.3)
SODIUM BLD-SCNC: 137 MMOL/L (ref 135–144)

## 2021-10-13 PROCEDURE — 80048 BASIC METABOLIC PNL TOTAL CA: CPT

## 2021-10-13 PROCEDURE — 36415 COLL VENOUS BLD VENIPUNCTURE: CPT

## 2021-10-18 ENCOUNTER — HOSPITAL ENCOUNTER (OUTPATIENT)
Age: 76
Discharge: HOME | End: 2021-10-18
Payer: MEDICARE

## 2021-10-18 DIAGNOSIS — N28.9 ACUTE RENAL INSUFFICIENCY: ICD-10-CM

## 2021-10-18 LAB
ANION GAP SERPL CALCULATED.3IONS-SCNC: 7 MMOL/L (ref 9–17)
BUN BLDV-MCNC: 19 MG/DL (ref 8–23)
BUN/CREAT BLD: 17 (ref 9–20)
CALCIUM SERPL-MCNC: 9.2 MG/DL (ref 8.6–10.4)
CHLORIDE BLD-SCNC: 103 MMOL/L (ref 98–107)
CO2: 30 MMOL/L (ref 20–31)
CREAT SERPL-MCNC: 1.12 MG/DL (ref 0.5–0.9)
GFR AFRICAN AMERICAN: 57 ML/MIN
GFR NON-AFRICAN AMERICAN: 47 ML/MIN
GFR SERPL CREATININE-BSD FRML MDRD: ABNORMAL ML/MIN/{1.73_M2}
GFR SERPL CREATININE-BSD FRML MDRD: ABNORMAL ML/MIN/{1.73_M2}
GLUCOSE BLD-MCNC: 100 MG/DL (ref 70–99)
POTASSIUM SERPL-SCNC: 4.3 MMOL/L (ref 3.7–5.3)
SODIUM BLD-SCNC: 140 MMOL/L (ref 135–144)

## 2021-10-18 PROCEDURE — 36415 COLL VENOUS BLD VENIPUNCTURE: CPT

## 2021-10-18 PROCEDURE — 80048 BASIC METABOLIC PNL TOTAL CA: CPT

## 2021-10-21 ENCOUNTER — PROCEDURE VISIT (OUTPATIENT)
Dept: UROLOGY | Age: 76
End: 2021-10-21
Payer: MEDICARE

## 2021-10-21 VITALS
BODY MASS INDEX: 30.48 KG/M2 | SYSTOLIC BLOOD PRESSURE: 138 MMHG | DIASTOLIC BLOOD PRESSURE: 64 MMHG | WEIGHT: 172 LBS | HEIGHT: 63 IN

## 2021-10-21 DIAGNOSIS — R31.29 MICROHEMATURIA: Primary | ICD-10-CM

## 2021-10-21 DIAGNOSIS — N39.0 FREQUENT UTI: ICD-10-CM

## 2021-10-21 PROCEDURE — 52000 CYSTOURETHROSCOPY: CPT | Performed by: UROLOGY

## 2021-10-21 PROCEDURE — 4040F PNEUMOC VAC/ADMIN/RCVD: CPT | Performed by: UROLOGY

## 2021-10-21 PROCEDURE — 1036F TOBACCO NON-USER: CPT | Performed by: UROLOGY

## 2021-10-21 PROCEDURE — 99213 OFFICE O/P EST LOW 20 MIN: CPT | Performed by: UROLOGY

## 2021-10-21 PROCEDURE — 1123F ACP DISCUSS/DSCN MKR DOCD: CPT | Performed by: UROLOGY

## 2021-10-21 PROCEDURE — G8417 CALC BMI ABV UP PARAM F/U: HCPCS | Performed by: UROLOGY

## 2021-10-21 PROCEDURE — G8399 PT W/DXA RESULTS DOCUMENT: HCPCS | Performed by: UROLOGY

## 2021-10-21 PROCEDURE — G8427 DOCREV CUR MEDS BY ELIG CLIN: HCPCS | Performed by: UROLOGY

## 2021-10-21 PROCEDURE — 1090F PRES/ABSN URINE INCON ASSESS: CPT | Performed by: UROLOGY

## 2021-10-21 PROCEDURE — G8482 FLU IMMUNIZE ORDER/ADMIN: HCPCS | Performed by: UROLOGY

## 2021-10-21 PROCEDURE — 3017F COLORECTAL CA SCREEN DOC REV: CPT | Performed by: UROLOGY

## 2021-10-21 NOTE — PROGRESS NOTES
HPI:        Patient is a 77 y.o. female in no acute distress. She is alert and oriented to person, place, and time. History  2016 Referred for recurrent UTI and microhematuria. Treated with 3 rounds abx in Mar/Apr 2016. Symptoms finally resolved, but microhematuria persisted.  Previous smoker, 1 PPD x 30 yrs.      5/2016 CT and cysto normal.     Frequent UTIs in the past. Does not leak urine. Did have hysterectomy. Pelvic did show large rectocele. Referred to GYN for this. They offered a pessary versus surgery, patient declined both      Currently  Patient is here today for lower tract visualization. The secondary to persistent microscopic hematuria. Patient have recent CT urogram.  This film was independently reviewed today. This does not show any significant  calcifications or abnormalities. Patient does have diverticulosis of the sigmoid colon. Patient does have a nodule in the right lower lobe of her lung. We will send this to her primary care physician. Cystoscopy Procedure Note    Pre-operative Diagnosis: Microhematuria    Post-operative Diagnosis: Same     Surgeon: Polina Mac    Assistants: None    Anesthesia : Local    Procedure Details   The risks, benefits, complications, treatment options, and expected outcomes were discussed with the patient. The patient concurred with the proposed plan, giving informed consent. Cystoscopy was performed today under local anesthesia, using sterile technique. The patient was placed in the dorsal lithotomy position, prepped with CHG, and draped in the usual sterile fashion. A 14 [place]ian flexible cystoscope was used to systematically inspect both the urethra and bladder in their entirety. Findings:  Anterior urethra: normal without strictures  Hyperplasia: na  Bladder: Normal mucosa, without lesions.   Ureteral orifice(s) was/were seen in the normal position and effluxing clear urine  Trabeculations No  Diverticulum No  Description: Normal anatomy Specimens: Cytology/urine culture No                 Complications:  None; patient tolerated the procedure well.            Disposition: home           Condition: stable      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    Deep vein blood clot of left lower extremity ([address] 2016    Diverticulosis     Head injury     Hx of blood clots     Hyperlipidemia     Hypertension     Migraine headache     Pulmonary emphysema ([address] 10/5/2020    Seizures ([address]     Stroke (cerebrum) ([address]     Unspecified cerebral artery occlusion with cerebral infarction     TIA     Past Surgical History:   Procedure Laterality Date    BLEPHAROPLASTY      BREAST SURGERY      b/l breast cyst removal     CATARACT REMOVAL      COLONOSCOPY  2009     COLONOSCOPY BIOPSY/STOMA N/A 8/28/2018    COLONOSCOPY BIOPSY/STOMA, Dx: External Hemorrhoids and Severe Diverticulosis performed by Aram Hollingsworth MD at [address] [place] [address]       Outpatient Encounter Medications as of 10/21/2021   Medication Sig Dispense Refill    triamcinolone (KENALOG) 0.1 % cream Apply topically 2 times daily 1 Tube 5    olmesartan-hydroCHLOROthiazide (BENICAR HCT) 20-12.5 MG per tablet TAKE 1 TABLET BY MOUTH EVERY DAY 90 tablet 1    pravastatin (PRAVACHOL) 20 MG tablet TAKE 1 TABLET BY MOUTH EVERY DAY 90 tablet 1    divalproex (DEPAKOTE) 500 MG DR tablet Take 500 mg by mouth daily       albuterol sulfate HFA (PROAIR HFA) 108 (90 Base) MCG/ACT inhaler Inhale 2 puffs into the lungs every 4 hours as needed for Wheezing or Shortness of Breath Inhale 2 puffs 4 times daily 1 Inhaler 3    linaCLOtide (LINZESS) 72 MCG CAPS capsule Take 1 capsule by mouth every morning (before breakfast) 30 capsule 5    acetaminophen (TYLENOL) 500 MG tablet Take 500 mg by mouth as needed for Pain      Cranberry 400 MG CAPS Take 400 mg by mouth daily (with breakfast)      aspirin 325 MG EC tablet Take 1 tablet by mouth daily 30 tablet 3     No facility-administered encounter medications on file as of 10/21/2021. Current Outpatient Medications on File Prior to Visit   Medication Sig Dispense Refill    triamcinolone (KENALOG) 0.1 % cream Apply topically 2 times daily 1 Tube 5    olmesartan-hydroCHLOROthiazide (BENICAR HCT) 20-12.5 MG per tablet TAKE 1 TABLET BY MOUTH EVERY DAY 90 tablet 1    pravastatin (PRAVACHOL) 20 MG tablet TAKE 1 TABLET BY MOUTH EVERY DAY 90 tablet 1    divalproex (DEPAKOTE) 500 MG DR tablet Take 500 mg by mouth daily       albuterol sulfate HFA (PROAIR HFA) 108 (90 Base) MCG/ACT inhaler Inhale 2 puffs into the lungs every 4 hours as needed for Wheezing or Shortness of Breath Inhale 2 puffs 4 times daily 1 Inhaler 3    linaCLOtide (LINZESS) 72 MCG CAPS capsule Take 1 capsule by mouth every morning (before breakfast) 30 capsule 5    acetaminophen (TYLENOL) 500 MG tablet Take 500 mg by mouth as needed for Pain      Cranberry 400 MG CAPS Take 400 mg by mouth daily (with breakfast)      aspirin 325 MG EC tablet Take 1 tablet by mouth daily 30 tablet 3     No current facility-administered medications on file prior to visit.      Bactrim ds [sulfamethoxazole-trimethoprim] and Tape Arn Screen tape]  Family History   Problem Relation Age of Onset    Breast Cancer Sister     Diabetes Sister     Migraines Sister     Clotting Disorder Mother     Diabetes Mother     Hypertension Mother     Migraines Mother     Heart Attack Father     Hypertension Father     Diabetes Father     Heart Disease Brother     Diabetes Brother     Migraines Brother     Hypertension Maternal Grandmother     Hypertension Maternal Grandfather     Hypertension Paternal Grandmother     Hypertension Paternal Grandfather      Social History     Tobacco Use Smoking Status Former Smoker    Packs/day: 1.00    Years: 30.00    Pack years: 30.00    Quit date: 2005    Years since quittin.8   Smokeless Tobacco Never Used       Social History     Substance and Sexual Activity   Alcohol Use No       Review of Systems    LMP  (LMP Unknown)       PHYSICAL EXAM:  Constitutional: Patient resting comfortably, in no acute distress. Neuro: Alert and oriented to person place and time. Cranial nerves grossly intact. Psych: Mood and affect normal.  Skin: Warm, dry  HEENT: normocephalic, atraumatic  Lymphatics: No palpable lymphadenopathy  Lungs: Respiratory effort normal, unlabored  Cardiovascular:  Normal peripheral pulses  Abdomen: Soft, non-tender, non-distended with no organomegaly or palpable masses. : No CVA tenderness. Bladder non-tender and not distended. Pelvic: vaginal atrophy    Lab Results   Component Value Date    BUN 19 10/18/2021     Lab Results   Component Value Date    CREATININE 1.12 (H) 10/18/2021       ASSESSMENT:  This is a 77 y.o. female with the following diagnoses:   Diagnosis Orders   1. Microhematuria  LA CYSTOURETHROSCOPY   2. Frequent UTI           PLAN:  Patient is clear for microscopic hematuria stable. Patient did have a nodule identified on CT scan. Will refer to her primary care for this care. We will see her back in 1 year.

## 2021-10-21 NOTE — PROGRESS NOTES
During cystoscopy the following was utilized on patient with no adverse affects:    45% SODIUM CHLORIDE 500 ML BAG  Lot number: [id number]  Expiration date: 05/22      LIDOCAINE HYDROCHLORIDE JELLY 2%    Lot number: [id number]  Expiration date: 12/22

## 2021-10-22 DIAGNOSIS — R91.1 LUNG NODULE, SOLITARY: Primary | ICD-10-CM

## 2021-10-22 NOTE — PROGRESS NOTES
Please set up PET CT scan. Shellie Melony Luong know we are doing this secondary to a lung nodule seen on her CT scan.

## 2021-10-22 NOTE — PROGRESS NOTES
All information faxed to PET scan scheduling [phone]. ( [phone]). PET scan schedule @ [facility] Wed Nov 10 @ . . Pt informed.

## 2021-10-25 DIAGNOSIS — R91.1 SOLITARY LUNG NODULE: Primary | ICD-10-CM

## 2021-11-10 ENCOUNTER — HOSPITAL ENCOUNTER (OUTPATIENT)
Dept: PET IMAGING | Age: 76
Discharge: HOME | End: 2021-11-12
Payer: MEDICARE

## 2021-11-10 DIAGNOSIS — R91.1 LUNG NODULE, SOLITARY: ICD-10-CM

## 2021-11-10 PROCEDURE — A9552 F18 FDG: HCPCS | Performed by: INTERNAL MEDICINE

## 2021-11-10 PROCEDURE — 3430000000 HC RX DIAGNOSTIC RADIOPHARMACEUTICAL: Performed by: INTERNAL MEDICINE

## 2021-11-10 PROCEDURE — 78816 PET IMAGE W/CT FULL BODY: CPT

## 2021-11-10 RX ORDER — FLUDEOXYGLUCOSE F 18 200 MCI/ML
17.59 INJECTION, SOLUTION INTRAVENOUS
Status: COMPLETED | OUTPATIENT
Start: 2021-11-10 | End: 2021-11-10

## 2021-11-10 RX ADMIN — FLUDEOXYGLUCOSE F 18 17.59 MILLICURIE: 200 INJECTION, SOLUTION INTRAVENOUS at 08:56

## 2021-11-11 DIAGNOSIS — R91.1 PULMONARY NODULE, RIGHT: Primary | ICD-10-CM

## 2021-12-04 ENCOUNTER — TELEPHONE (OUTPATIENT)
Dept: FAMILY MEDICINE CLINIC | Age: 76
End: 2021-12-04

## 2021-12-04 DIAGNOSIS — J40 BRONCHITIS: Primary | ICD-10-CM

## 2021-12-04 RX ORDER — AMOXICILLIN AND CLAVULANATE POTASSIUM 875; 125 MG/1; MG/1
1 TABLET, FILM COATED ORAL 2 TIMES DAILY
Qty: 20 TABLET | Refills: 0 | Status: SHIPPED | OUTPATIENT
Start: 2021-12-04 | End: 2021-12-14

## 2021-12-04 RX ORDER — BENZONATATE 100 MG/1
100 CAPSULE ORAL 3 TIMES DAILY PRN
Qty: 30 CAPSULE | Refills: 0 | Status: SHIPPED | OUTPATIENT
Start: 2021-12-04 | End: 2021-12-11

## 2021-12-14 DIAGNOSIS — I10 ESSENTIAL HYPERTENSION: ICD-10-CM

## 2021-12-14 DIAGNOSIS — E78.2 MIXED HYPERLIPIDEMIA: ICD-10-CM

## 2021-12-14 RX ORDER — OLMESARTAN MEDOXOMIL AND HYDROCHLOROTHIAZIDE 20/12.5 20; 12.5 MG/1; MG/1
1 TABLET ORAL DAILY
Qty: 90 TABLET | Refills: 1 | Status: SHIPPED | OUTPATIENT
Start: 2021-12-14 | End: 2022-03-08

## 2021-12-14 RX ORDER — PRAVASTATIN SODIUM 20 MG
20 TABLET ORAL DAILY
Qty: 90 TABLET | Refills: 1 | Status: SHIPPED | OUTPATIENT
Start: 2021-12-14 | End: 2022-03-08

## 2021-12-14 NOTE — TELEPHONE ENCOUNTER
Health Maintenance   Topic Date Due    Shingles Vaccine (1 of 2) Never done   Albert Singh Annual Wellness Visit (AWV)  05/18/2022    Lipid screen  09/13/2022    Potassium monitoring  10/18/2022    Creatinine monitoring  10/18/2022    DTaP/Tdap/Td vaccine (2 - Td or Tdap) 11/08/2022    Colon cancer screen colonoscopy  08/28/2028    DEXA (modify frequency per FRAX score)  Completed    Flu vaccine  Completed    Pneumococcal 65+ years Vaccine  Completed    COVID-19 Vaccine  Completed    Hepatitis C screen  Completed    Hepatitis A vaccine  Aged Out    Hepatitis B vaccine  Aged Out    Hib vaccine  Aged Out    Meningococcal (ACWY) vaccine  Aged Out             (applicable per patient's age: Cancer Screenings, Depression Screening, Fall Risk Screening, Immunizations)    Hemoglobin A1C (%)   Date Value   10/05/2020 5.5   04/01/2017 5.8     LDL Cholesterol (mg/dL)   Date Value   09/13/2021 117     LDL Calculated (mg/dL)   Date Value   08/06/2013 83     AST (U/L)   Date Value   09/13/2021 12     ALT (U/L)   Date Value   09/13/2021 5     BUN (mg/dL)   Date Value   10/18/2021 19      (goal A1C is < 7)   (goal LDL is <100) need 30-50% reduction from baseline     BP Readings from Last 3 Encounters:   10/21/21 138/64   10/04/21 132/72   09/30/21 110/60    (goal /80)      All Future Testing planned in [facility]:  Lab Frequency Next Occurrence   TSH with Reflex Once 02/13/2022   CBC Auto Differential Once 02/13/2022   Comprehensive Metabolic Panel Once 32/57/6528   Vitamin D 25 Hydroxy Once 02/13/2022   Lipid Panel Once 02/13/2022   Magnesium Once 02/13/2022   EKG 12 Lead Once 02/13/2022   XR CHEST (2 VW) Once 02/13/2022   ECHO Complete 2D W Doppler W Color Once 02/13/2022       Next Visit Date:  Future Appointments   Date Time Provider Melo Blake   4/4/2022 11:00 AM MD Raphael Crowe [facility]   4/11/2022 10:00 AM MD Edna Morocho [facility]   5/19/2022 10:30 AM MD Darron Crowe 9/15/2022 10:30 AM Milady Curtis, MAE [facility]            Patient Active Problem List:     Hypertension     Aortic stenosis     Hyperlipidemia     Diverticulosis     Intracranial bleed (HCC)     Vitamin D deficiency     Rectocele     Osteoporosis     History of DVT (deep vein thrombosis)     Cavernoma     TIA (transient ischemic attack)     Thalamic infarction ([facility]     Paresthesia     Microhematuria     Seizure disorder (HCC)     Frequent UTI     Pulmonary emphysema ([facility]

## 2022-02-25 ENCOUNTER — OFFICE VISIT (OUTPATIENT)
Dept: FAMILY MEDICINE CLINIC | Age: 77
End: 2022-02-25
Payer: MEDICARE

## 2022-02-25 VITALS
HEART RATE: 72 BPM | WEIGHT: 177 LBS | DIASTOLIC BLOOD PRESSURE: 60 MMHG | SYSTOLIC BLOOD PRESSURE: 134 MMHG | HEIGHT: 63 IN | BODY MASS INDEX: 31.36 KG/M2

## 2022-02-25 DIAGNOSIS — I35.0 NONRHEUMATIC AORTIC VALVE STENOSIS: ICD-10-CM

## 2022-02-25 DIAGNOSIS — I26.93 SINGLE SUBSEGMENTAL PULMONARY EMBOLISM WITHOUT ACUTE COR PULMONALE (HCC): Primary | ICD-10-CM

## 2022-02-25 DIAGNOSIS — G40.909 SEIZURE DISORDER (HCC): ICD-10-CM

## 2022-02-25 DIAGNOSIS — R91.1 LUNG NODULE: ICD-10-CM

## 2022-02-25 DIAGNOSIS — J43.9 PULMONARY EMPHYSEMA, UNSPECIFIED EMPHYSEMA TYPE (HCC): ICD-10-CM

## 2022-02-25 PROCEDURE — 99495 TRANSJ CARE MGMT MOD F2F 14D: CPT | Performed by: INTERNAL MEDICINE

## 2022-02-25 PROCEDURE — 1111F DSCHRG MED/CURRENT MED MERGE: CPT | Performed by: INTERNAL MEDICINE

## 2022-02-25 RX ORDER — APIXABAN 5 MG/1
TABLET, FILM COATED ORAL
COMMUNITY
Start: 2022-02-21 | End: 2022-02-25

## 2022-02-25 RX ORDER — CLOPIDOGREL BISULFATE 75 MG/1
75 TABLET ORAL DAILY
COMMUNITY
Start: 2022-02-25 | End: 2022-02-25

## 2022-02-25 NOTE — PATIENT INSTRUCTIONS
Survey: You may be receiving a survey from [employer] regarding your visit today. You may get this in the mail, through your MyChart or in your email. Please complete the survey to enable us to provide the highest quality of care to you and your family. Please also, mention our names. If you cannot score us as very good (5 Stars) on any question, please feel free to call the office to discuss how we could have made your experience exceptional.      Thank You! Dr. Adri Moralez MD    [facility], KRISTI Deutsch, Shanelle Hansen, BSN RN    Alex Hutchinson, [address] [place] [address]      1. Single subsegmental pulmonary embolism without acute cor pulmonale (HCC)  Placed on Eliquis. To transition to Lovenox (bridging) before her TAVR procedure. 2. Pulmonary emphysema, unspecified emphysema type ([address]  Uses an inhaler as needed. 3. Seizure disorder [facility])  Follows with Neurology. 4. Nonrheumatic aortic valve stenosis  To have TAVR procedure next week. 5. Lung nodule  To have lung surgery after TAVR procedure.

## 2022-02-25 NOTE — PROGRESS NOTES
Post-Discharge Transitional Care  Follow Up      Alicia Camilo   YOB: 1945    Date of Office Visit:  2/25/2022  Date of Hospital Admission: 8/28/18  Date of Hospital Discharge: 8/28/18  Risk of hospital readmission (high >=14%. Medium >=10%) :No data recorded    Care management risk score Rising risk (score 2-5) and Complex Care (Scores >=6): 2     Non face to face  following discharge, date last encounter closed (first attempt may have been earlier): *No documented post hospital discharge outreach found in the last 14 days    Call initiated 2 business days of discharge: *No response recorded in the last 14 days    ASSESSMENT/PLAN:   Single subsegmental pulmonary embolism without acute cor pulmonale (HCC)  Pulmonary emphysema, unspecified emphysema type ([address]  Seizure disorder ([facility]  Nonrheumatic aortic valve stenosis  Lung nodule      Plan:  1. Single subsegmental pulmonary embolism without acute cor pulmonale (HCC)  Placed on Eliquis. To transition to Lovenox (bridging) before her TAVR procedure. 2. Pulmonary emphysema, unspecified emphysema type ([address]  Uses an inhaler as needed. 3. Seizure disorder [facility])  Follows with Neurology. 4. Nonrheumatic aortic valve stenosis  To have TAVR procedure next week. 5. Lung nodule  To have lung surgery after TAVR procedure. Medical Decision Making: moderate complexity  No follow-ups on file. Subjective:   HPI:  Follow up of Hospital problems/diagnosis(es): Pulmonary embolism    Inpatient course: Discharge summary reviewed- see chart. Interval history/Current status: Cindy Garcia was admitted to [facility] for acute pulmonary embolism. She was placed on Heparin drip and then changed to Eliquis (started on heparin initially to make sure she did not bleed). She had some SOB this am which improved with inhaler. To have TAVR procedure next week and to change to Lovenox bridge before the procedure.   No problems with rectal bleeding or blood in the urine. Patient Active Problem List   Diagnosis    Hypertension    Aortic stenosis    Hyperlipidemia    Diverticulosis    Intracranial bleed (HCC)    Vitamin D deficiency    Rectocele    Osteoporosis    History of DVT (deep vein thrombosis)    Cavernoma    TIA (transient ischemic attack)    Thalamic infarction ([facility]    Paresthesia    Microhematuria    Seizure disorder (HCC)    Frequent UTI    Pulmonary emphysema (HCC)       Medications listed as ordered at the time of discharge from hospital  [unfilled]      Medications marked \"taking\" at this time  Outpatient Medications Marked as Taking for the 2/25/22 encounter (Office Visit) with Didi Lim MD   Medication Sig Dispense Refill    apixaban (ELIQUIS) 5 MG TABS tablet Take by mouth 2 times daily      pravastatin (PRAVACHOL) 20 MG tablet Take 1 tablet by mouth daily 90 tablet 1    olmesartan-hydroCHLOROthiazide (BENICAR HCT) 20-12.5 MG per tablet Take 1 tablet by mouth daily 90 tablet 1    triamcinolone (KENALOG) 0.1 % cream Apply topically 2 times daily 1 Tube 5    divalproex (DEPAKOTE) 500 MG DR tablet Take 500 mg by mouth daily       albuterol sulfate HFA (PROAIR HFA) 108 (90 Base) MCG/ACT inhaler Inhale 2 puffs into the lungs every 4 hours as needed for Wheezing or Shortness of Breath Inhale 2 puffs 4 times daily 1 Inhaler 3    linaCLOtide (LINZESS) 72 MCG CAPS capsule Take 1 capsule by mouth every morning (before breakfast) 30 capsule 5    acetaminophen (TYLENOL) 500 MG tablet Take 500 mg by mouth as needed for Pain          Medications patient taking as of now reconciled against medications ordered at time of hospital discharge: Yes    A comprehensive review of systems was negative except for what was noted in the HPI.     Objective:    /60 (Site: Right Upper Arm, Position: Sitting, Cuff Size: Medium Adult)   Pulse 72   Ht 5' 3\" (1.6 m)   Wt 177 lb (80.3 kg)   LMP  (LMP Unknown)   BMI 31.35 kg/m²   General Appearance: alert and oriented to person, place and time, well developed and well- nourished, in no acute distress  Skin: warm and dry, no rash or erythema  Head: normocephalic and atraumatic  Eyes: pupils equal, round, and reactive to light, extraocular eye movements intact, conjunctivae normal  ENT: tympanic membrane, external ear and ear canal normal bilaterally, nose without deformity, nasal mucosa and turbinates normal without polyps  Neck: supple and non-tender without mass, no thyromegaly or thyroid nodules, no cervical lymphadenopathy, radiating murmur to carotids. Pulmonary/Chest: clear to auscultation bilaterally- no wheezes, rales or rhonchi, normal air movement, no respiratory distress  Cardiovascular: normal rate, regular rhythm, normal S1 and S2, III/VI systolic murmurs. No  rubs, clicks, or gallops, distal pulses intact, no carotid bruits  Abdomen: soft, non-tender, non-distended, normal bowel sounds, no masses or organomegaly  Extremities: no cyanosis, clubbing. 1 + edema  Musculoskeletal: normal range of motion, no joint swelling, deformity or tenderness  Neurologic: reflexes normal and symmetric, no cranial nerve deficit, gait, coordination and speech normal      An electronic signature was used to authenticate this note.   --Didi Lim MD

## 2022-03-07 DIAGNOSIS — E78.2 MIXED HYPERLIPIDEMIA: ICD-10-CM

## 2022-03-07 DIAGNOSIS — I10 ESSENTIAL HYPERTENSION: ICD-10-CM

## 2022-03-08 RX ORDER — PRAVASTATIN SODIUM 20 MG
TABLET ORAL
Qty: 90 TABLET | Refills: 1 | Status: SHIPPED | OUTPATIENT
Start: 2022-03-08 | End: 2022-04-07

## 2022-03-08 RX ORDER — OLMESARTAN MEDOXOMIL AND HYDROCHLOROTHIAZIDE 20/12.5 20; 12.5 MG/1; MG/1
TABLET ORAL
Qty: 90 TABLET | Refills: 1 | Status: SHIPPED | OUTPATIENT
Start: 2022-03-08 | End: 2022-08-22

## 2022-03-08 NOTE — TELEPHONE ENCOUNTER
Health Maintenance   Topic Date Due    Shingles Vaccine (1 of 2) Never done    Depression Screen  05/17/2022    Lipid screen  09/13/2022    Potassium monitoring  10/18/2022    Creatinine monitoring  10/18/2022    DTaP/Tdap/Td vaccine (2 - Td or Tdap) 11/08/2022    Colorectal Cancer Screen  08/28/2028    DEXA (modify frequency per FRAX score)  Completed    Flu vaccine  Completed    Pneumococcal 65+ years Vaccine  Completed    COVID-19 Vaccine  Completed    Hepatitis C screen  Completed    Hepatitis A vaccine  Aged Out    Hepatitis B vaccine  Aged Out    Hib vaccine  Aged Out    Meningococcal (ACWY) vaccine  Aged Out             (applicable per patient's age: Cancer Screenings, Depression Screening, Fall Risk Screening, Immunizations)    Hemoglobin A1C (%)   Date Value   10/05/2020 5.5   04/01/2017 5.8     LDL Cholesterol (mg/dL)   Date Value   09/13/2021 117     LDL Calculated (mg/dL)   Date Value   08/06/2013 83     AST (U/L)   Date Value   09/13/2021 12     ALT (U/L)   Date Value   09/13/2021 5     BUN (mg/dL)   Date Value   10/18/2021 19      (goal A1C is < 7)   (goal LDL is <100) need 30-50% reduction from baseline     BP Readings from Last 3 Encounters:   02/25/22 134/60   10/21/21 138/64   10/04/21 132/72    (goal /80)      All Future Testing planned in [facility]:  Lab Frequency Next Occurrence   TSH with Reflex Once 04/04/2022   CBC Auto Differential Once 04/04/2022   Comprehensive Metabolic Panel Once 19/73/4640   Vitamin D 25 Hydroxy Once 04/04/2022   Lipid Panel Once 04/04/2022   Magnesium Once 04/04/2022   EKG 12 Lead Once 04/04/2022   XR CHEST (2 VW) Once 04/04/2022       Next Visit Date:  Future Appointments   Date Time Provider Melo Blake   4/4/2022 11:00 AM MD Raphael Brown [facility]   4/11/2022 10:00 AM MD Aileen Moses [facility]   5/19/2022 10:30 AM MD Darron Brown [facility]   9/15/2022 10:30 [facility] [facility] Patient Active Problem List:     Hypertension     Aortic stenosis     Hyperlipidemia     Diverticulosis     Intracranial bleed (HCC)     Vitamin D deficiency     Rectocele     Osteoporosis     History of DVT (deep vein thrombosis)     Cavernoma     TIA (transient ischemic attack)     Thalamic infarction ([facility]     Paresthesia     Microhematuria     Seizure disorder (HCC)     Frequent UTI     Pulmonary emphysema ([facility]

## 2022-04-06 ENCOUNTER — HOSPITAL ENCOUNTER (OUTPATIENT)
Age: 77
Discharge: HOME | End: 2022-04-06
Payer: MEDICARE

## 2022-04-06 ENCOUNTER — HOSPITAL ENCOUNTER (OUTPATIENT)
Dept: GENERAL RADIOLOGY | Age: 77
Discharge: HOME | End: 2022-04-08
Payer: MEDICARE

## 2022-04-06 ENCOUNTER — HOSPITAL ENCOUNTER (OUTPATIENT)
Age: 77
Discharge: HOME | End: 2022-04-08
Payer: MEDICARE

## 2022-04-06 DIAGNOSIS — E78.2 MIXED HYPERLIPIDEMIA: ICD-10-CM

## 2022-04-06 DIAGNOSIS — I35.0 NONRHEUMATIC AORTIC VALVE STENOSIS: ICD-10-CM

## 2022-04-06 DIAGNOSIS — E55.9 VITAMIN D DEFICIENCY: ICD-10-CM

## 2022-04-06 DIAGNOSIS — I10 ESSENTIAL HYPERTENSION: ICD-10-CM

## 2022-04-06 LAB
ABSOLUTE EOS #: 0.1 K/UL (ref 0–0.4)
ABSOLUTE LYMPH #: 1.1 K/UL (ref 1–4.8)
ABSOLUTE MONO #: 0.5 K/UL (ref 0–1)
ALBUMIN SERPL-MCNC: 4 G/DL (ref 3.5–5.2)
ALP BLD-CCNC: 61 U/L (ref 35–104)
ALT SERPL-CCNC: 7 U/L (ref 5–33)
ANION GAP SERPL CALCULATED.3IONS-SCNC: 10 MMOL/L (ref 9–17)
AST SERPL-CCNC: 15 U/L
BASOPHILS # BLD: 1 % (ref 0–2)
BASOPHILS ABSOLUTE: 0 K/UL (ref 0–0.2)
BILIRUB SERPL-MCNC: 0.52 MG/DL (ref 0.3–1.2)
BUN BLDV-MCNC: 19 MG/DL (ref 8–23)
BUN/CREAT BLD: 20 (ref 9–20)
CALCIUM SERPL-MCNC: 9 MG/DL (ref 8.6–10.4)
CHLORIDE BLD-SCNC: 101 MMOL/L (ref 98–107)
CHOLESTEROL/HDL RATIO: 4.1
CHOLESTEROL: 187 MG/DL
CO2: 27 MMOL/L (ref 20–31)
CREAT SERPL-MCNC: 0.97 MG/DL (ref 0.5–0.9)
DIFFERENTIAL TYPE: YES
EOSINOPHILS RELATIVE PERCENT: 1 % (ref 0–5)
GFR AFRICAN AMERICAN: >60 ML/MIN
GFR NON-AFRICAN AMERICAN: 56 ML/MIN
GFR SERPL CREATININE-BSD FRML MDRD: ABNORMAL ML/MIN/{1.73_M2}
GLUCOSE BLD-MCNC: 105 MG/DL (ref 70–99)
HCT VFR BLD CALC: 33.9 % (ref 36–46)
HDLC SERPL-MCNC: 46 MG/DL
HEMOGLOBIN: 11.3 G/DL (ref 12–16)
LDL CHOLESTEROL: 118 MG/DL (ref 0–130)
LYMPHOCYTES # BLD: 26 % (ref 15–40)
MAGNESIUM: 1.7 MG/DL (ref 1.6–2.6)
MCH RBC QN AUTO: 29.4 PG (ref 26–34)
MCHC RBC AUTO-ENTMCNC: 33.3 G/DL (ref 31–37)
MCV RBC AUTO: 88.2 FL (ref 80–100)
MONOCYTES # BLD: 12 % (ref 4–8)
PATIENT FASTING?: YES
PDW BLD-RTO: 14 % (ref 12.1–15.2)
PLATELET # BLD: 135 K/UL (ref 140–450)
POTASSIUM SERPL-SCNC: 3.7 MMOL/L (ref 3.7–5.3)
RBC # BLD: 3.84 M/UL (ref 4–5.2)
SEG NEUTROPHILS: 60 % (ref 47–75)
SEGMENTED NEUTROPHILS ABSOLUTE COUNT: 2.6 K/UL (ref 2.5–7)
SODIUM BLD-SCNC: 138 MMOL/L (ref 135–144)
TOTAL PROTEIN: 6.6 G/DL (ref 6.4–8.3)
TRIGL SERPL-MCNC: 117 MG/DL
TSH SERPL DL<=0.05 MIU/L-ACNC: 2.21 UIU/ML (ref 0.3–5)
VITAMIN D 25-HYDROXY: 63.4 NG/ML
WBC # BLD: 4.3 K/UL (ref 3.5–11)

## 2022-04-06 PROCEDURE — 85025 COMPLETE CBC W/AUTO DIFF WBC: CPT

## 2022-04-06 PROCEDURE — 84443 ASSAY THYROID STIM HORMONE: CPT

## 2022-04-06 PROCEDURE — 71046 X-RAY EXAM CHEST 2 VIEWS: CPT

## 2022-04-06 PROCEDURE — 80061 LIPID PANEL: CPT

## 2022-04-06 PROCEDURE — 93005 ELECTROCARDIOGRAM TRACING: CPT

## 2022-04-06 PROCEDURE — 80053 COMPREHEN METABOLIC PANEL: CPT

## 2022-04-06 PROCEDURE — 83735 ASSAY OF MAGNESIUM: CPT

## 2022-04-06 PROCEDURE — 36415 COLL VENOUS BLD VENIPUNCTURE: CPT

## 2022-04-06 PROCEDURE — 82306 VITAMIN D 25 HYDROXY: CPT

## 2022-04-07 ENCOUNTER — OFFICE VISIT (OUTPATIENT)
Dept: FAMILY MEDICINE CLINIC | Age: 77
End: 2022-04-07
Payer: MEDICARE

## 2022-04-07 VITALS
WEIGHT: 178 LBS | DIASTOLIC BLOOD PRESSURE: 64 MMHG | HEIGHT: 63 IN | HEART RATE: 66 BPM | BODY MASS INDEX: 31.54 KG/M2 | SYSTOLIC BLOOD PRESSURE: 128 MMHG

## 2022-04-07 DIAGNOSIS — E78.00 HYPERCHOLESTEROLEMIA: ICD-10-CM

## 2022-04-07 DIAGNOSIS — I35.0 NONRHEUMATIC AORTIC VALVE STENOSIS: ICD-10-CM

## 2022-04-07 DIAGNOSIS — E55.9 VITAMIN D DEFICIENCY: ICD-10-CM

## 2022-04-07 DIAGNOSIS — K59.09 CHRONIC CONSTIPATION: ICD-10-CM

## 2022-04-07 DIAGNOSIS — J43.9 PULMONARY EMPHYSEMA, UNSPECIFIED EMPHYSEMA TYPE (HCC): ICD-10-CM

## 2022-04-07 DIAGNOSIS — I25.10 CORONARY ARTERY DISEASE INVOLVING NATIVE CORONARY ARTERY OF NATIVE HEART WITHOUT ANGINA PECTORIS: ICD-10-CM

## 2022-04-07 DIAGNOSIS — G40.909 SEIZURE DISORDER (HCC): ICD-10-CM

## 2022-04-07 DIAGNOSIS — R91.1 LUNG NODULE: ICD-10-CM

## 2022-04-07 DIAGNOSIS — I10 PRIMARY HYPERTENSION: Primary | ICD-10-CM

## 2022-04-07 LAB
EKG ATRIAL RATE: 70 BPM
EKG P AXIS: 70 DEGREES
EKG P-R INTERVAL: 178 MS
EKG Q-T INTERVAL: 376 MS
EKG QRS DURATION: 82 MS
EKG QTC CALCULATION (BAZETT): 406 MS
EKG R AXIS: 44 DEGREES
EKG T AXIS: 74 DEGREES
EKG VENTRICULAR RATE: 70 BPM

## 2022-04-07 PROCEDURE — 4040F PNEUMOC VAC/ADMIN/RCVD: CPT | Performed by: INTERNAL MEDICINE

## 2022-04-07 PROCEDURE — 93010 ELECTROCARDIOGRAM REPORT: CPT | Performed by: INTERNAL MEDICINE

## 2022-04-07 PROCEDURE — 3017F COLORECTAL CA SCREEN DOC REV: CPT | Performed by: INTERNAL MEDICINE

## 2022-04-07 PROCEDURE — G8417 CALC BMI ABV UP PARAM F/U: HCPCS | Performed by: INTERNAL MEDICINE

## 2022-04-07 PROCEDURE — 99214 OFFICE O/P EST MOD 30 MIN: CPT | Performed by: INTERNAL MEDICINE

## 2022-04-07 PROCEDURE — 1123F ACP DISCUSS/DSCN MKR DOCD: CPT | Performed by: INTERNAL MEDICINE

## 2022-04-07 PROCEDURE — G8427 DOCREV CUR MEDS BY ELIG CLIN: HCPCS | Performed by: INTERNAL MEDICINE

## 2022-04-07 PROCEDURE — 1090F PRES/ABSN URINE INCON ASSESS: CPT | Performed by: INTERNAL MEDICINE

## 2022-04-07 PROCEDURE — G8399 PT W/DXA RESULTS DOCUMENT: HCPCS | Performed by: INTERNAL MEDICINE

## 2022-04-07 PROCEDURE — 3023F SPIROM DOC REV: CPT | Performed by: INTERNAL MEDICINE

## 2022-04-07 PROCEDURE — 1036F TOBACCO NON-USER: CPT | Performed by: INTERNAL MEDICINE

## 2022-04-07 RX ORDER — ROSUVASTATIN CALCIUM 10 MG/1
10 TABLET, COATED ORAL NIGHTLY
Qty: 90 TABLET | Refills: 1 | Status: SHIPPED | OUTPATIENT
Start: 2022-04-07 | End: 2022-08-22

## 2022-04-07 RX ORDER — CLOPIDOGREL BISULFATE 75 MG/1
75 TABLET ORAL DAILY
COMMUNITY

## 2022-04-07 ASSESSMENT — ENCOUNTER SYMPTOMS
SORE THROAT: 0
NAUSEA: 0
ABDOMINAL PAIN: 0
BLOOD IN STOOL: 0
DIARRHEA: 0
COUGH: 0
CONSTIPATION: 0
SHORTNESS OF BREATH: 0
CHEST TIGHTNESS: 0
RHINORRHEA: 0

## 2022-04-07 NOTE — PATIENT INSTRUCTIONS
Survey: You may be receiving a survey from [employer] regarding your visit today. You may get this in the mail, through your MyChart or in your email. Please complete the survey to enable us to provide the highest quality of care to you and your family. Please also, mention our names. If you cannot score us as very good (5 Stars) on any question, please feel free to call the office to discuss how we could have made your experience exceptional.      Thank You! MD Derik Thomas, Tayo Pinon, BSN RN    [facility], [address] [place] [address] Av      1. Chronic constipation      2. Primary hypertension  Controlled on Benicar HCTZ. 3. Nonrheumatic aortic valve stenosis  S/P TAVR procedure. Doing well. Follows with Dr. Sage Fried. 4. Vitamin D deficiency  Controlled on Vitamin D replacement. 5. Seizure disorder [facility])  Follows with Neurology. Has been stable on Depakote. 6. Pulmonary emphysema, unspecified emphysema type ([address]  Has been stable. Stopped smoking years ago. 7. Coronary artery disease involving native coronary artery of native heart without angina pectoris  S/P PTCA and stent. No chest pain after the procedure. Follows with Dr. Sage Fried. 8. Lung nodule  To follow up with cardiothoracic surgery to follow up for surgery. 9. Hypercholesterolemia  LDL > 70 on Pravachol (118). Will change to Crestor 10 mg daily. Repeat labs in 3 months. - rosuvastatin (CRESTOR) 10 MG tablet; Take 1 tablet by mouth nightly  Dispense: 90 tablet; Refill: [address] was instructed to follow up in the clinic in 3 months for check up or as needed with any medical issues.

## 2022-04-07 NOTE — PROGRESS NOTES
Sonali Gooden (:  1945) is a 77 y.o. female,Established patient, here for evaluation of the following chief complaint(s):  Discuss Labs (6 month check up), Hypertension, Hyperlipidemia, Coronary Artery Disease (3/2 valve replacement. / stent. Continues on eliquis and plavix), and Osteoporosis         ASSESSMENT/PLAN:  1. Primary hypertension  2. Chronic constipation  -     linaCLOtide (LINZESS) 72 MCG CAPS capsule; Take 1 capsule by mouth every morning (before breakfast), Disp-90 capsule, R-1Normal  3. Nonrheumatic aortic valve stenosis  4. Vitamin D deficiency  5. Seizure disorder ([address]  6. Pulmonary emphysema, unspecified emphysema type ([address]  7. Coronary artery disease involving native coronary artery of native heart without angina pectoris  8. Lung nodule  9. Hypercholesterolemia  -     rosuvastatin (CRESTOR) 10 MG tablet; Take 1 tablet by mouth nightly, Disp-90 tablet, R-1Normal  -     Lipid Panel; Future  -     Hepatic Function Panel; Future      Plan:  1. Chronic constipation  Well controlled on Linzess. No change in treatment. 2. Primary hypertension  Controlled on Benicar HCTZ. 3. Nonrheumatic aortic valve stenosis  S/P TAVR procedure. Doing well. Follows with Dr. George Green. 4. Vitamin D deficiency  Controlled on Vitamin D replacement. 5. Seizure disorder [facility])  Follows with Neurology. Has been stable on Depakote. 6. Pulmonary emphysema, unspecified emphysema type ([address]  Has been stable. Stopped smoking years ago. 7. Coronary artery disease involving native coronary artery of native heart without angina pectoris  S/P PTCA and stent. No chest pain after the procedure. Follows with Dr. George Green. 8. Lung nodule  To follow up with cardiothoracic surgery to follow up for surgery. 9. Hypercholesterolemia  LDL > 70 on Pravachol (118). Will change to Crestor 10 mg daily. Repeat labs in 3 months. - rosuvastatin (CRESTOR) 10 MG tablet;  Take 1 tablet by mouth nightly Dispense: 90 tablet; Refill: [address] evelio was instructed to follow up in the clinic in 3 months for check up or as needed with any medical issues. Subjective   SUBJECTIVE/OBJECTIVE:  Tavo Bryant presents for a check up on her medical conditions HTN, Hyperlipidemia, CAD. Tavo Bryant denies new problems. Medications were reviewed with Tavo Bryant, she is  tolerating the medication. Bowels are regular. There has not been rectal bleeding. Tavo Bryant denies urinary complications, the urine stream is good. Tavo Bryant denies chest pain and denies increasing shortness of breath. Labs from yesterday reviewed. Recently underwent TAVR and PTCA and stenting and doing well. She is to see the cardiothoracic surgeon to evaluate for lung mass. Past Medical History:  No date:  Aortic stenosis  No date: Arthritis  No date: Benign cyst of right breast in female  No date: Cardiac murmur  No date: Cataract  8/13: Cerebral brain hemorrhage [facility])      Comment:  Right thalamic  2016: Deep vein blood clot of left lower extremity (HCC)  No date: Diverticulosis  No date: Head injury  No date: Hx of blood clots  No date: Hyperlipidemia  No date: Hypertension  No date: Migraine headache  10/5/2020: Pulmonary emphysema (HCC)  No date: Seizures (HCC)  No date: Stroke (cerebrum) [address]  No date: Unspecified cerebral artery occlusion with cerebral   infarction      Comment:  TIA    Past Surgical History:  No date: BLEPHAROPLASTY  No date: BREAST SURGERY      Comment:  b/l breast cyst removal   No date: CATARACT REMOVAL  2009: COLONOSCOPY  8/28/2018:  COLONOSCOPY BIOPSY/STOMA; N/A      Comment:  COLONOSCOPY BIOPSY/STOMA, Dx: External Hemorrhoids and                Severe Diverticulosis performed by Prince Hooper MD at [address] [place] [address]  No date: HYSTERECTOMY  No date: MAMMO IMPLANT DIGITAL DIAG BI  No date: TUBAL LIGATION    Social History    Socioeconomic History      Marital status:       Spouse name: Not on file Number of children: Not on file      Years of education: Not on file      Highest education level: Not on file    Occupational History      Occupation: retired    Tobacco Use      Smoking status: Former Smoker        Packs/day: 1.00        Years: 30.00        Pack years: 27        Quit date: 2005        Years since quittin.2      Smokeless tobacco: Never Used    Vaping Use      Vaping Use: Never used    Substance and Sexual Activity      Alcohol use: No      Drug use: No      Sexual activity: Not on file    Other Topics      Concerns:        Not on file    Social History Narrative      Not on file    Social Determinants of Health  Financial Resource Strain: Low Risk       Difficulty of Paying Living Expenses: Not hard at all  Food Insecurity: No Food Insecurity      Worried About [address] [place] [address] in the Last Year: Never true      Ran Out of Food in the Last Year: Never true  Transportation Needs:       Lack of Transportation (Medical): Not on file      Lack of Transportation (Non-Medical):  Not on file  Physical Activity:       Days of Exercise per Week: Not on file      Minutes of Exercise per Session: Not on file  Stress:       Feeling of Stress : Not on file  Social Connections:       Frequency of Communication with Friends and Family: Not on file      Frequency of Social Gatherings with Friends and Family: Not on file      Attends Protestant Services: Not on file      Active Member of [address] [place] [address] or Organizations: Not on file      Attends Club or Organization Meetings: Not on file      Marital Status: Not on file  Intimate Partner Violence:       Fear of Current or Ex-Partner: Not on file      Emotionally Abused: Not on file      Physically Abused: Not on file      Sexually Abused: Not on file  Housing Stability:       Unable to Pay for Housing in the Last Year: Not on file      Number of Places Lived in the Last Year: Not on file      Unstable Housing in the Last Year: Not on file    Review of patient's family history indicates:  Problem: Breast Cancer      Relation: Sister          Age of Onset: (Not Specified)  Problem: Diabetes      Relation: Sister          Age of Onset: (Not Specified)  Problem: Migraines      Relation: Sister          Age of Onset: (Not Specified)  Problem: Clotting Disorder      Relation: Mother          Age of Onset: (Not Specified)  Problem: Diabetes      Relation: Mother          Age of Onset: (Not Specified)  Problem: Hypertension      Relation: Mother          Age of Onset: (Not Specified)  Problem: Migraines      Relation: Mother          Age of Onset: (Not Specified)  Problem: Heart Attack      Relation: Father          Age of Onset: (Not Specified)  Problem: Hypertension      Relation: Father          Age of Onset: (Not Specified)  Problem: Diabetes      Relation: Father          Age of Onset: (Not Specified)  Problem: Heart Disease      Relation: Brother          Age of Onset: (Not Specified)  Problem: Diabetes      Relation: Brother          Age of Onset: (Not Specified)  Problem: Migraines      Relation: Brother          Age of Onset: (Not Specified)  Problem: Hypertension      Relation: Maternal Grandmother          Age of Onset: (Not Specified)  Problem: Hypertension      Relation: Maternal Grandfather          Age of Onset: (Not Specified)  Problem: Hypertension      Relation: Paternal Grandmother          Age of Onset: (Not Specified)  Problem: Hypertension      Relation: Paternal Grandfather          Age of Onset: (Not Specified)      Current Outpatient Medications on File Prior to Visit:  clopidogrel (PLAVIX) 75 MG tablet, Take 75 mg by mouth daily, Disp: , Rfl:   pravastatin (PRAVACHOL) 20 MG tablet, TAKE 1 TABLET BY MOUTH EVERY DAY, Disp: 90 tablet, Rfl: 1  olmesartan-hydroCHLOROthiazide (BENICAR HCT) 20-12.5 MG per tablet, TAKE 1 TABLET BY MOUTH EVERY DAY, Disp: 90 tablet, Rfl: 1  apixaban (ELIQUIS) 5 MG TABS tablet, Take by mouth 2 times daily, Disp: , Rfl:   divalproex (DEPAKOTE) 500 MG DR tablet, Take 500 mg by mouth daily , Disp: , Rfl:   linaCLOtide (LINZESS) 72 MCG CAPS capsule, Take 1 capsule by mouth every morning (before breakfast), Disp: 30 capsule, Rfl: 5  triamcinolone (KENALOG) 0.1 % cream, Apply topically 2 times daily, Disp: 1 Tube, Rfl: 5  albuterol sulfate HFA (PROAIR HFA) 108 (90 Base) MCG/ACT inhaler, Inhale 2 puffs into the lungs every 4 hours as needed for Wheezing or Shortness of Breath Inhale 2 puffs 4 times daily, Disp: 1 Inhaler, Rfl: 3  acetaminophen (TYLENOL) 500 MG tablet, Take 500 mg by mouth as needed for Pain, Disp: , Rfl:     No current facility-administered medications on file prior to visit.        -- Bactrim Ds (Sulfamethoxazole-Trimethoprim) -- Other (See Comments)    --  Elevation of creatinine on Bactrim DS.   -- Tape (Adhesive Tape) -- Rash      Lab Results       Component                Value               Date                       NA                       138                 04/06/2022                 K                        3.7                 04/06/2022                 CL                       101                 04/06/2022                 CO2                      27                  04/06/2022                 BUN                      19                  04/06/2022                 CREATININE               0.97 (H)            04/06/2022                 GLUCOSE                  105 (H)             04/06/2022                 CALCIUM                  9.0                 04/06/2022                 PROT                     6.6                 04/06/2022                 LABALBU                  4.0                 04/06/2022                 BILITOT                  0.52                04/06/2022                 ALKPHOS                  61                  04/06/2022                 AST                      15                  04/06/2022                 ALT                      7                   04/06/2022                 LABGLOM 56 (L)              04/06/2022                 GFRAA                    >60                 04/06/2022              Lab Results       Component                Value               Date                       LABA1C                   5.5                 10/05/2020            Lab Results       Component                Value               Date                       EAG                      120                 04/01/2017              Lab Results       Component                Value               Date                       CHOL                     187                 04/06/2022                 CHOL                     183                 09/13/2021                 CHOL                     176                 09/15/2020            Lab Results       Component                Value               Date                       TRIG                     117                 04/06/2022                 TRIG                     109                 09/13/2021                 TRIG                     68                  09/15/2020            Lab Results       Component                Value               Date                       HDL                      46                  04/06/2022                 HDL                      44                  09/13/2021                 HDL                      53                  09/15/2020            Lab Results       Component                Value               Date                       LDLCHOLESTEROL           118                 04/06/2022                 LDLCHOLESTEROL           117                 09/13/2021                 LDLCHOLESTEROL           109                 09/15/2020                 LDLCALC                  83                  08/06/2013            Lab Results       Component                Value               Date                       VLDL                     NOT REPORTED        09/13/2021                 VLDL                     NOT REPORTED        09/15/2020                 VLDL NOT REPORTED        09/12/2019            Lab Results       Component                Value               Date                       CHOLBROOKLYNO             4.1                 04/06/2022                 CHOLHDLRATIO             4.2                 09/13/2021                 CHOLHDLRATIO             3.3                 09/15/2020                              Hypertension  This is a chronic problem. The current episode started more than 1 year ago. The problem is unchanged. The problem is controlled. Pertinent negatives include no chest pain, neck pain, palpitations or shortness of breath. Past treatments include diuretics. The current treatment provides significant improvement. There are no compliance problems. There is no history of angina. Hyperlipidemia  This is a chronic problem. The current episode started more than 1 year ago. The problem is uncontrolled. Recent lipid tests were reviewed and are normal. Pertinent negatives include no chest pain, myalgias or shortness of breath. Current antihyperlipidemic treatment includes statins. The current treatment provides significant improvement of lipids. There are no compliance problems. Coronary Artery Disease  Presents for follow-up visit. Pertinent negatives include no chest pain, chest tightness, palpitations or shortness of breath. Risk factors include hyperlipidemia. The symptoms have been stable. Compliance with diet is variable. Compliance with exercise is variable. Compliance with medications is good. Review of Systems   Constitutional: Negative. HENT: Negative for congestion, ear pain, rhinorrhea, sneezing and sore throat. Eyes: Negative for visual disturbance. Respiratory: Negative for cough, chest tightness and shortness of breath. Cardiovascular: Negative for chest pain and palpitations. Gastrointestinal: Negative for abdominal pain, blood in stool, constipation, diarrhea and nausea.    Genitourinary: Negative for difficulty urinating, dysuria, frequency, menstrual problem and urgency. Musculoskeletal: Negative for arthralgias, joint swelling, myalgias and neck pain. Skin: Negative. Neurological: Negative for syncope. Psychiatric/Behavioral: Negative. Objective   Physical Exam  Constitutional:       Appearance: She is well-developed. HENT:      Head: Atraumatic. Eyes:      Conjunctiva/sclera: Conjunctivae normal.   Cardiovascular:      Rate and Rhythm: Normal rate and regular rhythm. Heart sounds: Normal heart sounds. Pulmonary:      Effort: Pulmonary effort is normal.      Breath sounds: Normal breath sounds. Abdominal:      Palpations: Abdomen is soft. Tenderness: There is no abdominal tenderness. Musculoskeletal:         General: Normal range of motion. Cervical back: Normal range of motion and neck supple. Lymphadenopathy:      Cervical: No cervical adenopathy. Skin:     Findings: No rash. Neurological:      Mental Status: She is alert. Psychiatric:         Behavior: Behavior normal.         Thought Content: Thought content normal.                  An electronic signature was used to authenticate this note.     --Emmanuel Alvarez MD

## 2022-04-11 ENCOUNTER — OFFICE VISIT (OUTPATIENT)
Dept: CARDIOLOGY CLINIC | Age: 77
End: 2022-04-11
Payer: MEDICARE

## 2022-04-11 VITALS
HEART RATE: 72 BPM | SYSTOLIC BLOOD PRESSURE: 140 MMHG | BODY MASS INDEX: 31.35 KG/M2 | OXYGEN SATURATION: 98 % | WEIGHT: 177 LBS | DIASTOLIC BLOOD PRESSURE: 70 MMHG

## 2022-04-11 DIAGNOSIS — E78.2 MIXED HYPERLIPIDEMIA: ICD-10-CM

## 2022-04-11 DIAGNOSIS — I10 ESSENTIAL HYPERTENSION: ICD-10-CM

## 2022-04-11 DIAGNOSIS — E55.9 VITAMIN D DEFICIENCY: ICD-10-CM

## 2022-04-11 DIAGNOSIS — I35.0 NONRHEUMATIC AORTIC VALVE STENOSIS: Primary | ICD-10-CM

## 2022-04-11 DIAGNOSIS — Z98.61 POST PTCA: ICD-10-CM

## 2022-04-11 DIAGNOSIS — Z95.2 S/P TAVR (TRANSCATHETER AORTIC VALVE REPLACEMENT): ICD-10-CM

## 2022-04-11 PROCEDURE — G8417 CALC BMI ABV UP PARAM F/U: HCPCS | Performed by: INTERNAL MEDICINE

## 2022-04-11 PROCEDURE — 1036F TOBACCO NON-USER: CPT | Performed by: INTERNAL MEDICINE

## 2022-04-11 PROCEDURE — 1123F ACP DISCUSS/DSCN MKR DOCD: CPT | Performed by: INTERNAL MEDICINE

## 2022-04-11 PROCEDURE — 99214 OFFICE O/P EST MOD 30 MIN: CPT | Performed by: INTERNAL MEDICINE

## 2022-04-11 PROCEDURE — 4040F PNEUMOC VAC/ADMIN/RCVD: CPT | Performed by: INTERNAL MEDICINE

## 2022-04-11 PROCEDURE — G8399 PT W/DXA RESULTS DOCUMENT: HCPCS | Performed by: INTERNAL MEDICINE

## 2022-04-11 PROCEDURE — G8428 CUR MEDS NOT DOCUMENT: HCPCS | Performed by: INTERNAL MEDICINE

## 2022-04-11 PROCEDURE — 1090F PRES/ABSN URINE INCON ASSESS: CPT | Performed by: INTERNAL MEDICINE

## 2022-04-11 NOTE — PROGRESS NOTES
Patient here for 6 mo follow up  Was SOB   Left heart March 2nd  Stenting 4/5/22  At [facility]  Will be doing cardiac rehab at [facility]  No SOB/No CP  Dr. Gisele Apley changed cholesterol med to Crestor

## 2022-04-11 NOTE — PROGRESS NOTES
Pt had TAVR on 3/2  Will be doing cardiac rehab here.    Will be having a biopsy of her   Lung nodule will wee DR Clara Laws  On 4/13  Will refer cardiac rehab  Ok to hold eliquis 3 days prior   To lung bx but hold plavix   As little as possible  Pt will talk to  on Wednesday   See in 6 mths

## 2022-04-11 NOTE — LETTER
Sami Delgadillo MD  [facility]  [facility]  [address]  [phone]      2022      Antionette Forrester MD  [address] [place], [place] [address]      RE:   Crystal Davis  :        Dear Dr. Chichi Manuel:    [address]:  1. Aortic stenosis. 2.  Status post TAVR procedure on 2022, placing a 26 mm Krista 3 Ultra valve. 3.  Coronary artery disease status post angioplasty and stent placement in the mid LAD by Dr. Matt Buckner on 2022, placing a 3.5 x 18 mm Orsiro drug-eluting stent in the mid LAD with a good end result. 4.  Lung nodules, pending biopsy by interventional radiologist.    HISTORY OF PRESENT ILLNESS:  I had the pleasure of seeing Mrs. Parks in the office on 2022. She is a very pleasant, complex 60-year-old female. She is pending possible resection of a pulmonary nodule in the right lower lobe. She is PET scan positive and, therefore, is pending having a biopsy and possible resection. She has a history of a moderate-to-severe aortic stenosis. In preparation for her biopsy, a stress test was done as well as an echocardiogram.  Her stress test was abnormal and therefore, she had a left and right cardiac catheterization by Dr. Matt Buckner on 2022. Her right coronary artery and circumflex were unremarkable. She had a 75% lesion in the mid LAD. She was also found to have severe aortic stenosis with an aortic valve area of 0.52 cm2 (echocardiogram at [facility] done 3 days earlier had shown an area of 1 cm2 consistent with moderate aortic stenosis). Because of her severe aortic stenosis, she had a TAVR procedure on 2022 using a 26 mm Krista 3 Ultra valve. She was brought back for angioplasty and stent placement in her mid LAD on , placing a 3.5 x 18 mm Orsiro stent in the mid LAD with a good end result. She will be seeing Dr. Lilian Guevara on  to schedule her biopsy.   She has a history of DVT and has a factor V Leiden mutation. For this, she is on Eliquis 5 mg b.i.d. Because she is on Eliquis, she is only on Plavix and not aspirin. She overall is doing well. She has had no chest pain or chest discomfort. She has had no PND, orthopnea or pedal edema. (She does get pedal edema if she would happen to drink pop, which of course she never does. ..)    She has had no unusual shortness of breath. She does feel that she is breathing better since she has had the aortic valve replacement. Her blood pressures have been under good control. She is on Crestor for her hyperlipidemia. CARDIAC RISK FACTORS:  Known CAD:  Positive. Other Family Members:  Positive. Hyperlipidemia:  Positive. Hypertension:  Positive. Peripheral Vascular Disease:  Negative. Smoking:  Negative. Diabetes:  Borderline positive. MEDICATIONS AT THIS TIME:  She is on Eliquis 5 mg b.i.d., Plavix 75 mg daily, Depakote 500 mg daily, Linzess 72 mcg every morning, Benicar/hydrochlorothiazide 20/12.5 daily, Crestor 10 mg daily, Kenalog cream p.r.n. PAST MEDICAL AND SURGICAL HISTORY:  1. She had TAVR procedure for severe aortic stenosis on 03/02/2022, using a #26 Krista 3 Ultra valve. 2.  Status post angioplasty of her LAD on 04/05/2022, placing a 3.5 x 18 mm Orsiro drug-eluting stent in her mid LAD. 3.  Right lower lobe lung nodule, which is PET positive. 4.  History of seizures. 5.  TIA. 6.  Hysterectomy in 1995 in [address] [place] [address]. 7.  CVA 23 years ago with cerebral hemorrhage in 08/2013.  8.  Migraine headaches. 9.  Blepharoplasty. 10.  Cataract surgery. 11.  DVT in 09/2016, currently on Eliquis 5 mg b.i.d. FAMILY HISTORY:  Mother had an MI. Brother had an MI. Father had an MI.    SOCIAL HISTORY:  She is 69years old, , two children. Does not smoke or drink alcohol. She sees Dr. Janel Ayon for primary physician.   The last time that she was in his office, he happened to say \"a very dirty word (you are getting old\"). For this, she spanked him twice. (She seemed surprised that you kept yelling harder, harder. Steven Hilt Ermine Hilt \")    REVIEW OF SYSTEMS:  Cardiac as above. Other systems reviewed including constitutional, eyes, ears, nose and throat, cardiovascular, respiratory, GI, , musculoskeletal, integumentary, neurologic, endocrine, hematologic and allergic/immunologic, are negative except for what is described above. PHYSICAL EXAMINATION:  VITAL SIGNS:  Her blood pressure was 140/70 with a heart rate of 72 and regular. Respiratory rate 18. O2 saturation 98%. Weight 177 pounds. GENERAL:  She is a pleasant 50-year-old female. Denied pain. She was oriented to person, place and time. Answered questions appropriately. SKIN:  No unusual skin changes. HEENT:  The pupils are equally round and intact. Mucous membranes were dry. NECK:  No JVD. Good carotid pulses. No carotid bruits. No lymphadenopathy or thyromegaly. CARDIOVASCULAR EXAM:  S1 and S2 were normal.  No S3 or S4. Soft systolic blowing type murmur. No diastolic murmur. PMI was normal.  No lift, thrust, or pericardial friction rub. LUNGS:  Quite clear to auscultation and percussion. ABDOMEN:  Soft and nontender. Good bowel sounds. EXTREMITIES:  Good femoral pulses. Good pedal pulses. No pedal edema. Skin was warm and dry. No calf tenderness. Nail beds pink. Good cap refill. PULSES:  Bilateral symmetrical radial, brachial and carotid pulses. No carotid bruits. Good femoral and pedal pulses. NEUROLOGIC EXAM:  Within normal limits. PSYCHIATRIC EXAM:  Within normal limits. LABORATORY DATA:  Sodium 138, potassium 3.7, BUN 19, creatinine 0.97, GFR was 56, magnesium 1.7, calcium 9.0. Cholesterol 187, HDL 46, , triglycerides 117. ALT was 7, AST was 15. Her TSH was 2.21. Vitamin D 63.4. White count 4.3, hemoglobin 11.3 with a platelet count of 093,340. EKG showed sinus rhythm with nonspecific ST changes. IMPRESSION:  1. Severe coronary artery disease. 2.  Status post cardiac catheterization on 01/28/2022 that showed 75% disease in the mid LAD with unremarkable circumflex and right coronary artery with an EF of 60% and severe aortic stenosis with an aortic valve area of 0.54 cm2.  3.  Status post echocardiogram also at [facility], 3 days earlier, on 01/25/2022 that showed moderate aortic stenosis with an aortic valve area of 1.1 cm2 with normal LV function. 4.  Status post TAVR procedure on 03/02/2022, using a 26 mm Krista 3 Ultra valve. 5.  Status post angioplasty of the LAD on 04/05/2022, placing a 3.5 x 18 mm Orsiro drug-eluting stent with a good end result. 6.  Lung nodule in the right lower lobe, which is PET positive and is pending biopsy and possible wedge resection. 7.  Hypertension. 8.  Hyperlipidemia, with her recently changed to Crestor 10 mg daily. 9.  CVA 23 years ago. 10.  Bilateral DVT, currently on Eliquis because of factor V Leiden mutation. PLAN:  1. We will refer her to cardiac rehab. 2.  Hold Eliquis 3 days prior to her lung resection. 3.  Hold Plavix shortest time possible, hopefully no more than 4 to 5 days for her biopsy of her lung. 4.  We will meet with her in 6 months. DISCUSSION:  Mrs. Parks overall is doing well. She feels that she has less shortness of breath and is able to walk farther since she has had aortic valve replaced. Her angioplasty went well. I did review the film and she has had a nice result. She will start cardiac rehab here in [place]. She does have a lung biopsy pending. She will have to hold her Eliquis 3 days. Plavix will need to be held for the shortest time possible that the interventional radiologist is comfortable with since she just had angioplasty of her LAD on 04/05. She, however, needs the biopsy to continue the workup since it is PET/CT positive. From a cardiac standpoint, she is doing very well and is asymptomatic.     Thank you very much for allowing me the privilege of seeing Mrs. Parks. If you have any questions on my thoughts, please do not hesitate to contact me.     Sincerely,        Mike Pathak    D: 04/11/2022 10:42:10     T: 04/11/2022 10:48:56     [email or web address]  Job#: [id number]   Doc#: [id number]

## 2022-04-13 ENCOUNTER — TELEPHONE (OUTPATIENT)
Dept: CARDIAC REHAB | Age: 77
End: 2022-04-13

## 2022-04-13 NOTE — PROGRESS NOTES
Queen Cody MD  [facility]  [facility]  [address] [phone]      2022      Hilaria Forte MD  [address] [place], [place] [address]      RE:   Veronica Chapman  :        Dear Dr. Charlette Pimentel:    [address]:  1. Aortic stenosis. 2.  Status post TAVR procedure on 2022, placing a 26 mm Krista 3 Ultra valve. 3.  Coronary artery disease status post angioplasty and stent placement in the mid LAD by Dr. Lara Simental on 2022, placing a 3.5 x 18 mm Orsiro drug-eluting stent in the mid LAD with a good end result. 4.  Lung nodules, pending biopsy by interventional radiologist.    HISTORY OF PRESENT ILLNESS:  I had the pleasure of seeing Mrs. Parks in the office on 2022. She is a very pleasant, complex 78-year-old female. She is pending possible resection of a pulmonary nodule in the right lower lobe. She is PET scan positive and, therefore, is pending having a biopsy and possible resection. She has a history of a moderate-to-severe aortic stenosis. In preparation for her biopsy, a stress test was done as well as an echocardiogram.  Her stress test was abnormal and therefore, she had a left and right cardiac catheterization by Dr. Lara Simental on 2022. Her right coronary artery and circumflex were unremarkable. She had a 75% lesion in the mid LAD. She was also found to have severe aortic stenosis with an aortic valve area of 0.52 cm2 (echocardiogram at [facility] done 3 days earlier had shown an area of 1 cm2 consistent with moderate aortic stenosis). Because of her severe aortic stenosis, she had a TAVR procedure on 2022 using a 26 mm Krista 3 Ultra valve. She was brought back for angioplasty and stent placement in her mid LAD on , placing a 3.5 x 18 mm Orsiro stent in the mid LAD with a good end result. She will be seeing Dr. Ashley Carter on  to schedule her biopsy.   She has a history of DVT and has a factor V Leiden mutation. For this, she is on Eliquis 5 mg b.i.d. Because she is on Eliquis, she is only on Plavix and not aspirin. She overall is doing well. She has had no chest pain or chest discomfort. She has had no PND, orthopnea or pedal edema. (She does get pedal edema if she would happen to drink pop, which of course she never does. ..)    She has had no unusual shortness of breath. She does feel that she is breathing better since she has had the aortic valve replacement. Her blood pressures have been under good control. She is on Crestor for her hyperlipidemia. CARDIAC RISK FACTORS:  Known CAD:  Positive. Other Family Members:  Positive. Hyperlipidemia:  Positive. Hypertension:  Positive. Peripheral Vascular Disease:  Negative. Smoking:  Negative. Diabetes:  Borderline positive. MEDICATIONS AT THIS TIME:  She is on Eliquis 5 mg b.i.d., Plavix 75 mg daily, Depakote 500 mg daily, Linzess 72 mcg every morning, Benicar/hydrochlorothiazide 20/12.5 daily, Crestor 10 mg daily, Kenalog cream p.r.n. PAST MEDICAL AND SURGICAL HISTORY:  1. She had TAVR procedure for severe aortic stenosis on 03/02/2022, using a #26 Krista 3 Ultra valve. 2.  Status post angioplasty of her LAD on 04/05/2022, placing a 3.5 x 18 mm Orsiro drug-eluting stent in her mid LAD. 3.  Right lower lobe lung nodule, which is PET positive. 4.  History of seizures. 5.  TIA. 6.  Hysterectomy in 1995 in [address] [place] [address]. 7.  CVA 23 years ago with cerebral hemorrhage in 08/2013.  8.  Migraine headaches. 9.  Blepharoplasty. 10.  Cataract surgery. 11.  DVT in 09/2016, currently on Eliquis 5 mg b.i.d. FAMILY HISTORY:  Mother had an MI. Brother had an MI. Father had an MI.    SOCIAL HISTORY:  She is 69years old, , two children. Does not smoke or drink alcohol. She sees Dr. Mason Bazan for primary physician.   The last time that she was in his office, he happened to say \"a very dirty word (you are getting old\"). For this, she spanked him twice. (She seemed surprised that you kept yelling harder, harder. Virgen Carrero \")    REVIEW OF SYSTEMS:  Cardiac as above. Other systems reviewed including constitutional, eyes, ears, nose and throat, cardiovascular, respiratory, GI, , musculoskeletal, integumentary, neurologic, endocrine, hematologic and allergic/immunologic, are negative except for what is described above. PHYSICAL EXAMINATION:  VITAL SIGNS:  Her blood pressure was 140/70 with a heart rate of 72 and regular. Respiratory rate 18. O2 saturation 98%. Weight 177 pounds. GENERAL:  She is a pleasant 67-year-old female. Denied pain. She was oriented to person, place and time. Answered questions appropriately. SKIN:  No unusual skin changes. HEENT:  The pupils are equally round and intact. Mucous membranes were dry. NECK:  No JVD. Good carotid pulses. No carotid bruits. No lymphadenopathy or thyromegaly. CARDIOVASCULAR EXAM:  S1 and S2 were normal.  No S3 or S4. Soft systolic blowing type murmur. No diastolic murmur. PMI was normal.  No lift, thrust, or pericardial friction rub. LUNGS:  Quite clear to auscultation and percussion. ABDOMEN:  Soft and nontender. Good bowel sounds. EXTREMITIES:  Good femoral pulses. Good pedal pulses. No pedal edema. Skin was warm and dry. No calf tenderness. Nail beds pink. Good cap refill. PULSES:  Bilateral symmetrical radial, brachial and carotid pulses. No carotid bruits. Good femoral and pedal pulses. NEUROLOGIC EXAM:  Within normal limits. PSYCHIATRIC EXAM:  Within normal limits. LABORATORY DATA:  Sodium 138, potassium 3.7, BUN 19, creatinine 0.97, GFR was 56, magnesium 1.7, calcium 9.0. Cholesterol 187, HDL 46, , triglycerides 117. ALT was 7, AST was 15. Her TSH was 2.21. Vitamin D 63.4. White count 4.3, hemoglobin 11.3 with a platelet count of 318,697. EKG showed sinus rhythm with nonspecific ST changes. IMPRESSION:  1. Severe coronary artery disease. 2.  Status post cardiac catheterization on 01/28/2022 that showed 75% disease in the mid LAD with unremarkable circumflex and right coronary artery with an EF of 60% and severe aortic stenosis with an aortic valve area of 0.54 cm2.  3.  Status post echocardiogram also at [facility], 3 days earlier, on 01/25/2022 that showed moderate aortic stenosis with an aortic valve area of 1.1 cm2 with normal LV function. 4.  Status post TAVR procedure on 03/02/2022, using a 26 mm Krista 3 Ultra valve. 5.  Status post angioplasty of the LAD on 04/05/2022, placing a 3.5 x 18 mm Orsiro drug-eluting stent with a good end result. 6.  Lung nodule in the right lower lobe, which is PET positive and is pending biopsy and possible wedge resection. 7.  Hypertension. 8.  Hyperlipidemia, with her recently changed to Crestor 10 mg daily. 9.  CVA 23 years ago. 10.  Bilateral DVT, currently on Eliquis because of factor V Leiden mutation. PLAN:  1. We will refer her to cardiac rehab. 2.  Hold Eliquis 3 days prior to her lung resection. 3.  Hold Plavix shortest time possible, hopefully no more than 4 to 5 days for her biopsy of her lung. 4.  We will meet with her in 6 months. DISCUSSION:  Mrs. Parks overall is doing well. She feels that she has less shortness of breath and is able to walk farther since she has had aortic valve replaced. Her angioplasty went well. I did review the film and she has had a nice result. She will start cardiac rehab here in [place]. She does have a lung biopsy pending. She will have to hold her Eliquis 3 days. Plavix will need to be held for the shortest time possible that the interventional radiologist is comfortable with since she just had angioplasty of her LAD on 04/05. She, however, needs the biopsy to continue the workup since it is PET/CT positive. From a cardiac standpoint, she is doing very well and is asymptomatic.     Thank you very much for allowing me the privilege of seeing Mrs. Parks. If you have any questions on my thoughts, please do not hesitate to contact me.     Sincerely,        Deejay Forte    D: 04/11/2022 10:42:10     T: 04/11/2022 10:48:56     [email or web address]  Job#: [id number]   Doc#: [id number]

## 2022-04-13 NOTE — TELEPHONE ENCOUNTER
` Spoke with patient about getting scheduled for cardiac rehab. Pt states she has an appointment in [place] for a biopsy. Pt took our number to call with updates or when she is ready to start. Will reach out later next week.

## 2022-04-18 ENCOUNTER — TELEPHONE (OUTPATIENT)
Dept: CARDIAC REHAB | Age: 77
End: 2022-04-18

## 2022-04-18 NOTE — TELEPHONE ENCOUNTER
Spoke with patient about getting scheduled for cardiac rehab. Patient states she is having a filter placed on Thursday and will be having a biopsy taken at the beginning of May. Patient states she would like to start after then. Informed the patient that we would be in touch in the coming weeks.

## 2022-05-03 ENCOUNTER — OFFICE VISIT (OUTPATIENT)
Dept: FAMILY MEDICINE CLINIC | Age: 77
End: 2022-05-03
Payer: MEDICARE

## 2022-05-03 VITALS
SYSTOLIC BLOOD PRESSURE: 132 MMHG | WEIGHT: 179 LBS | BODY MASS INDEX: 31.71 KG/M2 | TEMPERATURE: 96.9 F | DIASTOLIC BLOOD PRESSURE: 72 MMHG | HEIGHT: 63 IN | OXYGEN SATURATION: 97 % | HEART RATE: 70 BPM

## 2022-05-03 DIAGNOSIS — J40 BRONCHITIS: Primary | ICD-10-CM

## 2022-05-03 PROBLEM — N18.30 CHRONIC RENAL DISEASE, STAGE III (HCC): Status: ACTIVE | Noted: 2022-05-03

## 2022-05-03 PROCEDURE — 99213 OFFICE O/P EST LOW 20 MIN: CPT | Performed by: INTERNAL MEDICINE

## 2022-05-03 PROCEDURE — 1036F TOBACCO NON-USER: CPT | Performed by: INTERNAL MEDICINE

## 2022-05-03 PROCEDURE — 1123F ACP DISCUSS/DSCN MKR DOCD: CPT | Performed by: INTERNAL MEDICINE

## 2022-05-03 PROCEDURE — 1090F PRES/ABSN URINE INCON ASSESS: CPT | Performed by: INTERNAL MEDICINE

## 2022-05-03 PROCEDURE — G8417 CALC BMI ABV UP PARAM F/U: HCPCS | Performed by: INTERNAL MEDICINE

## 2022-05-03 PROCEDURE — G8399 PT W/DXA RESULTS DOCUMENT: HCPCS | Performed by: INTERNAL MEDICINE

## 2022-05-03 PROCEDURE — G8427 DOCREV CUR MEDS BY ELIG CLIN: HCPCS | Performed by: INTERNAL MEDICINE

## 2022-05-03 PROCEDURE — 4040F PNEUMOC VAC/ADMIN/RCVD: CPT | Performed by: INTERNAL MEDICINE

## 2022-05-03 RX ORDER — CEPHALEXIN 500 MG/1
500 CAPSULE ORAL 3 TIMES DAILY
Qty: 21 CAPSULE | Refills: 0 | Status: SHIPPED | OUTPATIENT
Start: 2022-05-03 | End: 2022-05-07

## 2022-05-03 ASSESSMENT — PATIENT HEALTH QUESTIONNAIRE - PHQ9
SUM OF ALL RESPONSES TO PHQ QUESTIONS 1-9: 0
SUM OF ALL RESPONSES TO PHQ9 QUESTIONS 1 & 2: 0
1. LITTLE INTEREST OR PLEASURE IN DOING THINGS: 0
SUM OF ALL RESPONSES TO PHQ QUESTIONS 1-9: 0
2. FEELING DOWN, DEPRESSED OR HOPELESS: 0

## 2022-05-03 ASSESSMENT — ENCOUNTER SYMPTOMS
RHINORRHEA: 0
SHORTNESS OF BREATH: 0
COUGH: 1
CHEST TIGHTNESS: 0
ABDOMINAL PAIN: 0
NAUSEA: 0
BLOOD IN STOOL: 0
SINUS PAIN: 0
SORE THROAT: 1
DIARRHEA: 0
CONSTIPATION: 0

## 2022-05-03 NOTE — PATIENT INSTRUCTIONS
Survey: You may be receiving a survey from [employer] regarding your visit today. You may get this in the mail, through your MyChart or in your email. Please complete the survey to enable us to provide the highest quality of care to you and your family. Please also, mention our names. If you cannot score us as very good (5 Stars) on any question, please feel free to call the office to discuss how we could have made your experience exceptional.      Thank You! MD Jenn El, KRISTI Deutsch, Connor Gaston, BSN RN    [facility]les Talley, [address] [place] [address]      1. Bronchitis  With her upcoming lung surgery I am going to treat with Keflex 500 mg three times a day x 7 days. Derrill Raegan is instructed to return to the clinic if the symptoms continue or worsen. Derrill Raegan  was also instructed to go to the emergency room department if the symptoms significantly worsen before an appointment can be made.

## 2022-05-03 NOTE — PROGRESS NOTES
Glenis Harper (:  1945) is a 69 y.o. female,Established patient, here for evaluation of the following chief complaint(s):  URI (sx's 1 week, cough, sore throat, sob. Sx's worsening.)         ASSESSMENT/PLAN:  1. Bronchitis  -     cephALEXin (KEFLEX) 500 MG capsule; Take 1 capsule by mouth 3 times daily, Disp-21 capsule, R-0Normal      Plan:  1. Bronchitis  With her upcoming lung surgery I am going to treat with Keflex 500 mg three times a day x 7 days. Ema Gold is instructed to return to the clinic if the symptoms continue or worsen. Ema Gold  was also instructed to go to the emergency room department if the symptoms significantly worsen before an appointment can be made. - cephALEXin (KEFLEX) 500 MG capsule; Take 1 capsule by mouth 3 times daily  Dispense: 21 capsule; Refill: 0                 Subjective   SUBJECTIVE/OBJECTIVE:  URI   This is a new problem. The current episode started in the past 7 days. The problem has been gradually worsening. There has been no fever. Associated symptoms include congestion, coughing and a sore throat. Pertinent negatives include no abdominal pain, chest pain, diarrhea, dysuria, ear pain, nausea, neck pain, rhinorrhea, sinus pain or sneezing. She has tried nothing for the symptoms. Review of Systems   Constitutional: Negative. HENT: Positive for congestion and sore throat. Negative for ear pain, rhinorrhea, sinus pain and sneezing. Eyes: Negative for visual disturbance. Respiratory: Positive for cough. Negative for chest tightness and shortness of breath. Cardiovascular: Negative for chest pain and palpitations. Gastrointestinal: Negative for abdominal pain, blood in stool, constipation, diarrhea and nausea. Genitourinary: Negative for difficulty urinating, dysuria, frequency, menstrual problem and urgency. Musculoskeletal: Negative for arthralgias, joint swelling, myalgias and neck pain. Skin: Negative. Neurological: Negative for syncope. Psychiatric/Behavioral: Negative. Objective   Physical Exam  Constitutional:       Appearance: She is well-developed. HENT:      Head: Atraumatic. Mouth/Throat:      Comments: cobblestoning  Eyes:      Conjunctiva/sclera: Conjunctivae normal.   Cardiovascular:      Rate and Rhythm: Normal rate and regular rhythm. Heart sounds: Normal heart sounds. Pulmonary:      Effort: Pulmonary effort is normal.      Breath sounds: Normal breath sounds. Comments: Loose cough  Abdominal:      Palpations: Abdomen is soft. Tenderness: There is no abdominal tenderness. Musculoskeletal:         General: Normal range of motion. Cervical back: Normal range of motion and neck supple. Lymphadenopathy:      Cervical: No cervical adenopathy. Skin:     Findings: No rash. Neurological:      Mental Status: She is alert. Psychiatric:         Behavior: Behavior normal.         Thought Content: Thought content normal.                  An electronic signature was used to authenticate this note.     --Renny Marin MD

## 2022-05-07 ENCOUNTER — TELEPHONE (OUTPATIENT)
Dept: FAMILY MEDICINE CLINIC | Age: 77
End: 2022-05-07

## 2022-05-07 RX ORDER — DOXYCYCLINE HYCLATE 100 MG
100 TABLET ORAL 2 TIMES DAILY
Qty: 14 TABLET | Refills: 0 | Status: SHIPPED | OUTPATIENT
Start: 2022-05-07 | End: 2022-05-14

## 2022-05-07 RX ORDER — BENZONATATE 100 MG/1
100 CAPSULE ORAL 3 TIMES DAILY PRN
Qty: 30 CAPSULE | Refills: 0 | Status: SHIPPED | OUTPATIENT
Start: 2022-05-07 | End: 2022-06-06

## 2022-06-08 ENCOUNTER — HOSPITAL ENCOUNTER (OUTPATIENT)
Age: 77
Discharge: HOME | End: 2022-06-08
Payer: MEDICARE

## 2022-06-08 DIAGNOSIS — E78.2 MIXED HYPERLIPIDEMIA: ICD-10-CM

## 2022-06-08 DIAGNOSIS — I35.0 NONRHEUMATIC AORTIC VALVE STENOSIS: ICD-10-CM

## 2022-06-08 DIAGNOSIS — E55.9 VITAMIN D DEFICIENCY: ICD-10-CM

## 2022-06-08 DIAGNOSIS — E78.00 HYPERCHOLESTEROLEMIA: ICD-10-CM

## 2022-06-08 DIAGNOSIS — I10 ESSENTIAL HYPERTENSION: ICD-10-CM

## 2022-06-08 LAB
ALBUMIN SERPL-MCNC: 3.7 G/DL (ref 3.5–5.2)
ALP BLD-CCNC: 109 U/L (ref 35–104)
ALT SERPL-CCNC: 8 U/L (ref 5–33)
AST SERPL-CCNC: 13 U/L
BILIRUB SERPL-MCNC: 0.47 MG/DL (ref 0.3–1.2)
BILIRUBIN DIRECT: <0.08 MG/DL
BILIRUBIN, INDIRECT: ABNORMAL MG/DL (ref 0–1)
CHOLESTEROL/HDL RATIO: 3.4
CHOLESTEROL: 147 MG/DL
HDLC SERPL-MCNC: 43 MG/DL
LDL CHOLESTEROL: 74 MG/DL (ref 0–130)
PATIENT FASTING?: YES
TOTAL PROTEIN: 6.5 G/DL (ref 6.4–8.3)
TRIGL SERPL-MCNC: 151 MG/DL

## 2022-06-08 PROCEDURE — 80076 HEPATIC FUNCTION PANEL: CPT

## 2022-06-08 PROCEDURE — 36415 COLL VENOUS BLD VENIPUNCTURE: CPT

## 2022-06-08 PROCEDURE — 80061 LIPID PANEL: CPT

## 2022-06-09 ENCOUNTER — TELEPHONE (OUTPATIENT)
Dept: CARDIAC REHAB | Age: 77
End: 2022-06-09

## 2022-06-09 NOTE — TELEPHONE ENCOUNTER
Phone call placed to this prospective cardiac rehab patient that has been unable to schedule purportedly d/t having a filter placed earlier stating this is to be done next week. Also states was told to wait a month before starting rehab following the aforesaid. She cites she intends to enroll in rehab once cleared. She agrees to take down our direct line number and call back when ready to schedule. She is currently scheduled to see Dr. Jayme Castro on 10/4/22.

## 2022-06-21 ENCOUNTER — OFFICE VISIT (OUTPATIENT)
Dept: FAMILY MEDICINE CLINIC | Age: 77
End: 2022-06-21
Payer: MEDICARE

## 2022-06-21 VITALS
HEIGHT: 63 IN | SYSTOLIC BLOOD PRESSURE: 138 MMHG | WEIGHT: 177 LBS | HEART RATE: 48 BPM | BODY MASS INDEX: 31.36 KG/M2 | DIASTOLIC BLOOD PRESSURE: 75 MMHG

## 2022-06-21 DIAGNOSIS — Z00.00 MEDICARE ANNUAL WELLNESS VISIT, SUBSEQUENT: Primary | ICD-10-CM

## 2022-06-21 PROCEDURE — G0439 PPPS, SUBSEQ VISIT: HCPCS | Performed by: INTERNAL MEDICINE

## 2022-06-21 PROCEDURE — 1123F ACP DISCUSS/DSCN MKR DOCD: CPT | Performed by: INTERNAL MEDICINE

## 2022-06-21 SDOH — ECONOMIC STABILITY: FOOD INSECURITY: WITHIN THE PAST 12 MONTHS, YOU WORRIED THAT YOUR FOOD WOULD RUN OUT BEFORE YOU GOT MONEY TO BUY MORE.: NEVER TRUE

## 2022-06-21 SDOH — ECONOMIC STABILITY: FOOD INSECURITY: WITHIN THE PAST 12 MONTHS, THE FOOD YOU BOUGHT JUST DIDN'T LAST AND YOU DIDN'T HAVE MONEY TO GET MORE.: NEVER TRUE

## 2022-06-21 ASSESSMENT — PATIENT HEALTH QUESTIONNAIRE - PHQ9
SUM OF ALL RESPONSES TO PHQ9 QUESTIONS 1 & 2: 0
1. LITTLE INTEREST OR PLEASURE IN DOING THINGS: 0
SUM OF ALL RESPONSES TO PHQ QUESTIONS 1-9: 0
2. FEELING DOWN, DEPRESSED OR HOPELESS: 0
SUM OF ALL RESPONSES TO PHQ QUESTIONS 1-9: 0

## 2022-06-21 ASSESSMENT — LIFESTYLE VARIABLES: HOW OFTEN DO YOU HAVE A DRINK CONTAINING ALCOHOL: NEVER

## 2022-06-21 ASSESSMENT — SOCIAL DETERMINANTS OF HEALTH (SDOH): HOW HARD IS IT FOR YOU TO PAY FOR THE VERY BASICS LIKE FOOD, HOUSING, MEDICAL CARE, AND HEATING?: NOT HARD AT ALL

## 2022-06-21 NOTE — PROGRESS NOTES
Medicare Annual Wellness Visit    Siva Feng is here for Medicare AWV and Health Maintenance (items reviewed)       Diagnosis Orders   1. Medicare annual wellness visit, subsequent         1. Medicare annual wellness visit, subsequent  See below concerns and discussion    This encounter was performed under myAudi MDs, direct supervision, 6/21/2022. Advance Care Planning     General Advance Care Planning (ACP) Conversation    Date of Conversation: 6/21/2022  Conducted with: Patient with Decision Making Capacity    Healthcare Decision Maker:    Primary Decision Maker: Maite Coffman - Spouse - [phone]    Secondary Decision Maker: Chrystal  - Child - [phone]  Click here to complete Healthcare Decision Makers including selection of the Healthcare Decision Maker Relationship (ie \"Primary\"). Today we documented Decision Maker(s) consistent with Legal Next of Kin hierarchy. Content/Action Overview:  Recommended seeing a  for ACP docs  Reviewed DNR/DNI and patient elects Full Code (Attempt Resuscitation)  full code  no new order needed    Length of Voluntary ACP Conversation in minutes:  <16 minutes (Non-Billable)    Miguel Cha RN           Assessment & Plan   Medicare annual wellness visit, subsequent      Recommendations for Preventive Services Due: see orders and patient instructions/AVS.  Recommended screening schedule for the next 5-10 years is provided to the patient in written form: see Patient Instructions/AVS.     No follow-ups on file. Subjective       Patient's complete Health Risk Assessment and screening values have been reviewed and are found in Flowsheets. The following problems were reviewed today and where indicated follow up appointments were made and/or referrals ordered.     Positive Risk Factor Screenings with Interventions:             General Health and ACP:  General  In general, how would you say your health is?: Very Good  In the past 7 days, have you experienced any of the following: New or Increased Pain, New or Increased Fatigue, Loneliness, Social Isolation, Stress or Anger?: (!) Yes (recent hospital procedure causes pain)  Select all that apply: (!) New or Increased Pain  Do you get the social and emotional support that you need?: Yes  Do you have a Living Will?: (!) No    Advance Directives     Power of  Living Will ACP-Advance Directive ACP-Power of     Not on File Not on File Not on File Not on File      General Health Risk Interventions:  · concerns with pain, from recent hospital procedure, planned to do physical therapy program    Health Habits/Nutrition:     Physical Activity: Inactive    Days of Exercise per Week: 0 days    Minutes of Exercise per Session: 0 min     Have you lost any weight without trying in the past 3 months?: No  Body mass index: (!) 31.35  Have you seen the dentist within the past year?: N/A - wear dentures    Health Habits/Nutrition Interventions:  · Patient will increase physical therapy when she recovers from recent hospital procedure she will do exercise    Hearing/Vision:  Do you or your family notice any trouble with your hearing that hasn't been managed with hearing aids?: (!) Yes  Do you have difficulty driving, watching TV, or doing any of your daily activities because of your eyesight?: No  Have you had an eye exam within the past year?: Yes  No exam data present    Hearing/Vision Interventions:  · Hearing concerns:  recommended going to [place] in [facility] for hearing exam            Objective   Vitals:    06/21/22 1426   BP: 138/75   Site: Right Upper Arm   Position: Sitting   Cuff Size: Large Adult   Pulse: (!) 48   Weight: 177 lb (80.3 kg)   Height: 5' 3\" (1.6 m)      Body mass index is 31.35 kg/m². Allergies   Allergen Reactions    Bactrim Ds [Sulfamethoxazole-Trimethoprim] Other (See Comments)     Elevation of creatinine on Bactrim DS.     Tape [place] Tape] Rash     Prior to Visit Medications    Medication Sig Taking? Authorizing Provider   clopidogrel (PLAVIX) 75 MG tablet Take 75 mg by mouth daily  Historical Provider, MD   rosuvastatin (CRESTOR) 10 MG tablet Take 1 tablet by mouth nightly  Audi Young MD   linaCLOtide (LINZESS) 72 MCG CAPS capsule Take 1 capsule by mouth every morning (before breakfast)  Audi Young MD   olmesartan-hydroCHLOROthiazide (BENICAR HCT) 20-12.5 MG per tablet TAKE 1 TABLET BY MOUTH EVERY DAY  Audi Young MD   apixaban (ELIQUIS) 5 MG TABS tablet Take by mouth 2 times daily  Historical Provider, MD   triamcinolone (KENALOG) 0.1 % cream Apply topically 2 times daily  Audi Young MD   divalproex (DEPAKOTE) 500 MG DR tablet Take 500 mg by mouth daily   Historical Provider, MD   albuterol sulfate HFA (PROAIR HFA) 108 (90 Base) MCG/ACT inhaler Inhale 2 puffs into the lungs every 4 hours as needed for Wheezing or Shortness of Breath Inhale 2 puffs 4 times daily  Audi Young MD   acetaminophen (TYLENOL) 500 MG tablet Take 500 mg by mouth as needed for Pain  Historical Provider, MD Aj (Including outside providers/suppliers regularly involved in providing care):   Patient Care Team:  Elias Jeff MD as PCP - General (Internal Medicine)  Elias Jeff MD as PCP - REHABILITATION [facility] Empaneled Provider  Eloisa Finney MD as Consulting Physician (Neurology)  Kyler Fuentes MD as Consulting Physician (Cardiology)  Salome Burnham MD as Consulting Physician (Urology)     Reviewed and updated this visit:  Tobacco  Allergies  Meds  Med Hx  Surg Hx  Soc Hx  Fam Hx              I, Suzanna Moss RN, 6/21/2022, performed the documented evaluation under the direct supervision of the attending physician.

## 2022-06-21 NOTE — PATIENT INSTRUCTIONS
Personalized Preventive Plan for Lamine Finn - 6/21/2022  Medicare offers a range of preventive health benefits. Some of the tests and screenings are paid in full while other may be subject to a deductible, co-insurance, and/or copay. Some of these benefits include a comprehensive review of your medical history including lifestyle, illnesses that may run in your family, and various assessments and screenings as appropriate. After reviewing your medical record and screening and assessments performed today your provider may have ordered immunizations, labs, imaging, and/or referrals for you. A list of these orders (if applicable) as well as your Preventive Care list are included within your After Visit Summary for your review. Other Preventive Recommendations:    · A preventive eye exam performed by an eye specialist is recommended every 1-2 years to screen for glaucoma; cataracts, macular degeneration, and other eye disorders. · A preventive dental visit is recommended every 6 months. · Try to get at least 150 minutes of exercise per week or 10,000 steps per day on a pedometer . · Order or download the FREE \"Exercise & Physical Activity: Your Everyday Guide\" from [employer]. Call 2-920.849.7299 or search [employer] online. · You need 4082-8317 mg of calcium and 2092-8523 IU of vitamin D per day. It is possible to meet your calcium requirement with diet alone, but a vitamin D supplement is usually necessary to meet this goal.  · When exposed to the sun, use a sunscreen that protects against both UVA and UVB radiation with an SPF of 30 or greater. Reapply every 2 to 3 hours or after sweating, drying off with a towel, or swimming. · Always wear a seat belt when traveling in a car. Always wear a helmet when riding a bicycle or motorcycle.

## 2022-07-08 ENCOUNTER — OFFICE VISIT (OUTPATIENT)
Dept: FAMILY MEDICINE CLINIC | Age: 77
End: 2022-07-08
Payer: MEDICARE

## 2022-07-08 VITALS
WEIGHT: 172 LBS | HEART RATE: 81 BPM | HEIGHT: 63 IN | DIASTOLIC BLOOD PRESSURE: 72 MMHG | BODY MASS INDEX: 30.48 KG/M2 | SYSTOLIC BLOOD PRESSURE: 116 MMHG

## 2022-07-08 DIAGNOSIS — J43.9 PULMONARY EMPHYSEMA, UNSPECIFIED EMPHYSEMA TYPE (HCC): ICD-10-CM

## 2022-07-08 DIAGNOSIS — Z12.31 OTHER SCREENING MAMMOGRAM: ICD-10-CM

## 2022-07-08 DIAGNOSIS — E78.2 MIXED HYPERLIPIDEMIA: ICD-10-CM

## 2022-07-08 DIAGNOSIS — G40.909 SEIZURE DISORDER (HCC): ICD-10-CM

## 2022-07-08 DIAGNOSIS — I26.93 SINGLE SUBSEGMENTAL PULMONARY EMBOLISM WITHOUT ACUTE COR PULMONALE (HCC): ICD-10-CM

## 2022-07-08 DIAGNOSIS — I10 PRIMARY HYPERTENSION: Primary | ICD-10-CM

## 2022-07-08 DIAGNOSIS — E55.9 VITAMIN D DEFICIENCY: ICD-10-CM

## 2022-07-08 DIAGNOSIS — I35.0 NONRHEUMATIC AORTIC VALVE STENOSIS: ICD-10-CM

## 2022-07-08 DIAGNOSIS — I25.10 CORONARY ARTERY DISEASE INVOLVING NATIVE CORONARY ARTERY OF NATIVE HEART WITHOUT ANGINA PECTORIS: ICD-10-CM

## 2022-07-08 PROBLEM — N18.30 CHRONIC RENAL DISEASE, STAGE III (HCC): Status: RESOLVED | Noted: 2022-05-03 | Resolved: 2022-07-08

## 2022-07-08 PROCEDURE — 3023F SPIROM DOC REV: CPT | Performed by: INTERNAL MEDICINE

## 2022-07-08 PROCEDURE — 1090F PRES/ABSN URINE INCON ASSESS: CPT | Performed by: INTERNAL MEDICINE

## 2022-07-08 PROCEDURE — 1123F ACP DISCUSS/DSCN MKR DOCD: CPT | Performed by: INTERNAL MEDICINE

## 2022-07-08 PROCEDURE — G8427 DOCREV CUR MEDS BY ELIG CLIN: HCPCS | Performed by: INTERNAL MEDICINE

## 2022-07-08 PROCEDURE — G8399 PT W/DXA RESULTS DOCUMENT: HCPCS | Performed by: INTERNAL MEDICINE

## 2022-07-08 PROCEDURE — G8417 CALC BMI ABV UP PARAM F/U: HCPCS | Performed by: INTERNAL MEDICINE

## 2022-07-08 PROCEDURE — 1036F TOBACCO NON-USER: CPT | Performed by: INTERNAL MEDICINE

## 2022-07-08 PROCEDURE — 99214 OFFICE O/P EST MOD 30 MIN: CPT | Performed by: INTERNAL MEDICINE

## 2022-07-08 ASSESSMENT — ENCOUNTER SYMPTOMS
ABDOMINAL PAIN: 0
CONSTIPATION: 0
NAUSEA: 0
BLOOD IN STOOL: 0
RHINORRHEA: 0
COUGH: 0
DIARRHEA: 0
SHORTNESS OF BREATH: 0
CHEST TIGHTNESS: 0
SORE THROAT: 0

## 2022-07-08 NOTE — PROGRESS NOTES
Vanessa Low (:  1945) is a 69 y.o. female,Established patient, here for evaluation of the following chief complaint(s):  Hypertension (check up), Hyperlipidemia, and Health Maintenance (items reviewed)         ASSESSMENT/PLAN:  1. Primary hypertension  2. Other screening mammogram  -     TERENCE RASHAWN DIGITAL SCREEN BILATERAL; Future  3. Pulmonary emphysema, unspecified emphysema type ([address]  4. Seizure disorder ([address]  5. Vitamin D deficiency  6. Mixed hyperlipidemia  7. Single subsegmental pulmonary embolism without acute cor pulmonale (HCC)  8. Coronary artery disease involving native coronary artery of native heart without angina pectoris  9. Nonrheumatic aortic valve stenosis      Plan:  1. Other screening mammogram  Set up screening mammogram.  - TERENCE RASHAWN DIGITAL SCREEN BILATERAL; Future    2. Primary hypertension  Controlled on Beicar HCT. No change in treatment. 3. Pulmonary emphysema, unspecified emphysema type (HCC)  Uses Albuterol PRN which works well. 4. Seizure disorder ([address]  Follows with Dr. Sabine Beard in Neurology. Continues on Depakote. 5. Vitamin D deficiency  Controlled on Vitamin D replacement. 6. Mixed hyperlipidemia  Controlled on Crestor. No change in treatment. 7. Single subsegmental pulmonary embolism without acute cor pulmonale (HCC)  Recommend Eliquis for 3 months. Will check on the duration of treatment. 8. Coronary artery disease involving native coronary artery of native heart without angina pectoris  Follows with Dr. Lazaro Streeter. S/P PTCA and stent. No chest pain. 9. Nonrheumatic aortic valve stenosis  S/P TAVR procedure. Doing well. Zhanna Martinez was instructed to follow up in the clinic in 6 months for check up or as needed with any medical issues. Subjective   SUBJECTIVE/OBJECTIVE:  Zhanna Martinez presents for a check up on her medical conditions HTN, Hyperlipidemia, constipation.   Zhanna Martinez admits to new problems (pain in the left side after surgery). Medications were reviewed with Rip Peters, she is  tolerating the medication. Bowels are regular. There has not been rectal bleeding. Rip Peters denies urinary complications, the urine stream is good. Rip Peters denies chest pain and denies increasing shortness of breath. Labs from 6/22 reviewed. Past Medical History:  No date:  Aortic stenosis  No date: Arthritis  No date: Benign cyst of right breast in female  No date: Cardiac murmur  No date: Cataract  8/13: Cerebral brain hemorrhage [facility])      Comment:  Right thalamic  5/3/2022: Chronic renal disease, stage III [facility]) ([id number])  4/7/2022: Coronary artery disease involving native coronary artery of   native heart without angina pectoris  2016: Deep vein blood clot of left lower extremity (HCC)  No date: Diverticulosis  No date: Head injury  No date: Hx of blood clots  No date: Hyperlipidemia  No date: Hypertension  No date: Migraine headache  10/5/2020: Pulmonary emphysema (HCC)  No date: Seizures (HCC)  No date: Stroke (cerebrum) ([facility] [address]  No date: Unspecified cerebral artery occlusion with cerebral   infarction      Comment:  TIA    Past Surgical History:  No date: BLEPHAROPLASTY  No date: BREAST SURGERY      Comment:  b/l breast cyst removal   No date: CATARACT REMOVAL  2009: COLONOSCOPY  8/28/2018:  COLONOSCOPY BIOPSY/STOMA; N/A      Comment:  COLONOSCOPY BIOPSY/STOMA, Dx: External Hemorrhoids and                Severe Diverticulosis performed by Kelvin Mendez MD at [address] [place] [address]  No date: HYSTERECTOMY (CERVIX STATUS UNKNOWN)  No date: MAMMO IMPLANT DIGITAL DIAG BI  No date: TUBAL LIGATION    Social History    Socioeconomic History      Marital status:       Spouse name: Not on file      Number of children: Not on file      Years of education: Not on file      Highest education level: Not on file    Occupational History      Occupation: retired    Tobacco Use      Smoking status: Former Smoker        Packs/day: 1.00        Years: 30.00 Pack years: 27        Quit date: 2005        Years since quittin.5      Smokeless tobacco: Never Used    Vaping Use      Vaping Use: Never used    Substance and Sexual Activity      Alcohol use: No      Drug use: No      Sexual activity: Not on file    Other Topics      Concerns:        Not on file    Social History Narrative      Not on file    Social Determinants of Health  Financial Resource Strain: Low Risk       Difficulty of Paying Living Expenses: Not hard at all  Food Insecurity: No Food Insecurity      Worried About [facility] in the Last Year: Never true      Ran Out of Food in the Last Year: Never true  Transportation Needs:       Lack of Transportation (Medical): Not on file      Lack of Transportation (Non-Medical):  Not on file  Physical Activity: Inactive      Days of Exercise per Week: 0 days      Minutes of Exercise per Session: 0 min  Stress:       Feeling of Stress : Not on file  Social Connections:       Frequency of Communication with Friends and Family: Not on file      Frequency of Social Gatherings with Friends and Family: Not on file      Attends Worship Services: Not on file      Active Member of Clubs or Organizations: Not on file      Attends Club or Organization Meetings: Not on file      Marital Status: Not on file  Intimate Partner Violence:       Fear of Current or Ex-Partner: Not on file      Emotionally Abused: Not on file      Physically Abused: Not on file      Sexually Abused: Not on file  Housing Stability:       Unable to Pay for Housing in the Last Year: Not on file      Number of Places Lived in the Last Year: Not on file      Unstable Housing in the Last Year: Not on file    Review of patient's family history indicates:  Problem: Breast Cancer      Relation: Sister          Age of Onset: (Not Specified)  Problem: Diabetes      Relation: Sister          Age of Onset: (Not Specified)  Problem: Migraines      Relation: Sister          Age of Onset: (Not Specified)  Problem: Clotting Disorder      Relation: Mother          Age of Onset: (Not Specified)  Problem: Diabetes      Relation: Mother          Age of Onset: (Not Specified)  Problem: Hypertension      Relation: Mother          Age of Onset: (Not Specified)  Problem: Migraines      Relation: Mother          Age of Onset: (Not Specified)  Problem: Heart Attack      Relation: Father          Age of Onset: (Not Specified)  Problem: Hypertension      Relation: Father          Age of Onset: (Not Specified)  Problem: Diabetes      Relation: Father          Age of Onset: (Not Specified)  Problem: Heart Disease      Relation: Brother          Age of Onset: (Not Specified)  Problem: Diabetes      Relation: Brother          Age of Onset: (Not Specified)  Problem: Migraines      Relation: Brother          Age of Onset: (Not Specified)  Problem: Hypertension      Relation: Maternal Grandmother          Age of Onset: (Not Specified)  Problem: Hypertension      Relation: Maternal Grandfather          Age of Onset: (Not Specified)  Problem: Hypertension      Relation: Paternal Grandmother          Age of Onset: (Not Specified)  Problem: Hypertension      Relation: Paternal Grandfather          Age of Onset: (Not Specified)      Current Outpatient Medications on File Prior to Visit:  clopidogrel (PLAVIX) 75 MG tablet, Take 75 mg by mouth daily, Disp: , Rfl:   rosuvastatin (CRESTOR) 10 MG tablet, Take 1 tablet by mouth nightly, Disp: 90 tablet, Rfl: 1  linaCLOtide (LINZESS) 72 MCG CAPS capsule, Take 1 capsule by mouth every morning (before breakfast), Disp: 90 capsule, Rfl: 1  olmesartan-hydroCHLOROthiazide (BENICAR HCT) 20-12.5 MG per tablet, TAKE 1 TABLET BY MOUTH EVERY DAY, Disp: 90 tablet, Rfl: 1  apixaban (ELIQUIS) 5 MG TABS tablet, Take by mouth 2 times daily, Disp: , Rfl:   triamcinolone (KENALOG) 0.1 % cream, Apply topically 2 times daily, Disp: 1 Tube, Rfl: 5  divalproex (DEPAKOTE) 500 MG DR tablet, Take 500 mg by mouth daily , Disp: , Rfl:   albuterol sulfate HFA (PROAIR HFA) 108 (90 Base) MCG/ACT inhaler, Inhale 2 puffs into the lungs every 4 hours as needed for Wheezing or Shortness of Breath Inhale 2 puffs 4 times daily, Disp: 1 Inhaler, Rfl: 3  acetaminophen (TYLENOL) 500 MG tablet, Take 500 mg by mouth as needed for Pain, Disp: , Rfl:     No current facility-administered medications on file prior to visit.        -- Bactrim Ds (Sulfamethoxazole-Trimethoprim) -- Other (See Comments)    --  Elevation of creatinine on Bactrim DS.   -- Tape (Adhesive Tape) -- Rash      Lab Results       Component                Value               Date                       NA                       138                 04/06/2022                 K                        3.7                 04/06/2022                 CL                       101                 04/06/2022                 CO2                      27                  04/06/2022                 BUN                      19                  04/06/2022                 CREATININE               0.97 (H)            04/06/2022                 GLUCOSE                  105 (H)             04/06/2022                 CALCIUM                  9.0                 04/06/2022                 PROT                     6.5                 06/08/2022                 LABALBU                  3.7                 06/08/2022                 BILITOT                  0.47                06/08/2022                 ALKPHOS                  109 (H)             06/08/2022                 AST                      13                  06/08/2022                 ALT                      8                   06/08/2022                 LABGLOM                  56 (L)              04/06/2022                 GFRAA                    >60                 04/06/2022              Lab Results       Component                Value               Date                       LABA1C                   5.5 10/05/2020            Lab Results       Component                Value               Date                       EAG                      120                 04/01/2017              Lab Results       Component                Value               Date                       CHOL                     147                 06/08/2022                 CHOL                     187                 04/06/2022                 CHOL                     183                 09/13/2021            Lab Results       Component                Value               Date                       TRIG                     151 (H)             06/08/2022                 TRIG                     117                 04/06/2022                 TRIG                     109                 09/13/2021            Lab Results       Component                Value               Date                       HDL                      43                  06/08/2022                 HDL                      46                  04/06/2022                 HDL                      44                  09/13/2021            Lab Results       Component                Value               Date                       LDLCHOLESTEROL           74                  06/08/2022                 LDLCHOLESTEROL           118                 04/06/2022                 LDLCHOLESTEROL           117                 09/13/2021                 LDLCALC                  83                  08/06/2013            Lab Results       Component                Value               Date                       VLDL                     NOT REPORTED        09/13/2021                 VLDL                     NOT REPORTED        09/15/2020                 VLDL                     NOT REPORTED        09/12/2019            Lab Results       Component                Value               Date                       CHOLHDLRATIO             3.4                 06/08/2022                 CHOLHDLRATIO             4.1 04/06/2022                 CHOLHDLRATIO             4.2                 09/13/2021                              Hypertension  This is a chronic problem. The current episode started more than 1 year ago. The problem is unchanged. The problem is controlled. Pertinent negatives include no chest pain, neck pain, palpitations or shortness of breath. Past treatments include diuretics and angiotensin blockers. The current treatment provides significant improvement. There are no compliance problems. There is no history of angina. Hyperlipidemia  This is a chronic problem. The current episode started more than 1 year ago. The problem is controlled. Recent lipid tests were reviewed and are normal. Pertinent negatives include no chest pain, myalgias or shortness of breath. Current antihyperlipidemic treatment includes statins. The current treatment provides significant improvement of lipids. There are no compliance problems. Review of Systems   Constitutional: Negative. HENT: Negative for congestion, ear pain, rhinorrhea, sneezing and sore throat. Eyes: Negative for visual disturbance. Respiratory: Negative for cough, chest tightness and shortness of breath. Cardiovascular: Negative for chest pain and palpitations. Right side chest pain after surgery. Gastrointestinal: Negative for abdominal pain, blood in stool, constipation, diarrhea and nausea. Genitourinary: Negative for difficulty urinating, dysuria, frequency, menstrual problem and urgency. Musculoskeletal: Negative for arthralgias, joint swelling, myalgias and neck pain. Skin: Negative. Neurological: Negative for syncope. Psychiatric/Behavioral: Negative. Objective   Physical Exam  Constitutional:       Appearance: She is well-developed. HENT:      Head: Atraumatic. Eyes:      Conjunctiva/sclera: Conjunctivae normal.   Cardiovascular:      Rate and Rhythm: Normal rate and regular rhythm.       Heart sounds: Murmur heard. Pulmonary:      Effort: Pulmonary effort is normal.      Breath sounds: Normal breath sounds. Abdominal:      Palpations: Abdomen is soft. Tenderness: There is no abdominal tenderness. Musculoskeletal:         General: Normal range of motion. Cervical back: Normal range of motion and neck supple. Lymphadenopathy:      Cervical: No cervical adenopathy. Skin:     Findings: No rash. Neurological:      Mental Status: She is alert. Psychiatric:         Behavior: Behavior normal.         Thought Content: Thought content normal.                  An electronic signature was used to authenticate this note.     --Bubba Vela MD

## 2022-07-09 ENCOUNTER — TELEPHONE (OUTPATIENT)
Dept: FAMILY MEDICINE CLINIC | Age: 77
End: 2022-07-09

## 2022-07-09 RX ORDER — PREDNISONE 20 MG/1
40 TABLET ORAL DAILY
Qty: 10 TABLET | Refills: 0 | Status: SHIPPED | OUTPATIENT
Start: 2022-07-09 | End: 2022-07-14

## 2022-07-13 ENCOUNTER — HOSPITAL ENCOUNTER (OUTPATIENT)
Dept: MAMMOGRAPHY | Age: 77
Discharge: HOME | End: 2022-07-15
Payer: MEDICARE

## 2022-07-13 DIAGNOSIS — Z12.31 OTHER SCREENING MAMMOGRAM: ICD-10-CM

## 2022-07-13 PROCEDURE — 77063 BREAST TOMOSYNTHESIS BI: CPT

## 2022-07-25 ENCOUNTER — HOSPITAL ENCOUNTER (OUTPATIENT)
Dept: CARDIAC REHAB | Age: 77
Setting detail: THERAPIES SERIES
Discharge: HOME | End: 2022-07-25
Payer: MEDICARE

## 2022-07-25 ENCOUNTER — HOSPITAL ENCOUNTER (OUTPATIENT)
Age: 77
Discharge: HOME | End: 2022-07-25
Payer: MEDICARE

## 2022-07-25 LAB
ABSOLUTE EOS #: 0.1 K/UL (ref 0–0.4)
ABSOLUTE LYMPH #: 1.4 K/UL (ref 1–4.8)
ABSOLUTE MONO #: 0.6 K/UL (ref 0–1)
ALBUMIN SERPL-MCNC: 3.8 G/DL (ref 3.5–5.2)
ALP BLD-CCNC: 56 U/L (ref 35–104)
ALT SERPL-CCNC: 9 U/L (ref 5–33)
ANION GAP SERPL CALCULATED.3IONS-SCNC: 8 MMOL/L (ref 9–17)
AST SERPL-CCNC: 14 U/L
BASOPHILS # BLD: 0 % (ref 0–2)
BASOPHILS ABSOLUTE: 0 K/UL (ref 0–0.2)
BILIRUB SERPL-MCNC: 0.38 MG/DL (ref 0.3–1.2)
BUN BLDV-MCNC: 20 MG/DL (ref 8–23)
BUN/CREAT BLD: 18 (ref 9–20)
CALCIUM SERPL-MCNC: 8.5 MG/DL (ref 8.6–10.4)
CHLORIDE BLD-SCNC: 103 MMOL/L (ref 98–107)
CO2: 30 MMOL/L (ref 20–31)
CREAT SERPL-MCNC: 1.12 MG/DL (ref 0.5–0.9)
DIFFERENTIAL TYPE: YES
EOSINOPHILS RELATIVE PERCENT: 1 % (ref 0–5)
GFR AFRICAN AMERICAN: 57 ML/MIN
GFR NON-AFRICAN AMERICAN: 47 ML/MIN
GFR SERPL CREATININE-BSD FRML MDRD: ABNORMAL ML/MIN/{1.73_M2}
GLUCOSE BLD-MCNC: 111 MG/DL (ref 70–99)
HCT VFR BLD CALC: 34.4 % (ref 36–46)
HEMOGLOBIN: 11.5 G/DL (ref 12–16)
LYMPHOCYTES # BLD: 27 % (ref 15–40)
MCH RBC QN AUTO: 29.8 PG (ref 26–34)
MCHC RBC AUTO-ENTMCNC: 33.4 G/DL (ref 31–37)
MCV RBC AUTO: 89.2 FL (ref 80–100)
MONOCYTES # BLD: 12 % (ref 4–8)
PDW BLD-RTO: 14.2 % (ref 12.1–15.2)
PLATELET # BLD: 108 K/UL (ref 140–450)
POTASSIUM SERPL-SCNC: 4.3 MMOL/L (ref 3.7–5.3)
RBC # BLD: 3.85 M/UL (ref 4–5.2)
SEG NEUTROPHILS: 60 % (ref 47–75)
SEGMENTED NEUTROPHILS ABSOLUTE COUNT: 3.1 K/UL (ref 2.5–7)
SODIUM BLD-SCNC: 141 MMOL/L (ref 135–144)
TOTAL PROTEIN: 6.1 G/DL (ref 6.4–8.3)
VALPROIC ACID LEVEL: 21 UG/ML (ref 50–125)
VALPROIC DATE LAST DOSE: ABNORMAL
VALPROIC DOSE AMOUNT: ABNORMAL
VALPROIC TIME LAST DOSE: ABNORMAL
WBC # BLD: 5.2 K/UL (ref 3.5–11)

## 2022-07-25 PROCEDURE — 85025 COMPLETE CBC W/AUTO DIFF WBC: CPT

## 2022-07-25 PROCEDURE — 93798 PHYS/QHP OP CAR RHAB W/ECG: CPT

## 2022-07-25 PROCEDURE — 80053 COMPREHEN METABOLIC PANEL: CPT

## 2022-07-25 PROCEDURE — 36415 COLL VENOUS BLD VENIPUNCTURE: CPT

## 2022-07-25 PROCEDURE — 80164 ASSAY DIPROPYLACETIC ACD TOT: CPT

## 2022-07-25 NOTE — PROGRESS NOTES
Cardiac Rehab Initial History and Assessment    Poyntelle Cassette   1945  [id number]  7/25/2022    Primary Diagnosis: S/P Aortic Valve Replacement ON 3/2/22 AND Percutaneous Coronary Intervention ON 4/5/22  Living Will: [] Yes   [x] No  On File: [] Yes   [x] No   [] N/A  Durable Power of : [] Yes   [x] No    Medical History  Past Medical History:   Diagnosis Date    Aortic stenosis     Arthritis     Benign cyst of right breast in female     Cardiac murmur     Cataract     Cerebral brain hemorrhage ([facility] [address] 8/13    Right thalamic    Chronic renal disease, stage III [facility]) [[id number]] 5/3/2022    Coronary artery disease involving native coronary artery of native heart without angina pectoris 4/7/2022    Deep vein blood clot of left lower extremity ([address] 2016    Diverticulosis     Head injury     Hx of blood clots     Hyperlipidemia     Hypertension     Migraine headache     Pulmonary emphysema ([address] 10/5/2020    Seizures ([address]     Stroke (cerebrum) (HCC)     Unspecified cerebral artery occlusion with cerebral infarction     TIA     Past Surgical History:   Procedure Laterality Date    BLEPHAROPLASTY      BREAST SURGERY      b/l breast cyst removal     CATARACT REMOVAL      COLONOSCOPY  2009     COLONOSCOPY BIOPSY/STOMA N/A 8/28/2018    COLONOSCOPY BIOPSY/STOMA, Dx: External Hemorrhoids and Severe Diverticulosis performed by Xochitl Rushing MD at [address] ([address] [place] [address])      MAMMO IMPLANT DIGITAL DIAG BI      TUBAL LIGATION         Family History  Family History   Problem Relation Age of Onset    Breast Cancer Sister     Diabetes Sister     Migraines Sister     Clotting Disorder Mother     Diabetes Mother     Hypertension Mother     Migraines Mother     Heart Attack Father     Hypertension Father     Diabetes Father     Heart Disease Brother     Diabetes Brother     Migraines Brother     Hypertension Maternal Grandmother     Hypertension Maternal Grandfather     Hypertension Paternal Grandmother     Hypertension Paternal Grandfather          Symptoms:  1. Angina   [] None                                  [x] Loss of Energy / Fatigue   [] Tightness   [x] Shortness of Breath   [] Pressure    [] Nausea   [] Sharp, Stabbing  [] Pallor   [] Indigestion, Heartburn [] Sweaty    Where was discomfort located? N/A  Precipitating Factors? PHYSICAL EXERTION  Relieved by: INTERVENTIONS AS ABOVE    2. Arrhythmia   [] None                                  [] Heart Racing   [] Irregular Beats (skips) [] Pacer    [x] Atrial Fibrillation  [] AICD    On any Medications? N/A    3. Congestive Heart Failure:  PRIOR TO Aortic Valve Replacement    [] None                                  [] Distended Abdomen   [x] Pedal Edema  [] Unusual weight gain   [] SOB with mild exertion [] Fatigue    4. Vascular   [x] None   [] Carotid Narrowing  [] R [] L   [] Peripheral claudication [] R [] L    5. Musculoskeletal    [] None   [x] Back Pain  Where? CHRONIC-INTERMITTENT LBP \"ACHE\"   [x] Joint discomfort Where? CHRONIC-INTERMITTENT RT KNEE \"ACHE\"    6. Limitations to Home or Work Activities:                [x] Yes               []  No  If yes, what?:SHORTNESS OF BREATH        Current Exercise               [] Yes - type/frequency/intensity/duration:                [x] No    6. Neurological   [x] None   [] Numbness / Tingling  Where? [] Peripheral Neuropathy        Where? [] Stroke / TIA                         Deficit / Where? Nutrition Assessment     Diet: \"GOING TO GET BETTER\"  Appetite:  [x]  Too Good    []  Good  []  Fair  []  Poor    Eating out \"IT DEPENDS ON DR. DURAN times/wk    Special Diet: REGULAR DIET  [] Dietary Supplement:  N/A  Diet Medications (if applicable)    Willing to complete a food diary?:        [] Yes        [x] No    Rate Your Plate: IN-PROCESS     Alcohol Consumption: [] Yes [x] No     Caffeine: [x] Yes [] No  Type:SODA -- COKE  Amount: ONLY WE EAT OUT    Water intake per day: QUART CONTAINER WITH ICE WATER  Vitamins/Natural herbal products: N/A      Fall Risk Assessment:  History of falls with or without injury  [] Yes   [x] No   Use of ambulatory aid  [] Yes  [x] No   Difficulty walking/impaired gait  [] Yes  [x] No   Numbness in feet  [] Yes   [x] No   Vision changes  [] Yes  [x] No   Dizziness  [] Yes  [x] No   Shortness of breath  [x] Yes  [] No   Medications  [x] Anticoagulant  [] Betablocker /ACE/ARB  [] Antidepressant  [x] Seizure medication   [] NA  Risk of fall  [] Low (< / = 2 of above)  [x] Medium (> / = 3 above)  [] High (3 or more above)    Medication Compliance (stated):   [x] 100%  [] 75%           [] 50%  [] 25%      [] 0%      Tobacco Use  Social History     Tobacco Use   Smoking Status Former    Packs/day: 1.00    Years: 30.00    Pack years: 30.00    Types: Cigarettes    Quit date: 2005    Years since quittin.5   Smokeless Tobacco Never     Current smokers:  Longest quit attempt:  Tobacco triggers:  Barriers to successful cessation:  [] Smoking cessation medication:    Psychological    [] Depression  [] Tearful  [] Fearful  [x] Cheerful  [] Anxious  [x] Motivated  [] Overwhelmed    Treatment: DEPAKOTE FOR SEIZURES NOT DEPRESSION    Socio-Economic    Marital Status:     Diabetes / Prediabetes    [] Yes  [x] No    Stress    Source:   W/ NL LIFE STUFF  Relaxation techniques: \"USED TO LOVE TO COOK BUT NOT AS MUCH NOW\"  Hobbies: N/A    Level of Education    [] 8th Grade  [] Associates  [] Masters  [x] High School [] Bachelor  []  Other:    Cardiovascular Knowledge Assessment Score:  IN-PROCESS  Education Needs:  STANDARD CR CURRICULUM    Depression Screening:  PHQ-9 SCORE: TBD    Physical Findings  Weight:174.4 LB  Height: 63 IN (2.56 M2)  BMI: 31.0  Waist Circumference: 40.5 IN    Cardiovascular- No edema, S1-S2 and apically regular to auscultation, strong bilateral upper and lower extremity pulses at +2, no carotid bruits.   Monitor rhythm-SINUS RHYTHM  VR- 73  FL- 0.16  QRS- 0.10  QT- 0.40        Ejection Fraction- 60%  Pulmonary- COPD WELL-CONTROLLED  Neurological- HX OF SEIZURES / HEMORRHAGIC CVAs  Peripheral Vascular- No deficit noted or reported. Muscular Skeletal-  [x] Back Pain  Where? CHRONIC-INTERMITTENT LBP \"ACHE\"   [x] Joint discomfort Where? CHRONIC-INTERMITTENT RT KNEE \"ACHE\"  Pain Assessment- DENIES AT PRESENT  Pain Level Tolerable <4/10 on 10 point Likert scale  Location/ radiation/ Frequency/ Duration-  [x] Back Pain  Where? CHRONIC-INTERMITTENT LBP \"ACHE\"   [x] Joint discomfort Where? CHRONIC-INTERMITTENT RT KNEE \"ACHE\"  Endocrine- No deficit noted or reported. Gastrointestinal- No deficit noted or reported. Genitourinary- No deficit noted or reported.   Physical limitations- only as per above     Goals:     Overall Personal Program Goal:  \"GET WHERE I CAN GET UP AND DO THINGS WITHOUT GETTING TIRED AND SOB\"   [x] Initial Goal   [] Progressing to Goal  [] Not Meeting--Needs Reinforcement  [] Goal Met  [] Goal Not Met    To increase stamina, strength, and flexibility by exercising 31-50 total minutes by engaging in aerobic, resistance, and flexibility workout modalities with the goal of progressively achieving at least 0.5 to 1.0 metabolic equivalant improvement in the next 30 days as evidenced by daily session reports / [x] Initial Goal     [] Progressing to Goal  [] Not Meeting--Needs Reinforcement   [] Goal Met  [] Goal Not Met    To achieve and progress prescribed exercise frequency, intensity, time, and type in the next 30 days based upon initial evaluation and submaximal graded exercise results as evidenced by the attaining and maintaining the prescribed target heart rate range, a Kayley rating of perceived exertion between 11 and 16, duration of >30 - 50 minutes using multiple exercise modes for at least 3 - 5 days per week to accumulate a minimum total of 2.5 hours per week of moderate aerobic intensity exercise, as tolerated, evidenced by daily session reports and home workout log /         [x] Initial Goal   [] Progressing to Goal  [] Not Meeting--Needs Reinforcement   [] Goal Met  [] Goal Not Met       To gradually and progressively lose 2 - 4 lb of body weight in the next 30 days through moderating nutrional intake and performing regular aerobic and strength training exercises as prescribed with improvement evidenced by daily session report comparison / [x] Initial Goal   [] Progressing to Goal  [] Not Meeting--Needs Reinforcement  [] Goal Met  [] Goal Not Met    To decrease waist circumference by 5% by program completion if waist measurement is > or = 40 inches (male) / > or = 35 inches (female) as evidence by the [employer] /     [x] Initial Goal   [] Progressing to Goal  [] Not Meeting--Needs Reinforcement  [] Goal Met  [] Goal Not Met     To introduce and progress 8-10 different bilateral UE and LE progressive resistance exercises focused on major muscle groups using 1-3 sets each per lift, on 2-3 non-consecutive days implementing free and machine weights and ORANGE therabands, as appropriate, with a resistance of 40-60% 1-repetition maximum or 10 -15 repetitions to progressive overload and increasing resistance once repetitions have progressed to 15 reps and feel fairly light on 2 prior occasions in the next 30 days / [x] Initial Goal   [] Progressing to Goal  [] Not Meeting--Needs Reinforcement  [] Goal Met  [] Goal Not Met     To achieve and maintain an optimal average resting blood pressure of <130 / 80 mmHg, or as indicated by this patient's referring provider, in the next 30 days / [x] Initial Goal   [] Progressing to Goal  [] Not Meeting--Needs Reinforcement  [] Goal Met  [] Goal Not Met     To strive for blood lipid optimization with an LDL-C of <100 mg/dL or  LDL 70 mg/dL, an HDL-C of > or = 40 mg/dL for men and > or = 50 mg/dL for women, and a triglyceride level of <150 mg/dL via lifestyle education, behavioral modification, and medication compliance / [x] Initial Goal   [] Progressing to Goal  [] Not Meeting--Needs Reinforcement  [] Goal Met  [] Goal Not Met     -To develop regular home aerobic exercise program for 20 - 60 minutes at least 2 non-rehab days per week, excluding  5 - 10 minutes warm-up and cool-down periods, within the next 30 days, being tracked on home workout log / [x] Initial Goal   [] Progressing to Goal  [] Not Meeting--Needs Reinforcement  [] Goal Met  [] Goal Not Met    To reduce self-reported psycho-social feelings of stress in the next 30 days as evidenced by pre- and post- surveys and by routine rounding with patient to ascertain subjective improvements / [x] Initial Goal   [] Progressing to Goal  [] Not Meeting--Needs Reinforcement  [] Goal Met  [] Goal Not Met    In the next 30-days, to eat on average at least 2 servings of fruit per day and 4-5 servings of vegetables per day as evidenced by pre- and post-program nutriton survey and routine rounding with patient to ascertain progression toward goal per patient's self report, food diary, and Rate-your-Plate screening survey / [x] Initial Goal   [] Progressing to Goal  [] Not Meeting--Needs Reinforcement  [] Goal Met  [] Goal Not Met    To strive to eat < or = 30% of daily caloric intake of total fat and <10% of daily caloric saturated fat tracked by patient's self-report, food diary, and Rate-your-Plate screening survey / [x] Initial Goal   [] Progressing to Goal  [] Not Meeting--Needs Reinforcement  [] Goal Met  [] Goal Not Met    To have patient demonstrate knowledge about risk factor reduction, lifestyle modification, and heart health strategies with > / = 80% accuracy via pre- and post-program Test your [address] screening tool / [x] Initial Goal   [] Progressing to Goal  [] Not Meeting--Needs Reinforcement  [] Goal Met  [] Goal Not Met      Electronically signed by Krystal Kaufman RN on 7/25/22 at 1:39 PM EDT

## 2022-07-25 NOTE — PROGRESS NOTES
Phase II Cardiac Rehab Individualized Treatment Plan-Initial     Patient Name: Jena Kay #: [de-identified]  Date of Initial Assessment: 7/25/2022  Diagnosis:  S/P Aortic Valve Replacement ON 3/2/22 AND Percutaneous Coronary Intervention ON 4/5/22   Onset Date: AS ABOVE  Referring Physician: Clayton Alcantar   Risk Stratification: MODERATE  Session Number: 1   EXERCISE    Stages of Change:   [] pre-contemplation  [x] Action   [] Contemplate   [] Maintainence   [x] Prep   [] Relapse          Exercise Prescription:  Mode: [x] TM [x] UBE [x] STP [] EL [x] RW  [x] RC  Frequency: 3 DAYS PER WEEK  Duration: 31-60 MINUTES  Intensity: 2.8 - 3.8 METS  Target HR 84-96   Progression: INCREASE DURATION PER ABOVE F.I.T.T. Rx  PARAMETERS ON AVG OF 5-10 MIN / 1-2 WEEKS FOR THE FIRST 4-6 WEEKS. AFTER 3-4 WEEKS ARE COMPLETED, CONTINUE TO GRADUALLY INCREASE F. I.T. PARAMETERS GRADUALLY UPWARD AT THE ESTABLISHED DURATION ON AVERAGE 0.5-1.0 METS PER 30 DAYS OVER THE COARSE OF REMAINING PROGRAM AS ESTABLISHED BY PT-CENTERED GOALS AND GUIDELINES. Plan/Goal: Increase 1-2 levels/week or 1-2 min/week to achieve target HR and RPE   12-16.    [] Angina with Exertion   [x] Resistance Training  Introduce and progress 8-12 bilateral UE and LE progressive resistance exercises at 1-3 sets per lift, on 2-3 non-consecutive days using weights/ ORANGE therabands AND WTS TO 8-24 # for 8-15 reps to progressive overload by increasing resistance once reps progressed to at least 15 reps on at least 2 occasions     Hypertension:  [x] Yes  [] No  Resting BP: 126/64  Peak Exercise BP: 132/64  BP Meds: BENICAR HCT 20-12.5 MG, QD    Intervention:  Home Exercise:  Type: WALKING  Duration: 20 - 60 MIN  Frequency: AT LEAST 2 NO REHAB DAYS PER WEEK   [x] Resistance Training   Introduce and progress 8-12 bilateral UE and LE progressive resistance exercises at 1-3 sets per lift, on 2-3 non-consecutive days using weights/ ORANGE therabands AND WTS TO 8-24 # for 8-15 reps to progressive overload by increasing resistance once reps progressed to at least 15 reps on at least 2 occasions     Education:   [x] Equipment Universal  [x] Self pulse   [x] Proper use weights/therabands   [x] S/S to report  [x] Low Na Diet    [x] Warm up/ Cool down  [x] BP Medication    [x]RPE Scale   [x] Understand BP   [x] Ex Safety   [x] Exercise specialist class-Home Exercise       Target Goal:   -Individual Exercise Plan  -BP<130/80  -Aerobic active 30 + minutes 5-7 days per week    Nutrition    Stages of Change:   [] pre-contemplation  [x] Action   [] Contemplate   [] Maintainence   [x] Prep   [] Relapse    Lipids:   Component Ref Range & Units 6/8/22 0831 4/6/22 1029 9/13/21 0930 9/15/20 0912 9/12/19 0833 9/14/18 1021 9/11/17 0942   Cholesterol <200 mg/dL 147  187 CM  183 CM  176 CM  153 CM  191 CM  164 CM    Comment:     Cholesterol Guidelines:       <200  Desirable    200-240  Borderline       >240  Undesirable        HDL >40 mg/dL 43  46 CM  44 CM  53 CM  54 CM  59 CM  55 CM    Comment:     HDL Guidelines:     <40     Undesirable    40-59    Borderline     >59     Desirable        LDL Cholesterol 0 - 130 mg/dL 74  118 CM  117 CM  109 CM  83 CM  110 CM  94 CM    Comment:     LDL Guidelines:      <100    Desirable    100-129   Near to/above Desirable    130-159   Borderline       >159   Undesirable       Direct (measured) LDL and calculated LDL are not interchangeable tests. Chol/HDL Ratio <5 3.4  4.1 CM  4.2 CM  3.3 CM  2.8 CM  3.2 CM  3.0 CM    Comment:        Triglycerides <150 mg/dL 151 High   117 CM  109 CM  68 CM  78 CM  111 CM  73 CM    Comment:     Triglyceride Guidelines:      <150   Desirable    150-199  Borderline    200-499  High      >499   Very high    Based on AHA Guidelines for fasting triglyceride, October 2012.       Lipid Meds: 10 MG, CRESTOR qHS     Diabetes / Prediabetes:  [] Yes  [x] No    Weight Management:  Weight:174.4 LB  Height: 63 IN (2.56 M2)  BMI: 31.0  Waist Circumference: 40.5 IN  Wt Goal: 1-2 lbs/wk  [x] Loss  [] Gain  [] Maintain Current Wt.   Alcohol:   Social History     Substance and Sexual Activity   Alcohol Use No       Diet Assessment Tool: RATE MY PLATE:  IN-PROCESS  Special Diet: Special Diet:  1,600 Kcal / day, 2 GM Na+, 30% total fat, <10% saturated fat, 25-35 GM fiber, cholesterol reduced, balanced nutrition plan to be promoted and taught in rehab      Intervention:   [] Dietitian Consult       [x] Nurse/Patient Discussion     [x] Diet Class           [] Referred to Diabetes Education     Education:  [] S&S hypo/hyperglycemia  [x] Low fat/low cholesterol diet  [x] Weight loss methods      [] Relate Diabetes/CAD     [x] Eating heart healthy handout    Target Goal:  -LDL-C<100 if triglycerides are > 200  -LDL-C < 70 for high risk patients  -HbA1c < 7%  -BMI < 25   Education    Stages of Change:    [] pre-contemplation  [x] Action   [] Contemplate   [] Maintainence   [x] Prep   [] Relapse    Learning Barriers:   [] Speech   [] Cognitive   [] Literacy   [] Visions   [] Hearing    [x] Ready Learn    Knowledge test score: IN-PROCESS      Family support: [x] Yes  [] No    Tobacco use:   Social History     Tobacco Use   Smoking Status Former    Packs/day: 1.00    Years: 30.00    Pack years: 30.00    Types: Cigarettes    Quit date: 2005    Years since quittin.5   Smokeless Tobacco Never         Intervention:  [] Referred to smoking cessation counselor     [] Individual education and counseling  [] Tobacco adjunct  [] Informed of education class schedule     Education:   [x] Risk Factors/Modifications  [x] Psychological aspects  [x] Angina         [x] Medications  [] CHF                                    [] Diabetes     [x] Cardiac A&P                                 [] Smoking Cessation  [x] Stress Reduction and Relaxation                     [x] Weight Management    Target Goal:  -Complete cessation of tobacco use (if applicable)  -Continued risk factor modifications  -Recognizing signs/symptoms to report  -Proper use of meds    Psychosocial  Stages of Change:    [] pre-contemplation  [x] Action   [] Contemplate   [] Maintainence   [x] Prep   [] Relapse    Psychosocial Test:  Tool Used: Catherine Abraham Quality of Life  Overall Score: IN-PROCESS  Depression screening score PHQ-9: 1    Intervention:   [] Psych Consult/  [x] Uses stress management skills    [] Physician Referral    [x] Stress management class  Medications:     Education:    [x] Coping Techniques   [x] Relaxation techniques   [x] S/S of Depression    Target Goal:  -Assess presence or absence of depression using a valid screening tool. -Maximize coping skills.  -Positive support system. Preventative Medication:   [] Aspirin       [] Beta Blockade      [x] Statin or other lipid lowering agent     [x] Clopidogrel   [] ACE Inhibitor   [x] ACE Receptor Blocker   [] Antianginal    [x] DIURETIC   [x] Other anticoagulation medications     Fall Risk assess: [x] Yes  [] No / LOW-MOD FALL RISK  Assistive Device:   [] Cane  [] Saratha [place] [] Wheel Chair  [] Gait belt    Target Goal:  -Assess presence or absence of depression using a valid screening tool. -Maximize coping skills.  -Positive support system.     Patient/Program Goals:   Overall Personal Program Goal:  \"GET WHERE I CAN GET UP AND DO THINGS WITHOUT GETTING TIRED AND SOB\"   [x] Initial Goal   [] Progressing to Goal  [] Not Meeting--Needs Reinforcement  [] Goal Met  [] Goal Not Met     To increase stamina, strength, and flexibility by exercising 31-50 total minutes by engaging in aerobic, resistance, and flexibility workout modalities with the goal of progressively achieving at least 0.5 to 1.0 metabolic equivalant improvement in the next 30 days as evidenced by daily session reports / [x] Initial Goal     [] Progressing to Goal  [] Not Meeting--Needs Reinforcement   [] Goal Met  [] Goal Not Met     To achieve and progress prescribed exercise frequency, intensity, time, and type in the next 30 days based upon initial evaluation and submaximal graded exercise results as evidenced by the attaining and maintaining the prescribed target heart rate range, a Kayley rating of perceived exertion between 11 and 16, duration of >30 - 50 minutes using multiple exercise modes for at least 3 - 5 days per week to accumulate a minimum total of 2.5 hours per week of moderate aerobic intensity exercise, as tolerated, evidenced by daily session reports and home workout log /         [x] Initial Goal   [] Progressing to Goal  [] Not Meeting--Needs Reinforcement   [] Goal Met  [] Goal Not Met        To gradually and progressively lose 2 - 4 lb of body weight in the next 30 days through moderating nutrional intake and performing regular aerobic and strength training exercises as prescribed with improvement evidenced by daily session report comparison / [x] Initial Goal   [] Progressing to Goal  [] Not Meeting--Needs Reinforcement  [] Goal Met  [] Goal Not Met     To decrease waist circumference by 5% by program completion if waist measurement is > or = 40 inches (male) / > or = 35 inches (female) as evidence by the [employer] /     [x] Initial Goal   [] Progressing to Goal  [] Not Meeting--Needs Reinforcement  [] Goal Met  [] Goal Not Met     To introduce and progress 8-10 different bilateral UE and LE progressive resistance exercises focused on major muscle groups using 1-3 sets each per lift, on 2-3 non-consecutive days implementing free and machine weights and ORANGE therabands, as appropriate, with a resistance of 40-60% 1-repetition maximum or 10 -15 repetitions to progressive overload and increasing resistance once repetitions have progressed to 15 reps and feel fairly light on 2 prior occasions in the next 30 days / [x] Initial Goal   [] Progressing to Goal  [] Not Meeting--Needs Reinforcement  [] Goal Met  [] Goal Not Met     To achieve and maintain an optimal average resting blood pressure of <130 / 80 mmHg, or as indicated by this patient's referring provider, in the next 30 days / [x] Initial Goal   [] Progressing to Goal  [] Not Meeting--Needs Reinforcement  [] Goal Met  [] Goal Not Met     To strive for blood lipid optimization with an LDL-C of <100 mg/dL or  LDL 70 mg/dL, an HDL-C of > or = 40 mg/dL for men and > or = 50 mg/dL for women, and a triglyceride level of <150 mg/dL via lifestyle education, behavioral modification, and medication compliance / [x] Initial Goal   [] Progressing to Goal  [] Not Meeting--Needs Reinforcement  [] Goal Met  [] Goal Not Met     -To develop regular home aerobic exercise program for 20 - 60 minutes at least 2 non-rehab days per week, excluding  5 - 10 minutes warm-up and cool-down periods, within the next 30 days, being tracked on home workout log / [x] Initial Goal   [] Progressing to Goal  [] Not Meeting--Needs Reinforcement  [] Goal Met  [] Goal Not Met     To reduce self-reported psycho-social feelings of stress in the next 30 days as evidenced by pre- and post- surveys and by routine rounding with patient to ascertain subjective improvements / [x] Initial Goal   [] Progressing to Goal  [] Not Meeting--Needs Reinforcement  [] Goal Met  [] Goal Not Met     In the next 30-days, to eat on average at least 2 servings of fruit per day and 4-5 servings of vegetables per day as evidenced by pre- and post-program nutriton survey and routine rounding with patient to ascertain progression toward goal per patient's self report, food diary, and Rate-your-Plate screening survey / [x] Initial Goal   [] Progressing to Goal  [] Not Meeting--Needs Reinforcement  [] Goal Met  [] Goal Not Met     To strive to eat < or = 30% of daily caloric intake of total fat and <10% of daily caloric saturated fat tracked by patient's self-report, food diary, and Rate-your-Plate screening survey / [x] Initial Goal   [] Progressing to Goal  [] Not Meeting--Needs Reinforcement  [] Goal Met  [] Goal Not Met     To have patient demonstrate knowledge about risk factor reduction, lifestyle modification, and heart health strategies with > / = 80% accuracy via pre- and post-program Test your [address] screening tool / [x] Initial Goal   [] Progressing to Goal  [] Not Meeting--Needs Reinforcement  [] Goal Met  [] Goal Not Met    Physician Changes/Comments:    Electronically signed by Nicolle Cabrera RN on 7/25/22 at 2:44 PM EDT  Cardiopulmonary Rehab Staff

## 2022-07-25 NOTE — PROGRESS NOTES
Cardiac Rehabilitation   Physician Order Form    Jolene Carr  1945  [id number]  7/25/2022    [x] Phase 2 ECG Monitored Cardiac Rehabilitation    [] MI   [] Percutaneous Coronary Intervention          [] Other:   [] CABG  [] Heart Valve Repair/Replaced   [] Stable Angina [] Heart Failure: Onset Date:   S/P AVR ON 3/2/22 AND PCI ON 5/4/22                    Cardiac Education Goals: (see individualized treatment plan for specific goals, progression & compliance)    [x] Hypertension [x] Physical Inactivity  [x] Cardiac A&P    [] Heart Failure [x] Medications                       [x] Coping w/ Anxiety / Depression  [] Diabetes  [x] Weight Management [x] Angina  [x] Hyperlipidemia       [x] Home Exercise             [x] Stress Reduction and Relaxation   [x] Medications           [] Smoking Cessation                                                Prescribed Exercise Plan:    Target Hr: 84-96       Duration: 31 - 60 Minutes  Frequency: 3 Days per week  Initial Met Level: 2.5 METS  Limitations:  [x] Back Pain  Where? CHRONIC-INTERMITTENT LBP \"ACHE\"   [x] Joint discomfort Where? CHRONIC-INTERMITTENT RT KNEE \"ACHE\"    Modalities:  [x]Treadmill   [x] UBE  [x] Seated Stepper  [x] Rowing Machine  [x] Weights/therabands  [] Elliptical    Aerobic exercise to total 31-60 minutes. Progressing by 1-2 minutes per week and/or 1-2 levels per week per patient tolerance using various modalities; according to Kayley Scale 12-16 and THR  Introduce 8-12 bilateral UE and LE progressive resistance exercises at 1-3 sets per lift, on 2-3 non-consecutive days using weights/ ORANGE  therabands AND WTS TO 8-24 # for 8-15 reps to progressive overload by increasing resistance once reps progressed to at least 15 reps on at least 2 occasions                 Per patient symptoms use:  Appropriate ACLS Algorhythm for Cardiac Events. Nitroglycerine 0.4mg SLq 5mins X 3 for angina pain. 12 lead EKG for c/o chest pain or change in rhythm.   Nasal O2 for SaO2 <90% or symptoms warranted. Blood sugar monitoring for Hyper/Hypoglycemia symptoms.     Electronically signed by Lex Seip, RN on 7/25/22 at 2:42 PM EDT  Cardiac Rehab Staff

## 2022-07-27 ENCOUNTER — HOSPITAL ENCOUNTER (OUTPATIENT)
Dept: CARDIAC REHAB | Age: 77
Setting detail: THERAPIES SERIES
Discharge: HOME | End: 2022-07-27

## 2022-07-29 ENCOUNTER — HOSPITAL ENCOUNTER (OUTPATIENT)
Dept: CARDIAC REHAB | Age: 77
Setting detail: THERAPIES SERIES
Discharge: HOME | End: 2022-07-29

## 2022-08-01 ENCOUNTER — HOSPITAL ENCOUNTER (OUTPATIENT)
Dept: CARDIAC REHAB | Age: 77
Setting detail: THERAPIES SERIES
Discharge: HOME | End: 2022-08-01
Payer: MEDICARE

## 2022-08-01 PROCEDURE — 93798 PHYS/QHP OP CAR RHAB W/ECG: CPT

## 2022-08-03 ENCOUNTER — HOSPITAL ENCOUNTER (OUTPATIENT)
Dept: CARDIAC REHAB | Age: 77
Setting detail: THERAPIES SERIES
Discharge: HOME | End: 2022-08-03
Payer: MEDICARE

## 2022-08-03 PROCEDURE — 93798 PHYS/QHP OP CAR RHAB W/ECG: CPT

## 2022-08-05 ENCOUNTER — HOSPITAL ENCOUNTER (OUTPATIENT)
Dept: CARDIAC REHAB | Age: 77
Setting detail: THERAPIES SERIES
Discharge: HOME | End: 2022-08-05
Payer: MEDICARE

## 2022-08-05 PROCEDURE — 93798 PHYS/QHP OP CAR RHAB W/ECG: CPT

## 2022-08-08 ENCOUNTER — APPOINTMENT (OUTPATIENT)
Dept: CARDIAC REHAB | Age: 77
End: 2022-08-08
Payer: MEDICARE

## 2022-08-09 ENCOUNTER — HOSPITAL ENCOUNTER (OUTPATIENT)
Dept: NURSING | Age: 77
Setting detail: INFUSION SERIES
Discharge: HOME | End: 2022-08-09
Payer: MEDICARE

## 2022-08-09 ENCOUNTER — HOSPITAL ENCOUNTER (OUTPATIENT)
Dept: PREADMISSION TESTING | Age: 77
Setting detail: SPECIMEN
Discharge: HOME | End: 2022-08-09
Payer: MEDICARE

## 2022-08-09 ENCOUNTER — TELEPHONE (OUTPATIENT)
Dept: FAMILY MEDICINE CLINIC | Age: 77
End: 2022-08-09

## 2022-08-09 VITALS
HEART RATE: 87 BPM | SYSTOLIC BLOOD PRESSURE: 108 MMHG | DIASTOLIC BLOOD PRESSURE: 53 MMHG | RESPIRATION RATE: 18 BRPM | OXYGEN SATURATION: 94 %

## 2022-08-09 DIAGNOSIS — Z20.822 SUSPECTED COVID-19 VIRUS INFECTION: Primary | ICD-10-CM

## 2022-08-09 DIAGNOSIS — U07.1 SARS-COV-2 POSITIVE: Primary | ICD-10-CM

## 2022-08-09 DIAGNOSIS — U07.1 SARS-COV-2 POSITIVE: ICD-10-CM

## 2022-08-09 LAB
SARS-COV-2, RAPID: DETECTED
SPECIMEN DESCRIPTION: ABNORMAL

## 2022-08-09 PROCEDURE — C9803 HOPD COVID-19 SPEC COLLECT: HCPCS

## 2022-08-09 PROCEDURE — 96376 TX/PRO/DX INJ SAME DRUG ADON: CPT

## 2022-08-09 PROCEDURE — M0222 HC BEBTELOVIMAB INJECTION: HCPCS

## 2022-08-09 PROCEDURE — 6360000002 HC RX W HCPCS: Performed by: INTERNAL MEDICINE

## 2022-08-09 PROCEDURE — 87635 SARS-COV-2 COVID-19 AMP PRB: CPT

## 2022-08-09 RX ORDER — SODIUM CHLORIDE 9 MG/ML
5-250 INJECTION, SOLUTION INTRAVENOUS PRN
OUTPATIENT
Start: 2022-08-09

## 2022-08-09 RX ORDER — ONDANSETRON 2 MG/ML
8 INJECTION INTRAMUSCULAR; INTRAVENOUS
OUTPATIENT
Start: 2022-08-09

## 2022-08-09 RX ORDER — FAMOTIDINE 10 MG/ML
20 INJECTION, SOLUTION INTRAVENOUS
OUTPATIENT
Start: 2022-08-09

## 2022-08-09 RX ORDER — SODIUM CHLORIDE 0.9 % (FLUSH) 0.9 %
5-40 SYRINGE (ML) INJECTION PRN
OUTPATIENT
Start: 2022-08-09

## 2022-08-09 RX ORDER — SODIUM CHLORIDE 9 MG/ML
INJECTION, SOLUTION INTRAVENOUS CONTINUOUS
OUTPATIENT
Start: 2022-08-09

## 2022-08-09 RX ORDER — BEBTELOVIMAB 87.5 MG/ML
175 INJECTION, SOLUTION INTRAVENOUS ONCE
Status: COMPLETED | OUTPATIENT
Start: 2022-08-09 | End: 2022-08-09

## 2022-08-09 RX ORDER — ALBUTEROL SULFATE 90 UG/1
4 AEROSOL, METERED RESPIRATORY (INHALATION) PRN
OUTPATIENT
Start: 2022-08-09

## 2022-08-09 RX ORDER — ACETAMINOPHEN 325 MG/1
650 TABLET ORAL
OUTPATIENT
Start: 2022-08-09

## 2022-08-09 RX ORDER — BEBTELOVIMAB 87.5 MG/ML
175 INJECTION, SOLUTION INTRAVENOUS ONCE
Status: CANCELLED | OUTPATIENT
Start: 2022-08-09 | End: 2022-08-09

## 2022-08-09 RX ORDER — DIPHENHYDRAMINE HYDROCHLORIDE 50 MG/ML
50 INJECTION INTRAMUSCULAR; INTRAVENOUS
OUTPATIENT
Start: 2022-08-09

## 2022-08-09 RX ADMIN — BEBTELOVIMAB 175 MG: 87.5 INJECTION, SOLUTION INTRAVENOUS at 13:43

## 2022-08-10 ENCOUNTER — TELEPHONE (OUTPATIENT)
Dept: INTERNAL MEDICINE CLINIC | Age: 77
End: 2022-08-10

## 2022-08-10 ENCOUNTER — HOSPITAL ENCOUNTER (OUTPATIENT)
Dept: CARDIAC REHAB | Age: 77
Setting detail: THERAPIES SERIES
End: 2022-08-10
Payer: MEDICARE

## 2022-08-10 RX ORDER — BENZONATATE 100 MG/1
100 CAPSULE ORAL 3 TIMES DAILY PRN
Qty: 30 CAPSULE | Refills: 0 | Status: SHIPPED | OUTPATIENT
Start: 2022-08-10 | End: 2022-08-17

## 2022-08-12 ENCOUNTER — HOSPITAL ENCOUNTER (OUTPATIENT)
Dept: CARDIAC REHAB | Age: 77
Setting detail: THERAPIES SERIES
End: 2022-08-12
Payer: MEDICARE

## 2022-08-15 ENCOUNTER — TELEPHONE (OUTPATIENT)
Dept: FAMILY MEDICINE CLINIC | Age: 77
End: 2022-08-15

## 2022-08-15 ENCOUNTER — HOSPITAL ENCOUNTER (OUTPATIENT)
Dept: CARDIAC REHAB | Age: 77
Setting detail: THERAPIES SERIES
End: 2022-08-15
Payer: MEDICARE

## 2022-08-15 RX ORDER — PREDNISONE 20 MG/1
40 TABLET ORAL DAILY
Qty: 10 TABLET | Refills: 0 | Status: SHIPPED | OUTPATIENT
Start: 2022-08-15 | End: 2022-08-20

## 2022-08-15 RX ORDER — DOXYCYCLINE HYCLATE 100 MG
100 TABLET ORAL 2 TIMES DAILY
Qty: 14 TABLET | Refills: 0 | Status: SHIPPED | OUTPATIENT
Start: 2022-08-15 | End: 2022-08-22

## 2022-08-17 ENCOUNTER — HOSPITAL ENCOUNTER (OUTPATIENT)
Dept: CARDIAC REHAB | Age: 77
Setting detail: THERAPIES SERIES
End: 2022-08-17
Payer: MEDICARE

## 2022-08-19 ENCOUNTER — HOSPITAL ENCOUNTER (OUTPATIENT)
Dept: CARDIAC REHAB | Age: 77
Setting detail: THERAPIES SERIES
End: 2022-08-19
Payer: MEDICARE

## 2022-08-20 DIAGNOSIS — I10 ESSENTIAL HYPERTENSION: ICD-10-CM

## 2022-08-20 DIAGNOSIS — E78.00 HYPERCHOLESTEROLEMIA: ICD-10-CM

## 2022-08-22 ENCOUNTER — HOSPITAL ENCOUNTER (OUTPATIENT)
Dept: CARDIAC REHAB | Age: 77
Setting detail: THERAPIES SERIES
Discharge: HOME | End: 2022-08-22
Payer: MEDICARE

## 2022-08-22 RX ORDER — OLMESARTAN MEDOXOMIL AND HYDROCHLOROTHIAZIDE 20/12.5 20; 12.5 MG/1; MG/1
TABLET ORAL
Qty: 90 TABLET | Refills: 1 | Status: SHIPPED | OUTPATIENT
Start: 2022-08-22

## 2022-08-22 RX ORDER — ROSUVASTATIN CALCIUM 10 MG/1
10 TABLET, COATED ORAL NIGHTLY
Qty: 90 TABLET | Refills: 1 | Status: SHIPPED | OUTPATIENT
Start: 2022-08-22

## 2022-08-22 NOTE — PROGRESS NOTES
Phase II Cardiac Rehab Individualized Treatment Plan-30 Day      Patient Name: Robyn Dong #: [de-identified]   Date of Initial Assessment: 7/25/2022  Diagnosis:  S/P Aortic Valve Replacement ON 3/2/22 AND Percutaneous Coronary Intervention ON 4/5/22   Onset Date: AS ABOVE  Referring Physician: Verla Kawasaki   Risk Stratification: MODERATE  Session Number: 6  EXERCISE    Stages of Change:   [] pre-contemplation  [x] Action   [] Contemplate   [] Maintainence   [] Prep   [] Relapse          Exercise Prescription:  Mode: [x] TM [x] UBE [x] STP [] EL [x] R  Frequency: 3 DAYS PER WEEK  Duration: 31-60 MINUTES  Intensity: 3.2 avg mets  Plan/Goal: Increase 1-2 levels/week or 1-2 min/week to achieve target HR and RPE   12-16. Progression: INCREASE DURATION PER ABOVE F.I.T.T. Rx  PARAMETERS ON AVG OF 5-10 MIN / 1-2 WEEKS FOR THE FIRST 4-6 WEEKS. AFTER 3-4 WEEKS ARE COMPLETED, CONTINUE TO GRADUALLY INCREASE F. I.T. PARAMETERS GRADUALLY UPWARD AT THE ESTABLISHED DURATION ON AVERAGE 0.5-1.0 METS PER 30 DAYS OVER THE COARSE OF REMAINING PROGRAM AS ESTABLISHED BY PT-CENTERED GOALS AND GUIDELINES. Target HR 84 - 96 bpm      [] Angina with Exertion    [x] Resistance Training  Introduce and progress 8-12 bilateral UE and LE progressive resistance exercises at 1-3 sets per lift, on 2-3 non-consecutive days using weights/ orange  therabands AND WTS TO 8-24 # for 8-15 reps to progressive overload by increasing resistance once reps progressed to at least 15 reps on at least 2 occasions       Hypertension:  [x] Yes  [] No  Resting BP: 122/74  Peak Exercise BP: 132/68  [] Med change?     Intervention:  Home Exercise:  Type: Walking  Duration: 30-60 MINUTES  Frequency: 2-3 DAYS PER WEEK   [x] Resistance Training  introduce 8-12 bilateral UE and LE progressive resistance exercises at 1-3 sets per lift, on 2-3 non-consecutive days using weights/ orange  therabands AND WTS TO 8-24 # for 8-15 reps to progressive overload by increasing resistance once reps progressed to at least 15 reps on at least 2 occasions     Education:   [x] Equipment [place]  [x] Self pulse   [x] Proper use weights/therabands   [x] S/S to report  [x] Low Na Diet    [x] Warm up/ Cool down  [x] BP Medication    [x] RPE Scale   [x] Understand BP   [x] Ex Safety   [x] Exercise specialist class-Home Exercise       Target Goal:   -Individual Exercise Plan  -Bp<130/80  -Aerobic active 30 + minutes 5-7 days per week    Nutrition    Stages of Change:   [] pre-contemplation  [x] Action   [] Contemplate   [] Maintainence   [] Prep   [] Relapse    Lipids:   Lipids:   Component Ref Range & Units 6/8/22 0831 4/6/22 1029 9/13/21 0930 9/15/20 0912 9/12/19 0833 9/14/18 1021 9/11/17 0942   Cholesterol <200 mg/dL 147  187 CM  183 CM  176 CM  153 CM  191 CM  164 CM    Comment:     Cholesterol Guidelines:       <200  Desirable    200-240  Borderline       >240  Undesirable        HDL >40 mg/dL 43  46 CM  44 CM  53 CM  54 CM  59 CM  55 CM    Comment:     HDL Guidelines:     <40     Undesirable    40-59    Borderline     >59     Desirable        LDL Cholesterol 0 - 130 mg/dL 74  118 CM  117 CM  109 CM  83 CM  110 CM  94 CM    Comment:     LDL Guidelines:      <100    Desirable    100-129   Near to/above Desirable    130-159   Borderline       >159   Undesirable       Direct (measured) LDL and calculated LDL are not interchangeable tests. Chol/HDL Ratio <5 3.4  4.1 CM  4.2 CM  3.3 CM  2.8 CM  3.2 CM  3.0 CM    Comment:        Triglycerides <150 mg/dL 151 High   117 CM  109 CM  68 CM  78 CM  111 CM  73 CM    Comment:     Triglyceride Guidelines:      <150   Desirable    150-199  Borderline    200-499  High      >499   Very high    Based on AHA Guidelines for fasting triglyceride, October 2012     [] Med Change? Diabetes / Prediabetes:  [] Yes  [x] No    Weight Management:  Current Wt 174.4 lb  Wt Goal: 1-2 lbs/wk [x] Loss  [] Gain  [] Maintain Current Wt.      Intervention:   [] Dietitian Consult       [x] Nurse/Patient Discussion     [x] Diet Class           [] Referred to Diabetes Education     Education:  [] S&S hypo/hyperglycemia  [x] Low fat/low cholesterol diet  [x] Weight loss methods      [] Relate Diabetes/CAD     [x] Eating heart healthy handout    Target Goal:  -LDL-C<100 if triglycerides are > 200  -LDL-C < 70 for high risk patients  -HbA1c < 7%  -BMI < 25   Education    Stages of Change:    [] pre-contemplation  [x] Action   [] Contemplate   [] Maintainence   [] Prep   [] Relapse    Family support: [x] Yes  [] No    Tobacco use: [] Yes  [x] No    Intervention:  [] Referred to smoking cessation counselor     [] Individual education and counseling  [] Tobacco adjunct  [] Informed of education class schedule     Education:   [x] Risk Factors/Modifications  [x] Psychological aspects  [x] Angina         [x] Medications  [] CHF                                    [] Diabetes     [x] Cardiac A&P                                 [] Smoking Cessation  [x] Stress Reduction and Relaxation                     [] Weight Management    Target Goal:  -Complete cessation of tobacco use (if applicable)  -Continued risk factor modifications  -Recognizing signs/symptoms to report  -Proper use of meds    Psychosocial  Stages of Change:    [] pre-contemplation  [x] Action   [] Contemplate   [] Maintainence   [] Prep   [] Relapse      Intervention:   [] Psych Consult/  [x] Uses stress management skills    [] Physician Referral    [x] Stress management class   [] Med Change? Education:    [x] Coping Techniques   [x] Relaxation techniques   [x] S/S of Depression    Target Goal:  -Assess presence or absence of depression using a valid screening tool. -Maximize coping skills.  -Positive support system.     Preventative Medication:   [] Aspirin       [] Beta Blockade      [x] Statin or other lipid lowering agent     [] Clopidogrel   [x] ACE Inhibitor   [x] ACE Receptor Blocker   [] Antianginal    [] Calcium Channel Blocker   [x] Other anticoagulation medications     Fall Risk assess: [x] Yes  [] No / REMAINS LOW RISK  Assistive Device:   [] Cane  [] Walker [] Wheel Chair  [] Gait belt    Patient/Program goal:     GOALS NOT PROGRESSED D/T PATIENT BEING OUT WITH COVID, GOALS TO BE REASSESSED AFTER PATIENT RETURN     Overall Personal Program Goal:  \"GET WHERE I CAN GET UP AND DO THINGS WITHOUT GETTING TIRED AND SOB\"   [x] Initial Goal   [] Progressing to Goal  [] Not Meeting--Needs Reinforcement  [] Goal Met  [] Goal Not Met     To increase stamina, strength, and flexibility by exercising 31-50 total minutes by engaging in aerobic, resistance, and flexibility workout modalities with the goal of progressively achieving at least 0.5 to 1.0 metabolic equivalant improvement in the next 30 days as evidenced by daily session reports / [x] Initial Goal     [] Progressing to Goal  [] Not Meeting--Needs Reinforcement   [] Goal Met  [] Goal Not Met     To achieve and progress prescribed exercise frequency, intensity, time, and type in the next 30 days based upon initial evaluation and submaximal graded exercise results as evidenced by the attaining and maintaining the prescribed target heart rate range, a Kayley rating of perceived exertion between 11 and 16, duration of >30 - 50 minutes using multiple exercise modes for at least 3 - 5 days per week to accumulate a minimum total of 2.5 hours per week of moderate aerobic intensity exercise, as tolerated, evidenced by daily session reports and home workout log /         [x] Initial Goal   [] Progressing to Goal  [] Not Meeting--Needs Reinforcement   [] Goal Met  [] Goal Not Met        To gradually and progressively lose 2 - 4 lb of body weight in the next 30 days through moderating nutrional intake and performing regular aerobic and strength training exercises as prescribed with improvement evidenced by daily session report comparison / [x] Initial Goal   [] Progressing to Goal  [] Not Meeting--Needs Reinforcement  [] Goal Met  [] Goal Not Met     To decrease waist circumference by 5% by program completion if waist measurement is > or = 40 inches (male) / > or = 35 inches (female) as evidence by the [employer] /     [x] Initial Goal   [] Progressing to Goal  [] Not Meeting--Needs Reinforcement  [] Goal Met  [] Goal Not Met     To introduce and progress 8-10 different bilateral UE and LE progressive resistance exercises focused on major muscle groups using 1-3 sets each per lift, on 2-3 non-consecutive days implementing free and machine weights and ORANGE therabands, as appropriate, with a resistance of 40-60% 1-repetition maximum or 10 -15 repetitions to progressive overload and increasing resistance once repetitions have progressed to 15 reps and feel fairly light on 2 prior occasions in the next 30 days / [x] Initial Goal   [] Progressing to Goal  [] Not Meeting--Needs Reinforcement  [] Goal Met  [] Goal Not Met     To achieve and maintain an optimal average resting blood pressure of <130 / 80 mmHg, or as indicated by this patient's referring provider, in the next 30 days / [x] Initial Goal   [] Progressing to Goal  [] Not Meeting--Needs Reinforcement  [] Goal Met  [] Goal Not Met     To strive for blood lipid optimization with an LDL-C of <100 mg/dL or  LDL 70 mg/dL, an HDL-C of > or = 40 mg/dL for men and > or = 50 mg/dL for women, and a triglyceride level of <150 mg/dL via lifestyle education, behavioral modification, and medication compliance / [x] Initial Goal   [] Progressing to Goal  [] Not Meeting--Needs Reinforcement  [] Goal Met  [] Goal Not Met     -To develop regular home aerobic exercise program for 20 - 60 minutes at least 2 non-rehab days per week, excluding  5 - 10 minutes warm-up and cool-down periods, within the next 30 days, being tracked on home workout log / [x] Initial Goal   [] Progressing to Goal  [] Not Meeting--Needs Reinforcement  [] Goal Met  [] Goal Not Met     To reduce self-reported psycho-social feelings of stress in the next 30 days as evidenced by pre- and post- surveys and by routine rounding with patient to ascertain subjective improvements / [x] Initial Goal   [] Progressing to Goal  [] Not Meeting--Needs Reinforcement  [] Goal Met  [] Goal Not Met     In the next 30-days, to eat on average at least 2 servings of fruit per day and 4-5 servings of vegetables per day as evidenced by pre- and post-program nutriton survey and routine rounding with patient to ascertain progression toward goal per patient's self report, food diary, and Rate-your-Plate screening survey / [x] Initial Goal   [] Progressing to Goal  [] Not Meeting--Needs Reinforcement  [] Goal Met  [] Goal Not Met     To strive to eat < or = 30% of daily caloric intake of total fat and <10% of daily caloric saturated fat tracked by patient's self-report, food diary, and Rate-your-Plate screening survey / [x] Initial Goal   [] Progressing to Goal  [] Not Meeting--Needs Reinforcement  [] Goal Met  [] Goal Not Met     To have patient demonstrate knowledge about risk factor reduction, lifestyle modification, and heart health strategies with > / = 80% accuracy via pre- and post-program Test your [address] screening tool / [x] Initial Goal   [] Progressing to Goal  [] Not Meeting--Needs Reinforcement  [] Goal Met  [] Goal Not Met    Physician Changes/Comments:    Electronically signed by Pretty De La Cruz RCP on 8/22/22 at 7:38 AM EDT  Cardiac Rehab Staff

## 2022-08-23 ENCOUNTER — HOSPITAL ENCOUNTER (OUTPATIENT)
Dept: GENERAL RADIOLOGY | Age: 77
Discharge: HOME | End: 2022-08-25
Payer: MEDICARE

## 2022-08-23 ENCOUNTER — HOSPITAL ENCOUNTER (OUTPATIENT)
Age: 77
Discharge: HOME | End: 2022-08-25
Payer: MEDICARE

## 2022-08-23 ENCOUNTER — OFFICE VISIT (OUTPATIENT)
Dept: FAMILY MEDICINE CLINIC | Age: 77
End: 2022-08-23
Payer: MEDICARE

## 2022-08-23 VITALS
SYSTOLIC BLOOD PRESSURE: 130 MMHG | DIASTOLIC BLOOD PRESSURE: 80 MMHG | WEIGHT: 173 LBS | BODY MASS INDEX: 30.65 KG/M2 | HEIGHT: 63 IN | HEART RATE: 72 BPM

## 2022-08-23 DIAGNOSIS — R05.9 COUGH: ICD-10-CM

## 2022-08-23 DIAGNOSIS — R06.02 SHORTNESS OF BREATH: Primary | ICD-10-CM

## 2022-08-23 DIAGNOSIS — R06.02 SHORTNESS OF BREATH: ICD-10-CM

## 2022-08-23 PROCEDURE — 99213 OFFICE O/P EST LOW 20 MIN: CPT | Performed by: INTERNAL MEDICINE

## 2022-08-23 PROCEDURE — 71046 X-RAY EXAM CHEST 2 VIEWS: CPT

## 2022-08-23 PROCEDURE — 1090F PRES/ABSN URINE INCON ASSESS: CPT | Performed by: INTERNAL MEDICINE

## 2022-08-23 PROCEDURE — G8427 DOCREV CUR MEDS BY ELIG CLIN: HCPCS | Performed by: INTERNAL MEDICINE

## 2022-08-23 PROCEDURE — G8399 PT W/DXA RESULTS DOCUMENT: HCPCS | Performed by: INTERNAL MEDICINE

## 2022-08-23 PROCEDURE — G8417 CALC BMI ABV UP PARAM F/U: HCPCS | Performed by: INTERNAL MEDICINE

## 2022-08-23 PROCEDURE — 1123F ACP DISCUSS/DSCN MKR DOCD: CPT | Performed by: INTERNAL MEDICINE

## 2022-08-23 PROCEDURE — 96372 THER/PROPH/DIAG INJ SC/IM: CPT | Performed by: INTERNAL MEDICINE

## 2022-08-23 PROCEDURE — 1036F TOBACCO NON-USER: CPT | Performed by: INTERNAL MEDICINE

## 2022-08-23 RX ORDER — CEPHALEXIN 500 MG/1
500 CAPSULE ORAL 3 TIMES DAILY
Qty: 30 CAPSULE | Refills: 0 | Status: SHIPPED | OUTPATIENT
Start: 2022-08-23 | End: 2022-10-04

## 2022-08-23 RX ORDER — TRIAMCINOLONE ACETONIDE 40 MG/ML
60 INJECTION, SUSPENSION INTRA-ARTICULAR; INTRAMUSCULAR ONCE
Status: COMPLETED | OUTPATIENT
Start: 2022-08-23 | End: 2022-08-23

## 2022-08-23 RX ADMIN — TRIAMCINOLONE ACETONIDE 60 MG: 40 INJECTION, SUSPENSION INTRA-ARTICULAR; INTRAMUSCULAR at 15:53

## 2022-08-23 ASSESSMENT — ENCOUNTER SYMPTOMS
SORE THROAT: 0
COUGH: 1
RHINORRHEA: 0
CONSTIPATION: 0
NAUSEA: 0
BLOOD IN STOOL: 0
DIARRHEA: 0
CHEST TIGHTNESS: 0
ABDOMINAL PAIN: 0
SHORTNESS OF BREATH: 0

## 2022-08-23 NOTE — PROGRESS NOTES
Damian Meadows (:  1945) is a 69 y.o. female,Established patient, here for evaluation of the following chief complaint(s):  Cough (Post-covid, c/o cough, weakness. (Positive covid 22). Has tried otc cough syrup with little relief. 8/15/22 treated on prednisone and doxycycline. )         ASSESSMENT/PLAN:  1. Shortness of breath  -     XR CHEST (2 VW); Future  -     cephALEXin (KEFLEX) 500 MG capsule; Take 1 capsule by mouth 3 times daily, Disp-30 capsule, R-0Normal  -     triamcinolone acetonide (KENALOG-40) injection 60 mg; 60 mg, IntraMUSCular, ONCE, 1 dose, On 22 at 1600  2. Cough  -     XR CHEST (2 VW); Future  -     cephALEXin (KEFLEX) 500 MG capsule; Take 1 capsule by mouth 3 times daily, Disp-30 capsule, R-0Normal  -     triamcinolone acetonide (KENALOG-40) injection 60 mg; 60 mg, IntraMUSCular, ONCE, 1 dose, On 22 at 1600      Plan:  1. Shortness of breath  Concerned about pneumonia with the rales in the LLL. CXR at the [facility]. Start on Keflex 500 mg three times a day x 10 days. Hamzah Buck was given an intramuscular injection of 60 mg of kenalog .    - XR CHEST (2 VW); Future  - cephALEXin (KEFLEX) 500 MG capsule; Take 1 capsule by mouth 3 times daily  Dispense: 30 capsule; Refill: 0  - triamcinolone acetonide (KENALOG-40) injection 60 mg    2. Cough    - XR CHEST (2 VW); Future  - cephALEXin (KEFLEX) 500 MG capsule; Take 1 capsule by mouth 3 times daily  Dispense: 30 capsule; Refill: 0  - triamcinolone acetonide (KENALOG-40) injection 60 mg    Hamzah Buck is instructed to return to the clinic if the symptoms continue or worsen. Hamzah Buck  was also instructed to go to the emergency room department if the symptoms significantly worsen before an appointment can be made. Subjective   SUBJECTIVE/OBJECTIVE:  Hamzah Buck had COVID a few weeks ago. After she had COVID she has worsening cough and congestion and was treated with Doxycycline and Prednisone.   She states the symptoms never really improved. Raven Bowman states she has been very fatigued and wants to sleep all the time. No fever or chills. She states she is very weak. Appetite is starting to come back. Review of Systems   Constitutional:  Positive for fatigue. HENT:  Negative for congestion, ear pain, rhinorrhea, sneezing and sore throat. Eyes:  Negative for visual disturbance. Respiratory:  Positive for cough. Negative for chest tightness and shortness of breath. Cardiovascular:  Negative for chest pain and palpitations. Gastrointestinal:  Negative for abdominal pain, blood in stool, constipation, diarrhea and nausea. Genitourinary:  Negative for difficulty urinating, dysuria, frequency, menstrual problem and urgency. Musculoskeletal:  Negative for arthralgias, joint swelling, myalgias and neck pain. Skin: Negative. Neurological:  Positive for weakness. Negative for syncope. Psychiatric/Behavioral: Negative. Objective   Physical Exam  Constitutional:       Appearance: She is well-developed. HENT:      Head: Atraumatic. Eyes:      Conjunctiva/sclera: Conjunctivae normal.   Cardiovascular:      Rate and Rhythm: Normal rate and regular rhythm. Heart sounds: Normal heart sounds. Pulmonary:      Effort: Pulmonary effort is normal.      Comments: Scattered expiratory wheezing with a few rales in the left base posteriorly. Abdominal:      Palpations: Abdomen is soft. Tenderness: There is no abdominal tenderness. Musculoskeletal:         General: Normal range of motion. Cervical back: Normal range of motion and neck supple. Lymphadenopathy:      Cervical: No cervical adenopathy. Skin:     Findings: No rash. Neurological:      Mental Status: She is alert. Psychiatric:         Behavior: Behavior normal.         Thought Content: Thought content normal.                An electronic signature was used to authenticate this note.     --Gama Jimenez MD

## 2022-08-23 NOTE — PATIENT INSTRUCTIONS
Survey: You may be receiving a survey from [employer] regarding your visit today. You may get this in the mail, through your MyChart or in your email. Please complete the survey to enable us to provide the highest quality of care to you and your family. Please also, mention our names. 1. Shortness of breath  Concerned about pneumonia with the rales in the LLL. CXR at the hospital [facility]. Start on Keflex 500 mg three times a day x 10 days. Natalia Baca was given an intramuscular injection of 60 mg of kenalog .    - XR CHEST (2 VW); Future  - cephALEXin (KEFLEX) 500 MG capsule; Take 1 capsule by mouth 3 times daily  Dispense: 30 capsule; Refill: 0  - triamcinolone acetonide (KENALOG-40) injection 60 mg    2. Cough    - XR CHEST (2 VW); Future  - cephALEXin (KEFLEX) 500 MG capsule; Take 1 capsule by mouth 3 times daily  Dispense: 30 capsule; Refill: 0  - triamcinolone acetonide (KENALOG-40) injection 60 mg    Natalia Baca is instructed to return to the clinic if the symptoms continue or worsen. Natalia Baca  was also instructed to go to the emergency room department if the symptoms significantly worsen before an appointment can be made. If you cannot score us as very good (5 Stars) on any question, please feel free to call the office to discuss how we could have made your experience exceptional.      Thank You!         MD Esha Cherry, [address] [facility], PATRICIO ChauN BÁRBARA    [facility], [address] [place] [address]

## 2022-08-24 ENCOUNTER — APPOINTMENT (OUTPATIENT)
Dept: CARDIAC REHAB | Age: 77
End: 2022-08-24
Payer: MEDICARE

## 2022-08-26 ENCOUNTER — HOSPITAL ENCOUNTER (OUTPATIENT)
Dept: CARDIAC REHAB | Age: 77
Setting detail: THERAPIES SERIES
End: 2022-08-26
Payer: MEDICARE

## 2022-08-29 ENCOUNTER — HOSPITAL ENCOUNTER (OUTPATIENT)
Dept: CARDIAC REHAB | Age: 77
Setting detail: THERAPIES SERIES
End: 2022-08-29
Payer: MEDICARE

## 2022-08-31 ENCOUNTER — APPOINTMENT (OUTPATIENT)
Dept: CARDIAC REHAB | Age: 77
End: 2022-08-31
Payer: MEDICARE

## 2022-08-31 ENCOUNTER — TELEPHONE (OUTPATIENT)
Dept: CARDIAC REHAB | Age: 77
End: 2022-08-31

## 2022-08-31 NOTE — TELEPHONE ENCOUNTER
Attempted follow-up call to this pt after mishel Covid at the beginning of Aug and had not returned to cardiac rehab since. No answer but VM left requesting call-back to CR dept.

## 2022-09-09 ENCOUNTER — HOSPITAL ENCOUNTER (OUTPATIENT)
Dept: CARDIAC REHAB | Age: 77
Setting detail: THERAPIES SERIES
Discharge: HOME | End: 2022-09-09

## 2022-09-15 ENCOUNTER — HOSPITAL ENCOUNTER (OUTPATIENT)
Age: 77
Setting detail: SPECIMEN
Discharge: HOME | End: 2022-09-15
Payer: MEDICARE

## 2022-09-15 ENCOUNTER — OFFICE VISIT (OUTPATIENT)
Dept: UROLOGY | Age: 77
End: 2022-09-15
Payer: MEDICARE

## 2022-09-15 VITALS — BODY MASS INDEX: 30.82 KG/M2 | WEIGHT: 174 LBS | SYSTOLIC BLOOD PRESSURE: 118 MMHG | DIASTOLIC BLOOD PRESSURE: 76 MMHG

## 2022-09-15 DIAGNOSIS — R31.29 MICROHEMATURIA: ICD-10-CM

## 2022-09-15 DIAGNOSIS — R31.29 MICROHEMATURIA: Primary | ICD-10-CM

## 2022-09-15 DIAGNOSIS — R35.0 FREQUENCY OF MICTURITION: ICD-10-CM

## 2022-09-15 LAB
-: ABNORMAL
BACTERIA: ABNORMAL
BILIRUBIN URINE: ABNORMAL
COLOR: YELLOW
COMMENT UA: ABNORMAL
EPITHELIAL CELLS UA: ABNORMAL /HPF
GLUCOSE URINE: NEGATIVE
KETONES, URINE: NEGATIVE
LEUKOCYTE ESTERASE, URINE: ABNORMAL
MUCUS: ABNORMAL
NITRITE, URINE: NEGATIVE
PH UA: 7 (ref 5–8)
PROTEIN UA: NEGATIVE
RBC UA: ABNORMAL /HPF (ref 0–2)
SPECIFIC GRAVITY UA: 1.02 (ref 1–1.03)
TURBIDITY: ABNORMAL
URINE HGB: ABNORMAL
UROBILINOGEN, URINE: ABNORMAL
WBC UA: ABNORMAL /HPF

## 2022-09-15 PROCEDURE — G8417 CALC BMI ABV UP PARAM F/U: HCPCS | Performed by: PHYSICIAN ASSISTANT

## 2022-09-15 PROCEDURE — 1036F TOBACCO NON-USER: CPT | Performed by: PHYSICIAN ASSISTANT

## 2022-09-15 PROCEDURE — 1123F ACP DISCUSS/DSCN MKR DOCD: CPT | Performed by: PHYSICIAN ASSISTANT

## 2022-09-15 PROCEDURE — G8399 PT W/DXA RESULTS DOCUMENT: HCPCS | Performed by: PHYSICIAN ASSISTANT

## 2022-09-15 PROCEDURE — 99213 OFFICE O/P EST LOW 20 MIN: CPT | Performed by: PHYSICIAN ASSISTANT

## 2022-09-15 PROCEDURE — 1090F PRES/ABSN URINE INCON ASSESS: CPT | Performed by: PHYSICIAN ASSISTANT

## 2022-09-15 PROCEDURE — 81001 URINALYSIS AUTO W/SCOPE: CPT

## 2022-09-15 PROCEDURE — 87086 URINE CULTURE/COLONY COUNT: CPT

## 2022-09-15 PROCEDURE — G8427 DOCREV CUR MEDS BY ELIG CLIN: HCPCS | Performed by: PHYSICIAN ASSISTANT

## 2022-09-15 ASSESSMENT — ENCOUNTER SYMPTOMS
BACK PAIN: 0
COUGH: 0
ABDOMINAL PAIN: 0
NAUSEA: 0
VOMITING: 0
SHORTNESS OF BREATH: 0
APNEA: 0
WHEEZING: 0
CONSTIPATION: 0
COLOR CHANGE: 0
EYE REDNESS: 0

## 2022-09-15 NOTE — PROGRESS NOTES
HPI:    Patient is a 69 y.o. female in no acute distress. She is alert and oriented to person, place, and time. History  2016 Referred for recurrent UTI and microhematuria. Treated with 3 rounds abx in Mar/Apr 2016. Symptoms finally resolved, but microhematuria persisted. Previous smoker, 1 PPD x 30 yrs.      5/2016 CT and cysto normal.     Frequent UTIs in the past. Does not leak urine. Did have hysterectomy. Pelvic did show large rectocele. Referred to GYN for this. They offered a pessary versus surgery, patient declined both    9/2021  hematuria-CT and cystoscopy normal    Today:  Patient is here today for follow-up hematuria. She did have a negative microscopic hematuria work-up last year. She has not had any instances of gross hematuria over the last year. She states that over the last week or 2 she has had an increase in frequency and urgency. She denies any fever, chills, gross hematuria, flank pain, dysuria. She does have a daily bowel move which soft and easy to pass. Patient drinks approximately 32 ounces of water a day. Over the last year she has valve replacement as well as cardiac stent placement. She also had a lung nodule removed which ended up being benign.     Past Medical History:   Diagnosis Date    Aortic stenosis     Arthritis     Benign cyst of right breast in female     Cardiac murmur     Cataract     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    Chronic renal disease, stage III [facility]) [222726] 5/3/2022    Coronary artery disease involving native coronary artery of native heart without angina pectoris 4/7/2022    Deep vein blood clot of left lower extremity ([address] 2016    Diverticulosis     Head injury     Hx of blood clots     Hyperlipidemia     Hypertension     Migraine headache     Pulmonary emphysema ([address] 10/5/2020    Seizures ([address]     Stroke (cerebrum) (HCC)     Unspecified cerebral artery occlusion with cerebral infarction     TIA     Past Surgical History: Procedure Laterality Date    BLEPHAROPLASTY      BREAST SURGERY      b/l breast cyst removal     CATARACT REMOVAL      COLONOSCOPY  2009     COLONOSCOPY BIOPSY/STOMA N/A 8/28/2018    COLONOSCOPY BIOPSY/STOMA, Dx: External Hemorrhoids and Severe Diverticulosis performed by Anabella Guzman MD at [address] [place] [address] ([address] [place] [address])      MAMMO IMPLANT DIGITAL DIAG BI      TUBAL LIGATION       Outpatient Encounter Medications as of 9/15/2022   Medication Sig Dispense Refill    olmesartan-hydroCHLOROthiazide (BENICAR HCT) 20-12.5 MG per tablet TAKE 1 TABLET BY MOUTH EVERY DAY 90 tablet 1    rosuvastatin (CRESTOR) 10 MG tablet TAKE 1 TABLET BY MOUTH NIGHTLY 90 tablet 1    clopidogrel (PLAVIX) 75 MG tablet Take 75 mg by mouth daily      apixaban (ELIQUIS) 5 MG TABS tablet Take by mouth 2 times daily      triamcinolone (KENALOG) 0.1 % cream Apply topically 2 times daily 1 Tube 5    divalproex (DEPAKOTE) 500 MG DR tablet Take 500 mg by mouth daily       albuterol sulfate HFA (PROAIR HFA) 108 (90 Base) MCG/ACT inhaler Inhale 2 puffs into the lungs every 4 hours as needed for Wheezing or Shortness of Breath Inhale 2 puffs 4 times daily 1 Inhaler 3    acetaminophen (TYLENOL) 500 MG tablet Take 500 mg by mouth as needed for Pain      cephALEXin (KEFLEX) 500 MG capsule Take 1 capsule by mouth 3 times daily (Patient not taking: Reported on 9/15/2022) 30 capsule 0    linaCLOtide (LINZESS) 72 MCG CAPS capsule Take 1 capsule by mouth every morning (before breakfast) (Patient not taking: Reported on 9/15/2022) 90 capsule 1     No facility-administered encounter medications on file as of 9/15/2022.       Current Outpatient Medications on File Prior to Visit   Medication Sig Dispense Refill    olmesartan-hydroCHLOROthiazide (BENICAR HCT) 20-12.5 MG per tablet TAKE 1 TABLET BY MOUTH EVERY DAY 90 tablet 1    rosuvastatin (CRESTOR) 10 MG tablet TAKE 1 TABLET BY MOUTH NIGHTLY 90 tablet 1    clopidogrel (PLAVIX) 75 MG tablet Take 75 mg by mouth daily      apixaban (ELIQUIS) 5 MG TABS tablet Take by mouth 2 times daily      triamcinolone (KENALOG) 0.1 % cream Apply topically 2 times daily 1 Tube 5    divalproex (DEPAKOTE) 500 MG DR tablet Take 500 mg by mouth daily       albuterol sulfate HFA (PROAIR HFA) 108 (90 Base) MCG/ACT inhaler Inhale 2 puffs into the lungs every 4 hours as needed for Wheezing or Shortness of Breath Inhale 2 puffs 4 times daily 1 Inhaler 3    acetaminophen (TYLENOL) 500 MG tablet Take 500 mg by mouth as needed for Pain      cephALEXin (KEFLEX) 500 MG capsule Take 1 capsule by mouth 3 times daily (Patient not taking: Reported on 9/15/2022) 30 capsule 0    linaCLOtide (LINZESS) 72 MCG CAPS capsule Take 1 capsule by mouth every morning (before breakfast) (Patient not taking: Reported on 9/15/2022) 90 capsule 1     No current facility-administered medications on file prior to visit.      Bactrim ds [sulfamethoxazole-trimethoprim] and [place] Line Friday tape]  Family History   Problem Relation Age of Onset    Heart Attack Mother     Clotting Disorder Mother     Diabetes Mother     Hypertension Mother     Migraines Mother     Heart Attack Father     Hypertension Father     Diabetes Father     Heart Attack Sister     Breast Cancer Sister     Diabetes Sister     Migraines Sister     Heart Attack Brother     Heart Disease Brother     Diabetes Brother     Migraines Brother     Hypertension Maternal Grandmother     Hypertension Maternal Grandfather     Hypertension Paternal Grandmother     Hypertension Paternal Grandfather      Social History     Tobacco Use   Smoking Status Former    Packs/day: 1.00    Years: 30.00    Pack years: 30.00    Types: Cigarettes    Quit date: 2005    Years since quittin.7   Smokeless Tobacco Never       Social History     Substance and Sexual Activity   Alcohol Use No       Review of Systems    /76 (Site: Left Upper Arm, Position: Sitting, Cuff Size: Medium Adult)   Wt 174 lb (78.9 kg) LMP  (LMP Unknown)   BMI 30.82 kg/m²       PHYSICAL EXAM:  Constitutional: Patient resting comfortably, in no acute distress. Neuro: Alert and oriented to person place and time. Psych: Mood and affect normal.  HEENT: normocephalic, atraumatic  Lungs: Respiratory effort normal, unlabored  Abdomen: Soft, non-tender, non-distended   : No CVA tenderness. Pelvic: deferred     Lab Results   Component Value Date    BUN 20 07/25/2022     Lab Results   Component Value Date    CREATININE 1.12 (H) 07/25/2022       ASSESSMENT:   Diagnosis Orders   1. Microhematuria  Urinalysis with Microscopic    Culture, Urine              PLAN:  Increase water intake    We will check a urinalysis and culture. We will call her with results. We will check this secondary to her history of hematuria as well as her recent increase in urgency and frequency.     Follow-up in 1 year with hematuria check

## 2022-09-16 LAB
CULTURE: NORMAL
SPECIMEN DESCRIPTION: NORMAL

## 2022-09-19 ENCOUNTER — TELEPHONE (OUTPATIENT)
Dept: UROLOGY | Age: 77
End: 2022-09-19

## 2022-09-19 NOTE — RESULT ENCOUNTER NOTE
Please call pt - urine culture reviewed and does not show UTI    There was blood in the urine under the microscope. Patient just had hematuria work-up last year, no need to repeat this at this time.   Follow-up as planned no difficulties

## 2022-09-19 NOTE — TELEPHONE ENCOUNTER
----- Message from Yasmany Cardenas PA-C sent at 9/19/2022  8:23 AM EDT -----  Please call pt - urine culture reviewed and does not show UTI    There was blood in the urine under the microscope. Patient just had hematuria work-up last year, no need to repeat this at this time.   Follow-up as planned

## 2022-09-29 ENCOUNTER — HOSPITAL ENCOUNTER (OUTPATIENT)
Age: 77
Discharge: HOME | End: 2022-09-29
Payer: MEDICARE

## 2022-09-29 DIAGNOSIS — I10 ESSENTIAL HYPERTENSION: ICD-10-CM

## 2022-09-29 DIAGNOSIS — I10 PRIMARY HYPERTENSION: ICD-10-CM

## 2022-09-29 DIAGNOSIS — I10 PRIMARY HYPERTENSION: Primary | ICD-10-CM

## 2022-09-29 DIAGNOSIS — E55.9 VITAMIN D DEFICIENCY: ICD-10-CM

## 2022-09-29 DIAGNOSIS — E78.00 HYPERCHOLESTEROLEMIA: ICD-10-CM

## 2022-09-29 DIAGNOSIS — E78.2 MIXED HYPERLIPIDEMIA: ICD-10-CM

## 2022-09-29 DIAGNOSIS — I35.0 NONRHEUMATIC AORTIC VALVE STENOSIS: ICD-10-CM

## 2022-09-29 LAB
ABSOLUTE EOS #: 0 K/UL (ref 0–0.4)
ABSOLUTE LYMPH #: 1.2 K/UL (ref 1–4.8)
ABSOLUTE MONO #: 0.4 K/UL (ref 0–1)
ALBUMIN SERPL-MCNC: 3.9 G/DL (ref 3.5–5.2)
ALP BLD-CCNC: 50 U/L (ref 35–104)
ALT SERPL-CCNC: 9 U/L (ref 5–33)
ANION GAP SERPL CALCULATED.3IONS-SCNC: 6 MMOL/L (ref 9–17)
AST SERPL-CCNC: 14 U/L
BASOPHILS # BLD: 1 % (ref 0–2)
BASOPHILS ABSOLUTE: 0 K/UL (ref 0–0.2)
BILIRUB SERPL-MCNC: 0.5 MG/DL (ref 0.3–1.2)
BUN BLDV-MCNC: 19 MG/DL (ref 8–23)
BUN/CREAT BLD: 18 (ref 9–20)
CALCIUM SERPL-MCNC: 8.9 MG/DL (ref 8.6–10.4)
CHLORIDE BLD-SCNC: 102 MMOL/L (ref 98–107)
CHOLESTEROL/HDL RATIO: 2.9
CHOLESTEROL: 142 MG/DL
CO2: 32 MMOL/L (ref 20–31)
CREAT SERPL-MCNC: 1.04 MG/DL (ref 0.5–0.9)
DIFFERENTIAL TYPE: YES
EOSINOPHILS RELATIVE PERCENT: 1 % (ref 0–5)
GFR AFRICAN AMERICAN: >60 ML/MIN
GFR NON-AFRICAN AMERICAN: 52 ML/MIN
GFR SERPL CREATININE-BSD FRML MDRD: ABNORMAL ML/MIN/{1.73_M2}
GLUCOSE BLD-MCNC: 107 MG/DL (ref 70–99)
HCT VFR BLD CALC: 34.6 % (ref 36–46)
HDLC SERPL-MCNC: 49 MG/DL
HEMOGLOBIN: 11.7 G/DL (ref 12–16)
LDL CHOLESTEROL: 72 MG/DL (ref 0–130)
LYMPHOCYTES # BLD: 27 % (ref 15–40)
MAGNESIUM: 1.9 MG/DL (ref 1.6–2.6)
MCH RBC QN AUTO: 30.8 PG (ref 26–34)
MCHC RBC AUTO-ENTMCNC: 33.9 G/DL (ref 31–37)
MCV RBC AUTO: 90.9 FL (ref 80–100)
MONOCYTES # BLD: 9 % (ref 4–8)
PATIENT FASTING?: YES
PDW BLD-RTO: 14 % (ref 12.1–15.2)
PLATELET # BLD: 127 K/UL (ref 140–450)
POTASSIUM SERPL-SCNC: 4.7 MMOL/L (ref 3.7–5.3)
RBC # BLD: 3.81 M/UL (ref 4–5.2)
SEG NEUTROPHILS: 62 % (ref 47–75)
SEGMENTED NEUTROPHILS ABSOLUTE COUNT: 2.9 K/UL (ref 2.5–7)
SODIUM BLD-SCNC: 140 MMOL/L (ref 135–144)
TOTAL PROTEIN: 6.2 G/DL (ref 6.4–8.3)
TRIGL SERPL-MCNC: 103 MG/DL
TSH SERPL DL<=0.05 MIU/L-ACNC: 4.4 UIU/ML (ref 0.3–5)
VITAMIN D 25-HYDROXY: 63.9 NG/ML
WBC # BLD: 4.6 K/UL (ref 3.5–11)

## 2022-09-29 PROCEDURE — 83735 ASSAY OF MAGNESIUM: CPT

## 2022-09-29 PROCEDURE — 82306 VITAMIN D 25 HYDROXY: CPT

## 2022-09-29 PROCEDURE — 85025 COMPLETE CBC W/AUTO DIFF WBC: CPT

## 2022-09-29 PROCEDURE — 80053 COMPREHEN METABOLIC PANEL: CPT

## 2022-09-29 PROCEDURE — 36415 COLL VENOUS BLD VENIPUNCTURE: CPT

## 2022-09-29 PROCEDURE — 80061 LIPID PANEL: CPT

## 2022-09-29 PROCEDURE — 93005 ELECTROCARDIOGRAM TRACING: CPT

## 2022-09-29 PROCEDURE — 84443 ASSAY THYROID STIM HORMONE: CPT

## 2022-10-02 LAB
EKG ATRIAL RATE: 69 BPM
EKG P AXIS: 53 DEGREES
EKG P-R INTERVAL: 170 MS
EKG Q-T INTERVAL: 354 MS
EKG QRS DURATION: 76 MS
EKG QTC CALCULATION (BAZETT): 379 MS
EKG R AXIS: -5 DEGREES
EKG T AXIS: 77 DEGREES
EKG VENTRICULAR RATE: 69 BPM

## 2022-10-02 PROCEDURE — 93010 ELECTROCARDIOGRAM REPORT: CPT | Performed by: INTERNAL MEDICINE

## 2022-10-04 ENCOUNTER — OFFICE VISIT (OUTPATIENT)
Dept: CARDIOLOGY CLINIC | Age: 77
End: 2022-10-04
Payer: MEDICARE

## 2022-10-04 VITALS
BODY MASS INDEX: 31.35 KG/M2 | SYSTOLIC BLOOD PRESSURE: 130 MMHG | HEART RATE: 74 BPM | WEIGHT: 177 LBS | OXYGEN SATURATION: 98 % | DIASTOLIC BLOOD PRESSURE: 70 MMHG

## 2022-10-04 DIAGNOSIS — I10 PRIMARY HYPERTENSION: Primary | ICD-10-CM

## 2022-10-04 DIAGNOSIS — I35.0 NONRHEUMATIC AORTIC VALVE STENOSIS: ICD-10-CM

## 2022-10-04 DIAGNOSIS — E78.2 MIXED HYPERLIPIDEMIA: ICD-10-CM

## 2022-10-04 DIAGNOSIS — I10 ESSENTIAL HYPERTENSION: ICD-10-CM

## 2022-10-04 PROCEDURE — 99214 OFFICE O/P EST MOD 30 MIN: CPT | Performed by: INTERNAL MEDICINE

## 2022-10-04 PROCEDURE — 1090F PRES/ABSN URINE INCON ASSESS: CPT | Performed by: INTERNAL MEDICINE

## 2022-10-04 PROCEDURE — G8427 DOCREV CUR MEDS BY ELIG CLIN: HCPCS | Performed by: INTERNAL MEDICINE

## 2022-10-04 PROCEDURE — G8484 FLU IMMUNIZE NO ADMIN: HCPCS | Performed by: INTERNAL MEDICINE

## 2022-10-04 PROCEDURE — 1036F TOBACCO NON-USER: CPT | Performed by: INTERNAL MEDICINE

## 2022-10-04 PROCEDURE — G8399 PT W/DXA RESULTS DOCUMENT: HCPCS | Performed by: INTERNAL MEDICINE

## 2022-10-04 PROCEDURE — 1123F ACP DISCUSS/DSCN MKR DOCD: CPT | Performed by: INTERNAL MEDICINE

## 2022-10-04 PROCEDURE — G8417 CALC BMI ABV UP PARAM F/U: HCPCS | Performed by: INTERNAL MEDICINE

## 2022-10-04 NOTE — LETTER
Jw Newsome MD  [facility]  [facility]  [address]  [phone]      2022      Laureen Helton MD  [address]      RE:   Rebecca Keen  :      Dear Dr. Kaila Trent:    [address]:  1.  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 2.  Severe aortic stenosis status post TAVR and drug-eluting stent. HISTORY OF PRESENT ILLNESS:  I had the pleasure of seeing Mrs. Parks in our office on 10/04/2022. As you know, she is a very complex 67-year-old female who has a history of moderate-to-severe aortic stenosis. She had a pulmonary nodule in the right lower lobe with PET scan positive and was pending having biopsy and possible resection. In preparation for her biopsy, a stress test was done, as well as an echocardiogram, and her stress test was abnormal.  This prompted a left and right cardiac catheterization by Dr. Eduin Still on 2022, her right coronary artery and circumflex were unremarkable. She had a 75% lesion in the mid LAD. She also had severe aortic stenosis with an aortic valve area of 0.52 cm2. She had a TAVR procedure on 2022, using a 26 mm Krista 3 Ultra valve. She was brought back for angioplasty and stent placement in her mid LAD on 2022, placing a 3.5 x 18 mm Orsiro stent in the mid LAD with a good end result. 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 When I see her today, she is doing well. She did have three episodes where she suddenly felt weak; one was in a gym on the treadmill, and two at home in the kitchen where she had to sit down and it went away spontaneously.   She does have some soreness on her right side where her chest tubes were removed. She did have COVID last month. She is walking without difficulty. She has had no complete syncopal episode. Denies any palpitations. Her energy level is good. She overall is doing well. CARDIAC RISK FACTORS:  Known CAD:  Positive. Other Family Members:  Positive. Hyperlipidemia:  Positive. Hypertension:  Positive. Peripheral Vascular Disease:  Negative. Smoking:  Negative. Diabetes:  Borderline positive. MEDICATIONS AT THIS TIME:  She is on Tylenol p.r.n., albuterol p.r.n., Eliquis 5 mg b.i.d., Plavix 75 mg daily, Depakote 500 mg daily, Benicar/hydrochlorothiazide 20/12.5 daily, Crestor 20 mg daily, Kenalog cream p.r.n. PAST MEDICAL AND SURGICAL HISTORY:  1. TAVR procedure for severe aortic stenosis on 03/02/2022, using a #26 Krista 3 Ultra valve. 2.  Status post angioplasty of her LAD on 04/05/2022, placing 3.5 x 18 mm Orsiro drug-eluting stent in her mid LAD. 3.  Right lower lobe lung nodule with resection on 05/17/2022, with a robotic-assisted right lower lobe wedge resection, which was negative for malignancy with histoplasmosis diagnosed. 4.  History of seizures. 5.  TIA. 6.  Hysterectomy in 1995 in [address] [place] [address]. 7.  CVA 24 years ago with cerebral hemorrhage in 08/2013.  8.  Migraine headaches. 9.  Blepharoplasty. 10.  DVT in 09/2016. FAMILY HISTORY:  Mother had an MI. Brother had an MI. Father had an MI.    SOCIAL HISTORY:  She is 69years old, , two children. Does not smoke or drink alcohol. She sees Dr. Gwen Phillips on a regular basis due to unavailability of any other physicians in a 50 mile radius. She does not smoke or drink alcohol. REVIEW OF SYSTEMS:  Cardiac as above. Other systems reviewed including constitutional, eyes, ears, nose and throat, cardiovascular, respiratory, GI, , musculoskeletal, integumentary, neurologic, endocrine, hematologic and allergic/immunologic, are negative except for what is described above. No weight loss or weight gain. No change in bowel habits. No blood in stools. No fevers, sweats or chills. PHYSICAL EXAMINATION:  VITAL SIGNS:  Her blood pressure was 130/76 with a heart rate of 74 and regular. Respirations 18. O2 sat 98%. Weight 177 pounds. GENERAL:  She is a very pleasant 76-year-old female. Denied pain. She was oriented to person, place and time. Answered questions appropriately. SKIN:  No unusual skin changes. HEENT:  The pupils are equally round and intact. Mucous membranes were dry. NECK:  No JVD. Good carotid pulses. No carotid bruits. No lymphadenopathy or thyromegaly. CARDIOVASCULAR EXAM:  S1 and S2 were normal.  No S3 or S4. Soft systolic blowing type murmur. No diastolic murmur. PMI was normal.  No lift, thrust, or pericardial friction rub. LUNGS:  Clear to auscultation and percussion. ABDOMEN:  Soft and nontender. Good bowel sounds. EXTREMITIES:  Good femoral pulses. Good pedal pulses. No pedal edema. Skin was warm and dry. No calf tenderness. Nail beds pink. Good cap refill. PULSES:  Bilateral symmetrical radial, brachial and carotid pulses. No carotid bruits. Good femoral and pedal pulses. NEUROLOGIC EXAM:  Within normal limits. PSYCHIATRIC EXAM:  Within normal limits. LABORATORY DATA:  Her sodium was 140, potassium 4.7, BUN 19, creatinine 1.04, GFR was 52, glucose 107, calcium 8.9. Cholesterol 142, HDL was 49, LDL was 72, triglycerides 103. ALT was 9, AST was 14. Her TSH was 4.40. Vitamin D was 63.9. White count 4.6, hemoglobin 11.7 with a platelet count 043,067. Her EKG showed sinus rhythm and was normal.    Chest x-ray on 08/23, was unremarkable. Bedside echocardiogram showed normal LV function with normal prosthetic aortic valve replacement. IMPRESSION:  1.   Functional class I.  2.  Cardiac catheterization on 01/28/2022 that showed 75% disease in the mid LAD, with unremarkable circumflex and right coronary artery, with an EF of 60% with severe aortic stenosis with an aortic valve area of 0.54 cm2.  3.  Status post TAVR procedure on 03/02/2022, using 26 mm Krista 3 Ultra valve. 4.  Angioplasty of the LAD on 04/05/2022, placing a 3.5 x 18 mm Orsiro drug-eluting stent. 5.  Lung nodule on the right lower lobe, which was resected by Dr. Annabella Cruz, which turned out to be histoplasmosis with no further treatment necessary. 6.  Hypertension. 7.  Hyperlipidemia. 8.  On Eliquis for factor V Leiden mutation. PLAN:  1  No change in medications. 2.  See in one year. 3.  If she would have any chest pain, shortness of breath or loss of energy, of course we would want to see her earlier. DISCUSSION:  Mrs. Mickey Valle had a very difficult year with her TAVR procedure followed by her lung nodule in her right lower lobe removed by Dr. Annabella Cruz. However, she has done very well and at this time is functional class I. There is really no limitation in her activity. She did have three episodes where she felt suddenly weak; however, she has had no syncopal episodes, no chest pain and no other symptoms to indicate that any testing such as stress test needs to be done. I am delighted at how she is doing. I will plan on seeing her in one year unless a problem would develop. Thank you very much for allowing me the privilege of seeing Mrs. Parks. If you have any questions on my thoughts, please do not hesitate to contact me.      Sincerely,        Jose Grimaldo    D: 10/10/2022 20:27:35     T: 10/10/2022 20:32:28     [email or web address]  Job#: [id number]   Doc#: [id number]

## 2022-10-04 NOTE — PROGRESS NOTES
Ov DR Yenny Mae 6 mth follow up  No chest pain or sob  No hospitalizations or procedures  Since seen . Had 3 episodes suddenly felt   Weak. One at the gym on treadmill. 2 at home in [place] had to sit down  It would go away. C/o soreness rt side  where drain   Tubes were. Had COVID last month. Bedside echo done  Will see in 1 year.     Seeing DR Jm Sandoval on 10/14

## 2022-10-12 NOTE — PROGRESS NOTES
Miguelangel Varma MD  [facility]  [facility]  [address]  [phone]      2022      Delfino Ferraro MD  [address]      RE:   Nick Raffaele  :      Dear Dr. Real Number:    [address]:  1.  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 2.  Severe aortic stenosis status post TAVR and drug-eluting stent. HISTORY OF PRESENT ILLNESS:  I had the pleasure of seeing Mrs. Parks in our office on 10/04/2022. As you know, she is a very complex 50-year-old female who has a history of moderate-to-severe aortic stenosis. She had a pulmonary nodule in the right lower lobe with PET scan positive and was pending having biopsy and possible resection. In preparation for her biopsy, a stress test was done, as well as an echocardiogram, and her stress test was abnormal.  This prompted a left and right cardiac catheterization by Dr. Kameron Coppola on 2022, her right coronary artery and circumflex were unremarkable. She had a 75% lesion in the mid LAD. She also had severe aortic stenosis with an aortic valve area of 0.52 cm2. She had a TAVR procedure on 2022, using a 26 mm Krista 3 Ultra valve. She was brought back for angioplasty and stent placement in her mid LAD on 2022, placing a 3.5 x 18 mm Orsiro stent in the mid LAD with a good end result. 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 When I see her today, she is doing well. She did have three episodes where she suddenly felt weak; one was in a gym on the treadmill, and two at home in the kitchen where she had to sit down and it went away spontaneously.   She does have some soreness on her right side where her chest tubes were removed. She did have COVID last month. She is walking without difficulty. She has had no complete syncopal episode. Denies any palpitations. Her energy level is good. She overall is doing well. CARDIAC RISK FACTORS:  Known CAD:  Positive. Other Family Members:  Positive. Hyperlipidemia:  Positive. Hypertension:  Positive. Peripheral Vascular Disease:  Negative. Smoking:  Negative. Diabetes:  Borderline positive. MEDICATIONS AT THIS TIME:  She is on Tylenol p.r.n., albuterol p.r.n., Eliquis 5 mg b.i.d., Plavix 75 mg daily, Depakote 500 mg daily, Benicar/hydrochlorothiazide 20/12.5 daily, Crestor 20 mg daily, Kenalog cream p.r.n. PAST MEDICAL AND SURGICAL HISTORY:  1. TAVR procedure for severe aortic stenosis on 03/02/2022, using a #26 Krista 3 Ultra valve. 2.  Status post angioplasty of her LAD on 04/05/2022, placing 3.5 x 18 mm Orsiro drug-eluting stent in her mid LAD. 3.  Right lower lobe lung nodule with resection on 05/17/2022, with a robotic-assisted right lower lobe wedge resection, which was negative for malignancy with histoplasmosis diagnosed. 4.  History of seizures. 5.  TIA. 6.  Hysterectomy in 1995 in [address] [place] [address]. 7.  CVA 24 years ago with cerebral hemorrhage in 08/2013.  8.  Migraine headaches. 9.  Blepharoplasty. 10.  DVT in 09/2016. FAMILY HISTORY:  Mother had an MI. Brother had an MI. Father had an MI.    SOCIAL HISTORY:  She is 69years old, , two children. Does not smoke or drink alcohol. She sees Dr. Mame James on a regular basis due to unavailability of any other physicians in a 50 mile radius. She does not smoke or drink alcohol. REVIEW OF SYSTEMS:  Cardiac as above. Other systems reviewed including constitutional, eyes, ears, nose and throat, cardiovascular, respiratory, GI, , musculoskeletal, integumentary, neurologic, endocrine, hematologic and allergic/immunologic, are negative except for what is described above. No weight loss or weight gain. No change in bowel habits. No blood in stools. No fevers, sweats or chills. PHYSICAL EXAMINATION:  VITAL SIGNS:  Her blood pressure was 130/76 with a heart rate of 74 and regular. Respirations 18. O2 sat 98%. Weight 177 pounds. GENERAL:  She is a very pleasant 81-year-old female. Denied pain. She was oriented to person, place and time. Answered questions appropriately. SKIN:  No unusual skin changes. HEENT:  The pupils are equally round and intact. Mucous membranes were dry. NECK:  No JVD. Good carotid pulses. No carotid bruits. No lymphadenopathy or thyromegaly. CARDIOVASCULAR EXAM:  S1 and S2 were normal.  No S3 or S4. Soft systolic blowing type murmur. No diastolic murmur. PMI was normal.  No lift, thrust, or pericardial friction rub. LUNGS:  Clear to auscultation and percussion. ABDOMEN:  Soft and nontender. Good bowel sounds. EXTREMITIES:  Good femoral pulses. Good pedal pulses. No pedal edema. Skin was warm and dry. No calf tenderness. Nail beds pink. Good cap refill. PULSES:  Bilateral symmetrical radial, brachial and carotid pulses. No carotid bruits. Good femoral and pedal pulses. NEUROLOGIC EXAM:  Within normal limits. PSYCHIATRIC EXAM:  Within normal limits. LABORATORY DATA:  Her sodium was 140, potassium 4.7, BUN 19, creatinine 1.04, GFR was 52, glucose 107, calcium 8.9. Cholesterol 142, HDL was 49, LDL was 72, triglycerides 103. ALT was 9, AST was 14. Her TSH was 4.40. Vitamin D was 63.9. White count 4.6, hemoglobin 11.7 with a platelet count 936,202. Her EKG showed sinus rhythm and was normal.    Chest x-ray on 08/23, was unremarkable. Bedside echocardiogram showed normal LV function with normal prosthetic aortic valve replacement. IMPRESSION:  1.   Functional class I.  2.  Cardiac catheterization on 01/28/2022 that showed 75% disease in the mid LAD, with unremarkable circumflex and right coronary artery, with an EF of 60% with severe aortic stenosis with an aortic valve area of 0.54 cm2.  3.  Status post TAVR procedure on 03/02/2022, using 26 mm Krista 3 Ultra valve. 4.  Angioplasty of the LAD on 04/05/2022, placing a 3.5 x 18 mm Orsiro drug-eluting stent. 5.  Lung nodule on the right lower lobe, which was resected by Dr. Ana Elaine, which turned out to be histoplasmosis with no further treatment necessary. 6.  Hypertension. 7.  Hyperlipidemia. 8.  On Eliquis for factor V Leiden mutation. PLAN:  1  No change in medications. 2.  See in one year. 3.  If she would have any chest pain, shortness of breath or loss of energy, of course we would want to see her earlier. DISCUSSION:  Mrs. Deja Olivera had a very difficult year with her TAVR procedure followed by her lung nodule in her right lower lobe removed by Dr. Ana Elaine. However, she has done very well and at this time is functional class I. There is really no limitation in her activity. She did have three episodes where she felt suddenly weak; however, she has had no syncopal episodes, no chest pain and no other symptoms to indicate that any testing such as stress test needs to be done. I am delighted at how she is doing. I will plan on seeing her in one year unless a problem would develop. Thank you very much for allowing me the privilege of seeing Mrs. Parks. If you have any questions on my thoughts, please do not hesitate to contact me.      Sincerely,        Stephane Arevalo    D: 10/10/2022 20:27:35     T: 10/10/2022 20:32:28     [email or web address]  Job#: [id number]   Doc#: [id number]

## 2022-10-13 ENCOUNTER — OFFICE VISIT (OUTPATIENT)
Dept: FAMILY MEDICINE CLINIC | Age: 77
End: 2022-10-13
Payer: MEDICARE

## 2022-10-13 VITALS
DIASTOLIC BLOOD PRESSURE: 68 MMHG | SYSTOLIC BLOOD PRESSURE: 120 MMHG | TEMPERATURE: 96.8 F | BODY MASS INDEX: 30.83 KG/M2 | HEIGHT: 63 IN | HEART RATE: 76 BPM | WEIGHT: 174 LBS

## 2022-10-13 DIAGNOSIS — J20.9 BRONCHITIS WITH BRONCHOSPASM: Primary | ICD-10-CM

## 2022-10-13 PROCEDURE — G8427 DOCREV CUR MEDS BY ELIG CLIN: HCPCS | Performed by: INTERNAL MEDICINE

## 2022-10-13 PROCEDURE — G8484 FLU IMMUNIZE NO ADMIN: HCPCS | Performed by: INTERNAL MEDICINE

## 2022-10-13 PROCEDURE — 99213 OFFICE O/P EST LOW 20 MIN: CPT | Performed by: INTERNAL MEDICINE

## 2022-10-13 PROCEDURE — 1123F ACP DISCUSS/DSCN MKR DOCD: CPT | Performed by: INTERNAL MEDICINE

## 2022-10-13 PROCEDURE — 1090F PRES/ABSN URINE INCON ASSESS: CPT | Performed by: INTERNAL MEDICINE

## 2022-10-13 PROCEDURE — 1036F TOBACCO NON-USER: CPT | Performed by: INTERNAL MEDICINE

## 2022-10-13 PROCEDURE — G8417 CALC BMI ABV UP PARAM F/U: HCPCS | Performed by: INTERNAL MEDICINE

## 2022-10-13 PROCEDURE — G8399 PT W/DXA RESULTS DOCUMENT: HCPCS | Performed by: INTERNAL MEDICINE

## 2022-10-13 RX ORDER — PREDNISONE 20 MG/1
TABLET ORAL
Qty: 12 TABLET | Refills: 0 | Status: SHIPPED | OUTPATIENT
Start: 2022-10-13 | End: 2022-10-23

## 2022-10-13 RX ORDER — ALBUTEROL SULFATE 90 UG/1
2 AEROSOL, METERED RESPIRATORY (INHALATION) EVERY 4 HOURS PRN
Qty: 18 G | Refills: 5 | Status: SHIPPED | OUTPATIENT
Start: 2022-10-13

## 2022-10-13 RX ORDER — BENZONATATE 100 MG/1
100 CAPSULE ORAL 3 TIMES DAILY PRN
Qty: 30 CAPSULE | Refills: 0 | Status: SHIPPED | OUTPATIENT
Start: 2022-10-13 | End: 2022-10-23

## 2022-10-13 RX ORDER — DOXYCYCLINE HYCLATE 100 MG
100 TABLET ORAL 2 TIMES DAILY
Qty: 20 TABLET | Refills: 0 | Status: SHIPPED | OUTPATIENT
Start: 2022-10-13 | End: 2022-10-23

## 2022-10-13 ASSESSMENT — ENCOUNTER SYMPTOMS
NAUSEA: 0
ABDOMINAL PAIN: 0
DIARRHEA: 0
CHEST TIGHTNESS: 0
COUGH: 1
CONSTIPATION: 0
SORE THROAT: 1
RHINORRHEA: 0
BLOOD IN STOOL: 0
SHORTNESS OF BREATH: 0

## 2022-10-13 NOTE — PROGRESS NOTES
Maryana Sanders (:  1945) is a 69 y.o. female,Established patient, here for evaluation of the following chief complaint(s):  URI (1 week cough, congestion. Home tested negative covid yesterday. )         ASSESSMENT/PLAN:  1. Bronchitis with bronchospasm  -     doxycycline hyclate (VIBRA-TABS) 100 MG tablet; Take 1 tablet by mouth 2 times daily for 10 days, Disp-20 tablet, R-0Normal  -     predniSONE (DELTASONE) 20 MG tablet; 3 tabs daily for 2 days then 2 tabs daily for 2 days then 1 tab daily for 2 days. , Disp-12 tablet, R-0Normal  -     benzonatate (TESSALON PERLES) 100 MG capsule; Take 1 capsule by mouth 3 times daily as needed for Cough, Disp-30 capsule, R-0Normal  -     albuterol sulfate HFA (VENTOLIN HFA) 108 (90 Base) MCG/ACT inhaler; Inhale 2 puffs into the lungs every 4 hours as needed for Wheezing or Shortness of Breath, Disp-18 g, R-5Normal      Plan:  1. Bronchitis with bronchospasm  Take Doxycyline 100 mg two times a day x 10 days. Take the prednisone taper as directed on the prescription. The prednisone is very bitter so swallow the tablets quickly to prevent  dissolving in the mouth. After taking, don't lay  down for 2 hours to help prevent reflux. Take Tessalon 100 mg every 8 hours as needed for cough. Leeanne Ramirez is instructed to return to the clinic if the symptoms continue or worsen. Leeanne Ramirez  was also instructed to go to the emergency room department if the symptoms significantly worsen before an appointment can be made. - doxycycline hyclate (VIBRA-TABS) 100 MG tablet; Take 1 tablet by mouth 2 times daily for 10 days  Dispense: 20 tablet; Refill: 0  - predniSONE (DELTASONE) 20 MG tablet; 3 tabs daily for 2 days then 2 tabs daily for 2 days then 1 tab daily for 2 days. Dispense: 12 tablet; Refill: 0  - benzonatate (TESSALON PERLES) 100 MG capsule; Take 1 capsule by mouth 3 times daily as needed for Cough  Dispense: 30 capsule;  Refill: 0  - albuterol sulfate HFA (VENTOLIN HFA) 108 (90 Base) MCG/ACT inhaler; Inhale 2 puffs into the lungs every 4 hours as needed for Wheezing or Shortness of Breath  Dispense: 18 g; Refill: 5    No follow-ups on file. Subjective   SUBJECTIVE/OBJECTIVE:  URI   This is a new problem. The current episode started in the past 7 days. The problem has been gradually worsening. There has been no fever. Associated symptoms include congestion, coughing and a sore throat. Pertinent negatives include no abdominal pain, chest pain, diarrhea, dysuria, ear pain, nausea, neck pain, rhinorrhea or sneezing. She has tried acetaminophen for the symptoms. The treatment provided no relief. Review of Systems   Constitutional: Negative. HENT:  Positive for congestion and sore throat. Negative for ear pain, rhinorrhea and sneezing. Eyes:  Negative for visual disturbance. Respiratory:  Positive for cough. Negative for chest tightness and shortness of breath. Cardiovascular:  Negative for chest pain and palpitations. Gastrointestinal:  Negative for abdominal pain, blood in stool, constipation, diarrhea and nausea. Genitourinary:  Negative for difficulty urinating, dysuria, frequency, menstrual problem and urgency. Musculoskeletal:  Negative for arthralgias, joint swelling, myalgias and neck pain. Skin: Negative. Neurological:  Negative for syncope. Psychiatric/Behavioral: Negative. Objective   Physical Exam  Constitutional:       Appearance: She is well-developed. HENT:      Head: Atraumatic. Eyes:      Conjunctiva/sclera: Conjunctivae normal.   Cardiovascular:      Rate and Rhythm: Normal rate and regular rhythm. Heart sounds: Normal heart sounds. Pulmonary:      Effort: Pulmonary effort is normal.      Breath sounds: Wheezing present. Abdominal:      Palpations: Abdomen is soft. Tenderness: There is no abdominal tenderness. Musculoskeletal:         General: Normal range of motion.       Cervical back: Normal range of motion and neck supple. Lymphadenopathy:      Cervical: No cervical adenopathy. Skin:     Findings: No rash. Neurological:      Mental Status: She is alert. Psychiatric:         Behavior: Behavior normal.         Thought Content: Thought content normal.                An electronic signature was used to authenticate this note.     --Delfino Ferraro MD

## 2022-10-13 NOTE — PATIENT INSTRUCTIONS
Survey: You may be receiving a survey from [employer] regarding your visit today. You may get this in the mail, through your MyChart or in your email. Please complete the survey to enable us to provide the highest quality of care to you and your family. Please also, mention our names. If you cannot score us as very good (5 Stars) on any question, please feel free to call the office to discuss how we could have made your experience exceptional.      Thank You! MD Evan Goel [facility], [address] [place] [address], ALDEN Burr RN    Philip Ou       Plan:  1. Bronchitis with bronchospasm  Take Doxycyline 100 mg two times a day x 10 days. Take the prednisone taper as directed on the prescription. The prednisone is very bitter so swallow the tablets quickly to prevent  dissolving in the mouth. After taking, don't lay  down for 2 hours to help prevent reflux. Take Tessalon 100 mg every 8 hours as needed for cough. Thor Odom is instructed to return to the clinic if the symptoms continue or worsen. Thor Lei  was also instructed to go to the emergency room department if the symptoms significantly worsen before an appointment can be made.

## 2022-10-28 ENCOUNTER — NURSE ONLY (OUTPATIENT)
Dept: FAMILY MEDICINE CLINIC | Age: 77
End: 2022-10-28
Payer: MEDICARE

## 2022-10-28 DIAGNOSIS — Z23 NEED FOR INFLUENZA VACCINATION: Primary | ICD-10-CM

## 2022-10-28 PROCEDURE — G0008 ADMIN INFLUENZA VIRUS VAC: HCPCS | Performed by: INTERNAL MEDICINE

## 2022-10-28 PROCEDURE — 90674 CCIIV4 VAC NO PRSV 0.5 ML IM: CPT | Performed by: INTERNAL MEDICINE

## 2022-10-28 NOTE — PROGRESS NOTES
Vaccine Information Sheet, \"Influenza - Inactivated\"  given to Kaitlin Sommera, or parent/legal guardian of  Kaitlin Townsend and verbalized understanding. Patient responses:    Have you ever had a reaction to a flu vaccine? No  Are you able to eat eggs without adverse effects? Yes  Do you have any current illness? No  Have you ever had [place] Syndrome? No    Flu vaccine given per order. Please see immunization tab.

## 2022-12-06 ENCOUNTER — HOSPITAL ENCOUNTER (OUTPATIENT)
Age: 77
Discharge: HOME | End: 2022-12-06
Payer: MEDICARE

## 2022-12-06 LAB
ANION GAP SERPL CALCULATED.3IONS-SCNC: 10 MMOL/L (ref 9–17)
BUN BLDV-MCNC: 38 MG/DL (ref 8–23)
BUN/CREAT BLD: 30 (ref 9–20)
CALCIUM SERPL-MCNC: 9.2 MG/DL (ref 8.6–10.4)
CHLORIDE BLD-SCNC: 98 MMOL/L (ref 98–107)
CO2: 35 MMOL/L (ref 20–31)
CREAT SERPL-MCNC: 1.28 MG/DL (ref 0.5–0.9)
GFR SERPL CREATININE-BSD FRML MDRD: 43 ML/MIN/1.73M2
GLUCOSE BLD-MCNC: 116 MG/DL (ref 70–99)
POTASSIUM SERPL-SCNC: 3.3 MMOL/L (ref 3.7–5.3)
SODIUM BLD-SCNC: 143 MMOL/L (ref 135–144)

## 2022-12-06 PROCEDURE — 36415 COLL VENOUS BLD VENIPUNCTURE: CPT

## 2022-12-06 PROCEDURE — 80048 BASIC METABOLIC PNL TOTAL CA: CPT

## 2022-12-09 ENCOUNTER — HOSPITAL ENCOUNTER (OUTPATIENT)
Age: 77
Discharge: HOME | End: 2022-12-09
Payer: MEDICARE

## 2022-12-09 LAB
ABSOLUTE EOS #: 0.1 K/UL (ref 0–0.4)
ABSOLUTE LYMPH #: 1.7 K/UL (ref 1–4.8)
ABSOLUTE MONO #: 0.5 K/UL (ref 0–1)
BASOPHILS # BLD: 1 % (ref 0–2)
BASOPHILS ABSOLUTE: 0 K/UL (ref 0–0.2)
DIFFERENTIAL TYPE: YES
EOSINOPHILS RELATIVE PERCENT: 3 % (ref 0–5)
HCT VFR BLD CALC: 34.2 % (ref 36–46)
HEMOGLOBIN: 11.7 G/DL (ref 12–16)
LYMPHOCYTES # BLD: 34 % (ref 15–40)
MCH RBC QN AUTO: 30.9 PG (ref 26–34)
MCHC RBC AUTO-ENTMCNC: 34.3 G/DL (ref 31–37)
MCV RBC AUTO: 90.1 FL (ref 80–100)
MONOCYTES # BLD: 11 % (ref 4–8)
PDW BLD-RTO: 12.9 % (ref 12.1–15.2)
PLATELET # BLD: 123 K/UL (ref 140–450)
RBC # BLD: 3.79 M/UL (ref 4–5.2)
SEG NEUTROPHILS: 51 % (ref 47–75)
SEGMENTED NEUTROPHILS ABSOLUTE COUNT: 2.5 K/UL (ref 2.5–7)
WBC # BLD: 4.9 K/UL (ref 3.5–11)

## 2022-12-09 PROCEDURE — 85025 COMPLETE CBC W/AUTO DIFF WBC: CPT

## 2022-12-09 PROCEDURE — 36415 COLL VENOUS BLD VENIPUNCTURE: CPT

## 2023-01-10 DIAGNOSIS — E78.00 HYPERCHOLESTEROLEMIA: ICD-10-CM

## 2023-01-12 RX ORDER — ROSUVASTATIN CALCIUM 10 MG/1
TABLET, COATED ORAL
Qty: 90 TABLET | Refills: 1 | Status: SHIPPED | OUTPATIENT
Start: 2023-01-12

## 2023-01-19 ENCOUNTER — OFFICE VISIT (OUTPATIENT)
Dept: FAMILY MEDICINE CLINIC | Age: 78
End: 2023-01-19

## 2023-01-19 VITALS
WEIGHT: 176 LBS | HEIGHT: 63 IN | HEART RATE: 66 BPM | DIASTOLIC BLOOD PRESSURE: 68 MMHG | BODY MASS INDEX: 31.18 KG/M2 | SYSTOLIC BLOOD PRESSURE: 138 MMHG

## 2023-01-19 DIAGNOSIS — E78.2 MIXED HYPERLIPIDEMIA: ICD-10-CM

## 2023-01-19 DIAGNOSIS — R53.1 ACUTE WEAKNESS: Primary | ICD-10-CM

## 2023-01-19 DIAGNOSIS — I25.10 CORONARY ARTERY DISEASE INVOLVING NATIVE CORONARY ARTERY OF NATIVE HEART WITHOUT ANGINA PECTORIS: ICD-10-CM

## 2023-01-19 DIAGNOSIS — I10 PRIMARY HYPERTENSION: ICD-10-CM

## 2023-01-19 DIAGNOSIS — I26.93 SINGLE SUBSEGMENTAL PULMONARY EMBOLISM WITHOUT ACUTE COR PULMONALE (HCC): ICD-10-CM

## 2023-01-19 DIAGNOSIS — I10 ESSENTIAL HYPERTENSION: ICD-10-CM

## 2023-01-19 DIAGNOSIS — G40.909 SEIZURE DISORDER (HCC): ICD-10-CM

## 2023-01-19 DIAGNOSIS — J43.9 PULMONARY EMPHYSEMA, UNSPECIFIED EMPHYSEMA TYPE (HCC): ICD-10-CM

## 2023-01-19 DIAGNOSIS — I48.0 PAROXYSMAL ATRIAL FIBRILLATION (HCC): ICD-10-CM

## 2023-01-19 DIAGNOSIS — E55.9 VITAMIN D DEFICIENCY: ICD-10-CM

## 2023-01-19 DIAGNOSIS — I35.0 NONRHEUMATIC AORTIC VALVE STENOSIS: ICD-10-CM

## 2023-01-19 DIAGNOSIS — R73.9 HYPERGLYCEMIA: ICD-10-CM

## 2023-01-19 RX ORDER — POTASSIUM CHLORIDE 750 MG/1
TABLET, EXTENDED RELEASE ORAL
COMMUNITY
Start: 2022-12-23

## 2023-01-19 RX ORDER — OLMESARTAN MEDOXOMIL AND HYDROCHLOROTHIAZIDE 20/12.5 20; 12.5 MG/1; MG/1
1 TABLET ORAL DAILY
Qty: 90 TABLET | Refills: 1 | Status: SHIPPED | OUTPATIENT
Start: 2023-01-19

## 2023-01-19 RX ORDER — FUROSEMIDE 20 MG/1
TABLET ORAL
COMMUNITY
Start: 2022-12-23

## 2023-01-19 ASSESSMENT — ENCOUNTER SYMPTOMS
BLOOD IN STOOL: 0
DIARRHEA: 0
COUGH: 0
CHEST TIGHTNESS: 0
SORE THROAT: 0
CONSTIPATION: 1
SHORTNESS OF BREATH: 0
NAUSEA: 0
ABDOMINAL PAIN: 0
RHINORRHEA: 0

## 2023-01-19 ASSESSMENT — PATIENT HEALTH QUESTIONNAIRE - PHQ9
SUM OF ALL RESPONSES TO PHQ QUESTIONS 1-9: 0
SUM OF ALL RESPONSES TO PHQ QUESTIONS 1-9: 0
1. LITTLE INTEREST OR PLEASURE IN DOING THINGS: 0
SUM OF ALL RESPONSES TO PHQ9 QUESTIONS 1 & 2: 0
2. FEELING DOWN, DEPRESSED OR HOPELESS: 0
SUM OF ALL RESPONSES TO PHQ QUESTIONS 1-9: 0
SUM OF ALL RESPONSES TO PHQ QUESTIONS 1-9: 0

## 2023-01-19 NOTE — PROGRESS NOTES
Rosie Rosales (:  1945) is a 69 y.o. female,Established patient, here for evaluation of the following chief complaint(s):  Hypertension (6 month check up. ), Hyperlipidemia, Coronary Artery Disease, and COPD         ASSESSMENT/PLAN:  1. Acute weakness  2. Essential hypertension  -     olmesartan-hydroCHLOROthiazide (BENICAR HCT) 20-12.5 MG per tablet; Take 1 tablet by mouth daily, Disp-90 tablet, R-1Normal  3. Single subsegmental pulmonary embolism without acute cor pulmonale (HCC)  4. Pulmonary emphysema, unspecified emphysema type ([facility] [address]  5. Seizure disorder ([facility] [address]  6. Hyperglycemia  -     Comprehensive Metabolic Panel; Future  -     Hemoglobin A1C; Future  7. Vitamin D deficiency  8. Primary hypertension  9. Mixed hyperlipidemia  -     Comprehensive Metabolic Panel; Future  -     Lipid Panel; Future  10. Paroxysmal atrial fibrillation (HCC)  11. Nonrheumatic aortic valve stenosis  12. Coronary artery disease involving native coronary artery of native heart without angina pectoris      Plan:  1. Essential hypertension  Controlled on Benicar HCT. - olmesartan-hydroCHLOROthiazide (BENICAR HCT) 20-12.5 MG per tablet; Take 1 tablet by mouth daily  Dispense: 90 tablet; Refill: 1    2. Acute weakness  Likely low BS level (hypoglycemia). Encouraged not to skip meals and snack with strenuous activity. HgbA1C with labs. 3. Single subsegmental pulmonary embolism without acute cor pulmonale (HCC)  Continues on Eliquis. 4. Pulmonary emphysema, unspecified emphysema type ([facility] [address]  Stable since she stopped smoking. 5. Seizure disorder [facility])  Follows with Neurology. On Depakote. 6. Hyperglycemia  HgbA1C in 3 months. - Comprehensive Metabolic Panel; Future  - Hemoglobin A1C; Future    7. Vitamin D deficiency  Controlled on Vitamin D replacement. 8. Primary hypertension      9. Mixed hyperlipidemia  Controlled on Crestor. Obtain labs approximately one week prior to the office appointment.   Please fast for 12 hours prior to obtaining the labs. Water or black coffee (no cream or sugar) is allowed prior to the the labs. - Comprehensive Metabolic Panel; Future  - Lipid Panel; Future    10. Paroxysmal atrial fibrillation (HCC)  Continues on Eliquis. 11. Nonrheumatic aortic valve stenosis  Follows with Dr. Martha Charles with yearly ECHO. 12. Coronary artery disease involving native coronary artery of native heart without angina pectoris  S/P PTCA and Stenting of LAD. Follows with Dr. Martha Charles. Continues on Plavix. Nichole Orozco was instructed to follow up in the clinic in 3 months for check up or as needed with any medical issues. Subjective   SUBJECTIVE/OBJECTIVE:  Roni Smith presents for a check up on her medical conditions HTN, Hyperlipidemia, COPD, CAD. Roni Smith admits to new problems. Medications were reviewed with Roni Smith, she is  tolerating the medication. Bowels are not regular. There has not been rectal bleeding. Roni Smith denies urinary complications, the urine stream is good. Roni Smith denies chest pain and denies increasing shortness of breath. Labs from 9/22 reviewed. Roni Smith states she has had episodes when she has been at the gym and working at home where she got acute weak, \"like all the energy went out of me\". No chest pain, SOB, or racing heart. When she ate this resolved pretty quick. 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  5/3/2022: Chronic renal disease, stage III [facility]) [[id number]]  4/7/2022: Coronary artery disease involving native coronary artery of   native heart without angina pectoris  2016: Deep vein blood clot of left lower extremity (HCC)  No date: Diverticulosis  No date: Head injury  No date: Hx of blood clots  No date: Hyperlipidemia  No date: Hypertension  No date: Migraine headache  10/5/2020: Pulmonary emphysema (HCC)  No date: Seizures ([facility] [address]  No date: Stroke (cerebrum) (HCC)  No date: Unspecified cerebral artery occlusion with cerebral   infarction      Comment:  TIA    Past Surgical History:  No date: BLEPHAROPLASTY  No date: BREAST SURGERY      Comment:  b/l breast cyst removal   No date: CATARACT REMOVAL  2009: COLONOSCOPY  2018: HC COLONOSCOPY BIOPSY/STOMA; N/A      Comment:  COLONOSCOPY BIOPSY/STOMA, Dx: External Hemorrhoids and                Severe Diverticulosis performed by Delmis Fields MD at [address] [place] [address]  No date: HYSTERECTOMY ([address] [place] [address])  No date: MAMMO IMPLANT DIGITAL DIAG BI  No date: TUBAL LIGATION    Social History    Socioeconomic History      Marital status:       Spouse name: Not on file      Number of children: Not on file      Years of education: Not on file      Highest education level: Not on file    Occupational History      Occupation: retired    Tobacco Use      Smoking status: Former        Packs/day: 1.00        Years: 30.00        Pack years: 30        Types: Cigarettes        Quit date: 2005        Years since quittin.0      Smokeless tobacco: Never    Vaping Use      Vaping Use: Never used    Substance and Sexual Activity      Alcohol use: No      Drug use: No      Sexual activity: Not on file    Other Topics      Concerns:        Not on file    Social History Narrative      Not on file    Social Determinants of Health  Financial Resource Strain: Low Risk       Difficulty of Paying Living Expenses: Not hard at all  Food Insecurity: No Food Insecurity      Worried About Running Out of Food in the Last Year: Never true      Ran Out of Food in the Last Year: Never true  Transportation Needs: Not on file  Physical Activity: Inactive      Days of Exercise per Week: 0 days      Minutes of Exercise per Session: 0 min  Stress: Not on file  Social Connections: Not on file  Intimate Partner Violence: Not on file  Housing Stability: Not on file    Review of patient's family history indicates:  Problem: Heart Attack      Relation: Mother          Age of Onset: (Not Specified)  Problem: Clotting Disorder      Relation: Mother          Age of Onset: (Not Specified)  Problem: Diabetes      Relation: Mother          Age of Onset: (Not Specified)  Problem: Hypertension      Relation: Mother          Age of Onset: (Not Specified)  Problem: Migraines      Relation: Mother          Age of Onset: (Not Specified)  Problem: Heart Attack      Relation: Father          Age of Onset: (Not Specified)  Problem: Hypertension      Relation: Father          Age of Onset: (Not Specified)  Problem: Diabetes      Relation: Father          Age of Onset: (Not Specified)  Problem: Heart Attack      Relation: Sister          Age of Onset: (Not Specified)  Problem: Breast Cancer      Relation: Sister          Age of Onset: (Not Specified)  Problem: Diabetes      Relation: Sister          Age of Onset: (Not Specified)  Problem: Migraines      Relation: Sister          Age of Onset: (Not Specified)  Problem: Heart Attack      Relation: Brother          Age of Onset: (Not Specified)  Problem: Heart Disease      Relation: Brother          Age of Onset: (Not Specified)  Problem: Diabetes      Relation: Brother          Age of Onset: (Not Specified)  Problem: Migraines      Relation: Brother          Age of Onset: (Not Specified)  Problem: Hypertension      Relation: Maternal Grandmother          Age of Onset: (Not Specified)  Problem: Hypertension      Relation: Maternal Grandfather          Age of Onset: (Not Specified)  Problem: Hypertension      Relation: Paternal Grandmother          Age of Onset: (Not Specified)  Problem: Hypertension      Relation: Paternal Grandfather          Age of Onset: (Not Specified)      Current Outpatient Medications on File Prior to Visit:  furosemide (LASIX) 20 MG tablet, TAKE 1 TABLET BY MOUTH ONCE DAILY, Disp: , Rfl: potassium chloride (KLOR-CON M) 10 MEQ extended release tablet, TAKE 1 TABLET pop ONCE DAILY, Disp: , Rfl:   rosuvastatin (CRESTOR) 10 MG tablet, TAKE 1 TABLET BY MOUTH EVERY NIGHT, Disp: 90 tablet, Rfl: 1  olmesartan-hydroCHLOROthiazide (BENICAR HCT) 20-12.5 MG per tablet, TAKE 1 TABLET BY MOUTH EVERY DAY, Disp: 90 tablet, Rfl: 1  clopidogrel (PLAVIX) 75 MG tablet, Take 75 mg by mouth daily, Disp: , Rfl:   apixaban (ELIQUIS) 5 MG TABS tablet, Take by mouth 2 times daily, Disp: , Rfl:   divalproex (DEPAKOTE) 500 MG DR tablet, Take 500 mg by mouth daily , Disp: , Rfl:   albuterol sulfate HFA (PROAIR HFA) 108 (90 Base) MCG/ACT inhaler, Inhale 2 puffs into the lungs every 4 hours as needed for Wheezing or Shortness of Breath Inhale 2 puffs 4 times daily, Disp: 1 Inhaler, Rfl: 3  albuterol sulfate HFA (VENTOLIN HFA) 108 (90 Base) MCG/ACT inhaler, Inhale 2 puffs into the lungs every 4 hours as needed for Wheezing or Shortness of Breath, Disp: 18 g, Rfl: 5  triamcinolone (KENALOG) 0.1 % cream, Apply topically 2 times daily, Disp: 1 Tube, Rfl: 5  acetaminophen (TYLENOL) 500 MG tablet, Take 500 mg by mouth as needed for Pain, Disp: , Rfl:     No current facility-administered medications on file prior to visit.        -- Bactrim Ds [Sulfamethoxazole-Trimethoprim] -- Other (See Comments)    --  Elevation of creatinine on Bactrim DS.   -- Tape Eduardo Konig Tape] -- Rash      Lab Results       Component                Value               Date                       NA                       143                 12/06/2022                 K                        3.3 (L)             12/06/2022                 CL                       98                  12/06/2022                 CO2                      35 (H)              12/06/2022                 BUN                      38 (H)              12/06/2022                 CREATININE               1.28 (H)            12/06/2022                 GLUCOSE                  116 (H) 12/06/2022                 CALCIUM                  9.2                 12/06/2022                 PROT                     6.2 (L)             09/29/2022                 LABALBU                  3.9                 09/29/2022                 BILITOT                  0.5                 09/29/2022                 ALKPHOS                  50                  09/29/2022                 AST                      14                  09/29/2022                 ALT                      9                   09/29/2022                 LABGLOM                  43 (L)              12/06/2022                 GFRAA                    >60                 09/29/2022              Lab Results       Component                Value               Date                       LABA1C                   5.5                 10/05/2020            Lab Results       Component                Value               Date                       EAG                      120                 04/01/2017              Lab Results       Component                Value               Date                       CHOL                     142                 09/29/2022                 CHOL                     147                 06/08/2022                 CHOL                     187                 04/06/2022            Lab Results       Component                Value               Date                       TRIG                     103                 09/29/2022                 TRIG                     151 (H)             06/08/2022                 TRIG                     117                 04/06/2022            Lab Results       Component                Value               Date                       HDL                      49                  09/29/2022                 HDL                      43                  06/08/2022                 HDL                      46                  04/06/2022            Lab Results       Component                Value Date                       LDLCHOLESTEROL           72                  09/29/2022                 LDLCHOLESTEROL           74                  06/08/2022                 LDLCHOLESTEROL           118                 04/06/2022                 LDLCALC                  83                  08/06/2013            Lab Results       Component                Value               Date                       VLDL                     NOT REPORTED        09/13/2021                 VLDL                     NOT REPORTED        09/15/2020                 VLDL                     NOT REPORTED        09/12/2019            Lab Results       Component                Value               Date                       CHOLHDLRATIO             2.9                 09/29/2022                 CHOLHDLRATIO             3.4                 06/08/2022                 CHOLHDLRATIO             4.1                 04/06/2022                                Hypertension  This is a chronic problem. The current episode started more than 1 year ago. The problem is unchanged. The problem is controlled. Pertinent negatives include no chest pain, neck pain, palpitations or shortness of breath. Past treatments include angiotensin blockers and diuretics. The current treatment provides significant improvement. There are no compliance problems. There is no history of angina. Hyperlipidemia  This is a chronic problem. The current episode started more than 1 year ago. The problem is controlled. Recent lipid tests were reviewed and are normal. Pertinent negatives include no chest pain, myalgias or shortness of breath. Current antihyperlipidemic treatment includes statins. The current treatment provides significant improvement of lipids. There are no compliance problems. Review of Systems   Constitutional: Negative. HENT:  Negative for congestion, ear pain, rhinorrhea, sneezing and sore throat. Eyes:  Negative for visual disturbance.    Respiratory: Negative for cough, chest tightness and shortness of breath. Cardiovascular:  Negative for chest pain and palpitations. Gastrointestinal:  Positive for constipation. Negative for abdominal pain, blood in stool, diarrhea and nausea. Genitourinary:  Negative for difficulty urinating, dysuria, frequency, menstrual problem and urgency. Musculoskeletal:  Negative for arthralgias, joint swelling, myalgias and neck pain. Skin: Negative. Neurological:  Negative for syncope. Psychiatric/Behavioral: Negative. Objective   Physical Exam  Constitutional:       Appearance: She is well-developed. HENT:      Head: Atraumatic. Right Ear: There is impacted cerumen. Left Ear: There is impacted cerumen. Eyes:      Conjunctiva/sclera: Conjunctivae normal.   Cardiovascular:      Rate and Rhythm: Normal rate and regular rhythm. Heart sounds: Murmur heard. Pulmonary:      Effort: Pulmonary effort is normal.      Breath sounds: Normal breath sounds. Abdominal:      Palpations: Abdomen is soft. Tenderness: There is no abdominal tenderness. Musculoskeletal:         General: Normal range of motion. Cervical back: Normal range of motion and neck supple. Right lower leg: Edema present. Left lower leg: Edema present. Comments: Compression stockings on. Lymphadenopathy:      Cervical: No cervical adenopathy. Skin:     Findings: No rash. Neurological:      Mental Status: She is alert. Psychiatric:         Behavior: Behavior normal.         Thought Content: Thought content normal.                An electronic signature was used to authenticate this note.     --Antionette Forrester MD

## 2023-01-19 NOTE — PATIENT INSTRUCTIONS
Survey: You may be receiving a survey from "[employer] regarding your visit today. You may get this in the mail, through your MyChart or in your email. Please complete the survey to enable us to provide the highest quality of care to you and your family. Please also, mention our names. If you cannot score us as very good (5 Stars) on any question, please feel free to call the office to discuss how we could have made your experience exceptional.      Thank You! MD Anthony Darby, [address] [place] [address], ALDEN Klein RN       Plan:  1. Essential hypertension  Controlled on Benicar HCT. - olmesartan-hydroCHLOROthiazide (BENICAR HCT) 20-12.5 MG per tablet; Take 1 tablet by mouth daily  Dispense: 90 tablet; Refill: 1    2. Acute weakness  Likely low BS level (hypoglycemia). Encouraged not to skip meals and snack with strenuous activity. HgbA1C with labs. 3. Single subsegmental pulmonary embolism without acute cor pulmonale (HCC)  Continues on Eliquis. 4. Pulmonary emphysema, unspecified emphysema type ([address]  Stable since she stopped smoking. 5. Seizure disorder [facility])  Follows with Neurology. On Depakote. 6. Hyperglycemia  HgbA1C in 3 months. - Comprehensive Metabolic Panel; Future  - Hemoglobin A1C; Future    7. Vitamin D deficiency  Controlled on Vitamin D replacement. 8. Primary hypertension      9. Mixed hyperlipidemia  Controlled on Crestor. Obtain labs approximately one week prior to the office appointment. Please fast for 12 hours prior to obtaining the labs. Water or black coffee (no cream or sugar) is allowed prior to the the labs. - Comprehensive Metabolic Panel; Future  - Lipid Panel; Future    10. Paroxysmal atrial fibrillation (HCC)  Continues on Eliquis. 11. Nonrheumatic aortic valve stenosis  Follows with Dr. Evaristo Dumont with yearly ECHO.     12. Coronary artery disease involving native coronary artery of native heart without angina pectoris  S/P PTCA and Stenting of LAD. Follows with Dr. Mayito Ybarra. Continues on Plavix. Derian Olivegi was instructed to follow up in the clinic in 3 months for check up or as needed with any medical issues.

## 2023-01-27 ENCOUNTER — PROCEDURE VISIT (OUTPATIENT)
Dept: FAMILY MEDICINE CLINIC | Age: 78
End: 2023-01-27

## 2023-01-27 VITALS
HEIGHT: 63 IN | BODY MASS INDEX: 31.54 KG/M2 | WEIGHT: 178 LBS | HEART RATE: 70 BPM | SYSTOLIC BLOOD PRESSURE: 104 MMHG | DIASTOLIC BLOOD PRESSURE: 70 MMHG

## 2023-01-27 DIAGNOSIS — H93.8X1 SENSATION OF FULLNESS IN RIGHT EAR: Primary | ICD-10-CM

## 2023-01-27 NOTE — PATIENT INSTRUCTIONS
Survey: You may be receiving a survey from [employer] regarding your visit today. You may get this in the mail, through your MyChart or in your email. Please complete the survey to enable us to provide the highest quality of care to you and your family. Please also, mention our names. If you cannot score us as very good (5 Stars) on any question, please feel free to call the office to discuss how we could have made your experience exceptional.      Thank You!         Dr. Bria Nunes MD    [facility], [address] [place] [address], Joann Brennan, BSN RN    [address]

## 2023-02-17 ENCOUNTER — TELEPHONE (OUTPATIENT)
Dept: FAMILY MEDICINE CLINIC | Age: 78
End: 2023-02-17

## 2023-02-17 NOTE — TELEPHONE ENCOUNTER
Care Transitions Initial Follow Up Call    Outreach made within 2 business days of discharge: Yes    Patient: Claribel Marrero Patient : 1945   MRN: [id number]  Reason for Admission: There are no discharge diagnoses documented for the most recent discharge. Discharge Date: 18       Spoke with: Jenna Rubalcava    Discharge department/facility: [facility] Interactive Patient Contact:  Was patient able to fill all prescriptions: Yes  Was patient instructed to bring all medications to the follow-up visit: Yes  Is patient taking all medications as directed in the discharge summary?  Yes  Does patient understand their discharge instructions: Yes  Does patient have questions or concerns that need addressed prior to 7-14 day follow up office visit: no    Scheduled appointment with PCP within 7-14 days    Follow Up  Future Appointments   Date Time Provider Melo Blake   2023  9:15 AM Shae Paredes MD [facility]   2023 10:30 AM [facility]   2023  3:15 PM Shae Paredes MD [facility]   2023 10:30 AM MAE Reese urol [facility]   2023 11:00 AM MD Lizzie Esquivel [address] [place], [place] [address]ie [facility]

## 2023-02-21 ENCOUNTER — OFFICE VISIT (OUTPATIENT)
Dept: FAMILY MEDICINE CLINIC | Age: 78
End: 2023-02-21

## 2023-02-21 VITALS
HEART RATE: 72 BPM | HEIGHT: 63 IN | DIASTOLIC BLOOD PRESSURE: 68 MMHG | SYSTOLIC BLOOD PRESSURE: 118 MMHG | WEIGHT: 174 LBS | BODY MASS INDEX: 30.83 KG/M2

## 2023-02-21 DIAGNOSIS — J45.20 MILD INTERMITTENT ASTHMA WITHOUT COMPLICATION: ICD-10-CM

## 2023-02-21 DIAGNOSIS — Z09 HOSPITAL DISCHARGE FOLLOW-UP: Primary | ICD-10-CM

## 2023-02-21 DIAGNOSIS — J43.9 PULMONARY EMPHYSEMA, UNSPECIFIED EMPHYSEMA TYPE (HCC): ICD-10-CM

## 2023-02-21 RX ORDER — FLUTICASONE FUROATE, UMECLIDINIUM BROMIDE AND VILANTEROL TRIFENATATE 200; 62.5; 25 UG/1; UG/1; UG/1
1 POWDER RESPIRATORY (INHALATION) DAILY
Qty: 1 EACH | Refills: 0
Start: 2023-02-21

## 2023-02-21 SDOH — ECONOMIC STABILITY: FOOD INSECURITY: WITHIN THE PAST 12 MONTHS, THE FOOD YOU BOUGHT JUST DIDN'T LAST AND YOU DIDN'T HAVE MONEY TO GET MORE.: NEVER TRUE

## 2023-02-21 SDOH — ECONOMIC STABILITY: FOOD INSECURITY: WITHIN THE PAST 12 MONTHS, YOU WORRIED THAT YOUR FOOD WOULD RUN OUT BEFORE YOU GOT MONEY TO BUY MORE.: NEVER TRUE

## 2023-02-21 SDOH — ECONOMIC STABILITY: HOUSING INSECURITY
IN THE LAST 12 MONTHS, WAS THERE A TIME WHEN YOU DID NOT HAVE A STEADY PLACE TO SLEEP OR SLEPT IN A SHELTER (INCLUDING NOW)?: NO

## 2023-02-21 SDOH — ECONOMIC STABILITY: INCOME INSECURITY: HOW HARD IS IT FOR YOU TO PAY FOR THE VERY BASICS LIKE FOOD, HOUSING, MEDICAL CARE, AND HEATING?: NOT VERY HARD

## 2023-02-21 NOTE — PROGRESS NOTES
Post-Discharge Transitional Care  Follow Up      Becky Barrett   YOB: 1945    Date of Office Visit:  2/21/2023  Date of Hospital Admission: 8/28/18  Date of Hospital Discharge: 8/28/18  Risk of hospital readmission (high >=14%. Medium >=10%) :No data recorded    Care management risk score Rising risk (score 2-5) and Complex Care (Scores >=6): No Risk Score On File     Non face to face  following discharge, date last encounter closed (first attempt may have been earlier): 02/17/2023    Call initiated 2 business days of discharge: Yes    ASSESSMENT/PLAN:   Hospital discharge follow-up  -     CO DISCHARGE MEDS RECONCILED W/ CURRENT OUTPATIENT MED LIST  Pulmonary emphysema, unspecified emphysema type ([address]  -     fluticasone-umeclidin-vilant (TRELEGY ELLIPTA) 200-62.5-25 MCG/ACT AEPB inhaler; Inhale 1 puff into the lungs daily, Disp-1 each, R-0Sample:  Lot # [id number]  Exp 6.24NO PRINT  Mild intermittent asthma without complication  -     fluticasone-umeclidin-vilant (TRELEGY ELLIPTA) 200-62.5-25 MCG/ACT AEPB inhaler; Inhale 1 puff into the lungs daily, Disp-1 each, R-0Sample:  Lot # [id number]  Exp 6.24NO PRINT      Plan:  1. Pulmonary emphysema, unspecified emphysema type ([address]  Start on Trelegy 1 puff daily (samples). Use Albuterol just as needed for shortness of breath. Follow up with Pulmonologist in March. - fluticasone-umeclidin-vilant (TRELEGY ELLIPTA) 100-62.5-25 MCG/ACT AEPB inhaler; Inhale 1 puff into the lungs daily  Dispense: 1 each; Refill: 0    2. Mild intermittent asthma without complication    - fluticasone-umeclidin-vilant (TRELEGY ELLIPTA) 100-62.5-25 MCG/ACT AEPB inhaler; Inhale 1 puff into the lungs daily  Dispense: 1 each; Refill: 0    3. Hospital discharge follow-up  Doing well after thoracentesis. Has a follow up appt with Pulmonology.     Will need repeat CXR but will wait until she sees Pulmonary.    - CO DISCHARGE MEDS RECONCILED W/ CURRENT OUTPATIENT MED LIST    Return on next scheduled appointment or as needed. Medical Decision Making: moderate complexity             Subjective:   HPI:  Follow up of Hospital problems/diagnosis(es): Pleural effusion     Inpatient course: Discharge summary reviewed- see chart. Interval history/Current status: Kvng Zhu was admitted to [facility] for chest pain, SOB, and right side pleural effusion. Kvng Zhu states he SOB resolved once the effusion was drained. Cytology reviewed showing inflammatory cells. She has a follow up appt with Pulmonologist 3/4/23. No further chest pain. Kvng Zhu does have a dry cough. Patient Active Problem List   Diagnosis    Hypertension    Aortic stenosis    Hyperlipidemia    Diverticulosis    Intracranial bleed (HCC)    Vitamin D deficiency    Rectocele    Osteoporosis    History of DVT (deep vein thrombosis)    Cavernoma    TIA (transient ischemic attack)    Thalamic infarction ([facility]    Paresthesia    Microhematuria    Seizure disorder (HCC)    Frequent UTI    Pulmonary emphysema (HCC)    Coronary artery disease involving native coronary artery of native heart without angina pectoris    Lung nodule    SARS-CoV-2 positive    Single subsegmental pulmonary embolism without acute cor pulmonale (HCC)    Paroxysmal atrial fibrillation ([facility]       Medications listed as ordered at the time of discharge from hospital     Medication List            Accurate as of February 21, 2023 11:18 AM. If you have any questions, ask your nurse or doctor.                 CONTINUE taking these medications      acetaminophen 500 MG tablet  Commonly known as: TYLENOL     * albuterol sulfate  (90 Base) MCG/ACT inhaler  Commonly known as: ProAir HFA  Inhale 2 puffs into the lungs every 4 hours as needed for Wheezing or Shortness of Breath Inhale 2 puffs 4 times daily     * albuterol sulfate  (90 Base) MCG/ACT inhaler  Commonly known as: Ventolin HFA  Inhale 2 puffs into the lungs every 4 hours as needed for Wheezing or Shortness of Breath     apixaban 5 MG Tabs tablet  Commonly known as: ELIQUIS     clopidogrel 75 MG tablet  Commonly known as: PLAVIX     divalproex 500 MG DR tablet  Commonly known as: DEPAKOTE     furosemide 20 MG tablet  Commonly known as: LASIX     olmesartan-hydroCHLOROthiazide 20-12.5 MG per tablet  Commonly known as: BENICAR HCT  Take 1 tablet by mouth daily     potassium chloride 10 MEQ extended release tablet  Commonly known as: KLOR-CON M     rosuvastatin 10 MG tablet  Commonly known as: CRESTOR  TAKE 1 TABLET BY MOUTH EVERY NIGHT     triamcinolone 0.1 % cream  Commonly known as: KENALOG  Apply topically 2 times daily     VITAMIN D PO           * This list has 2 medication(s) that are the same as other medications prescribed for you. Read the directions carefully, and ask your doctor or other care provider to review them with you.                     Medications marked \"taking\" at this time  Outpatient Medications Marked as Taking for the 2/21/23 encounter (Office Visit) with Laureen Helton MD   Medication Sig Dispense Refill    VITAMIN D PO Take by mouth      furosemide (LASIX) 20 MG tablet TAKE 1 TABLET BY MOUTH ONCE DAILY      potassium chloride (KLOR-CON M) 10 MEQ extended release tablet TAKE 1 TABLET pop ONCE DAILY      olmesartan-hydroCHLOROthiazide (BENICAR HCT) 20-12.5 MG per tablet Take 1 tablet by mouth daily 90 tablet 1    rosuvastatin (CRESTOR) 10 MG tablet TAKE 1 TABLET BY MOUTH EVERY NIGHT 90 tablet 1    clopidogrel (PLAVIX) 75 MG tablet Take 75 mg by mouth daily      apixaban (ELIQUIS) 5 MG TABS tablet Take by mouth 2 times daily      divalproex (DEPAKOTE) 500 MG DR tablet Take 500 mg by mouth daily       albuterol sulfate HFA (PROAIR HFA) 108 (90 Base) MCG/ACT inhaler Inhale 2 puffs into the lungs every 4 hours as needed for Wheezing or Shortness of Breath Inhale 2 puffs 4 times daily 1 Inhaler 3        Medications patient taking as of now reconciled against medications ordered at time of hospital discharge: Yes    A comprehensive review of systems was negative except for what was noted in the HPI. Objective:    /68   Pulse 72   Ht 5' 3\" (1.6 m)   Wt 174 lb (78.9 kg)   LMP  (LMP Unknown)   BMI 30.82 kg/m²   General Appearance: alert and oriented to person, place and time, well developed and well- nourished, in no acute distress  Skin: warm and dry, no rash or erythema  Head: normocephalic and atraumatic  Eyes: pupils equal, round, and reactive to light, extraocular eye movements intact, conjunctivae normal  ENT: tympanic membrane, external ear and ear canal normal bilaterally, nose without deformity, nasal mucosa and turbinates normal without polyps  Neck: supple and non-tender without mass, no thyromegaly or thyroid nodules, no cervical lymphadenopathy  Pulmonary/Chest: clear to auscultation bilaterally- no wheezes, rales or rhonchi, normal air movement, no respiratory distress  Cardiovascular: normal rate, regular rhythm, normal S1 and S2, II/VI systolic murmurs. No rubs, clicks, or gallops, distal pulses intact, no carotid bruits  Abdomen: soft, non-tender, non-distended, normal bowel sounds, no masses or organomegaly  Extremities: no cyanosis, clubbing or edema  Musculoskeletal: normal range of motion, no joint swelling, deformity or tenderness  Neurologic: reflexes normal and symmetric, no cranial nerve deficit, gait, coordination and speech normal      An electronic signature was used to authenticate this note.   --Sara Quiroz MD

## 2023-02-21 NOTE — PATIENT INSTRUCTIONS
Survey: You may be receiving a survey from [employer] regarding your visit today. You may get this in the mail, through your MyChart or in your email. Please complete the survey to enable us to provide the highest quality of care to you and your family. Please also, mention our names. If you cannot score us as very good (5 Stars) on any question, please feel free to call the office to discuss how we could have made your experience exceptional.      Thank You! MD Evaristo [facility], [address] [place] [address], Eleanor Maki, PATRICION BÁRBARA Shrestha Given       Plan:  1. Pulmonary emphysema, unspecified emphysema type ([address]  Start on Trelegy 1 puff daily (samples). Use Albuterol just as needed for shortness of breath. Follow up with Pulmonologist in March. - fluticasone-umeclidin-vilant (TRELEGY ELLIPTA) 100-62.5-25 MCG/ACT AEPB inhaler; Inhale 1 puff into the lungs daily  Dispense: 1 each; Refill: 0    2. Mild intermittent asthma without complication    - fluticasone-umeclidin-vilant (TRELEGY ELLIPTA) 100-62.5-25 MCG/ACT AEPB inhaler; Inhale 1 puff into the lungs daily  Dispense: 1 each; Refill: 0    3. Hospital discharge follow-up  Doing well after thoracentesis. Has a follow up appt with Pulmonology. Will need repeat CXR but will wait until she sees Pulmonary.    - [place] DISCHARGE MEDS RECONCILED W/ CURRENT OUTPATIENT MED LIST    Return on next scheduled appointment or as needed.     Medical Decision Making: moderate complexity

## 2023-03-07 ENCOUNTER — OFFICE VISIT (OUTPATIENT)
Dept: FAMILY MEDICINE CLINIC | Age: 78
End: 2023-03-07
Payer: MEDICARE

## 2023-03-07 VITALS
WEIGHT: 175 LBS | SYSTOLIC BLOOD PRESSURE: 128 MMHG | HEART RATE: 78 BPM | HEIGHT: 63 IN | BODY MASS INDEX: 31.01 KG/M2 | DIASTOLIC BLOOD PRESSURE: 78 MMHG

## 2023-03-07 DIAGNOSIS — J44.1 COPD EXACERBATION (HCC): Primary | ICD-10-CM

## 2023-03-07 DIAGNOSIS — I95.9 HYPOTENSION, UNSPECIFIED HYPOTENSION TYPE: ICD-10-CM

## 2023-03-07 DIAGNOSIS — J90 RECURRENT RIGHT PLEURAL EFFUSION: ICD-10-CM

## 2023-03-07 PROCEDURE — 1090F PRES/ABSN URINE INCON ASSESS: CPT | Performed by: INTERNAL MEDICINE

## 2023-03-07 PROCEDURE — G8427 DOCREV CUR MEDS BY ELIG CLIN: HCPCS | Performed by: INTERNAL MEDICINE

## 2023-03-07 PROCEDURE — 99214 OFFICE O/P EST MOD 30 MIN: CPT | Performed by: INTERNAL MEDICINE

## 2023-03-07 PROCEDURE — 1036F TOBACCO NON-USER: CPT | Performed by: INTERNAL MEDICINE

## 2023-03-07 PROCEDURE — 1123F ACP DISCUSS/DSCN MKR DOCD: CPT | Performed by: INTERNAL MEDICINE

## 2023-03-07 PROCEDURE — G8482 FLU IMMUNIZE ORDER/ADMIN: HCPCS | Performed by: INTERNAL MEDICINE

## 2023-03-07 PROCEDURE — 3078F DIAST BP <80 MM HG: CPT | Performed by: INTERNAL MEDICINE

## 2023-03-07 PROCEDURE — 3023F SPIROM DOC REV: CPT | Performed by: INTERNAL MEDICINE

## 2023-03-07 PROCEDURE — G8399 PT W/DXA RESULTS DOCUMENT: HCPCS | Performed by: INTERNAL MEDICINE

## 2023-03-07 PROCEDURE — 3074F SYST BP LT 130 MM HG: CPT | Performed by: INTERNAL MEDICINE

## 2023-03-07 PROCEDURE — G8417 CALC BMI ABV UP PARAM F/U: HCPCS | Performed by: INTERNAL MEDICINE

## 2023-03-07 RX ORDER — AMOXICILLIN AND CLAVULANATE POTASSIUM 875; 125 MG/1; MG/1
1 TABLET, FILM COATED ORAL 2 TIMES DAILY
Qty: 20 TABLET | Refills: 0 | Status: SHIPPED | OUTPATIENT
Start: 2023-03-07 | End: 2023-03-17

## 2023-03-07 RX ORDER — PREDNISONE 20 MG/1
TABLET ORAL
Qty: 12 TABLET | Refills: 0 | Status: SHIPPED | OUTPATIENT
Start: 2023-03-07 | End: 2023-03-17

## 2023-03-07 ASSESSMENT — ENCOUNTER SYMPTOMS
RHINORRHEA: 0
SORE THROAT: 0
ABDOMINAL PAIN: 0
CONSTIPATION: 0
NAUSEA: 0
SHORTNESS OF BREATH: 1
BLOOD IN STOOL: 0
DIARRHEA: 0
COUGH: 1
CHEST TIGHTNESS: 0

## 2023-03-07 NOTE — PATIENT INSTRUCTIONS
Survey: You may be receiving a survey from [employer] regarding your visit today. You may get this in the mail, through your MyChart or in your email. Please complete the survey to enable us to provide the highest quality of care to you and your family. Please also, mention our names. If you cannot score us as very good (5 Stars) on any question, please feel free to call the office to discuss how we could have made your experience exceptional.      Thank You! Dr. Bear Hernandez MD    [facility], [address] [place] [address], Catharine Litten, PATRICION BÁRBARA Jones       Plan:  1. COPD exacerbation (HCC)  Take Augmentin 875 mg two times a day x 10 days. Take the prednisone taper as directed on the prescription. The prednisone is very bitter so swallow the tablets quickly to prevent  dissolving in the mouth. After taking, don't lay  down for 2 hours to help prevent reflux. Repeat CXR on Thursday of next week. Follow up on Friday next week. - amoxicillin-clavulanate (AUGMENTIN) 875-125 MG per tablet; Take 1 tablet by mouth 2 times daily for 10 days  Dispense: 20 tablet; Refill: 0  - predniSONE (DELTASONE) 20 MG tablet; 3 tabs daily for 2 days then 2 tabs daily for 2 days then 1 tab daily for 2 days. Dispense: 12 tablet; Refill: 0  - XR CHEST (2 VW); Future    2. Hypotension, unspecified hypotension type  Hold Benicar HCT, Lasix, and potassium. Increase fluids. 3. Recurrent right pleural effusion  Repeat CXR next Thursday.    - XR CHEST (2 VW);  Future

## 2023-03-07 NOTE — PROGRESS NOTES
Jeremy Smith (:  1945) is a 69 y.o. female,Established patient, here for evaluation of the following chief complaint(s):  Hypertension (Concerns with bp running too low. Stopped benicar, lasix and K past week.) and Asthma (C/o dizziness w/ Trelegy)         ASSESSMENT/PLAN:  1. COPD exacerbation (HCC)  -     amoxicillin-clavulanate (AUGMENTIN) 875-125 MG per tablet; Take 1 tablet by mouth 2 times daily for 10 days, Disp-20 tablet, R-0Normal  -     predniSONE (DELTASONE) 20 MG tablet; 3 tabs daily for 2 days then 2 tabs daily for 2 days then 1 tab daily for 2 days. , Disp-12 tablet, R-0Normal  -     XR CHEST (2 VW); Future  2. Hypotension, unspecified hypotension type  3. Recurrent right pleural effusion  -     XR CHEST (2 VW); Future      Plan:  1. COPD exacerbation (HCC)  Take Augmentin 875 mg two times a day x 10 days. Take the prednisone taper as directed on the prescription. The prednisone is very bitter so swallow the tablets quickly to prevent  dissolving in the mouth. After taking, don't lay  down for 2 hours to help prevent reflux. Repeat CXR on Thursday of next week. Follow up on Friday next week. - amoxicillin-clavulanate (AUGMENTIN) 875-125 MG per tablet; Take 1 tablet by mouth 2 times daily for 10 days  Dispense: 20 tablet; Refill: 0  - predniSONE (DELTASONE) 20 MG tablet; 3 tabs daily for 2 days then 2 tabs daily for 2 days then 1 tab daily for 2 days. Dispense: 12 tablet; Refill: 0  - XR CHEST (2 VW); Future    2. Hypotension, unspecified hypotension type  Hold Benicar HCT, Lasix, and potassium. Increase fluids. 3. Recurrent right pleural effusion  Repeat CXR next Thursday.    - XR CHEST (2 VW); Future             Subjective   SUBJECTIVE/OBJECTIVE:  Natalia Baca has been having issues with low BP at home for the past week. She has been eating well but not drinking a lot of fluid.   She has been coughing which comes and goes and states that she has had the cough since being in the [facility]. Has been holding Benicar, Lasix, and potassium. Breathing seems to fluctuate, she feels benadryl does help. No fever but \"I freeze all the time\". Review of Systems   Constitutional: Negative. HENT:  Negative for congestion, ear pain, rhinorrhea, sneezing and sore throat. Eyes:  Negative for visual disturbance. Respiratory:  Positive for cough and shortness of breath. Negative for chest tightness. Cardiovascular:  Negative for chest pain and palpitations. Gastrointestinal:  Negative for abdominal pain, blood in stool, constipation, diarrhea and nausea. Genitourinary:  Negative for difficulty urinating, dysuria, frequency, menstrual problem and urgency. Musculoskeletal:  Negative for arthralgias, joint swelling, myalgias and neck pain. Skin: Negative. Neurological:  Negative for syncope. Psychiatric/Behavioral: Negative. Objective   Physical Exam  Constitutional:       Appearance: She is well-developed. HENT:      Head: Atraumatic. Nose: Congestion present. Eyes:      Conjunctiva/sclera: Conjunctivae normal.   Cardiovascular:      Rate and Rhythm: Normal rate and regular rhythm. Heart sounds: Normal heart sounds. Pulmonary:      Effort: Pulmonary effort is normal.      Breath sounds: Wheezing present. Abdominal:      Palpations: Abdomen is soft. Tenderness: There is no abdominal tenderness. Musculoskeletal:         General: Normal range of motion. Cervical back: Normal range of motion and neck supple. Lymphadenopathy:      Cervical: No cervical adenopathy. Skin:     Findings: No rash. Neurological:      Mental Status: She is alert. Psychiatric:         Behavior: Behavior normal.         Thought Content: Thought content normal.                An electronic signature was used to authenticate this note.     --Kelly Gallardo MD

## 2023-03-09 DIAGNOSIS — J43.9 PULMONARY EMPHYSEMA, UNSPECIFIED EMPHYSEMA TYPE (HCC): ICD-10-CM

## 2023-03-09 DIAGNOSIS — J45.20 MILD INTERMITTENT ASTHMA WITHOUT COMPLICATION: ICD-10-CM

## 2023-03-09 RX ORDER — FLUTICASONE FUROATE, UMECLIDINIUM BROMIDE AND VILANTEROL TRIFENATATE 200; 62.5; 25 UG/1; UG/1; UG/1
1 POWDER RESPIRATORY (INHALATION) DAILY
Qty: 2 EACH | Refills: 0
Start: 2023-03-09

## 2023-03-16 ENCOUNTER — HOSPITAL ENCOUNTER (OUTPATIENT)
Age: 78
Discharge: HOME | End: 2023-03-18
Payer: MEDICARE

## 2023-03-16 ENCOUNTER — HOSPITAL ENCOUNTER (OUTPATIENT)
Dept: GENERAL RADIOLOGY | Age: 78
Discharge: HOME | End: 2023-03-18
Payer: MEDICARE

## 2023-03-16 DIAGNOSIS — J90 RECURRENT RIGHT PLEURAL EFFUSION: ICD-10-CM

## 2023-03-16 DIAGNOSIS — J44.1 COPD EXACERBATION (HCC): ICD-10-CM

## 2023-03-16 PROCEDURE — 71046 X-RAY EXAM CHEST 2 VIEWS: CPT

## 2023-03-17 ENCOUNTER — OFFICE VISIT (OUTPATIENT)
Dept: FAMILY MEDICINE CLINIC | Age: 78
End: 2023-03-17

## 2023-03-17 VITALS
BODY MASS INDEX: 29.95 KG/M2 | HEIGHT: 63 IN | WEIGHT: 169 LBS | DIASTOLIC BLOOD PRESSURE: 72 MMHG | SYSTOLIC BLOOD PRESSURE: 108 MMHG

## 2023-03-17 DIAGNOSIS — I95.89 HYPOTENSION DUE TO HYPOVOLEMIA: Primary | ICD-10-CM

## 2023-03-17 DIAGNOSIS — E86.1 HYPOTENSION DUE TO HYPOVOLEMIA: Primary | ICD-10-CM

## 2023-03-17 DIAGNOSIS — J44.1 COPD EXACERBATION (HCC): ICD-10-CM

## 2023-03-17 ASSESSMENT — ENCOUNTER SYMPTOMS
CHEST TIGHTNESS: 0
RHINORRHEA: 0
BLOOD IN STOOL: 0
DIARRHEA: 0
ABDOMINAL PAIN: 0
NAUSEA: 0
COUGH: 1
SHORTNESS OF BREATH: 0
CONSTIPATION: 0
SORE THROAT: 0

## 2023-03-17 NOTE — PATIENT INSTRUCTIONS
Survey: You may be receiving a survey from [employer] regarding your visit today. You may get this in the mail, through your MyChart or in your email. Please complete the survey to enable us to provide the highest quality of care to you and your family. Please also, mention our names. If you cannot score us as very good (5 Stars) on any question, please feel free to call the office to discuss how we could have made your experience exceptional.      Thank You! Dr. Pearletha Gilford, MD    [facility], [address] [place] [address], ALDEN Martell RN    [facility]       Plan:  1. Hypotension due to hypovolemia  Doing well off Benicar and increasing fluids. Continue to stay off Benicar for now. 2. COPD exacerbation ([address]  Doing well after treatment with Augmentin and Prednisone. Continue to use Albuterol as needed. Sidra Ellis is instructed to return to the clinic if the symptoms continue or worsen. Sidra Alexey  was also instructed to go to the emergency room department if the symptoms significantly worsen before an appointment can be made.

## 2023-03-17 NOTE — PROGRESS NOTES
Rancho Murphy (:  1945) is a 69 y.o. female,Established patient, here for evaluation of the following chief complaint(s): Other (1 week follow up, she has been holding Benicar, but she is taking lasix and k+)         ASSESSMENT/PLAN:  1. Hypotension due to hypovolemia  2. COPD exacerbation ([address]      Plan:  1. Hypotension due to hypovolemia  Doing well off Benicar and increasing fluids. Continue to stay off Benicar for now. 2. COPD exacerbation ([facility]  Doing well after treatment with Augmentin and Prednisone. Continue to use Albuterol as needed. [employer] is instructed to return to the clinic if the symptoms continue or worsen. Tisha Rule  was also instructed to go to the emergency room department if the symptoms significantly worsen before an appointment can be made. Subjective   SUBJECTIVE/OBJECTIVE:  [employer] states she has been feeling better off Benicar. She has been taking Augmentin and the Prednisone finished. Breathing is doing much better. Occasional cough, no production. CXR reviewed from yesterday. Appetite has been good, drinking fluids. No light headedness or weakness. Review of Systems   Constitutional: Negative. HENT:  Negative for congestion, ear pain, rhinorrhea, sneezing and sore throat. Eyes:  Negative for visual disturbance. Respiratory:  Positive for cough. Negative for chest tightness and shortness of breath. Cardiovascular:  Negative for chest pain and palpitations. Gastrointestinal:  Negative for abdominal pain, blood in stool, constipation, diarrhea and nausea. Genitourinary:  Negative for difficulty urinating, dysuria, frequency, menstrual problem and urgency. Musculoskeletal:  Negative for arthralgias, joint swelling, myalgias and neck pain. Skin: Negative. Neurological:  Negative for syncope. Psychiatric/Behavioral: Negative. Objective   Physical Exam  Constitutional:       Appearance: She is well-developed.    HENT:  Head: Atraumatic.   Eyes:      Conjunctiva/sclera: Conjunctivae normal.   Cardiovascular:      Rate and Rhythm: Normal rate and regular rhythm.      Heart sounds: Murmur heard.   Pulmonary:      Effort: Pulmonary effort is normal.      Breath sounds: Normal breath sounds.   Abdominal:      Palpations: Abdomen is soft.      Tenderness: There is no abdominal tenderness.   Musculoskeletal:         General: Normal range of motion.      Cervical back: Normal range of motion and neck supple.   Lymphadenopathy:      Cervical: No cervical adenopathy.   Skin:     Findings: No rash.   Neurological:      Mental Status: She is alert.   Psychiatric:         Behavior: Behavior normal.         Thought Content: Thought content normal.                An electronic signature was used to authenticate this note.    --Audi Young MD

## 2023-04-08 ENCOUNTER — TELEPHONE (OUTPATIENT)
Dept: FAMILY MEDICINE CLINIC | Age: 78
End: 2023-04-08

## 2023-04-08 RX ORDER — LEVOFLOXACIN 500 MG/1
500 TABLET, FILM COATED ORAL DAILY
Qty: 7 TABLET | Refills: 0 | Status: SHIPPED | OUTPATIENT
Start: 2023-04-08 | End: 2023-04-15

## 2023-04-17 ENCOUNTER — HOSPITAL ENCOUNTER (OUTPATIENT)
Age: 78
Discharge: HOME | End: 2023-04-17
Payer: MEDICARE

## 2023-04-17 DIAGNOSIS — R73.9 HYPERGLYCEMIA: ICD-10-CM

## 2023-04-17 DIAGNOSIS — E78.2 MIXED HYPERLIPIDEMIA: ICD-10-CM

## 2023-04-17 LAB
ALBUMIN SERPL-MCNC: 3.5 G/DL (ref 3.5–5.2)
ALP SERPL-CCNC: 46 U/L (ref 35–104)
ALT SERPL-CCNC: <5 U/L (ref 5–33)
ANION GAP SERPL CALCULATED.3IONS-SCNC: 8 MMOL/L (ref 9–17)
AST SERPL-CCNC: 13 U/L
BILIRUB SERPL-MCNC: 0.3 MG/DL (ref 0.3–1.2)
BUN SERPL-MCNC: 14 MG/DL (ref 8–23)
BUN/CREAT BLD: 16 (ref 9–20)
CALCIUM SERPL-MCNC: 9 MG/DL (ref 8.6–10.4)
CHLORIDE SERPL-SCNC: 106 MMOL/L (ref 98–107)
CHOLEST SERPL-MCNC: 127 MG/DL
CHOLESTEROL/HDL RATIO: 3.3
CO2 SERPL-SCNC: 31 MMOL/L (ref 20–31)
CREAT SERPL-MCNC: 0.87 MG/DL (ref 0.5–0.9)
EST. AVERAGE GLUCOSE BLD GHB EST-MCNC: 105 MG/DL
GFR SERPL CREATININE-BSD FRML MDRD: >60 ML/MIN/1.73M2
GLUCOSE SERPL-MCNC: 109 MG/DL (ref 70–99)
HBA1C MFR BLD: 5.3 % (ref 4–6)
HDLC SERPL-MCNC: 38 MG/DL
LDLC SERPL CALC-MCNC: 66 MG/DL (ref 0–130)
PATIENT FASTING?: YES
POTASSIUM SERPL-SCNC: 4.2 MMOL/L (ref 3.7–5.3)
PROT SERPL-MCNC: 6.1 G/DL (ref 6.4–8.3)
SODIUM SERPL-SCNC: 145 MMOL/L (ref 135–144)
TRIGL SERPL-MCNC: 116 MG/DL

## 2023-04-17 PROCEDURE — 83036 HEMOGLOBIN GLYCOSYLATED A1C: CPT

## 2023-04-17 PROCEDURE — 80061 LIPID PANEL: CPT

## 2023-04-17 PROCEDURE — 36415 COLL VENOUS BLD VENIPUNCTURE: CPT

## 2023-04-17 PROCEDURE — 80053 COMPREHEN METABOLIC PANEL: CPT

## 2023-04-25 ENCOUNTER — OFFICE VISIT (OUTPATIENT)
Dept: FAMILY MEDICINE CLINIC | Age: 78
End: 2023-04-25
Payer: MEDICARE

## 2023-04-25 VITALS
HEART RATE: 70 BPM | BODY MASS INDEX: 31.18 KG/M2 | HEIGHT: 63 IN | DIASTOLIC BLOOD PRESSURE: 76 MMHG | SYSTOLIC BLOOD PRESSURE: 128 MMHG | WEIGHT: 176 LBS

## 2023-04-25 DIAGNOSIS — E78.00 HYPERCHOLESTEROLEMIA: ICD-10-CM

## 2023-04-25 DIAGNOSIS — I48.0 PAROXYSMAL ATRIAL FIBRILLATION (HCC): ICD-10-CM

## 2023-04-25 DIAGNOSIS — I35.0 NONRHEUMATIC AORTIC VALVE STENOSIS: ICD-10-CM

## 2023-04-25 DIAGNOSIS — I10 PRIMARY HYPERTENSION: Primary | ICD-10-CM

## 2023-04-25 DIAGNOSIS — J43.9 PULMONARY EMPHYSEMA, UNSPECIFIED EMPHYSEMA TYPE (HCC): ICD-10-CM

## 2023-04-25 DIAGNOSIS — G40.909 SEIZURE DISORDER (HCC): ICD-10-CM

## 2023-04-25 PROCEDURE — 3078F DIAST BP <80 MM HG: CPT | Performed by: INTERNAL MEDICINE

## 2023-04-25 PROCEDURE — 1036F TOBACCO NON-USER: CPT | Performed by: INTERNAL MEDICINE

## 2023-04-25 PROCEDURE — 3023F SPIROM DOC REV: CPT | Performed by: INTERNAL MEDICINE

## 2023-04-25 PROCEDURE — G8417 CALC BMI ABV UP PARAM F/U: HCPCS | Performed by: INTERNAL MEDICINE

## 2023-04-25 PROCEDURE — 1090F PRES/ABSN URINE INCON ASSESS: CPT | Performed by: INTERNAL MEDICINE

## 2023-04-25 PROCEDURE — G8427 DOCREV CUR MEDS BY ELIG CLIN: HCPCS | Performed by: INTERNAL MEDICINE

## 2023-04-25 PROCEDURE — 3074F SYST BP LT 130 MM HG: CPT | Performed by: INTERNAL MEDICINE

## 2023-04-25 PROCEDURE — G8399 PT W/DXA RESULTS DOCUMENT: HCPCS | Performed by: INTERNAL MEDICINE

## 2023-04-25 PROCEDURE — 99214 OFFICE O/P EST MOD 30 MIN: CPT | Performed by: INTERNAL MEDICINE

## 2023-04-25 PROCEDURE — 1123F ACP DISCUSS/DSCN MKR DOCD: CPT | Performed by: INTERNAL MEDICINE

## 2023-04-25 RX ORDER — FLUTICASONE FUROATE, UMECLIDINIUM BROMIDE AND VILANTEROL TRIFENATATE 200; 62.5; 25 UG/1; UG/1; UG/1
1 POWDER RESPIRATORY (INHALATION) DAILY
Qty: 1 EACH | Refills: 5 | Status: SHIPPED | OUTPATIENT
Start: 2023-04-25

## 2023-04-25 RX ORDER — ROSUVASTATIN CALCIUM 10 MG/1
10 TABLET, COATED ORAL NIGHTLY
Qty: 90 TABLET | Refills: 1 | Status: SHIPPED | OUTPATIENT
Start: 2023-04-25

## 2023-04-25 ASSESSMENT — ENCOUNTER SYMPTOMS
COUGH: 0
SHORTNESS OF BREATH: 1
ABDOMINAL PAIN: 0
SORE THROAT: 0
RHINORRHEA: 0
BLOOD IN STOOL: 0
CONSTIPATION: 0
NAUSEA: 0
CHEST TIGHTNESS: 0
DIARRHEA: 0

## 2023-04-25 NOTE — PATIENT INSTRUCTIONS
Survey: You may be receiving a survey from [employer] regarding your visit today. You may get this in the mail, through your MyChart or in your email. Please complete the survey to enable us to provide the highest quality of care to you and your family. Please also, mention our names. If you cannot score us as very good (5 Stars) on any question, please feel free to call the office to discuss how we could have made your experience exceptional.      Thank You! MD Mario Ramirez [facility], [address] [place] [address], Gracia Trimble, PATRICION RN    [facility]       Plan:  1. Hypercholesterolemia  Controlled on Crestor. No change in dose. - rosuvastatin (CRESTOR) 10 MG tablet; Take 1 tablet by mouth nightly  Dispense: 90 tablet; Refill: 1    2. Paroxysmal atrial fibrillation (HCC)  NSR today. Continues on Eliquis. 3. [facility]  No recent seizures. Follows with Dr. Anthony Bazan. Continues on Depakote. 4. Pulmonary emphysema, unspecified emphysema type ([facility]  Would recommend taking Trelegy every day to help with shortness of breath. - fluticasone-umeclidin-vilant (TRELEGY ELLIPTA) 200-62.5-25 MCG/ACT AEPB inhaler; Inhale 1 puff into the lungs daily  Dispense: 1 each; Refill: 5    5. Primary hypertension  Controlled on Lasix. No change in dose. 6. Nonrheumatic aortic valve stenosis  Follows with Dr. Shelby Tillman yearly. Will continue to follow yearly ECHO. Rao Montiel was instructed to follow up in the clinic in 3 months for check up or as needed with any medical issues.

## 2023-04-25 NOTE — PROGRESS NOTES
Scottie Schneider (:  1945) is a 69 y.o. female,Established patient, here for evaluation of the following chief complaint(s):  Hypertension (3 month check up. Discuss labs. Home BP monitor verified with office monitor. ), Hyperlipidemia, COPD, and Coronary Artery Disease (Follows with Dr Rody Rodriguez. )         ASSESSMENT/PLAN:  1. Primary hypertension  2. Hypercholesterolemia  -     rosuvastatin (CRESTOR) 10 MG tablet; Take 1 tablet by mouth nightly, Disp-90 tablet, R-1This prescription was filled on 1/10/2023. Any refills authorized will be placed on file. Normal  3. Paroxysmal atrial fibrillation (HCC)  4. Seizure disorder ([facility]  5. Pulmonary emphysema, unspecified emphysema type ([facility]  -     fluticasone-umeclidin-vilant (TRELEGY ELLIPTA) 200-62.5-25 MCG/ACT AEPB inhaler; Inhale 1 puff into the lungs daily, Disp-1 each, R-5Normal  6. Nonrheumatic aortic valve stenosis      Plan:  1. Hypercholesterolemia  Controlled on Crestor. No change in dose. - rosuvastatin (CRESTOR) 10 MG tablet; Take 1 tablet by mouth nightly  Dispense: 90 tablet; Refill: 1    2. Paroxysmal atrial fibrillation (HCC)  NSR today. Continues on Eliquis. 3. Seizure disorder ([facility]  No recent seizures. Follows with Dr. Severiano Connor. Continues on Depakote. 4. Pulmonary emphysema, unspecified emphysema type ([facility]  Would recommend taking Trelegy every day to help with shortness of breath. - fluticasone-umeclidin-vilant (TRELEGY ELLIPTA) 200-62.5-25 MCG/ACT AEPB inhaler; Inhale 1 puff into the lungs daily  Dispense: 1 each; Refill: 5    5. Primary hypertension  Controlled on Lasix. No change in dose. 6. Nonrheumatic aortic valve stenosis  Follows with Dr. Rody Rodriguez yearly. Will continue to follow yearly ECHO. Scottie Schneider was instructed to follow up in the clinic in 3 months for check up or as needed with any medical issues.                  Subjective   SUBJECTIVE/OBJECTIVE:  Lenin Mclean presents for a check up on her medical

## 2023-05-26 ENCOUNTER — HOSPITAL ENCOUNTER (OUTPATIENT)
Dept: GENERAL RADIOLOGY | Age: 78
End: 2023-05-26
Payer: MEDICARE

## 2023-05-26 ENCOUNTER — OFFICE VISIT (OUTPATIENT)
Dept: FAMILY MEDICINE CLINIC | Age: 78
End: 2023-05-26

## 2023-05-26 ENCOUNTER — HOSPITAL ENCOUNTER (OUTPATIENT)
Age: 78
End: 2023-05-26
Payer: MEDICARE

## 2023-05-26 VITALS
HEART RATE: 72 BPM | WEIGHT: 178 LBS | SYSTOLIC BLOOD PRESSURE: 136 MMHG | HEIGHT: 63 IN | DIASTOLIC BLOOD PRESSURE: 76 MMHG | BODY MASS INDEX: 31.54 KG/M2

## 2023-05-26 DIAGNOSIS — J43.9 PULMONARY EMPHYSEMA, UNSPECIFIED EMPHYSEMA TYPE (HCC): ICD-10-CM

## 2023-05-26 DIAGNOSIS — M25.562 ACUTE PAIN OF LEFT KNEE: Primary | ICD-10-CM

## 2023-05-26 DIAGNOSIS — M25.562 ACUTE PAIN OF LEFT KNEE: ICD-10-CM

## 2023-05-26 PROCEDURE — 73564 X-RAY EXAM KNEE 4 OR MORE: CPT

## 2023-05-26 RX ORDER — FLUTICASONE FUROATE, UMECLIDINIUM BROMIDE AND VILANTEROL TRIFENATATE 200; 62.5; 25 UG/1; UG/1; UG/1
1 POWDER RESPIRATORY (INHALATION) DAILY
Qty: 1 EACH | Refills: 5
Start: 2023-05-26

## 2023-05-26 RX ORDER — TRIAMCINOLONE ACETONIDE 40 MG/ML
60 INJECTION, SUSPENSION INTRA-ARTICULAR; INTRAMUSCULAR ONCE
Status: COMPLETED | OUTPATIENT
Start: 2023-05-26 | End: 2023-05-26

## 2023-05-26 RX ADMIN — TRIAMCINOLONE ACETONIDE 60 MG: 40 INJECTION, SUSPENSION INTRA-ARTICULAR; INTRAMUSCULAR at 13:30

## 2023-05-26 ASSESSMENT — ENCOUNTER SYMPTOMS
NAUSEA: 0
CONSTIPATION: 0
RHINORRHEA: 0
ABDOMINAL PAIN: 0
DIARRHEA: 0
BLOOD IN STOOL: 0
COUGH: 0
SHORTNESS OF BREATH: 0
CHEST TIGHTNESS: 0
SORE THROAT: 0

## 2023-05-26 NOTE — PROGRESS NOTES
Jacob Bennett (:  1945) is a 69 y.o. female,Established patient, here for evaluation of the following chief complaint(s):  Knee Pain (Left knee pain since starting back at gym. )         ASSESSMENT/PLAN:  1. Acute pain of left knee  -     XR KNEE LEFT (MIN 4 VIEWS); Future  -     KS ARTHROCENTESIS ASPIR&/INJ MAJOR JT/BURSA W/O US  -     triamcinolone acetonide (KENALOG-40) injection 60 mg; 60 mg, Intra-artICUlar, ONCE, 1 dose, On 23 at 1345      Plan:  1. Acute pain of left knee  Likely acute on chronic osteoarthritis. Xray left knee. Risk of the procedure was dicussed (including risk of skin discoloration, fat atrophy and dimpling, elevated blood glucose, infection, bleeding, allergic reaction, tendon injury, or cartilage loss) as well as the benefit. Verbal consent was obtained. The left knee was prepped with betadine and alcohol. The left knee was injected with the use of a 1.5 inch 27 gauge needle without difficulties. Kenolog 60 mixed with 1 cc of 1% lidocaine without epinephrine. Geri tolerated the procedure well. Use Arthrotec gel on the left knee two times a day. - XR KNEE LEFT (MIN 4 VIEWS); Future  - [place] ARTHROCENTESIS ASPIR&/INJ MAJOR JT/BURSA W/O US  - triamcinolone acetonide (KENALOG-40) injection 60 mg                 Subjective   SUBJECTIVE/OBJECTIVE:  Dorbart Breath states she went to the gym 2 weeks ago and that night she developed left knee pain (walked on treadmill). No known injury at the gym. She has some swelling and warm. She has been using icy hot which helped a little. The pain is worse with walking on it. When she stands on it, it feels like it is going to buckle. No history of knee pain in the past or injury. Review of Systems   Constitutional: Negative. HENT:  Negative for congestion, ear pain, rhinorrhea, sneezing and sore throat. Eyes:  Negative for visual disturbance.    Respiratory:  Negative for cough, chest tightness and shortness of

## 2023-06-22 ENCOUNTER — OFFICE VISIT (OUTPATIENT)
Dept: FAMILY MEDICINE CLINIC | Age: 78
End: 2023-06-22
Payer: MEDICARE

## 2023-06-22 VITALS
WEIGHT: 175 LBS | SYSTOLIC BLOOD PRESSURE: 136 MMHG | BODY MASS INDEX: 31.01 KG/M2 | HEIGHT: 63 IN | DIASTOLIC BLOOD PRESSURE: 76 MMHG

## 2023-06-22 DIAGNOSIS — Z00.00 MEDICARE ANNUAL WELLNESS VISIT, SUBSEQUENT: Primary | ICD-10-CM

## 2023-06-22 PROCEDURE — 1123F ACP DISCUSS/DSCN MKR DOCD: CPT | Performed by: INTERNAL MEDICINE

## 2023-06-22 PROCEDURE — 3075F SYST BP GE 130 - 139MM HG: CPT | Performed by: INTERNAL MEDICINE

## 2023-06-22 PROCEDURE — 3078F DIAST BP <80 MM HG: CPT | Performed by: INTERNAL MEDICINE

## 2023-06-22 PROCEDURE — G0439 PPPS, SUBSEQ VISIT: HCPCS | Performed by: INTERNAL MEDICINE

## 2023-06-22 ASSESSMENT — PATIENT HEALTH QUESTIONNAIRE - PHQ9
2. FEELING DOWN, DEPRESSED OR HOPELESS: 0
SUM OF ALL RESPONSES TO PHQ QUESTIONS 1-9: 0
SUM OF ALL RESPONSES TO PHQ QUESTIONS 1-9: 0
SUM OF ALL RESPONSES TO PHQ9 QUESTIONS 1 & 2: 0
SUM OF ALL RESPONSES TO PHQ QUESTIONS 1-9: 0
SUM OF ALL RESPONSES TO PHQ QUESTIONS 1-9: 0
1. LITTLE INTEREST OR PLEASURE IN DOING THINGS: 0

## 2023-06-22 ASSESSMENT — LIFESTYLE VARIABLES
HOW OFTEN DO YOU HAVE A DRINK CONTAINING ALCOHOL: NEVER
HOW MANY STANDARD DRINKS CONTAINING ALCOHOL DO YOU HAVE ON A TYPICAL DAY: PATIENT DOES NOT DRINK

## 2023-06-22 NOTE — PATIENT INSTRUCTIONS
[facility] ([facility]). If you have questions about a medical condition or this instruction, always ask your healthcare professional. [address] any warranty or liability for your use of this information. Learning About Vision Tests  What are vision tests? The four most common vision tests are visual acuity tests, refraction, visual field tests, and color vision tests. Visual acuity (sharpness) tests  These tests are used: To see if you need glasses or contact lenses. To monitor an eye problem. To check an eye injury. Visual acuity tests are done as part of routine exams. You may also have this test when you get your 's license or apply for some types of jobs. Visual field tests  These tests are used: To check for vision loss in any area of your range of vision. To screen for certain eye diseases. To look for nerve damage after a stroke, head injury, or other problem that could reduce blood flow to the brain. Refraction and color tests  A refraction test is done to find the right prescription for glasses and contact lenses. A color vision test is done to check for color blindness. Color vision is often tested as part of a routine exam. You may also have this test when you apply for a job where recognizing different colors is important, such as , electronics, or the [employer]. How are vision tests done? Visual acuity test   You cover one eye at a time. You read aloud from a wall chart across the room. You read aloud from a small card that you hold in your hand. Refraction   You look into a special device. The device puts lenses of different strengths in front of each eye to see how strong your glasses or contact lenses need to be. Visual field tests   Your doctor may have you look through special machines. Or your doctor may simply have you stare straight ahead while they move a finger into and out of your field of vision.   Color vision test   You look

## 2023-06-22 NOTE — PROGRESS NOTES
Pain  Historical Provider, MD       [facility] (Including outside providers/suppliers regularly involved in providing care):   Patient Care Team:  Anahi Cunningham MD as PCP - General (Internal Medicine)  Anahi Cunningham MD as PCP - Empaneled Provider  Jessica Ba MD as Consulting Physician (Neurology)  Lisa Simpson MD as Consulting Physician (Cardiology)  Lopez Webber MD as Consulting Physician (Urology)     Reviewed and updated this visit:  Tobacco  Allergies  Meds  Med Hx  Surg Hx  Soc Hx  Fam Hx

## 2023-07-17 ENCOUNTER — HOSPITAL ENCOUNTER (OUTPATIENT)
Dept: MAMMOGRAPHY | Age: 78
Discharge: HOME | End: 2023-07-19
Attending: INTERNAL MEDICINE
Payer: MEDICARE

## 2023-07-17 DIAGNOSIS — Z12.31 OTHER SCREENING MAMMOGRAM: ICD-10-CM

## 2023-07-17 PROCEDURE — 77063 BREAST TOMOSYNTHESIS BI: CPT

## 2023-07-20 ENCOUNTER — OFFICE VISIT (OUTPATIENT)
Dept: FAMILY MEDICINE CLINIC | Age: 78
End: 2023-07-20
Payer: MEDICARE

## 2023-07-20 VITALS
HEIGHT: 63 IN | HEART RATE: 70 BPM | SYSTOLIC BLOOD PRESSURE: 124 MMHG | BODY MASS INDEX: 32.25 KG/M2 | WEIGHT: 182 LBS | DIASTOLIC BLOOD PRESSURE: 72 MMHG | OXYGEN SATURATION: 97 %

## 2023-07-20 DIAGNOSIS — J40 BRONCHITIS: Primary | ICD-10-CM

## 2023-07-20 PROCEDURE — 1123F ACP DISCUSS/DSCN MKR DOCD: CPT | Performed by: INTERNAL MEDICINE

## 2023-07-20 PROCEDURE — 3078F DIAST BP <80 MM HG: CPT | Performed by: INTERNAL MEDICINE

## 2023-07-20 PROCEDURE — 99213 OFFICE O/P EST LOW 20 MIN: CPT | Performed by: INTERNAL MEDICINE

## 2023-07-20 PROCEDURE — G8427 DOCREV CUR MEDS BY ELIG CLIN: HCPCS | Performed by: INTERNAL MEDICINE

## 2023-07-20 PROCEDURE — G8399 PT W/DXA RESULTS DOCUMENT: HCPCS | Performed by: INTERNAL MEDICINE

## 2023-07-20 PROCEDURE — 1090F PRES/ABSN URINE INCON ASSESS: CPT | Performed by: INTERNAL MEDICINE

## 2023-07-20 PROCEDURE — 1036F TOBACCO NON-USER: CPT | Performed by: INTERNAL MEDICINE

## 2023-07-20 PROCEDURE — 3074F SYST BP LT 130 MM HG: CPT | Performed by: INTERNAL MEDICINE

## 2023-07-20 PROCEDURE — G8417 CALC BMI ABV UP PARAM F/U: HCPCS | Performed by: INTERNAL MEDICINE

## 2023-07-20 RX ORDER — BENZONATATE 100 MG/1
100 CAPSULE ORAL 3 TIMES DAILY PRN
Qty: 30 CAPSULE | Refills: 0 | Status: SHIPPED | OUTPATIENT
Start: 2023-07-20 | End: 2023-07-30

## 2023-07-20 RX ORDER — AZITHROMYCIN 250 MG/1
TABLET, FILM COATED ORAL
Qty: 1 PACKET | Refills: 0 | Status: SHIPPED | OUTPATIENT
Start: 2023-07-20 | End: 2023-07-30

## 2023-07-20 ASSESSMENT — ENCOUNTER SYMPTOMS
NAUSEA: 0
DIARRHEA: 0
BLOOD IN STOOL: 0
COUGH: 1
CONSTIPATION: 0
ABDOMINAL PAIN: 0
SHORTNESS OF BREATH: 0
SORE THROAT: 0
CHEST TIGHTNESS: 0
RHINORRHEA: 0

## 2023-07-20 NOTE — PROGRESS NOTES
Linda Maravilla (:  1945) is a 69 y.o. female,Established patient, here for evaluation of the following chief complaint(s):  URI (C/o cough, sneezing, runny nose. Sx's 2 days. Denies fever or sore throat.)         ASSESSMENT/PLAN:  1. Bronchitis  -     azithromycin (ZITHROMAX Z-PAUL) 250 MG tablet; Two tablets by mouth the first day  then one tablet daily for 4 days. , Disp-1 packet, R-0Normal  -     benzonatate (TESSALON PERLES) 100 MG capsule; Take 1 capsule by mouth 3 times daily as needed for Cough, Disp-30 capsule, R-0Normal      Plan:  1. Bronchitis  H/O Pneumonia but no physical findings of pneumonia today. She has had trouble clearing infections in the past so I will treat with an antibiotic. Take Tessalon 100 m every 8 hours as needed for cough. Take the Zithromax (Z Paul) as prescribed. Return to the clinic if the symptoms continue or worsens. - azithromycin (ZITHROMAX Z-PAUL) 250 MG tablet; Two tablets by mouth the first day  then one tablet daily for 4 days. Dispense: 1 packet; Refill: 0  - benzonatate (TESSALON PERLES) 100 MG capsule; Take 1 capsule by mouth 3 times daily as needed for Cough  Dispense: 30 capsule; Refill: 0        No follow-ups on file. Subjective   SUBJECTIVE/OBJECTIVE:  URI   This is a new problem. Episode onset: 2days. The problem has been rapidly worsening. There has been no fever. Associated symptoms include congestion and coughing. Pertinent negatives include no abdominal pain, chest pain, diarrhea, dysuria, ear pain, nausea, neck pain, rhinorrhea, sneezing or sore throat. Treatments tried: tessalon. The treatment provided mild relief. Review of Systems   Constitutional: Negative. HENT:  Positive for congestion. Negative for ear pain, rhinorrhea, sneezing and sore throat. Eyes:  Negative for visual disturbance. Respiratory:  Positive for cough. Negative for chest tightness and shortness of breath.     Cardiovascular:  Negative for chest

## 2023-07-20 NOTE — PATIENT INSTRUCTIONS
Survey: You may be receiving a survey from [employer] regarding your visit today. You may get this in the mail, through your MyChart or in your email. Please complete the survey to enable us to provide the highest quality of care to you and your family. Please also, mention our names. If you cannot score us as very good (5 Stars) on any question, please feel free to call the office to discuss how we could have made your experience exceptional.      Thank You! MD Kortney Burgos, [address] [place] [address], ShirleyPATRICIO SotoN BÁRBARA Weber Code       Plan:  1. Bronchitis  Take Tessalon 100 m every 8 hours as needed for cough. Take the Zithromax (Z Bhargav) as prescribed. Return to the clinic if the symptoms continue or worsens.

## 2023-07-28 ENCOUNTER — HOSPITAL ENCOUNTER (OUTPATIENT)
Age: 78
Discharge: HOME | End: 2023-07-28
Payer: MEDICARE

## 2023-07-28 ENCOUNTER — HOSPITAL ENCOUNTER (OUTPATIENT)
Dept: GENERAL RADIOLOGY | Age: 78
End: 2023-07-28
Payer: MEDICARE

## 2023-07-28 ENCOUNTER — OFFICE VISIT (OUTPATIENT)
Dept: FAMILY MEDICINE CLINIC | Age: 78
End: 2023-07-28

## 2023-07-28 ENCOUNTER — HOSPITAL ENCOUNTER (OUTPATIENT)
Age: 78
End: 2023-07-28
Payer: MEDICARE

## 2023-07-28 VITALS
SYSTOLIC BLOOD PRESSURE: 138 MMHG | DIASTOLIC BLOOD PRESSURE: 78 MMHG | WEIGHT: 182 LBS | BODY MASS INDEX: 32.25 KG/M2 | HEIGHT: 63 IN | HEART RATE: 66 BPM

## 2023-07-28 DIAGNOSIS — D68.9 COAGULATION DEFECT (HCC): ICD-10-CM

## 2023-07-28 DIAGNOSIS — J20.9 BRONCHITIS WITH BRONCHOSPASM: ICD-10-CM

## 2023-07-28 DIAGNOSIS — I25.10 CORONARY ARTERY DISEASE INVOLVING NATIVE CORONARY ARTERY OF NATIVE HEART WITHOUT ANGINA PECTORIS: ICD-10-CM

## 2023-07-28 DIAGNOSIS — I48.0 PAROXYSMAL ATRIAL FIBRILLATION (HCC): ICD-10-CM

## 2023-07-28 DIAGNOSIS — R06.02 SOB (SHORTNESS OF BREATH): ICD-10-CM

## 2023-07-28 DIAGNOSIS — J43.9 PULMONARY EMPHYSEMA, UNSPECIFIED EMPHYSEMA TYPE (HCC): ICD-10-CM

## 2023-07-28 DIAGNOSIS — I10 PRIMARY HYPERTENSION: ICD-10-CM

## 2023-07-28 DIAGNOSIS — I10 PRIMARY HYPERTENSION: Primary | ICD-10-CM

## 2023-07-28 DIAGNOSIS — G40.909 SEIZURE DISORDER (HCC): ICD-10-CM

## 2023-07-28 LAB
ALBUMIN SERPL-MCNC: 3.9 G/DL (ref 3.5–5.2)
ALP SERPL-CCNC: 64 U/L (ref 35–104)
ALT SERPL-CCNC: 7 U/L (ref 5–33)
ANION GAP SERPL CALCULATED.3IONS-SCNC: 9 MMOL/L (ref 9–17)
AST SERPL-CCNC: 14 U/L
BASOPHILS # BLD: 0 K/UL (ref 0–0.2)
BASOPHILS NFR BLD: 0 % (ref 0–2)
BILIRUB SERPL-MCNC: 0.5 MG/DL (ref 0.3–1.2)
BNP SERPL-MCNC: 376 PG/ML
BUN SERPL-MCNC: 18 MG/DL (ref 8–23)
BUN/CREAT SERPL: 20 (ref 9–20)
CALCIUM SERPL-MCNC: 9.2 MG/DL (ref 8.6–10.4)
CHLORIDE SERPL-SCNC: 100 MMOL/L (ref 98–107)
CO2 SERPL-SCNC: 30 MMOL/L (ref 20–31)
CREAT SERPL-MCNC: 0.9 MG/DL (ref 0.5–0.9)
DIFFERENTIAL TYPE: YES
EOSINOPHIL # BLD: 0.1 K/UL (ref 0–0.4)
EOSINOPHILS RELATIVE PERCENT: 1 % (ref 0–5)
ERYTHROCYTE [DISTWIDTH] IN BLOOD BY AUTOMATED COUNT: 14.7 % (ref 12.1–15.2)
GFR SERPL CREATININE-BSD FRML MDRD: >60 ML/MIN/1.73M2
GLUCOSE SERPL-MCNC: 76 MG/DL (ref 70–99)
HCT VFR BLD AUTO: 37.7 % (ref 36–46)
HGB BLD-MCNC: 12.7 G/DL (ref 12–16)
LYMPHOCYTES NFR BLD: 2 K/UL (ref 1–4.8)
LYMPHOCYTES RELATIVE PERCENT: 31 % (ref 15–40)
MCH RBC QN AUTO: 29.3 PG (ref 26–34)
MCHC RBC AUTO-ENTMCNC: 33.6 G/DL (ref 31–37)
MCV RBC AUTO: 87.2 FL (ref 80–100)
MONOCYTES NFR BLD: 0.5 K/UL (ref 0–1)
MONOCYTES NFR BLD: 9 % (ref 4–8)
NEUTROPHILS NFR BLD: 59 % (ref 47–75)
NEUTS SEG NFR BLD: 3.7 K/UL (ref 2.5–7)
PLATELET # BLD AUTO: 131 K/UL (ref 140–450)
POTASSIUM SERPL-SCNC: 3.8 MMOL/L (ref 3.7–5.3)
PROT SERPL-MCNC: 6.8 G/DL (ref 6.4–8.3)
RBC # BLD AUTO: 4.33 M/UL (ref 4–5.2)
SODIUM SERPL-SCNC: 139 MMOL/L (ref 135–144)
WBC OTHER # BLD: 6.3 K/UL (ref 3.5–11)

## 2023-07-28 PROCEDURE — 80053 COMPREHEN METABOLIC PANEL: CPT

## 2023-07-28 PROCEDURE — 36415 COLL VENOUS BLD VENIPUNCTURE: CPT

## 2023-07-28 PROCEDURE — 85027 COMPLETE CBC AUTOMATED: CPT

## 2023-07-28 PROCEDURE — 71046 X-RAY EXAM CHEST 2 VIEWS: CPT

## 2023-07-28 PROCEDURE — 83880 ASSAY OF NATRIURETIC PEPTIDE: CPT

## 2023-07-28 RX ORDER — PREDNISONE 20 MG/1
TABLET ORAL
Qty: 15 TABLET | Refills: 0 | Status: SHIPPED | OUTPATIENT
Start: 2023-07-28 | End: 2023-08-07

## 2023-07-28 RX ORDER — AMOXICILLIN AND CLAVULANATE POTASSIUM 875; 125 MG/1; MG/1
1 TABLET, FILM COATED ORAL 2 TIMES DAILY
Qty: 20 TABLET | Refills: 0 | Status: SHIPPED | OUTPATIENT
Start: 2023-07-28 | End: 2023-08-07

## 2023-07-28 ASSESSMENT — ENCOUNTER SYMPTOMS
CHEST TIGHTNESS: 0
ABDOMINAL PAIN: 0
CONSTIPATION: 0
SORE THROAT: 0
RHINORRHEA: 0
SHORTNESS OF BREATH: 1
NAUSEA: 0
BLOOD IN STOOL: 0
COUGH: 1
DIARRHEA: 0

## 2023-07-28 NOTE — PATIENT INSTRUCTIONS
Survey: You may be receiving a survey from [employer] regarding your visit today. You may get this in the mail, through your MyChart or in your email. Please complete the survey to enable us to provide the highest quality of care to you and your family. Please also, mention our names. If you cannot score us as very good (5 Stars) on any question, please feel free to call the office to discuss how we could have made your experience exceptional.      Thank You! Dr. Steven Hall MD    Cassi Lewis, [address] [facility], Amanda Osborn, PATRICION BÁRBARA Fox       Plan:  1. Primary hypertension  Controlled on Lasix and Benicar HCT. No change in dose. - Comprehensive Metabolic Panel; Future    2. Coagulation defect ([address])  On Eliquis. 3. Coronary artery disease involving native coronary artery of native heart without angina pectoris  Denies chest pain or SOB. Follows with Dr. Divina Polk. - Comprehensive Metabolic Panel; Future    4. Paroxysmal atrial fibrillation (HCC)  NSR today. Continues on Eliquis. 5. Pulmonary emphysema, unspecified emphysema type ([address])  Continue on Trelegy and albuterol as needed. 6. Seizure disorder ([address])  Has been stable on the current medication. Follows with Neurology. 7. Bronchitis with bronchospasm  CXR and labs today to rule out pneumonia / CHF. Start on Augmentin 875 mg two times a day x 10 days. Take the prednisone taper as directed on the prescription. The prednisone is very bitter so swallow the tablets quickly to prevent  dissolving in the mouth. After taking, don't lay  down for 2 hours to help prevent reflux.    - CBC with Auto Differential; Future  - Brain Natriuretic Peptide; Future  - XR CHEST (2 VW); Future  - amoxicillin-clavulanate (AUGMENTIN) 875-125 MG per tablet; Take 1 tablet by mouth 2 times daily for 10 days  Dispense: 20 tablet;  Refill: 0  - predniSONE (DELTASONE) 20 MG tablet; 3 tablets daily x 3 days then 2 tablets daily x 2 days then 1 tablet

## 2023-07-28 NOTE — PROGRESS NOTES
palpitations. Past treatments include diuretics and angiotensin blockers. The current treatment provides significant improvement. There are no compliance problems. There is no history of angina. Hyperlipidemia  The current episode started more than 1 year ago. The problem is controlled. Recent lipid tests were reviewed and are normal. Associated symptoms include shortness of breath. Pertinent negatives include no chest pain or myalgias. Current antihyperlipidemic treatment includes statins. The current treatment provides significant improvement of lipids. There are no compliance problems. Atrial Fibrillation  Presents for follow-up visit. Symptoms include shortness of breath. Symptoms are negative for chest pain and palpitations. The symptoms have been stable. Past medical history includes hyperlipidemia. Review of Systems   Constitutional: Negative. HENT:  Negative for congestion, ear pain, rhinorrhea, sneezing and sore throat. Eyes:  Negative for visual disturbance. Respiratory:  Positive for cough and shortness of breath. Negative for chest tightness. Cardiovascular:  Negative for chest pain and palpitations. Gastrointestinal:  Negative for abdominal pain, blood in stool, constipation, diarrhea and nausea. Genitourinary:  Negative for difficulty urinating, dysuria, frequency, menstrual problem and urgency. Musculoskeletal:  Negative for arthralgias, joint swelling, myalgias and neck pain. Skin: Negative. Neurological:  Negative for syncope. Psychiatric/Behavioral: Negative. Objective   Physical Exam  Constitutional:       Appearance: She is well-developed. HENT:      Head: Atraumatic. Eyes:      Conjunctiva/sclera: Conjunctivae normal.   Cardiovascular:      Rate and Rhythm: Normal rate and regular rhythm. Heart sounds: Murmur heard. Pulmonary:      Effort: Pulmonary effort is normal.      Breath sounds: Wheezing present.    Abdominal:      Palpations: Abdomen

## 2023-09-01 ENCOUNTER — HOSPITAL ENCOUNTER (OUTPATIENT)
Age: 78
Discharge: HOME | End: 2023-09-01
Payer: MEDICARE

## 2023-09-01 DIAGNOSIS — E78.2 MIXED HYPERLIPIDEMIA: ICD-10-CM

## 2023-09-01 DIAGNOSIS — I10 ESSENTIAL HYPERTENSION: ICD-10-CM

## 2023-09-01 DIAGNOSIS — I10 PRIMARY HYPERTENSION: ICD-10-CM

## 2023-09-01 DIAGNOSIS — I35.0 NONRHEUMATIC AORTIC VALVE STENOSIS: ICD-10-CM

## 2023-09-01 LAB
ALBUMIN SERPL-MCNC: 4 G/DL (ref 3.5–5.2)
ALP SERPL-CCNC: 68 U/L (ref 35–104)
ALT SERPL-CCNC: 8 U/L (ref 5–33)
ANION GAP SERPL CALCULATED.3IONS-SCNC: 8 MMOL/L (ref 9–17)
AST SERPL-CCNC: 15 U/L
BASOPHILS # BLD: ABNORMAL K/UL (ref 0–0.2)
BASOPHILS NFR BLD: ABNORMAL % (ref 0–2)
BILIRUB SERPL-MCNC: 0.6 MG/DL (ref 0.3–1.2)
BUN SERPL-MCNC: 21 MG/DL (ref 8–23)
BUN/CREAT SERPL: 21 (ref 9–20)
CALCIUM SERPL-MCNC: 9.3 MG/DL (ref 8.6–10.4)
CHLORIDE SERPL-SCNC: 104 MMOL/L (ref 98–107)
CHOLEST SERPL-MCNC: 154 MG/DL
CHOLESTEROL/HDL RATIO: 3.3
CO2 SERPL-SCNC: 31 MMOL/L (ref 20–31)
CREAT SERPL-MCNC: 1 MG/DL (ref 0.5–0.9)
EOSINOPHIL # BLD: 0.05 K/UL (ref 0–0.4)
EOSINOPHILS RELATIVE PERCENT: 1 % (ref 0–5)
ERYTHROCYTE [DISTWIDTH] IN BLOOD BY AUTOMATED COUNT: 15.2 % (ref 12.1–15.2)
GFR SERPL CREATININE-BSD FRML MDRD: 58 ML/MIN/1.73M2
GLUCOSE SERPL-MCNC: 106 MG/DL (ref 70–99)
HCT VFR BLD AUTO: 38.4 % (ref 36–46)
HDLC SERPL-MCNC: 47 MG/DL
HGB BLD-MCNC: 12.8 G/DL (ref 12–16)
IMM GRANULOCYTES # BLD AUTO: ABNORMAL K/UL (ref 0–0.3)
IMM GRANULOCYTES NFR BLD: ABNORMAL %
LDLC SERPL CALC-MCNC: 89 MG/DL (ref 0–130)
LYMPHOCYTES NFR BLD: 2.07 K/UL (ref 1–4.8)
LYMPHOCYTES RELATIVE PERCENT: 39 % (ref 15–40)
MAGNESIUM SERPL-MCNC: 2 MG/DL (ref 1.6–2.6)
MCH RBC QN AUTO: 29.3 PG (ref 26–34)
MCHC RBC AUTO-ENTMCNC: 33.4 G/DL (ref 31–37)
MCV RBC AUTO: 87.6 FL (ref 80–100)
MONOCYTES NFR BLD: 0.27 K/UL (ref 0–1)
MONOCYTES NFR BLD: 5 % (ref 4–8)
MORPHOLOGY: ABNORMAL
NEUTROPHILS NFR BLD: 55 % (ref 47–75)
NEUTS SEG NFR BLD: 2.91 K/UL (ref 2.5–7)
PATIENT FASTING?: YES
PLATELET # BLD AUTO: 120 K/UL (ref 140–450)
POTASSIUM SERPL-SCNC: 4.3 MMOL/L (ref 3.7–5.3)
PROT SERPL-MCNC: 6.7 G/DL (ref 6.4–8.3)
RBC # BLD AUTO: 4.38 M/UL (ref 4–5.2)
SODIUM SERPL-SCNC: 143 MMOL/L (ref 135–144)
TRIGL SERPL-MCNC: 92 MG/DL
TSH SERPL DL<=0.05 MIU/L-ACNC: 2.78 UIU/ML (ref 0.3–5)
WBC OTHER # BLD: 5.3 K/UL (ref 3.5–11)

## 2023-09-01 PROCEDURE — 93005 ELECTROCARDIOGRAM TRACING: CPT

## 2023-09-01 PROCEDURE — 80053 COMPREHEN METABOLIC PANEL: CPT

## 2023-09-01 PROCEDURE — 36415 COLL VENOUS BLD VENIPUNCTURE: CPT

## 2023-09-01 PROCEDURE — 80061 LIPID PANEL: CPT

## 2023-09-01 PROCEDURE — 85025 COMPLETE CBC W/AUTO DIFF WBC: CPT

## 2023-09-01 PROCEDURE — 83735 ASSAY OF MAGNESIUM: CPT

## 2023-09-01 PROCEDURE — 84443 ASSAY THYROID STIM HORMONE: CPT

## 2023-09-02 LAB
EKG ATRIAL RATE: 63 BPM
EKG P AXIS: 66 DEGREES
EKG P-R INTERVAL: 184 MS
EKG Q-T INTERVAL: 394 MS
EKG QRS DURATION: 78 MS
EKG QTC CALCULATION (BAZETT): 403 MS
EKG R AXIS: 18 DEGREES
EKG T AXIS: 78 DEGREES
EKG VENTRICULAR RATE: 63 BPM

## 2023-09-21 ENCOUNTER — HOSPITAL ENCOUNTER (OUTPATIENT)
Age: 78
Setting detail: SPECIMEN
Discharge: HOME | End: 2023-09-21
Payer: MEDICARE

## 2023-09-21 ENCOUNTER — OFFICE VISIT (OUTPATIENT)
Dept: UROLOGY | Age: 78
End: 2023-09-21
Payer: MEDICARE

## 2023-09-21 VITALS
BODY MASS INDEX: 32.25 KG/M2 | WEIGHT: 182 LBS | DIASTOLIC BLOOD PRESSURE: 82 MMHG | SYSTOLIC BLOOD PRESSURE: 134 MMHG | RESPIRATION RATE: 18 BRPM | HEIGHT: 63 IN | TEMPERATURE: 97 F

## 2023-09-21 DIAGNOSIS — R31.29 MICROHEMATURIA: ICD-10-CM

## 2023-09-21 DIAGNOSIS — R31.29 MICROHEMATURIA: Primary | ICD-10-CM

## 2023-09-21 LAB
-: ABNORMAL
BILIRUB UR QL STRIP: NEGATIVE
CLARITY UR: CLEAR
COLOR UR: YELLOW
COMMENT: ABNORMAL
EPI CELLS #/AREA URNS HPF: ABNORMAL /HPF
GLUCOSE UR STRIP-MCNC: NEGATIVE MG/DL
HGB UR QL STRIP.AUTO: ABNORMAL
KETONES UR STRIP-MCNC: ABNORMAL MG/DL
LEUKOCYTE ESTERASE UR QL STRIP: ABNORMAL
NITRITE UR QL STRIP: NEGATIVE
PH UR STRIP: 6 [PH] (ref 5–8)
PROT UR STRIP-MCNC: ABNORMAL MG/DL
RBC #/AREA URNS HPF: ABNORMAL /HPF (ref 0–2)
SP GR UR STRIP: 1.02 (ref 1–1.03)
UROBILINOGEN UR STRIP-ACNC: ABNORMAL EU/DL (ref 0–1)
WBC #/AREA URNS HPF: ABNORMAL /HPF

## 2023-09-21 PROCEDURE — 3079F DIAST BP 80-89 MM HG: CPT | Performed by: PHYSICIAN ASSISTANT

## 2023-09-21 PROCEDURE — 3075F SYST BP GE 130 - 139MM HG: CPT | Performed by: PHYSICIAN ASSISTANT

## 2023-09-21 PROCEDURE — 1123F ACP DISCUSS/DSCN MKR DOCD: CPT | Performed by: PHYSICIAN ASSISTANT

## 2023-09-21 PROCEDURE — 1090F PRES/ABSN URINE INCON ASSESS: CPT | Performed by: PHYSICIAN ASSISTANT

## 2023-09-21 PROCEDURE — G8417 CALC BMI ABV UP PARAM F/U: HCPCS | Performed by: PHYSICIAN ASSISTANT

## 2023-09-21 PROCEDURE — G8427 DOCREV CUR MEDS BY ELIG CLIN: HCPCS | Performed by: PHYSICIAN ASSISTANT

## 2023-09-21 PROCEDURE — 1036F TOBACCO NON-USER: CPT | Performed by: PHYSICIAN ASSISTANT

## 2023-09-21 PROCEDURE — 99213 OFFICE O/P EST LOW 20 MIN: CPT | Performed by: PHYSICIAN ASSISTANT

## 2023-09-21 PROCEDURE — 81001 URINALYSIS AUTO W/SCOPE: CPT

## 2023-09-21 PROCEDURE — G8399 PT W/DXA RESULTS DOCUMENT: HCPCS | Performed by: PHYSICIAN ASSISTANT

## 2023-09-21 ASSESSMENT — ENCOUNTER SYMPTOMS
COLOR CHANGE: 0
SHORTNESS OF BREATH: 0
CONSTIPATION: 0
ABDOMINAL PAIN: 0
NAUSEA: 0
VOMITING: 0
EYE REDNESS: 0
APNEA: 0
WHEEZING: 0
COUGH: 0
BACK PAIN: 1

## 2023-09-22 DIAGNOSIS — E78.00 HYPERCHOLESTEROLEMIA: ICD-10-CM

## 2023-09-22 RX ORDER — ROSUVASTATIN CALCIUM 10 MG/1
10 TABLET, COATED ORAL NIGHTLY
Qty: 90 TABLET | Refills: 1 | Status: SHIPPED | OUTPATIENT
Start: 2023-09-22

## 2023-09-22 NOTE — TELEPHONE ENCOUNTER
Health Maintenance   Topic Date Due    Shingles vaccine (1 of 2) Never done    DTaP/Tdap/Td vaccine (2 - Td or Tdap) 11/08/2022    COVID-19 Vaccine (5 - Pfizer series) 01/30/2023    Flu vaccine (1) 08/01/2023    Depression Screen  06/22/2024    Annual Wellness Visit (AWV)  06/22/2024    Lipids  09/01/2024    DEXA (modify frequency per FRAX score)  Completed    Pneumococcal 65+ years Vaccine  Completed    Hepatitis C screen  Completed    Hepatitis A vaccine  Aged Out    Hepatitis B vaccine  Aged Out    Hib vaccine  Aged Out    Meningococcal (ACWY) vaccine  Aged Out    A1C test (Diabetic or Prediabetic)  Discontinued    Breast cancer screen  Discontinued    Colorectal Cancer Screen  Discontinued             (applicable per patient's age: Cancer Screenings, Depression Screening, Fall Risk Screening, Immunizations)    Hemoglobin A1C (%)   Date Value   04/17/2023 5.3   10/05/2020 5.5   04/01/2017 5.8     LDL Cholesterol (mg/dL)   Date Value   09/01/2023 89     LDL Calculated (mg/dL)   Date Value   08/06/2013 83     AST (U/L)   Date Value   09/01/2023 15     ALT (U/L)   Date Value   09/01/2023 8     BUN (mg/dL)   Date Value   09/01/2023 21      (goal A1C is < 7)   (goal LDL is <100) need 30-50% reduction from baseline     BP Readings from Last 3 Encounters:   09/21/23 134/82   07/28/23 138/78   07/20/23 124/72    (goal /80)      All Future Testing planned in [facility]:  Lab Frequency Next Occurrence   XR CHEST (2 VW) Once 04/04/2023       Next Visit Date:  Future Appointments   Date Time Provider [address]   9/28/2023 11:00 AM MD Noe Mayo [facility]   10/31/2023  3:00 PM Marin Naik MD [facility] Oneil Ramsey   6/24/2024 10:45 AM Marin Naik MD [facility]   9/26/2024 11:00 AM MAE Randall urol [facility]            Patient Active Problem List:     Hypertension     Aortic stenosis     Hyperlipidemia     Diverticulosis     Intracranial bleed (HCC)     Vitamin D deficiency

## 2023-09-28 ENCOUNTER — OFFICE VISIT (OUTPATIENT)
Dept: CARDIOLOGY CLINIC | Age: 78
End: 2023-09-28

## 2023-09-28 VITALS
SYSTOLIC BLOOD PRESSURE: 153 MMHG | OXYGEN SATURATION: 98 % | HEART RATE: 74 BPM | BODY MASS INDEX: 32.77 KG/M2 | DIASTOLIC BLOOD PRESSURE: 80 MMHG | WEIGHT: 185 LBS

## 2023-09-28 DIAGNOSIS — E55.9 VITAMIN D DEFICIENCY: ICD-10-CM

## 2023-09-28 DIAGNOSIS — Z98.61 POST PTCA: ICD-10-CM

## 2023-09-28 DIAGNOSIS — I10 PRIMARY HYPERTENSION: Primary | ICD-10-CM

## 2023-09-28 DIAGNOSIS — Z95.2 S/P TAVR (TRANSCATHETER AORTIC VALVE REPLACEMENT): ICD-10-CM

## 2023-09-28 DIAGNOSIS — I10 ESSENTIAL HYPERTENSION: ICD-10-CM

## 2023-09-28 DIAGNOSIS — E78.2 MIXED HYPERLIPIDEMIA: ICD-10-CM

## 2023-09-28 NOTE — PROGRESS NOTES
Ov Dr. Augustina Rutledge for one year follow up  Did not bring list/bottles of [facility] one time d/t lung infection   Had to be drained per pt -  No chest pain   No palpitations   C/o weight gain   Edema legs     No changes    Follow up in May  With echo prior

## 2023-10-19 RX ORDER — APIXABAN 5 MG/1
5 TABLET, FILM COATED ORAL 2 TIMES DAILY
Qty: 180 TABLET | Refills: 1 | Status: SHIPPED | OUTPATIENT
Start: 2023-10-19

## 2023-10-31 ENCOUNTER — OFFICE VISIT (OUTPATIENT)
Dept: FAMILY MEDICINE CLINIC | Age: 78
End: 2023-10-31
Payer: MEDICARE

## 2023-10-31 VITALS
BODY MASS INDEX: 32.6 KG/M2 | HEART RATE: 66 BPM | HEIGHT: 63 IN | WEIGHT: 184 LBS | SYSTOLIC BLOOD PRESSURE: 114 MMHG | DIASTOLIC BLOOD PRESSURE: 80 MMHG

## 2023-10-31 DIAGNOSIS — G40.909 SEIZURE DISORDER (HCC): ICD-10-CM

## 2023-10-31 DIAGNOSIS — E55.9 VITAMIN D DEFICIENCY: ICD-10-CM

## 2023-10-31 DIAGNOSIS — I10 PRIMARY HYPERTENSION: Primary | ICD-10-CM

## 2023-10-31 DIAGNOSIS — Z23 NEED FOR INFLUENZA VACCINATION: ICD-10-CM

## 2023-10-31 DIAGNOSIS — I25.10 CORONARY ARTERY DISEASE INVOLVING NATIVE CORONARY ARTERY OF NATIVE HEART WITHOUT ANGINA PECTORIS: ICD-10-CM

## 2023-10-31 DIAGNOSIS — I48.0 PAROXYSMAL ATRIAL FIBRILLATION (HCC): ICD-10-CM

## 2023-10-31 DIAGNOSIS — E78.2 MIXED HYPERLIPIDEMIA: ICD-10-CM

## 2023-10-31 DIAGNOSIS — K59.09 CHRONIC CONSTIPATION: ICD-10-CM

## 2023-10-31 PROCEDURE — G8417 CALC BMI ABV UP PARAM F/U: HCPCS | Performed by: INTERNAL MEDICINE

## 2023-10-31 PROCEDURE — 1090F PRES/ABSN URINE INCON ASSESS: CPT | Performed by: INTERNAL MEDICINE

## 2023-10-31 PROCEDURE — G8482 FLU IMMUNIZE ORDER/ADMIN: HCPCS | Performed by: INTERNAL MEDICINE

## 2023-10-31 PROCEDURE — G8427 DOCREV CUR MEDS BY ELIG CLIN: HCPCS | Performed by: INTERNAL MEDICINE

## 2023-10-31 PROCEDURE — G8399 PT W/DXA RESULTS DOCUMENT: HCPCS | Performed by: INTERNAL MEDICINE

## 2023-10-31 PROCEDURE — 90674 CCIIV4 VAC NO PRSV 0.5 ML IM: CPT | Performed by: INTERNAL MEDICINE

## 2023-10-31 PROCEDURE — 1036F TOBACCO NON-USER: CPT | Performed by: INTERNAL MEDICINE

## 2023-10-31 PROCEDURE — 3079F DIAST BP 80-89 MM HG: CPT | Performed by: INTERNAL MEDICINE

## 2023-10-31 PROCEDURE — G0008 ADMIN INFLUENZA VIRUS VAC: HCPCS | Performed by: INTERNAL MEDICINE

## 2023-10-31 PROCEDURE — 3074F SYST BP LT 130 MM HG: CPT | Performed by: INTERNAL MEDICINE

## 2023-10-31 PROCEDURE — 1123F ACP DISCUSS/DSCN MKR DOCD: CPT | Performed by: INTERNAL MEDICINE

## 2023-10-31 PROCEDURE — 99214 OFFICE O/P EST MOD 30 MIN: CPT | Performed by: INTERNAL MEDICINE

## 2023-10-31 ASSESSMENT — ENCOUNTER SYMPTOMS
CHEST TIGHTNESS: 0
ABDOMINAL PAIN: 0
BLOOD IN STOOL: 0
NAUSEA: 0
CONSTIPATION: 1
COUGH: 0
DIARRHEA: 0
SHORTNESS OF BREATH: 0
SORE THROAT: 0
RHINORRHEA: 0

## 2023-10-31 NOTE — PROGRESS NOTES
Vaccine Information Sheet, \"Influenza - Inactivated\"  given to Alis Argueta, or parent/legal guardian of  Alis Argueta and verbalized understanding. Patient responses:    Have you ever had a reaction to a flu vaccine? No  Are you able to eat eggs without adverse effects? Yes  Do you have any current illness? No  Have you ever had Guillian [place] Syndrome? No    Flu vaccine given per order. Please see immunization tab.

## 2023-10-31 NOTE — PROGRESS NOTES
Becca Murray (:  1945) is a 69 y.o. female,Established patient, here for evaluation of the following chief complaint(s):  Hypertension (3 month check up. Labs 23, Dr Maile Homans. Holding on Benicar. ), Hyperlipidemia, Atrial Fibrillation (Follows with Dr Maile Homans. ), Coronary Artery Disease, COPD, Seizures, and Arthritis         ASSESSMENT/PLAN:  1. Primary hypertension  2. Need for influenza vaccination  -     Influenza, FLUCELVAX, (age 10 mo+), IM, Preservative Free, 0.5 mL  3. Coronary artery disease involving native coronary artery of native heart without angina pectoris  4. Paroxysmal atrial fibrillation (HCC)  5. Vitamin D deficiency  6. Mixed hyperlipidemia  7. Seizure disorder ([address])  8. Chronic constipation    Plan:  1. Need for influenza vaccination  Celestina Rao was given an influenza vaccine today. - Influenza, FLUCELVAX, (age 10 mo+), IM, Preservative Free, 0.5 mL    2. Primary hypertension  Controlled on Benicar HCT and Lasix. No change in medication. 3. Coronary artery disease involving native coronary artery of native heart without angina pectoris  Follows with Dr. Maile Homans. 4. Paroxysmal atrial fibrillation (HCC)  NSR today. Continues on Eliquis. 5. Vitamin D deficiency  Controlled on Vitamin D replacement. 6. Mixed hyperlipidemia  Controlled on Crestor. No change in medication. Dr. Maile Homans follows labs. 7. Seizure disorder ([address])  Has been stable on Depakote. No change in medication. Follows with Neurology. 8. Chronic constipation  Uses Linzess PRN because daily use causes diarrhea. Becca Murray was instructed to follow up in the clinic in 3 months for check up or as needed with any medical issues. Subjective   SUBJECTIVE/OBJECTIVE:  Celestina Rao presents for a check up on her medical conditions HTN, Hyperlipidemia, atrial fib, CAD, COPD. Celestina Rao denies new problems.   Medications were reviewed with Celestina Rao, she is  tolerating the

## 2023-10-31 NOTE — PATIENT INSTRUCTIONS
Survey: You may be receiving a survey from [employer] regarding your visit today. You may get this in the mail, through your MyChart or in your email. Please complete the survey to enable us to provide the highest quality of care to you and your family. Please also, mention our names. If you cannot score us as very good (5 Stars) on any question, please feel free to call the office to discuss how we could have made your experience exceptional.      Thank You! MD Esteban Yatesgi Manny, [facility]3 [facility], [phone], APRN [facility], [address]       Plan:  1. Need for influenza vaccination  María Elena Jimenez was given an influenza vaccine today. - Influenza, FLUCELVAX, (age 10 mo+), IM, Preservative Free, 0.5 mL    2. Primary hypertension  Controlled on Benicar HCT and Lasix. No change in medication. 3. Coronary artery disease involving native coronary artery of native heart without angina pectoris  Follows with Dr. Farzana Perkins. 4. Paroxysmal atrial fibrillation (HCC)  NSR today. Continues on Eliquis. 5. Vitamin D deficiency  Controlled on Vitamin D replacement. 6. Mixed hyperlipidemia  Controlled on Crestor. No change in medication. Dr. Farzana Perkins follows labs. 7. Seizure disorder ([address])  Has been stable on Depakote. No change in medication. Follows with Neurology. 8. Chronic constipation  Uses Linzess PRN because daily use causes diarrhea. Linda Maravilla was instructed to follow up in the clinic in 3 months for check up or as needed with any medical issues.

## 2023-11-02 NOTE — PATIENT INSTRUCTIONS
SURVEY:    You may be receiving a survey from [employer] regarding your visit today. Please complete the survey to enable us to provide the highest quality of care to you and your family. If you cannot score us a very good on any question, please call the office to discuss how we could have made your experience a very good one. Thank you. Winlevi Pregnancy And Lactation Text: This medication is considered safe during pregnancy and breastfeeding.

## 2023-11-30 ENCOUNTER — OFFICE VISIT (OUTPATIENT)
Dept: FAMILY MEDICINE CLINIC | Age: 78
End: 2023-11-30
Payer: COMMERCIAL

## 2023-11-30 VITALS
SYSTOLIC BLOOD PRESSURE: 132 MMHG | HEART RATE: 86 BPM | HEIGHT: 63 IN | BODY MASS INDEX: 32.6 KG/M2 | DIASTOLIC BLOOD PRESSURE: 84 MMHG | WEIGHT: 184 LBS

## 2023-11-30 DIAGNOSIS — J20.9 BRONCHITIS WITH BRONCHOSPASM: Primary | ICD-10-CM

## 2023-11-30 PROCEDURE — 3075F SYST BP GE 130 - 139MM HG: CPT | Performed by: INTERNAL MEDICINE

## 2023-11-30 PROCEDURE — 1036F TOBACCO NON-USER: CPT | Performed by: INTERNAL MEDICINE

## 2023-11-30 PROCEDURE — G8482 FLU IMMUNIZE ORDER/ADMIN: HCPCS | Performed by: INTERNAL MEDICINE

## 2023-11-30 PROCEDURE — G8427 DOCREV CUR MEDS BY ELIG CLIN: HCPCS | Performed by: INTERNAL MEDICINE

## 2023-11-30 PROCEDURE — 1090F PRES/ABSN URINE INCON ASSESS: CPT | Performed by: INTERNAL MEDICINE

## 2023-11-30 PROCEDURE — G8417 CALC BMI ABV UP PARAM F/U: HCPCS | Performed by: INTERNAL MEDICINE

## 2023-11-30 PROCEDURE — 1123F ACP DISCUSS/DSCN MKR DOCD: CPT | Performed by: INTERNAL MEDICINE

## 2023-11-30 PROCEDURE — 99213 OFFICE O/P EST LOW 20 MIN: CPT | Performed by: INTERNAL MEDICINE

## 2023-11-30 PROCEDURE — G8399 PT W/DXA RESULTS DOCUMENT: HCPCS | Performed by: INTERNAL MEDICINE

## 2023-11-30 PROCEDURE — 3079F DIAST BP 80-89 MM HG: CPT | Performed by: INTERNAL MEDICINE

## 2023-11-30 RX ORDER — AMOXICILLIN AND CLAVULANATE POTASSIUM 875; 125 MG/1; MG/1
1 TABLET, FILM COATED ORAL 2 TIMES DAILY
Qty: 14 TABLET | Refills: 0 | Status: SHIPPED | OUTPATIENT
Start: 2023-11-30 | End: 2023-12-07

## 2023-11-30 RX ORDER — BENZONATATE 100 MG/1
100 CAPSULE ORAL 3 TIMES DAILY PRN
Qty: 30 CAPSULE | Refills: 0 | Status: SHIPPED | OUTPATIENT
Start: 2023-11-30 | End: 2023-12-10

## 2023-11-30 RX ORDER — PREDNISONE 20 MG/1
40 TABLET ORAL DAILY
Qty: 10 TABLET | Refills: 0 | Status: SHIPPED | OUTPATIENT
Start: 2023-11-30 | End: 2023-12-05

## 2023-11-30 ASSESSMENT — ENCOUNTER SYMPTOMS
BLOOD IN STOOL: 0
ABDOMINAL PAIN: 0
COUGH: 1
DIARRHEA: 0
RHINORRHEA: 1
SHORTNESS OF BREATH: 0
NAUSEA: 0
CHEST TIGHTNESS: 0
SORE THROAT: 1
CONSTIPATION: 0

## 2023-11-30 NOTE — PROGRESS NOTES
Candido Hatfield (:  1945) is a 69 y.o. female,Established patient, here for evaluation of the following chief complaint(s):  URI (Sx's 3-4 days with cough, congestion, sob, body aches. )         ASSESSMENT/PLAN:  1. Bronchitis with bronchospasm  -     amoxicillin-clavulanate (AUGMENTIN) 875-125 MG per tablet; Take 1 tablet by mouth 2 times daily for 7 days, Disp-14 tablet, R-0Normal  -     predniSONE (DELTASONE) 20 MG tablet; Take 2 tablets by mouth daily for 5 days, Disp-10 tablet, R-0Normal  -     benzonatate (TESSALON PERLES) 100 MG capsule; Take 1 capsule by mouth 3 times daily as needed for Cough, Disp-30 capsule, R-0Normal      Plan:  1. Bronchitis with bronchospasm  Take Augmentin 875 mg two times a day x 7 days. Take Prednisone 20 m tablets daily x 5 days. Take tessalon 100 mg every 8 hours as needed for cough. Fabricio Saini is instructed to return to the clinic if the symptoms continue or worsen. Fabricio Saini  was also instructed to go to the emergency room department if the symptoms significantly worsen before an appointment can be made. - amoxicillin-clavulanate (AUGMENTIN) 875-125 MG per tablet; Take 1 tablet by mouth 2 times daily for 7 days  Dispense: 14 tablet; Refill: 0  - predniSONE (DELTASONE) 20 MG tablet; Take 2 tablets by mouth daily for 5 days  Dispense: 10 tablet; Refill: 0  - benzonatate (TESSALON PERLES) 100 MG capsule; Take 1 capsule by mouth 3 times daily as needed for Cough  Dispense: 30 capsule; Refill: 0                 Subjective   SUBJECTIVE/OBJECTIVE:  URI   This is a new problem. Episode onset: 4 days. The problem has been gradually worsening. Maximum temperature: Feverish. Associated symptoms include congestion, coughing, rhinorrhea and a sore throat. Pertinent negatives include no abdominal pain, chest pain, diarrhea, dysuria, ear pain, nausea, neck pain or sneezing. Associated symptoms comments: Shoulder pain on the right.   . She has tried acetaminophen (Vics salve) for

## 2023-11-30 NOTE — PATIENT INSTRUCTIONS
Survey: You may be receiving a survey from [employer] regarding your visit today. You may get this in the mail, through your MyChart or in your email. Please complete the survey to enable us to provide the highest quality of care to you and your family. Please also, mention our names. If you cannot score us as very good (5 Stars) on any question, please feel free to call the office to discuss how we could have made your experience exceptional.      Thank You! Dr. Aurelio Hess MD    [facility], [facility]3 [facility], Marylin Spatz, APRN [facility]    [place], [place]         Plan:  1. Bronchitis with bronchospasm  Take Augmentin 875 mg two times a day x 7 days. Take Prednisone 20 m tablets daily x 5 days. Take tessalon 100 mg every 8 hours as needed for cough. Ann Griffin is instructed to return to the clinic if the symptoms continue or worsen. Ann Griffin  was also instructed to go to the emergency room department if the symptoms significantly worsen before an appointment can be made.

## 2023-12-02 NOTE — PATIENT INSTRUCTIONS
Survey: You may be receiving a survey from [employer] regarding your visit today. You may get this in the mail, through your MyChart or in your email. Please complete the survey to enable us to provide the highest quality of care to you and your family. Please also, mention our names. If you cannot score us as very good (5 Stars) on any question, please feel free to call the office to discuss how we could have made your experience exceptional.      Thank You! MD Darron Mon, KRISTI Deutsch, Clementina Ivory, PATRICION RN    [facility], [address] [place] [address]       1. Acute pain of left knee  Likely acute on chronic osteoarthritis. Xray left knee. Risk of the procedure was dicussed (including risk of skin discoloration, fat atrophy and dimpling, elevated blood glucose, infection, bleeding, allergic reaction, tendon injury, or cartilage loss) as well as the benefit. Verbal consent was obtained. The left knee was prepped with betadine and alcohol. The left knee was injected with the use of a 1.5 inch 27 gauge needle without difficulties. Kenolog 60 mixed with 1 cc of 1% lidocaine without epinephrine. Geri tolerated the procedure well. Use Arthrotec gel on the left knee two times a day. no abrasions, no jaundice, no lesions, no pruritis, and no rashes.

## 2023-12-06 DIAGNOSIS — J43.9 PULMONARY EMPHYSEMA, UNSPECIFIED EMPHYSEMA TYPE (HCC): ICD-10-CM

## 2023-12-06 RX ORDER — FLUTICASONE FUROATE, UMECLIDINIUM BROMIDE AND VILANTEROL TRIFENATATE 200; 62.5; 25 UG/1; UG/1; UG/1
1 POWDER RESPIRATORY (INHALATION) DAILY
Qty: 1 EACH | Refills: 5 | Status: SHIPPED | OUTPATIENT
Start: 2023-12-06

## 2024-01-15 DIAGNOSIS — Z98.61 POST PTCA: ICD-10-CM

## 2024-01-15 DIAGNOSIS — E55.9 VITAMIN D DEFICIENCY: ICD-10-CM

## 2024-01-15 DIAGNOSIS — I10 PRIMARY HYPERTENSION: Primary | ICD-10-CM

## 2024-01-15 DIAGNOSIS — Z95.2 S/P TAVR (TRANSCATHETER AORTIC VALVE REPLACEMENT): ICD-10-CM

## 2024-01-15 DIAGNOSIS — I10 PRIMARY HYPERTENSION: ICD-10-CM

## 2024-01-15 DIAGNOSIS — I10 ESSENTIAL HYPERTENSION: ICD-10-CM

## 2024-01-15 DIAGNOSIS — E78.2 MIXED HYPERLIPIDEMIA: ICD-10-CM

## 2024-01-30 ENCOUNTER — OFFICE VISIT (OUTPATIENT)
Dept: FAMILY MEDICINE CLINIC | Age: 79
End: 2024-01-30
Payer: MEDICARE

## 2024-01-30 VITALS
SYSTOLIC BLOOD PRESSURE: 139 MMHG | HEART RATE: 72 BPM | BODY MASS INDEX: 33.49 KG/M2 | WEIGHT: 189 LBS | HEIGHT: 63 IN | DIASTOLIC BLOOD PRESSURE: 78 MMHG

## 2024-01-30 DIAGNOSIS — I10 PRIMARY HYPERTENSION: ICD-10-CM

## 2024-01-30 DIAGNOSIS — G40.909 SEIZURE DISORDER (HCC): ICD-10-CM

## 2024-01-30 DIAGNOSIS — R60.0 LOWER LEG EDEMA: Primary | ICD-10-CM

## 2024-01-30 DIAGNOSIS — J43.9 PULMONARY EMPHYSEMA, UNSPECIFIED EMPHYSEMA TYPE (HCC): ICD-10-CM

## 2024-01-30 DIAGNOSIS — I26.93 SINGLE SUBSEGMENTAL PULMONARY EMBOLISM WITHOUT ACUTE COR PULMONALE (HCC): ICD-10-CM

## 2024-01-30 DIAGNOSIS — I48.0 PAROXYSMAL ATRIAL FIBRILLATION (HCC): ICD-10-CM

## 2024-01-30 PROBLEM — D68.9 COAGULATION DEFECT (HCC): Status: RESOLVED | Noted: 2023-07-28 | Resolved: 2024-01-30

## 2024-01-30 PROCEDURE — 99214 OFFICE O/P EST MOD 30 MIN: CPT | Performed by: INTERNAL MEDICINE

## 2024-01-30 PROCEDURE — 3023F SPIROM DOC REV: CPT | Performed by: INTERNAL MEDICINE

## 2024-01-30 PROCEDURE — 1036F TOBACCO NON-USER: CPT | Performed by: INTERNAL MEDICINE

## 2024-01-30 PROCEDURE — 1090F PRES/ABSN URINE INCON ASSESS: CPT | Performed by: INTERNAL MEDICINE

## 2024-01-30 PROCEDURE — G8482 FLU IMMUNIZE ORDER/ADMIN: HCPCS | Performed by: INTERNAL MEDICINE

## 2024-01-30 PROCEDURE — G8417 CALC BMI ABV UP PARAM F/U: HCPCS | Performed by: INTERNAL MEDICINE

## 2024-01-30 PROCEDURE — G8427 DOCREV CUR MEDS BY ELIG CLIN: HCPCS | Performed by: INTERNAL MEDICINE

## 2024-01-30 PROCEDURE — 1123F ACP DISCUSS/DSCN MKR DOCD: CPT | Performed by: INTERNAL MEDICINE

## 2024-01-30 PROCEDURE — G8399 PT W/DXA RESULTS DOCUMENT: HCPCS | Performed by: INTERNAL MEDICINE

## 2024-01-30 PROCEDURE — 3075F SYST BP GE 130 - 139MM HG: CPT | Performed by: INTERNAL MEDICINE

## 2024-01-30 PROCEDURE — 3078F DIAST BP <80 MM HG: CPT | Performed by: INTERNAL MEDICINE

## 2024-01-30 ASSESSMENT — PATIENT HEALTH QUESTIONNAIRE - PHQ9
1. LITTLE INTEREST OR PLEASURE IN DOING THINGS: 0
SUM OF ALL RESPONSES TO PHQ QUESTIONS 1-9: 0
SUM OF ALL RESPONSES TO PHQ9 QUESTIONS 1 & 2: 0
SUM OF ALL RESPONSES TO PHQ QUESTIONS 1-9: 0
2. FEELING DOWN, DEPRESSED OR HOPELESS: 0

## 2024-01-30 ASSESSMENT — ENCOUNTER SYMPTOMS
NAUSEA: 0
CHEST TIGHTNESS: 0
BLOOD IN STOOL: 0
SHORTNESS OF BREATH: 0
CONSTIPATION: 0
ABDOMINAL PAIN: 0
DIARRHEA: 0
COUGH: 0
SORE THROAT: 0
RHINORRHEA: 0

## 2024-01-30 NOTE — PROGRESS NOTES
Geri Parks (:  1945) is a 78 y.o. female,Established patient, here for evaluation of the following chief complaint(s):  Hypertension (3 month check up. ), Coronary Artery Disease, Atrial Fibrillation, Hyperlipidemia, and Seizures         ASSESSMENT/PLAN:  1. Lower leg edema  2. Single subsegmental pulmonary embolism without acute cor pulmonale (HCC)  3. Pulmonary emphysema, unspecified emphysema type (HCC)  4. Paroxysmal atrial fibrillation (HCC)  5. Seizure disorder (HCC)  6. Primary hypertension      Plan:  1. Single subsegmental pulmonary embolism without acute cor pulmonale (HCC)  Continues on Eliquis.     2. Pulmonary emphysema, unspecified emphysema type (HCC)  Controlled with PRN use of Trelegy.      3. Paroxysmal atrial fibrillation (HCC)  NSR today.  Follows with Dr. Warren.  Continue on Eliquis.      4. Seizure disorder (HCC)  Has been stable on Depakote. No change in medication.      5. Lower leg edema  Recommend knee high compression stockings (20 to 30 mmHg pressure).    6. Primary hypertension  Controlled on Lasix. No change in medication.       Geri was instructed to follow up in the clinic in 6 months for check up or as needed with any medical issues.                     Subjective   SUBJECTIVE/OBJECTIVE:  Geri presents for a check up on her medical conditions HTN, CAD, Atrial fib, Hyperlipidemia, Sz D/O.  Geri denies new problems.  Medications were reviewed with Geri, she is  tolerating the medication.  Bowels are regular.  There has not been rectal bleeding.  Geri denies urinary complications, the urine stream is good.  Geri denies chest pain and denies increasing shortness of breath.  Labs from  reviewed.     Geri denies any recent seizures.  She continues on Depakote and follows with Neurology.      Past Medical History:  No date: Aortic stenosis  No date: Arthritis  No date: Benign cyst of right breast in female  No date: Cardiac murmur  No date:

## 2024-01-30 NOTE — PATIENT INSTRUCTIONS
Survey:     You may be receiving a survey from [facility] regarding your visit today.     You may get this in the mail, through your MyChart or in your email.      Please complete the survey to enable us to provide the highest quality of care to you and your family. Please also, mention our names.     If you cannot score us as very good (5 Stars) on any question, please feel free to call the office to discuss how we could have made your experience exceptional.      Thank You!        Dr. Young, MD Duvall, KRISTI Deutsch, ASHISH Ruff, JUDE Frankel, ASHISH Avalos MA       Plan:  1. Single subsegmental pulmonary embolism without acute cor pulmonale (HCC)  Continues on Eliquis.     2. Pulmonary emphysema, unspecified emphysema type (HCC)  Controlled with PRN use of Trelegy.      3. Paroxysmal atrial fibrillation (HCC)  NSR today.  Follows with Dr. Warren.  Continue on Eliquis.      4. Seizure disorder (HCC)  Has been stable on Depakote. No change in medication.      5. Lower leg edema  Recommend knee high compression stockings (20 to 30 mmHg pressure).    6. Primary hypertension  Controlled on Lasix.       Geri was instructed to follow up in the clinic in 6 months for check up or as needed with any medical issues.

## 2024-02-12 ENCOUNTER — OFFICE VISIT (OUTPATIENT)
Dept: FAMILY MEDICINE CLINIC | Age: 79
End: 2024-02-12
Payer: MEDICARE

## 2024-02-12 VITALS
SYSTOLIC BLOOD PRESSURE: 136 MMHG | BODY MASS INDEX: 33.84 KG/M2 | WEIGHT: 191 LBS | DIASTOLIC BLOOD PRESSURE: 80 MMHG | HEART RATE: 72 BPM | HEIGHT: 63 IN

## 2024-02-12 DIAGNOSIS — I10 PRIMARY HYPERTENSION: ICD-10-CM

## 2024-02-12 DIAGNOSIS — M25.511 ACUTE PAIN OF RIGHT SHOULDER: Primary | ICD-10-CM

## 2024-02-12 PROCEDURE — G8427 DOCREV CUR MEDS BY ELIG CLIN: HCPCS | Performed by: INTERNAL MEDICINE

## 2024-02-12 PROCEDURE — 1123F ACP DISCUSS/DSCN MKR DOCD: CPT | Performed by: INTERNAL MEDICINE

## 2024-02-12 PROCEDURE — 3075F SYST BP GE 130 - 139MM HG: CPT | Performed by: INTERNAL MEDICINE

## 2024-02-12 PROCEDURE — G8417 CALC BMI ABV UP PARAM F/U: HCPCS | Performed by: INTERNAL MEDICINE

## 2024-02-12 PROCEDURE — 99213 OFFICE O/P EST LOW 20 MIN: CPT | Performed by: INTERNAL MEDICINE

## 2024-02-12 PROCEDURE — G8482 FLU IMMUNIZE ORDER/ADMIN: HCPCS | Performed by: INTERNAL MEDICINE

## 2024-02-12 PROCEDURE — 1090F PRES/ABSN URINE INCON ASSESS: CPT | Performed by: INTERNAL MEDICINE

## 2024-02-12 PROCEDURE — 3079F DIAST BP 80-89 MM HG: CPT | Performed by: INTERNAL MEDICINE

## 2024-02-12 PROCEDURE — G8399 PT W/DXA RESULTS DOCUMENT: HCPCS | Performed by: INTERNAL MEDICINE

## 2024-02-12 PROCEDURE — 1036F TOBACCO NON-USER: CPT | Performed by: INTERNAL MEDICINE

## 2024-02-12 RX ORDER — FUROSEMIDE 20 MG/1
20 TABLET ORAL DAILY
Qty: 30 TABLET | Refills: 5 | Status: SHIPPED | OUTPATIENT
Start: 2024-02-12

## 2024-02-12 RX ORDER — PREDNISONE 20 MG/1
TABLET ORAL
Qty: 15 TABLET | Refills: 0 | Status: SHIPPED | OUTPATIENT
Start: 2024-02-12 | End: 2024-02-22

## 2024-02-12 NOTE — PATIENT INSTRUCTIONS
Plan:  1. Acute pain of right shoulder  Appears to be secondary to a muscle strain.    Take the prednisone taper as directed on the prescription.  The prednisone is very bitter so swallow the tablets quickly to prevent  dissolving in the mouth.  After taking, don't lay  down for 2 hours to help prevent reflux.    - predniSONE (DELTASONE) 20 MG tablet; 3 tablets daily x 3 days then 2 tablets daily x 2 days then 1 tablet daily x 2 days.  Dispense: 15 tablet; Refill: 0    2. Primary hypertension  Controled on the current medication.   - furosemide (LASIX) 20 MG tablet; Take 1 tablet by mouth daily  Dispense: 30 tablet; Refill: 5      Geri is instructed to return to the clinic if the symptoms continue or worsen.  Geri  was also instructed to go to the emergency room department if the symptoms significantly worsen before an appointment can be made.

## 2024-02-19 ENCOUNTER — TELEPHONE (OUTPATIENT)
Dept: FAMILY MEDICINE CLINIC | Age: 79
End: 2024-02-19

## 2024-02-19 DIAGNOSIS — M25.511 ACUTE PAIN OF RIGHT SHOULDER: ICD-10-CM

## 2024-02-19 RX ORDER — PREDNISONE 20 MG/1
TABLET ORAL
Qty: 15 TABLET | Refills: 0 | Status: SHIPPED | OUTPATIENT
Start: 2024-02-19 | End: 2024-02-29

## 2024-03-08 ENCOUNTER — TELEPHONE (OUTPATIENT)
Dept: FAMILY MEDICINE CLINIC | Age: 79
End: 2024-03-08

## 2024-03-08 RX ORDER — POTASSIUM CHLORIDE 750 MG/1
20 TABLET, EXTENDED RELEASE ORAL DAILY
Qty: 60 TABLET | Refills: 11 | Status: SHIPPED | OUTPATIENT
Start: 2024-03-08

## 2024-03-12 ENCOUNTER — OFFICE VISIT (OUTPATIENT)
Dept: FAMILY MEDICINE CLINIC | Age: 79
End: 2024-03-12
Payer: MEDICARE

## 2024-03-12 VITALS
SYSTOLIC BLOOD PRESSURE: 138 MMHG | OXYGEN SATURATION: 97 % | HEART RATE: 88 BPM | DIASTOLIC BLOOD PRESSURE: 72 MMHG | WEIGHT: 188 LBS | HEIGHT: 63 IN | BODY MASS INDEX: 33.31 KG/M2

## 2024-03-12 DIAGNOSIS — M50.123 CERVICAL DISC DISORDER AT C6-C7 LEVEL WITH RADICULOPATHY: ICD-10-CM

## 2024-03-12 DIAGNOSIS — M25.511 ACUTE PAIN OF RIGHT SHOULDER: ICD-10-CM

## 2024-03-12 DIAGNOSIS — J20.9 BRONCHITIS WITH BRONCHOSPASM: Primary | ICD-10-CM

## 2024-03-12 PROCEDURE — 3078F DIAST BP <80 MM HG: CPT | Performed by: LICENSED PRACTICAL NURSE

## 2024-03-12 PROCEDURE — 3075F SYST BP GE 130 - 139MM HG: CPT | Performed by: LICENSED PRACTICAL NURSE

## 2024-03-12 PROCEDURE — 99213 OFFICE O/P EST LOW 20 MIN: CPT | Performed by: LICENSED PRACTICAL NURSE

## 2024-03-12 PROCEDURE — 1123F ACP DISCUSS/DSCN MKR DOCD: CPT | Performed by: LICENSED PRACTICAL NURSE

## 2024-03-12 RX ORDER — BENZONATATE 100 MG/1
100 CAPSULE ORAL 3 TIMES DAILY PRN
Qty: 30 CAPSULE | Refills: 0 | Status: SHIPPED | OUTPATIENT
Start: 2024-03-12 | End: 2024-03-22

## 2024-03-12 RX ORDER — PREDNISONE 20 MG/1
TABLET ORAL
Qty: 12 TABLET | Refills: 0 | Status: SHIPPED | OUTPATIENT
Start: 2024-03-12 | End: 2024-03-22

## 2024-03-12 RX ORDER — DOXYCYCLINE HYCLATE 100 MG
100 TABLET ORAL 2 TIMES DAILY
Qty: 20 TABLET | Refills: 0 | Status: SHIPPED | OUTPATIENT
Start: 2024-03-12 | End: 2024-03-22

## 2024-03-12 ASSESSMENT — ENCOUNTER SYMPTOMS
ABDOMINAL PAIN: 0
WHEEZING: 1
EYE DISCHARGE: 1
RHINORRHEA: 1
SORE THROAT: 0
COUGH: 1
SHORTNESS OF BREATH: 0
VOMITING: 0
DIARRHEA: 0

## 2024-03-12 NOTE — PROGRESS NOTES
Geri Parks (:  1945) is a 78 y.o. female,Established patient, here for evaluation of the following chief complaint(s):  Cough (Started last month. Within the last couple days it has got worse. Pt is wheezing. Took Trelegy before she came here. )         ASSESSMENT/PLAN:  1. Bronchitis with bronchospasm  -     benzonatate (TESSALON PERLES) 100 MG capsule; Take 1 capsule by mouth 3 times daily as needed for Cough, Disp-30 capsule, R-0Normal  -     doxycycline hyclate (VIBRA-TABS) 100 MG tablet; Take 1 tablet by mouth 2 times daily for 10 days, Disp-20 tablet, R-0Normal  -     predniSONE (DELTASONE) 20 MG tablet; 3 tabs daily for 2 days then 2 tabs daily for 2 days then 1 tab daily for 2 days., Disp-12 tablet, R-0Normal  2. Acute pain of right shoulder  3. Cervical disc disorder at C6-C7 level with radiculopathy    Plan:     1. Bronchitis with bronchospasm  Treat with doxycycline for 10 days  Prednisone taper  Take the prednisone taper as directed on the prescription.  The prednisone is very bitter so swallow the tablets quickly to prevent  dissolving in the mouth.  After taking, don't lay  down for 2 hours to help prevent reflux.   Use tessalon for cough  Increase fluids  Use Tylenol/Ibuprofen for pain or fever  Over the counter cough/cold medications may be used  1 tsp of honey in warm water will soothe throat and help with cough  Nasal saline nasal spray can be used to keep nasal passages moist and clear   - benzonatate (TESSALON PERLES) 100 MG capsule; Take 1 capsule by mouth 3 times daily as needed for Cough  Dispense: 30 capsule; Refill: 0  - doxycycline hyclate (VIBRA-TABS) 100 MG tablet; Take 1 tablet by mouth 2 times daily for 10 days  Dispense: 20 tablet; Refill: 0  - predniSONE (DELTASONE) 20 MG tablet; 3 tabs daily for 2 days then 2 tabs daily for 2 days then 1 tab daily for 2 days.  Dispense: 12 tablet; Refill: 0    2. Acute pain of right shoulder  Persistent pain  Prednisone taper  Take

## 2024-04-04 DIAGNOSIS — E78.00 HYPERCHOLESTEROLEMIA: ICD-10-CM

## 2024-04-05 RX ORDER — ROSUVASTATIN CALCIUM 10 MG/1
10 TABLET, COATED ORAL NIGHTLY
Qty: 90 TABLET | Refills: 1 | Status: SHIPPED | OUTPATIENT
Start: 2024-04-05

## 2024-04-19 ENCOUNTER — OFFICE VISIT (OUTPATIENT)
Dept: FAMILY MEDICINE CLINIC | Age: 79
End: 2024-04-19

## 2024-04-19 VITALS
HEIGHT: 63 IN | BODY MASS INDEX: 33.66 KG/M2 | WEIGHT: 190 LBS | SYSTOLIC BLOOD PRESSURE: 138 MMHG | HEART RATE: 86 BPM | DIASTOLIC BLOOD PRESSURE: 80 MMHG

## 2024-04-19 DIAGNOSIS — J20.9 BRONCHITIS WITH BRONCHOSPASM: Primary | ICD-10-CM

## 2024-04-19 RX ORDER — LEVOFLOXACIN 500 MG/1
500 TABLET, FILM COATED ORAL DAILY
Qty: 7 TABLET | Refills: 0 | Status: SHIPPED | OUTPATIENT
Start: 2024-04-19 | End: 2024-04-26

## 2024-04-19 RX ORDER — ALBUTEROL SULFATE 90 UG/1
2 AEROSOL, METERED RESPIRATORY (INHALATION) EVERY 4 HOURS PRN
Qty: 18 G | Refills: 5 | Status: SHIPPED | OUTPATIENT
Start: 2024-04-19

## 2024-04-19 RX ORDER — PREDNISONE 20 MG/1
TABLET ORAL
Qty: 12 TABLET | Refills: 0 | Status: SHIPPED | OUTPATIENT
Start: 2024-04-19 | End: 2024-04-29

## 2024-04-19 SDOH — ECONOMIC STABILITY: FOOD INSECURITY: WITHIN THE PAST 12 MONTHS, THE FOOD YOU BOUGHT JUST DIDN'T LAST AND YOU DIDN'T HAVE MONEY TO GET MORE.: NEVER TRUE

## 2024-04-19 SDOH — ECONOMIC STABILITY: INCOME INSECURITY: HOW HARD IS IT FOR YOU TO PAY FOR THE VERY BASICS LIKE FOOD, HOUSING, MEDICAL CARE, AND HEATING?: NOT VERY HARD

## 2024-04-19 SDOH — ECONOMIC STABILITY: FOOD INSECURITY: WITHIN THE PAST 12 MONTHS, YOU WORRIED THAT YOUR FOOD WOULD RUN OUT BEFORE YOU GOT MONEY TO BUY MORE.: NEVER TRUE

## 2024-04-19 ASSESSMENT — ENCOUNTER SYMPTOMS
BLOOD IN STOOL: 0
NAUSEA: 0
CHEST TIGHTNESS: 0
DIARRHEA: 0
COUGH: 1
WHEEZING: 1
ABDOMINAL PAIN: 0
RHINORRHEA: 0
CONSTIPATION: 0
SHORTNESS OF BREATH: 0
SORE THROAT: 0
SINUS PAIN: 0

## 2024-04-19 NOTE — PATIENT INSTRUCTIONS
Survey:     You may be receiving a survey from [facility] regarding your visit today.     You may get this in the mail, through your MyChart or in your email.      Please complete the survey to enable us to provide the highest quality of care to you and your family. Please also, mention our names.     If you cannot score us as very good (5 Stars) on any question, please feel free to call the office to discuss how we could have made your experience exceptional.      Thank You!        Dr. Young, MD Duvall, KRISTI Deutsch, JUDE Hines CNP, ASHISH Avalos MA       Plan:  1. Bronchitis with bronchospasm  Take Levaquin 500 mg daily x 7 days.  Take the prednisone taper as directed on the prescription.  The prednisone is very bitter so swallow the tablets quickly to prevent  dissolving in the mouth.  After taking, don't lay  down for 2 hours to help prevent reflux.  Use Albuterol as needed for wheezing / shortness of breath.  Geri is instructed to return to the clinic if the symptoms continue or worsen.  Geri  was also instructed to go to the emergency room department if the symptoms significantly worsen before an appointment can be made.

## 2024-04-19 NOTE — PROGRESS NOTES
include congestion, coughing, ear pain and wheezing. Pertinent negatives include no abdominal pain, chest pain, diarrhea, dysuria, nausea, neck pain, rhinorrhea, sinus pain, sneezing or sore throat. Treatments tried: Robitussin. The treatment provided mild relief.       Review of Systems   Constitutional: Negative.    HENT:  Positive for congestion and ear pain. Negative for rhinorrhea, sinus pain, sneezing and sore throat.    Eyes:  Negative for visual disturbance.   Respiratory:  Positive for cough and wheezing. Negative for chest tightness and shortness of breath.    Cardiovascular:  Negative for chest pain and palpitations.   Gastrointestinal:  Negative for abdominal pain, blood in stool, constipation, diarrhea and nausea.   Genitourinary:  Negative for difficulty urinating, dysuria, frequency, menstrual problem and urgency.   Musculoskeletal:  Negative for arthralgias, joint swelling, myalgias and neck pain.   Skin: Negative.    Neurological:  Negative for syncope.   Psychiatric/Behavioral: Negative.            Objective   Physical Exam  Constitutional:       Appearance: She is well-developed.   HENT:      Head: Atraumatic.   Eyes:      Conjunctiva/sclera: Conjunctivae normal.   Cardiovascular:      Rate and Rhythm: Normal rate and regular rhythm.      Heart sounds: Normal heart sounds.   Pulmonary:      Effort: Pulmonary effort is normal.      Breath sounds: Rales present.      Comments: Rales bilateral, R>L.  Abdominal:      Palpations: Abdomen is soft.      Tenderness: There is no abdominal tenderness.   Musculoskeletal:         General: Normal range of motion.      Cervical back: Normal range of motion and neck supple.   Lymphadenopathy:      Cervical: No cervical adenopathy.   Skin:     Findings: No rash.   Neurological:      Mental Status: She is alert.   Psychiatric:         Behavior: Behavior normal.         Thought Content: Thought content normal.                  An electronic signature was used to

## 2024-05-13 ENCOUNTER — HOSPITAL ENCOUNTER (OUTPATIENT)
Age: 79
Discharge: HOME | End: 2024-05-15
Attending: INTERNAL MEDICINE
Payer: MEDICARE

## 2024-05-13 ENCOUNTER — HOSPITAL ENCOUNTER (OUTPATIENT)
Age: 79
Discharge: HOME | End: 2024-05-13
Attending: INTERNAL MEDICINE
Payer: MEDICARE

## 2024-05-13 ENCOUNTER — HOSPITAL ENCOUNTER (OUTPATIENT)
Dept: GENERAL RADIOLOGY | Age: 79
Discharge: HOME | End: 2024-05-15
Attending: INTERNAL MEDICINE
Payer: MEDICARE

## 2024-05-13 VITALS — HEIGHT: 63 IN | WEIGHT: 190 LBS | BODY MASS INDEX: 33.66 KG/M2

## 2024-05-13 DIAGNOSIS — Z95.2 S/P TAVR (TRANSCATHETER AORTIC VALVE REPLACEMENT): ICD-10-CM

## 2024-05-13 DIAGNOSIS — E55.9 VITAMIN D DEFICIENCY: ICD-10-CM

## 2024-05-13 DIAGNOSIS — I10 ESSENTIAL HYPERTENSION: ICD-10-CM

## 2024-05-13 DIAGNOSIS — Z98.61 POST PTCA: ICD-10-CM

## 2024-05-13 DIAGNOSIS — I10 PRIMARY HYPERTENSION: ICD-10-CM

## 2024-05-13 DIAGNOSIS — E78.2 MIXED HYPERLIPIDEMIA: ICD-10-CM

## 2024-05-13 LAB
25(OH)D3 SERPL-MCNC: 89.4 NG/ML (ref 30–100)
ALBUMIN SERPL-MCNC: 3.8 G/DL (ref 3.5–5.2)
ALP SERPL-CCNC: 49 U/L (ref 35–104)
ALT SERPL-CCNC: 7 U/L (ref 5–33)
ANION GAP SERPL CALCULATED.3IONS-SCNC: 8 MMOL/L (ref 9–17)
AST SERPL-CCNC: 13 U/L
BASOPHILS # BLD: 0.02 K/UL (ref 0–0.2)
BASOPHILS NFR BLD: 0 % (ref 0–2)
BILIRUB SERPL-MCNC: 0.5 MG/DL (ref 0.3–1.2)
BUN SERPL-MCNC: 15 MG/DL (ref 8–23)
BUN/CREAT SERPL: 19 (ref 9–20)
CALCIUM SERPL-MCNC: 8.9 MG/DL (ref 8.6–10.4)
CHLORIDE SERPL-SCNC: 105 MMOL/L (ref 98–107)
CHOLEST SERPL-MCNC: 145 MG/DL (ref 0–199)
CHOLESTEROL/HDL RATIO: 3
CO2 SERPL-SCNC: 28 MMOL/L (ref 20–31)
CREAT SERPL-MCNC: 0.8 MG/DL (ref 0.5–0.9)
ECHO AO ASC DIAM: 2.5 CM
ECHO AO ASCENDING AORTA INDEX: 1.32 CM/M2
ECHO AO ROOT DIAM: 3 CM
ECHO AO ROOT INDEX: 1.59 CM/M2
ECHO AV AREA PEAK VELOCITY: 1.6 CM2
ECHO AV AREA VTI: 1.5 CM2
ECHO AV AREA/BSA PEAK VELOCITY: 0.8 CM2/M2
ECHO AV AREA/BSA VTI: 0.8 CM2/M2
ECHO AV CUSP MM: 1.7 CM
ECHO AV MEAN GRADIENT: 8 MMHG
ECHO AV MEAN VELOCITY: 1.4 M/S
ECHO AV PEAK GRADIENT: 14 MMHG
ECHO AV PEAK VELOCITY: 1.9 M/S
ECHO AV VELOCITY RATIO: 0.53
ECHO AV VTI: 45.8 CM
ECHO BSA: 1.96 M2
ECHO EST RA PRESSURE: 5 MMHG
ECHO LA DIAMETER INDEX: 2.06 CM/M2
ECHO LA DIAMETER: 3.9 CM
ECHO LA TO AORTIC ROOT RATIO: 1.3
ECHO LV E' LATERAL VELOCITY: 5 CM/S
ECHO LV INTERNAL DIMENSION DIASTOLIC MMODE: 5.3 CM (ref 3.9–5.3)
ECHO LV INTERNAL DIMENSION SYSTOLIC MMODE: 4 CM
ECHO LV IVSD MMODE: 0.8 CM (ref 0.6–0.9)
ECHO LV IVSS MMODE: 1.3 CM
ECHO LV POSTERIOR WALL DIASTOLIC MMODE: 0.7 CM (ref 0.6–0.9)
ECHO LV POSTERIOR WALL SYSTOLIC MMODE: 1.1 CM
ECHO LVOT AREA: 3.1 CM2
ECHO LVOT AV VTI INDEX: 0.48
ECHO LVOT DIAM: 2 CM
ECHO LVOT MEAN GRADIENT: 2 MMHG
ECHO LVOT PEAK GRADIENT: 4 MMHG
ECHO LVOT PEAK VELOCITY: 1 M/S
ECHO LVOT STROKE VOLUME INDEX: 36.6 ML/M2
ECHO LVOT SV: 69.1 ML
ECHO LVOT VTI: 22 CM
ECHO MV A VELOCITY: 1.33 M/S
ECHO MV AREA PHT: 2 CM2
ECHO MV E DECELERATION TIME (DT): 378.9 MS
ECHO MV E VELOCITY: 1.2 M/S
ECHO MV E/A RATIO: 0.9
ECHO MV E/E' LATERAL: 24
ECHO MV PRESSURE HALF TIME (PHT): 109.9 MS
ECHO PV MAX VELOCITY: 1.2 M/S
ECHO PV PEAK GRADIENT: 6 MMHG
ECHO RIGHT VENTRICULAR SYSTOLIC PRESSURE (RVSP): 35 MMHG
ECHO TV REGURGITANT MAX VELOCITY: 2.76 M/S
ECHO TV REGURGITANT PEAK GRADIENT: 30 MMHG
EKG ATRIAL RATE: 71 BPM
EKG P AXIS: 73 DEGREES
EKG P-R INTERVAL: 188 MS
EKG Q-T INTERVAL: 374 MS
EKG QRS DURATION: 72 MS
EKG QTC CALCULATION (BAZETT): 406 MS
EKG R AXIS: 37 DEGREES
EKG T AXIS: 64 DEGREES
EKG VENTRICULAR RATE: 71 BPM
EOSINOPHIL # BLD: 0.06 K/UL (ref 0–0.4)
EOSINOPHILS RELATIVE PERCENT: 1 % (ref 0–5)
ERYTHROCYTE [DISTWIDTH] IN BLOOD BY AUTOMATED COUNT: 13.8 % (ref 12.1–15.2)
GFR, ESTIMATED: 75 ML/MIN/1.73M2
GLUCOSE SERPL-MCNC: 96 MG/DL (ref 70–99)
HCT VFR BLD AUTO: 37.2 % (ref 36–46)
HDLC SERPL-MCNC: 57 MG/DL
HGB BLD-MCNC: 12.3 G/DL (ref 12–16)
IMM GRANULOCYTES # BLD AUTO: 0.01 K/UL (ref 0–0.3)
IMM GRANULOCYTES NFR BLD: 0 % (ref 0–5)
LDLC SERPL CALC-MCNC: 72 MG/DL (ref 0–100)
LYMPHOCYTES NFR BLD: 1.66 K/UL (ref 1–4.8)
LYMPHOCYTES RELATIVE PERCENT: 30 % (ref 15–40)
MAGNESIUM SERPL-MCNC: 1.9 MG/DL (ref 1.6–2.6)
MCH RBC QN AUTO: 29.7 PG (ref 26–34)
MCHC RBC AUTO-ENTMCNC: 33.1 G/DL (ref 31–37)
MCV RBC AUTO: 89.9 FL (ref 80–100)
MONOCYTES NFR BLD: 0.52 K/UL (ref 0–1)
MONOCYTES NFR BLD: 10 % (ref 4–8)
MORPHOLOGY: ABNORMAL
NEUTROPHILS NFR BLD: 59 % (ref 47–75)
NEUTS SEG NFR BLD: 3.21 K/UL (ref 2.5–7)
PLATELET # BLD AUTO: 79 K/UL (ref 140–450)
PMV BLD AUTO: 10.2 FL (ref 6–12)
POTASSIUM SERPL-SCNC: 4.4 MMOL/L (ref 3.7–5.3)
PROT SERPL-MCNC: 6.3 G/DL (ref 6.4–8.3)
RBC # BLD AUTO: 4.14 M/UL (ref 4–5.2)
SODIUM SERPL-SCNC: 141 MMOL/L (ref 135–144)
TRIGL SERPL-MCNC: 82 MG/DL
TSH SERPL DL<=0.05 MIU/L-ACNC: 2.86 UIU/ML (ref 0.3–5)
VLDLC SERPL CALC-MCNC: 16 MG/DL
WBC OTHER # BLD: 5.5 K/UL (ref 3.5–11)

## 2024-05-13 PROCEDURE — 80061 LIPID PANEL: CPT

## 2024-05-13 PROCEDURE — 85025 COMPLETE CBC W/AUTO DIFF WBC: CPT

## 2024-05-13 PROCEDURE — 36415 COLL VENOUS BLD VENIPUNCTURE: CPT

## 2024-05-13 PROCEDURE — 84443 ASSAY THYROID STIM HORMONE: CPT

## 2024-05-13 PROCEDURE — 71046 X-RAY EXAM CHEST 2 VIEWS: CPT

## 2024-05-13 PROCEDURE — 93306 TTE W/DOPPLER COMPLETE: CPT | Performed by: INTERNAL MEDICINE

## 2024-05-13 PROCEDURE — 82306 VITAMIN D 25 HYDROXY: CPT

## 2024-05-13 PROCEDURE — 93306 TTE W/DOPPLER COMPLETE: CPT

## 2024-05-13 PROCEDURE — 93005 ELECTROCARDIOGRAM TRACING: CPT

## 2024-05-13 PROCEDURE — 80053 COMPREHEN METABOLIC PANEL: CPT

## 2024-05-13 PROCEDURE — 93010 ELECTROCARDIOGRAM REPORT: CPT | Performed by: INTERNAL MEDICINE

## 2024-05-13 PROCEDURE — 83735 ASSAY OF MAGNESIUM: CPT

## 2024-05-20 ENCOUNTER — OFFICE VISIT (OUTPATIENT)
Dept: CARDIOLOGY CLINIC | Age: 79
End: 2024-05-20
Payer: MEDICARE

## 2024-05-20 VITALS
HEART RATE: 78 BPM | RESPIRATION RATE: 16 BRPM | OXYGEN SATURATION: 98 % | SYSTOLIC BLOOD PRESSURE: 142 MMHG | BODY MASS INDEX: 33.66 KG/M2 | DIASTOLIC BLOOD PRESSURE: 78 MMHG | WEIGHT: 190 LBS

## 2024-05-20 DIAGNOSIS — E78.2 MIXED HYPERLIPIDEMIA: ICD-10-CM

## 2024-05-20 DIAGNOSIS — I10 PRIMARY HYPERTENSION: Primary | ICD-10-CM

## 2024-05-20 DIAGNOSIS — I10 ESSENTIAL HYPERTENSION: ICD-10-CM

## 2024-05-20 DIAGNOSIS — I35.0 NONRHEUMATIC AORTIC VALVE STENOSIS: ICD-10-CM

## 2024-05-20 DIAGNOSIS — E55.9 VITAMIN D DEFICIENCY: ICD-10-CM

## 2024-05-20 DIAGNOSIS — Z95.2 S/P TAVR (TRANSCATHETER AORTIC VALVE REPLACEMENT): ICD-10-CM

## 2024-05-20 PROCEDURE — G8399 PT W/DXA RESULTS DOCUMENT: HCPCS | Performed by: NURSE PRACTITIONER

## 2024-05-20 PROCEDURE — G8427 DOCREV CUR MEDS BY ELIG CLIN: HCPCS | Performed by: NURSE PRACTITIONER

## 2024-05-20 PROCEDURE — 1036F TOBACCO NON-USER: CPT | Performed by: NURSE PRACTITIONER

## 2024-05-20 PROCEDURE — G8417 CALC BMI ABV UP PARAM F/U: HCPCS | Performed by: NURSE PRACTITIONER

## 2024-05-20 PROCEDURE — 3078F DIAST BP <80 MM HG: CPT | Performed by: NURSE PRACTITIONER

## 2024-05-20 PROCEDURE — 1123F ACP DISCUSS/DSCN MKR DOCD: CPT | Performed by: NURSE PRACTITIONER

## 2024-05-20 PROCEDURE — 1090F PRES/ABSN URINE INCON ASSESS: CPT | Performed by: NURSE PRACTITIONER

## 2024-05-20 PROCEDURE — 99214 OFFICE O/P EST MOD 30 MIN: CPT | Performed by: NURSE PRACTITIONER

## 2024-05-20 PROCEDURE — 3075F SYST BP GE 130 - 139MM HG: CPT | Performed by: NURSE PRACTITIONER

## 2024-05-20 NOTE — PROGRESS NOTES
Ov Dr. Silverio for follow up   Did not bring list/bottles of meds  No new sob  C/o tired a lot   Will have to sit down a lot   And take breaks   C/o reflux/gerd    No changes    Will repeat cbc in one week     Follow up in one year with   Echo/testing   
bruising.    She has had no hospitalizations, ER visits or procedures.  Her only concern is with acid reflux, certain foods such as onions make her feel like she has to burp.    She denies any chest pain now or in the recent past, chest discomfort, increased shortness of breath or loss of energy.  No syncope or near syncope.  No problems with her medications or any other concerns at this time.      CARDIAC RISK FACTORS:  Known CAD:  Positive.  Other family members:  Positive.  Hyperlipidemia:  Positive.  Hypertension:  Positive.  Peripheral vascular disease:  Negative.  Smoking:  Negative.  Diabetes:  Negative.     PAST MEDICAL HISTORY:         Past Medical History:   Diagnosis Date    Aortic stenosis     Arthritis     Benign cyst of right breast in female     Cardiac murmur     Cataract     Cerebral brain hemorrhage (HCC) 8/13    Right thalamic    Chronic renal disease, stage III ([facility]) [[id number]] 5/3/2022    Coronary artery disease involving native coronary artery of native heart without angina pectoris 4/7/2022    Deep vein blood clot of left lower extremity ([facility]) 2016    Diverticulosis     Head injury     Hx of blood clots     Hyperlipidemia     Hypertension     Migraine headache     Pulmonary emphysema ([facility]) 10/5/2020    Seizures ([facility])     Stroke (cerebrum) ([facility])     Unspecified cerebral artery occlusion with cerebral infarction     TIA       CURRENT ALLERGIES: Bactrim ds [sulfamethoxazole-trimethoprim] and Tape [adhesive tape] REVIEW OF SYSTEMS: Cardiac as above.  Other systems reviewed including constitutional, eyes, ears, nose and throat, cardiovascular, respiratory, GI, , musculoskeletal, integumentary, neurologic, endocrine, hematologic and allergic/immunologic, are negative except for what is described above.  No weight loss or weight gain.  No change in bowel habits.  No blood in stools.  No fevers, sweats or chills.      Past Surgical History:   Procedure Laterality Date    BLEPHAROPLASTY      BREAST

## 2024-05-28 ENCOUNTER — OFFICE VISIT (OUTPATIENT)
Dept: FAMILY MEDICINE CLINIC | Age: 79
End: 2024-05-28

## 2024-05-28 VITALS — HEART RATE: 68 BPM | WEIGHT: 189 LBS | BODY MASS INDEX: 32.27 KG/M2 | HEIGHT: 64 IN

## 2024-05-28 DIAGNOSIS — B37.0 THRUSH: Primary | ICD-10-CM

## 2024-05-28 RX ORDER — CLOTRIMAZOLE 10 MG/1
10 LOZENGE ORAL; TOPICAL
Qty: 50 TABLET | Refills: 0 | Status: SHIPPED | OUTPATIENT
Start: 2024-05-28 | End: 2024-06-07

## 2024-05-28 ASSESSMENT — ENCOUNTER SYMPTOMS
CHEST TIGHTNESS: 0
BLOOD IN STOOL: 0
DIARRHEA: 0
ABDOMINAL PAIN: 0
NAUSEA: 0
CONSTIPATION: 0
SHORTNESS OF BREATH: 0
RHINORRHEA: 0
SORE THROAT: 1
COUGH: 0

## 2024-05-28 NOTE — PATIENT INSTRUCTIONS
Survey:     You may be receiving a survey from [facility] regarding your visit today.     You may get this in the mail, through your MyChart or in your email.      Please complete the survey to enable us to provide the highest quality of care to you and your family. Please also, mention our names.     If you cannot score us as very good (5 Stars) on any question, please feel free to call the office to discuss how we could have made your experience exceptional.      Thank You!        Dr. Young, MD Duvall, KRISTI Deutsch, JUDE Hines CNP, ASHISH Avalos MA       Plan:  1. Thrush  Causing her sore throat (caused by inhaler trelegy).  Hold Trelegy x 10 days unless she developed shortness of breath.  After starting back gargle after use and drink a glass of water after.    Use Mycelex 5 times a day x 10 days.    - clotrimazole (MYCELEX) 10 MG omi; Take 1 tablet by mouth 5 times daily for 10 days  Dispense: 50 tablet; Refill: 0      Geri is instructed to return to the clinic if the symptoms continue or worsen.  Geri  was also instructed to go to the emergency room department if the symptoms significantly worsen before an appointment can be made.

## 2024-05-28 NOTE — PROGRESS NOTES
Geri Parks (:  1945) is a 79 y.o. female,Established patient, here for evaluation of the following chief complaint(s):  Sore Throat (Started with sore throat 1 month ago, \"feels like something hung in throat, frequent swallowing trying to get it down\".  Has tried mucinex. )      Assessment & Plan   1. Thrush  -     clotrimazole (MYCELEX) 10 MG omi; Take 1 tablet by mouth 5 times daily for 10 days, Disp-50 tablet, R-0Normal      Plan:  1. Thrush  Causing her sore throat (caused by inhaler trelegy).  Hold Trelegy x 10 days unless she developed shortness of breath.  After starting back gargle after use and drink a glass of water after.    Use Mycelex 5 times a day x 10 days.    - clotrimazole (MYCELEX) 10 MG omi; Take 1 tablet by mouth 5 times daily for 10 days  Dispense: 50 tablet; Refill: 0      Geri is instructed to return to the clinic if the symptoms continue or worsen.  Geri  was also instructed to go to the emergency room department if the symptoms significantly worsen before an appointment can be made.           Subjective   Geri complains of a sore throat.  \"It feels like I have a piece of bread caught\".  Able to eat and drink with no problems.  This has been going on for about a month.  When it started it was a lot worse.  Mucinex and Tylenol has helped some.  No fever.  She does have a runny nose, sneezing, and a cough.  Not taking an antihistamine daily.          Review of Systems   Constitutional: Negative.    HENT:  Positive for sore throat. Negative for congestion, ear pain, rhinorrhea and sneezing.    Eyes:  Negative for visual disturbance.   Respiratory:  Negative for cough, chest tightness and shortness of breath.    Cardiovascular:  Negative for chest pain and palpitations.   Gastrointestinal:  Negative for abdominal pain, blood in stool, constipation, diarrhea and nausea.   Genitourinary:  Negative for difficulty urinating, dysuria, frequency, menstrual problem and

## 2024-06-24 ENCOUNTER — OFFICE VISIT (OUTPATIENT)
Dept: FAMILY MEDICINE CLINIC | Age: 79
End: 2024-06-24
Payer: MEDICARE

## 2024-06-24 VITALS
SYSTOLIC BLOOD PRESSURE: 122 MMHG | DIASTOLIC BLOOD PRESSURE: 80 MMHG | HEIGHT: 64 IN | HEART RATE: 72 BPM | WEIGHT: 187 LBS | BODY MASS INDEX: 31.92 KG/M2

## 2024-06-24 DIAGNOSIS — Z00.00 MEDICARE ANNUAL WELLNESS VISIT, SUBSEQUENT: Primary | ICD-10-CM

## 2024-06-24 PROCEDURE — G0439 PPPS, SUBSEQ VISIT: HCPCS | Performed by: INTERNAL MEDICINE

## 2024-06-24 PROCEDURE — 3074F SYST BP LT 130 MM HG: CPT | Performed by: INTERNAL MEDICINE

## 2024-06-24 PROCEDURE — 1123F ACP DISCUSS/DSCN MKR DOCD: CPT | Performed by: INTERNAL MEDICINE

## 2024-06-24 PROCEDURE — 3079F DIAST BP 80-89 MM HG: CPT | Performed by: INTERNAL MEDICINE

## 2024-06-24 ASSESSMENT — PATIENT HEALTH QUESTIONNAIRE - PHQ9
SUM OF ALL RESPONSES TO PHQ QUESTIONS 1-9: 2
1. LITTLE INTEREST OR PLEASURE IN DOING THINGS: SEVERAL DAYS
SUM OF ALL RESPONSES TO PHQ QUESTIONS 1-9: 2
2. FEELING DOWN, DEPRESSED OR HOPELESS: SEVERAL DAYS
SUM OF ALL RESPONSES TO PHQ9 QUESTIONS 1 & 2: 2

## 2024-06-24 ASSESSMENT — LIFESTYLE VARIABLES
HOW MANY STANDARD DRINKS CONTAINING ALCOHOL DO YOU HAVE ON A TYPICAL DAY: PATIENT DOES NOT DRINK
HOW OFTEN DO YOU HAVE A DRINK CONTAINING ALCOHOL: NEVER

## 2024-06-24 NOTE — PROGRESS NOTES
Screen:  Do you or your family notice any trouble with your hearing that hasn't been managed with hearing aids?: (!) Yes    Interventions:  Discussed having a formal hearing evaluation to see is she qualifies for hearing.         Advanced Directives:  Do you have a Living Will?: (!) No    Intervention:  see ACP note                     Objective   Vitals:    06/24/24 1042   BP: 122/80   Pulse: 72   Weight: 84.8 kg (187 lb)   Height: 1.613 m (5' 3.5\")      Body mass index is 32.61 kg/m².             Allergies   Allergen Reactions    Bactrim Ds [Sulfamethoxazole-Trimethoprim] Other (See Comments)     Elevation of creatinine on Bactrim DS.    Tape [Adhesive Tape] Rash     Prior to Visit Medications    Medication Sig Taking? Authorizing Provider   rosuvastatin (CRESTOR) 10 MG tablet TAKE 1 TABLET BY MOUTH NIGHTLY Yes Audi Young MD   potassium chloride (KLOR-CON M) 10 MEQ extended release tablet Take 2 tablets by mouth daily Yes Audi Young MD   furosemide (LASIX) 20 MG tablet Take 1 tablet by mouth daily Yes Back, MD Audi   fluticasone-umeclidin-vilant (TRELEGY ELLIPTA) 200-62.5-25 MCG/ACT AEPB inhaler Inhale 1 puff into the lungs daily Yes Audi Young MD   ELIQUIS 5 MG TABS tablet TAKE 1 TABLET BY MOUTH TWICE DAILY Yes Audi Young MD   diclofenac sodium (VOLTAREN) 1 % GEL Apply 4 g topically 4 times daily Yes Audi Young MD   VITAMIN D PO Take by mouth Yes ProviderJens MD   albuterol sulfate HFA (VENTOLIN HFA) 108 (90 Base) MCG/ACT inhaler Inhale 2 puffs into the lungs every 4 hours as needed for Wheezing or Shortness of Breath Yes Audi Young MD   divalproex (DEPAKOTE) 500 MG DR tablet Take 1 tablet by mouth daily Yes Jens Terry MD   triamcinolone (KENALOG) 0.1 % cream Apply topically 2 times daily  Audi Young MD   acetaminophen (TYLENOL) 500 MG tablet Take 1 tablet by mouth as needed for Pain  Jens Terry MD       CareTeam (Including outside providers/suppliers regularly

## 2024-06-24 NOTE — PATIENT INSTRUCTIONS
record and screening and assessments performed today your provider may have ordered immunizations, labs, imaging, and/or referrals for you.  A list of these orders (if applicable) as well as your Preventive Care list are included within your After Visit Summary for your review.    Other Preventive Recommendations:    A preventive eye exam performed by an eye specialist is recommended every 1-2 years to screen for glaucoma; cataracts, macular degeneration, and other eye disorders.  A preventive dental visit is recommended every 6 months.  Try to get at least 150 minutes of exercise per week or 10,000 steps per day on a pedometer .  Order or download the FREE \"Exercise & Physical Activity: Your Everyday Guide\" from [employer]. Call 1-363.552.1980 or search [employer] online.  You need 1002-0112 mg of calcium and 1820-5238 IU of vitamin D per day. It is possible to meet your calcium requirement with diet alone, but a vitamin D supplement is usually necessary to meet this goal.  When exposed to the sun, use a sunscreen that protects against both UVA and UVB radiation with an SPF of 30 or greater. Reapply every 2 to 3 hours or after sweating, drying off with a towel, or swimming.  Always wear a seat belt when traveling in a car. Always wear a helmet when riding a bicycle or motorcycle.

## 2024-07-12 ENCOUNTER — HOSPITAL ENCOUNTER (OUTPATIENT)
Age: 79
Discharge: HOME | End: 2024-07-12
Payer: MEDICARE

## 2024-07-12 ENCOUNTER — TELEPHONE (OUTPATIENT)
Dept: UROLOGY | Age: 79
End: 2024-07-12

## 2024-07-12 DIAGNOSIS — R35.0 FREQUENCY OF MICTURITION: ICD-10-CM

## 2024-07-12 DIAGNOSIS — R35.0 FREQUENCY OF MICTURITION: Primary | ICD-10-CM

## 2024-07-12 PROCEDURE — 87077 CULTURE AEROBIC IDENTIFY: CPT

## 2024-07-12 PROCEDURE — 87186 SC STD MICRODIL/AGAR DIL: CPT

## 2024-07-12 PROCEDURE — 87086 URINE CULTURE/COLONY COUNT: CPT

## 2024-07-12 RX ORDER — CIPROFLOXACIN 250 MG/1
250 TABLET, FILM COATED ORAL 2 TIMES DAILY
Qty: 14 TABLET | Refills: 0 | Status: SHIPPED | OUTPATIENT
Start: 2024-07-12 | End: 2024-07-19

## 2024-07-12 NOTE — TELEPHONE ENCOUNTER
The patient called this morning with the complaint of burning, frequency, urgency. She denies any fever, chills, nausea. Ms Parks stated this has been going for a while, but progressively got worse 07.11.2024. She stated she is no longer able to hold her bladder,       She is requesting an appointment today.     Please advise

## 2024-07-12 NOTE — TELEPHONE ENCOUNTER
Please let her know that there are no providers available this afternoon for her to be seen urgently    Please have her drop off a urine culture to [place] lab    We will start her on empiric antibiotics, do not start antibiotics until she drops off the urine -sent to [facility] in [place]    Increase water intake to upwards of 80 ounces,  Unless she is on a fluid restriction by another provider    She may take over-the-counter Azo for her discomfort    If she develops any fever or chills she needs to go to the emergency room    Make her an appointment in [place] for 2 weeks from now to discuss her worsening incontinence

## 2024-07-12 NOTE — TELEPHONE ENCOUNTER
Writer talk to patient regarding what provider said she will drop a  urine off to [facility]  and  her antibiotic and start AZO. Patient did make an appointment in [place] on July 25th.

## 2024-07-14 LAB
MICROORGANISM SPEC CULT: ABNORMAL
SERVICE CMNT-IMP: ABNORMAL
SPECIMEN DESCRIPTION: ABNORMAL

## 2024-07-15 ENCOUNTER — TELEPHONE (OUTPATIENT)
Dept: UROLOGY | Age: 79
End: 2024-07-15

## 2024-07-15 RX ORDER — LEVOFLOXACIN 500 MG/1
500 TABLET, FILM COATED ORAL DAILY
Qty: 5 TABLET | Refills: 0 | Status: SHIPPED | OUTPATIENT
Start: 2024-07-15 | End: 2024-07-20

## 2024-07-15 NOTE — TELEPHONE ENCOUNTER
Urine culture is positive for infection. STOP cipro    We sent in culture specific levaquin. Take this antibiotic until gone. Take this with food and eat yogurt once per day to prevent GI upset. If you develop nausea, vomiting, or fevers call the office or go to the ER. If your urinary symptoms do not improve once completing the antibiotics call our office.

## 2024-07-15 NOTE — TELEPHONE ENCOUNTER
Patient informed of results and provider's instructions.  Patient confirmed she will stop taking Cipro and start Levaquin.  Patient verbalizes understanding of all information.

## 2024-07-25 ENCOUNTER — OFFICE VISIT (OUTPATIENT)
Dept: UROLOGY | Age: 79
End: 2024-07-25
Payer: MEDICARE

## 2024-07-25 ENCOUNTER — HOSPITAL ENCOUNTER (OUTPATIENT)
Age: 79
Setting detail: SPECIMEN
Discharge: HOME | End: 2024-07-25
Payer: MEDICARE

## 2024-07-25 VITALS
BODY MASS INDEX: 32.27 KG/M2 | HEIGHT: 64 IN | DIASTOLIC BLOOD PRESSURE: 80 MMHG | WEIGHT: 189 LBS | SYSTOLIC BLOOD PRESSURE: 138 MMHG

## 2024-07-25 DIAGNOSIS — R31.29 MICROHEMATURIA: Primary | ICD-10-CM

## 2024-07-25 DIAGNOSIS — K59.09 OTHER CONSTIPATION: ICD-10-CM

## 2024-07-25 DIAGNOSIS — R31.29 MICROHEMATURIA: ICD-10-CM

## 2024-07-25 LAB
-: ABNORMAL
BILIRUB UR QL STRIP: NEGATIVE
CLARITY UR: CLEAR
COLOR UR: YELLOW
COMMENT: ABNORMAL
EPI CELLS #/AREA URNS HPF: ABNORMAL /HPF
GLUCOSE UR STRIP-MCNC: NEGATIVE MG/DL
HGB UR QL STRIP.AUTO: ABNORMAL
KETONES UR STRIP-MCNC: NEGATIVE MG/DL
LEUKOCYTE ESTERASE UR QL STRIP: ABNORMAL
NITRITE UR QL STRIP: NEGATIVE
PH UR STRIP: 8 [PH] (ref 5–8)
PROT UR STRIP-MCNC: ABNORMAL MG/DL
RBC #/AREA URNS HPF: ABNORMAL /HPF (ref 0–2)
SP GR UR STRIP: 1.01 (ref 1–1.03)
UROBILINOGEN UR STRIP-ACNC: NORMAL EU/DL (ref 0–1)
WBC #/AREA URNS HPF: ABNORMAL /HPF

## 2024-07-25 PROCEDURE — G8399 PT W/DXA RESULTS DOCUMENT: HCPCS | Performed by: PHYSICIAN ASSISTANT

## 2024-07-25 PROCEDURE — 3075F SYST BP GE 130 - 139MM HG: CPT | Performed by: PHYSICIAN ASSISTANT

## 2024-07-25 PROCEDURE — G8427 DOCREV CUR MEDS BY ELIG CLIN: HCPCS | Performed by: PHYSICIAN ASSISTANT

## 2024-07-25 PROCEDURE — 81001 URINALYSIS AUTO W/SCOPE: CPT

## 2024-07-25 PROCEDURE — 99214 OFFICE O/P EST MOD 30 MIN: CPT | Performed by: PHYSICIAN ASSISTANT

## 2024-07-25 PROCEDURE — G8417 CALC BMI ABV UP PARAM F/U: HCPCS | Performed by: PHYSICIAN ASSISTANT

## 2024-07-25 PROCEDURE — 3079F DIAST BP 80-89 MM HG: CPT | Performed by: PHYSICIAN ASSISTANT

## 2024-07-25 PROCEDURE — 1123F ACP DISCUSS/DSCN MKR DOCD: CPT | Performed by: PHYSICIAN ASSISTANT

## 2024-07-25 PROCEDURE — 1090F PRES/ABSN URINE INCON ASSESS: CPT | Performed by: PHYSICIAN ASSISTANT

## 2024-07-25 PROCEDURE — 1036F TOBACCO NON-USER: CPT | Performed by: PHYSICIAN ASSISTANT

## 2024-07-25 RX ORDER — ASPIRIN 81 MG/1
81 TABLET ORAL DAILY
COMMUNITY

## 2024-07-25 ASSESSMENT — ENCOUNTER SYMPTOMS
APNEA: 0
COUGH: 0
CONSTIPATION: 1
COLOR CHANGE: 0
NAUSEA: 0
BACK PAIN: 0
VOMITING: 0
ABDOMINAL PAIN: 0
EYE REDNESS: 0
SHORTNESS OF BREATH: 0
WHEEZING: 0

## 2024-07-25 NOTE — PATIENT INSTRUCTIONS
FOR CONSTIPATION    It is very important to have regular, soft, daily bowel movements, because it WILL improve your urinary symptoms.    Take Miralax (or generic equivalent) half capful. Take every day, DO NOT skip days, as it must be taken daily in order to be effective. DO NOT take just \"as needed\". It is safe to take long term and is recommended for your symptoms.  If one dose daily causes loose stools/diarrhea, decrease to 1/4 capful. If you cannot tolerate this medication, please notify our office.     If one dose daily of Miralax is not sufficient to produce a soft, easy to pass daily stool, you may also add an over-the-counter stool softener capsule. For example: colace (docusate).     For Miralax to have maximal effectiveness, be sure to increase your water intake - aim for 80 oz daily unless you are on a fluid-restriction from another provider.     SURVEY:    You may be receiving a survey from [employer] regarding your visit today.    Please complete the survey to enable us to provide the highest quality of care to you and your family.    If you cannot score us a very good on any question, please call the office to discuss how we could have made your experience a very good one.    Thank you.

## 2024-07-25 NOTE — PROGRESS NOTES
years (30.0 ttl pk-yrs)    Types: Cigarettes    Start date: 1975    Quit date: 2005    Years since quittin.5   Smokeless Tobacco Never       Social History     Substance and Sexual Activity   Alcohol Use No       Review of Systems   Constitutional:  Negative for appetite change, chills and fever.   Eyes:  Negative for redness and visual disturbance.   Respiratory:  Negative for apnea, cough, shortness of breath and wheezing.    Cardiovascular:  Negative for chest pain and leg swelling.   Gastrointestinal:  Positive for constipation. Negative for abdominal pain, nausea and vomiting.   Genitourinary:  Negative for difficulty urinating, dyspareunia, dysuria, enuresis, flank pain, frequency, hematuria, pelvic pain, urgency, vaginal bleeding and vaginal discharge.   Musculoskeletal:  Negative for back pain, joint swelling and myalgias.   Skin:  Negative for color change, rash and wound.   Neurological:  Negative for dizziness, tremors and numbness.   Hematological:  Negative for adenopathy. Does not bruise/bleed easily.   Psychiatric/Behavioral:  Negative for sleep disturbance.        /80 (Site: Right Upper Arm, Position: Sitting, Cuff Size: Medium Adult)   Ht 1.613 m (5' 3.5\")   Wt 85.7 kg (189 lb)   LMP  (LMP Unknown)   BMI 32.95 kg/m²       PHYSICAL EXAM:  Constitutional: Patient resting comfortably, in no acute distress.   Neuro: Alert and oriented to person place and time.   Psych: Mood and affect normal.  HEENT: normocephalic, atraumatic  Lungs: Respiratory effort normal, unlabored  Abdomen: Soft, non-tender, non-distended   : No CVA tenderness.   Pelvic: deferred     Lab Results   Component Value Date    BUN 15 2024     Lab Results   Component Value Date    CREATININE 0.8 2024       ASSESSMENT:   Diagnosis Orders   1. Microhematuria  Urinalysis with Microscopic      2. Other constipation                PLAN:  Surveillance UA    We will hold off on adding any additional

## 2024-07-26 ENCOUNTER — TELEPHONE (OUTPATIENT)
Dept: UROLOGY | Age: 79
End: 2024-07-26

## 2024-07-26 NOTE — TELEPHONE ENCOUNTER
----- Message from Antonio Curtis PA-C sent at 7/26/2024  9:42 AM EDT -----  Please let her know that she did have blood in her urine.  It has been 3 years since we did a cystoscopy to evaluate her for blood.  We will hold off on repeating workup at this time.  Follow-up as scheduled next year.  If she does have blood in her urine again we will proceed with full blood in the urine workup.

## 2024-07-26 NOTE — RESULT ENCOUNTER NOTE
Please let her know that she did have blood in her urine.  It has been 3 years since we did a cystoscopy to evaluate her for blood.  We will hold off on repeating workup at this time.  Follow-up as scheduled next year.  If she does have blood in her urine again we will proceed with full blood in the urine workup.

## 2024-07-29 DIAGNOSIS — I10 PRIMARY HYPERTENSION: ICD-10-CM

## 2024-07-29 RX ORDER — FUROSEMIDE 20 MG/1
20 TABLET ORAL DAILY
Qty: 30 TABLET | Refills: 5 | Status: SHIPPED | OUTPATIENT
Start: 2024-07-29

## 2024-07-30 ENCOUNTER — OFFICE VISIT (OUTPATIENT)
Dept: FAMILY MEDICINE CLINIC | Age: 79
End: 2024-07-30

## 2024-07-30 VITALS
WEIGHT: 181 LBS | SYSTOLIC BLOOD PRESSURE: 126 MMHG | HEIGHT: 63 IN | OXYGEN SATURATION: 94 % | DIASTOLIC BLOOD PRESSURE: 72 MMHG | BODY MASS INDEX: 32.07 KG/M2 | HEART RATE: 78 BPM

## 2024-07-30 DIAGNOSIS — J20.9 BRONCHITIS WITH BRONCHOSPASM: Primary | ICD-10-CM

## 2024-07-30 DIAGNOSIS — Z12.31 OTHER SCREENING MAMMOGRAM: ICD-10-CM

## 2024-07-30 RX ORDER — LEVOFLOXACIN 500 MG/1
500 TABLET, FILM COATED ORAL DAILY
Qty: 7 TABLET | Refills: 0 | Status: SHIPPED | OUTPATIENT
Start: 2024-07-30 | End: 2024-08-06

## 2024-07-30 RX ORDER — BENZONATATE 100 MG/1
100 CAPSULE ORAL 3 TIMES DAILY PRN
Qty: 30 CAPSULE | Refills: 0 | Status: SHIPPED | OUTPATIENT
Start: 2024-07-30 | End: 2024-08-09

## 2024-07-30 ASSESSMENT — ENCOUNTER SYMPTOMS
DIARRHEA: 0
ABDOMINAL PAIN: 0
SINUS PAIN: 1
SORE THROAT: 1
COUGH: 1
SHORTNESS OF BREATH: 1

## 2024-07-30 NOTE — PROGRESS NOTES
Geri Parks (:  1945) is a 79 y.o. female,Established patient, here for evaluation of the following chief complaint(s):  URI (Sx's 1 week w/ cough, congestion, wheezing. Has tried Vicks salve and tylenol with little relief. Using trelegy inhaler)      Assessment & Plan   1. Bronchitis with bronchospasm  -     levoFLOXacin (LEVAQUIN) 500 MG tablet; Take 1 tablet by mouth daily for 7 days, Disp-7 tablet, R-0Normal  -     benzonatate (TESSALON PERLES) 100 MG capsule; Take 1 capsule by mouth 3 times daily as needed for Cough, Disp-30 capsule, R-0Normal  2. Other screening mammogram  -     [facility] DIGITAL SCREEN BILATERAL; Future    Plan:     1. Bronchitis with bronchospasm  Vitals are stable  Pulse ox 94%  Will treat with with Levaqiun for 1 week  Use Tessalon Perles as needed for cough  Increase fluids  Use Tylenol/Ibuprofen for pain or fever  Over the counter cough/cold medications may be used  1 tsp of honey in warm water will soothe throat and help with cough  Nasal saline nasal spray can be used to keep nasal passages moist and clear   - levoFLOXacin (LEVAQUIN) 500 MG tablet; Take 1 tablet by mouth daily for 7 days  Dispense: 7 tablet; Refill: 0  - benzonatate (TESSALON PERLES) 100 MG capsule; Take 1 capsule by mouth 3 times daily as needed for Cough  Dispense: 30 capsule; Refill: 0    2. Other screening mammogram  Screening mammogram ordered  - [facility] DIGITAL SCREEN BILATERAL; Future      Return if symptoms worsen or fail to improve.       Daylin Nevarez presents today with concerns for productive cough for 1 week. She reports symptoms started with sinus congestion and she took Tylenol and used Vicks vapor rub. She states now she \"just has a cough\" and fatigue. She reports decreased appetite and increased thirst. She has been around family that was sick. She states she does not have diarrhea but she has had frequent bowel movements.     URI   This is a new problem. The current episode

## 2024-07-30 NOTE — PATIENT INSTRUCTIONS
Increase fluids  Use Tylenol/Ibuprofen for pain or fever  Over the counter cough/cold medications may be used  1 tsp of honey in warm water will soothe throat and help with cough  Nasal saline nasal spray can be used to keep nasal passages moist and clear               Survey:     You may be receiving a survey from Kailee Mathews regarding your visit today.     You may get this in the mail, through your MyChart or in your email.      Please complete the survey to enable us to provide the highest quality of care to you and your family. Please also, mention our names.     If you cannot score us as very good (5 Stars) on any question, please feel free to call the office to discuss how we could have made your experience exceptional.      Thank You!        Dr. Young, MD Duvall, KRISTI Deutsch, JUDE Hines CNP, ASHISH Avalos CMA

## 2024-08-01 ENCOUNTER — HOSPITAL ENCOUNTER (OUTPATIENT)
Dept: MAMMOGRAPHY | Age: 79
Discharge: HOME | End: 2024-08-03
Payer: MEDICARE

## 2024-08-01 DIAGNOSIS — Z12.31 OTHER SCREENING MAMMOGRAM: ICD-10-CM

## 2024-08-01 PROCEDURE — 77063 BREAST TOMOSYNTHESIS BI: CPT

## 2024-08-16 ENCOUNTER — OFFICE VISIT (OUTPATIENT)
Dept: FAMILY MEDICINE CLINIC | Age: 79
End: 2024-08-16

## 2024-08-16 ENCOUNTER — HOSPITAL ENCOUNTER (OUTPATIENT)
Age: 79
Setting detail: SPECIMEN
Discharge: HOME | End: 2024-08-16
Payer: MEDICARE

## 2024-08-16 VITALS
DIASTOLIC BLOOD PRESSURE: 74 MMHG | HEIGHT: 63 IN | WEIGHT: 187 LBS | HEART RATE: 62 BPM | SYSTOLIC BLOOD PRESSURE: 130 MMHG | BODY MASS INDEX: 33.13 KG/M2

## 2024-08-16 DIAGNOSIS — E78.2 MIXED HYPERLIPIDEMIA: ICD-10-CM

## 2024-08-16 DIAGNOSIS — D69.6 THROMBOCYTOPENIA (HCC): ICD-10-CM

## 2024-08-16 DIAGNOSIS — I48.0 PAROXYSMAL ATRIAL FIBRILLATION (HCC): ICD-10-CM

## 2024-08-16 DIAGNOSIS — G40.909 SEIZURE DISORDER (HCC): ICD-10-CM

## 2024-08-16 DIAGNOSIS — D69.6 THROMBOCYTOPENIA (HCC): Primary | ICD-10-CM

## 2024-08-16 DIAGNOSIS — I10 PRIMARY HYPERTENSION: ICD-10-CM

## 2024-08-16 DIAGNOSIS — J20.9 BRONCHITIS WITH BRONCHOSPASM: ICD-10-CM

## 2024-08-16 LAB
ERYTHROCYTE [DISTWIDTH] IN BLOOD BY AUTOMATED COUNT: 13.8 % (ref 12.1–15.2)
HCT VFR BLD AUTO: 38 % (ref 36–46)
HGB BLD-MCNC: 12.4 G/DL (ref 12–16)
MCH RBC QN AUTO: 29.6 PG (ref 26–34)
MCHC RBC AUTO-ENTMCNC: 32.6 G/DL (ref 31–37)
MCV RBC AUTO: 90.7 FL (ref 80–100)
PLATELET # BLD AUTO: 102 K/UL (ref 140–450)
PMV BLD AUTO: 10.8 FL (ref 6–12)
RBC # BLD AUTO: 4.19 M/UL (ref 4–5.2)
WBC OTHER # BLD: 5.7 K/UL (ref 3.5–11)

## 2024-08-16 PROCEDURE — 85027 COMPLETE CBC AUTOMATED: CPT

## 2024-08-16 RX ORDER — AZITHROMYCIN 250 MG/1
TABLET, FILM COATED ORAL
Qty: 1 PACKET | Refills: 0 | Status: SHIPPED | OUTPATIENT
Start: 2024-08-16 | End: 2024-08-26

## 2024-08-16 RX ORDER — PREDNISONE 20 MG/1
TABLET ORAL
Qty: 12 TABLET | Refills: 0 | Status: SHIPPED | OUTPATIENT
Start: 2024-08-16 | End: 2024-08-26

## 2024-08-16 ASSESSMENT — ENCOUNTER SYMPTOMS
DIARRHEA: 0
CHEST TIGHTNESS: 0
RHINORRHEA: 0
COUGH: 1
NAUSEA: 0
BLOOD IN STOOL: 0
ABDOMINAL PAIN: 0
SORE THROAT: 0
WHEEZING: 1
CONSTIPATION: 0
SHORTNESS OF BREATH: 1

## 2024-08-16 NOTE — PATIENT INSTRUCTIONS
Survey:     You may be receiving a survey from [facility] regarding your visit today.     You may get this in the mail, through your MyChart or in your email.      Please complete the survey to enable us to provide the highest quality of care to you and your family. Please also, mention our names.     If you cannot score us as very good (5 Stars) on any question, please feel free to call the office to discuss how we could have made your experience exceptional.      Thank You!        Dr. Young, MD Duvall, KRISTI Deutsch, ASHISH Ruff, APRN CATRACHITO Frankel, CMA    Bailey, CMA       Assessment & Plan  Thrombocytopenia (HCC)   Repeat CBC today to check Platelet level.  She denies any issues with bleeding.  Orders:    CBC; Future    Primary hypertension   Controlled on Lasix .  No change in medication.          Paroxysmal atrial fibrillation (HCC)   NSR today.  Continues on Eliquis.  Follows with Dr. Warren.       Mixed hyperlipidemia   Controlled on Crestor.  No change in dose.  Dr. Warren follows labs.          Seizure disorder (HCC)   Well controlled on Depakote.  Follows with Dr. Gray in [place].         Geri was instructed to follow up in the clinic in 6 months for check up or as needed with any medical issues.

## 2024-08-16 NOTE — ASSESSMENT & PLAN NOTE
Well controlled on Depakote.  Follows with Dr. Gray in [place].         Geri was instructed to follow up in the clinic in 6 months for check up or as needed with any medical issues.

## 2024-08-16 NOTE — PROGRESS NOTES
Pt in office for labs. Venipuncture successful in Right antecubital space. Attempted 1 time(s). Pt tolerated well.      Applied coban to venipuncture site due to slight bruising and pt being on blood thinners.   
of motion.      Cervical back: Normal range of motion and neck supple.   Lymphadenopathy:      Cervical: No cervical adenopathy.   Skin:     Findings: No rash.   Neurological:      Mental Status: She is alert.   Psychiatric:         Behavior: Behavior normal.         Thought Content: Thought content normal.                  An electronic signature was used to authenticate this note.    --Audi Young MD

## 2024-08-27 RX ORDER — APIXABAN 5 MG/1
5 TABLET, FILM COATED ORAL 2 TIMES DAILY
Qty: 180 TABLET | Refills: 1 | Status: SHIPPED | OUTPATIENT
Start: 2024-08-27

## 2024-09-19 ENCOUNTER — HOSPITAL ENCOUNTER (EMERGENCY)
Age: 79
Discharge: ANOTHER HOSPITAL | End: 2024-09-20
Attending: EMERGENCY MEDICINE
Payer: MEDICARE

## 2024-09-19 ENCOUNTER — APPOINTMENT (OUTPATIENT)
Dept: CT IMAGING | Age: 79
End: 2024-09-19
Payer: MEDICARE

## 2024-09-19 DIAGNOSIS — H53.2 DIPLOPIA: Primary | ICD-10-CM

## 2024-09-19 LAB
-: ABNORMAL
-: ABNORMAL
ANION GAP SERPL CALCULATED.3IONS-SCNC: 10 MMOL/L (ref 9–17)
BACTERIA URNS QL MICRO: ABNORMAL
BILIRUB UR QL STRIP: NEGATIVE
BILIRUB UR QL STRIP: NEGATIVE
BUN SERPL-MCNC: 16 MG/DL (ref 8–23)
BUN/CREAT SERPL: 16 (ref 9–20)
CALCIUM SERPL-MCNC: 9.5 MG/DL (ref 8.6–10.4)
CHLORIDE SERPL-SCNC: 104 MMOL/L (ref 98–107)
CLARITY UR: CLEAR
CLARITY UR: CLEAR
CO2 SERPL-SCNC: 29 MMOL/L (ref 20–31)
COLOR UR: YELLOW
COLOR UR: YELLOW
COMMENT: ABNORMAL
COMMENT: ABNORMAL
CREAT SERPL-MCNC: 1 MG/DL (ref 0.5–0.9)
EPI CELLS #/AREA URNS HPF: ABNORMAL /HPF
ERYTHROCYTE [DISTWIDTH] IN BLOOD BY AUTOMATED COUNT: 14 % (ref 12.1–15.2)
GFR, ESTIMATED: 58 ML/MIN/1.73M2
GLUCOSE BLD-MCNC: 85 MG/DL (ref 65–99)
GLUCOSE SERPL-MCNC: 88 MG/DL (ref 70–99)
GLUCOSE UR STRIP-MCNC: NEGATIVE MG/DL
GLUCOSE UR STRIP-MCNC: NEGATIVE MG/DL
HCT VFR BLD AUTO: 39.2 % (ref 36–46)
HGB BLD-MCNC: 12.8 G/DL (ref 12–16)
HGB UR QL STRIP.AUTO: ABNORMAL
HGB UR QL STRIP.AUTO: ABNORMAL
INR PPP: 1.2
KETONES UR STRIP-MCNC: ABNORMAL MG/DL
KETONES UR STRIP-MCNC: ABNORMAL MG/DL
LEUKOCYTE ESTERASE UR QL STRIP: ABNORMAL
LEUKOCYTE ESTERASE UR QL STRIP: NEGATIVE
MCH RBC QN AUTO: 29.4 PG (ref 26–34)
MCHC RBC AUTO-ENTMCNC: 32.7 G/DL (ref 31–37)
MCV RBC AUTO: 89.9 FL (ref 80–100)
MUCOUS THREADS URNS QL MICRO: ABNORMAL
NITRITE UR QL STRIP: NEGATIVE
NITRITE UR QL STRIP: NEGATIVE
PH UR STRIP: 7 [PH] (ref 5–8)
PH UR STRIP: 8 [PH] (ref 5–8)
PLATELET # BLD AUTO: 109 K/UL (ref 140–450)
PMV BLD AUTO: 9.9 FL (ref 6–12)
POTASSIUM SERPL-SCNC: 4 MMOL/L (ref 3.7–5.3)
PROT UR STRIP-MCNC: ABNORMAL MG/DL
PROT UR STRIP-MCNC: ABNORMAL MG/DL
PROTHROMBIN TIME: 15.3 SEC (ref 11.5–14.2)
RBC # BLD AUTO: 4.36 M/UL (ref 4–5.2)
RBC #/AREA URNS HPF: ABNORMAL /HPF (ref 0–2)
RBC #/AREA URNS HPF: ABNORMAL /HPF (ref 0–2)
SODIUM SERPL-SCNC: 143 MMOL/L (ref 135–144)
SP GR UR STRIP: 1.01 (ref 1–1.03)
SP GR UR STRIP: 1.01 (ref 1–1.03)
TROPONIN I SERPL HS-MCNC: 14 NG/L (ref 0–14)
UROBILINOGEN UR STRIP-ACNC: ABNORMAL EU/DL (ref 0–1)
UROBILINOGEN UR STRIP-ACNC: NORMAL EU/DL (ref 0–1)
WBC #/AREA URNS HPF: ABNORMAL /HPF
WBC #/AREA URNS HPF: ABNORMAL /HPF
WBC OTHER # BLD: 6.2 K/UL (ref 3.5–11)

## 2024-09-19 PROCEDURE — 85027 COMPLETE CBC AUTOMATED: CPT

## 2024-09-19 PROCEDURE — 6360000004 HC RX CONTRAST MEDICATION: Performed by: EMERGENCY MEDICINE

## 2024-09-19 PROCEDURE — 99285 EMERGENCY DEPT VISIT HI MDM: CPT

## 2024-09-19 PROCEDURE — 85610 PROTHROMBIN TIME: CPT

## 2024-09-19 PROCEDURE — 70498 CT ANGIOGRAPHY NECK: CPT

## 2024-09-19 PROCEDURE — 82947 ASSAY GLUCOSE BLOOD QUANT: CPT

## 2024-09-19 PROCEDURE — 81001 URINALYSIS AUTO W/SCOPE: CPT

## 2024-09-19 PROCEDURE — 93005 ELECTROCARDIOGRAM TRACING: CPT | Performed by: EMERGENCY MEDICINE

## 2024-09-19 PROCEDURE — 80048 BASIC METABOLIC PNL TOTAL CA: CPT

## 2024-09-19 PROCEDURE — 84484 ASSAY OF TROPONIN QUANT: CPT

## 2024-09-19 PROCEDURE — 6370000000 HC RX 637 (ALT 250 FOR IP): Performed by: EMERGENCY MEDICINE

## 2024-09-19 RX ORDER — POTASSIUM CHLORIDE 750 MG/1
10 TABLET, EXTENDED RELEASE ORAL DAILY
Status: DISCONTINUED | OUTPATIENT
Start: 2024-09-19 | End: 2024-09-20 | Stop reason: HOSPADM

## 2024-09-19 RX ORDER — ASPIRIN 325 MG
325 TABLET ORAL ONCE
Status: COMPLETED | OUTPATIENT
Start: 2024-09-19 | End: 2024-09-19

## 2024-09-19 RX ORDER — ACETAMINOPHEN 325 MG/1
650 TABLET ORAL ONCE
Status: COMPLETED | OUTPATIENT
Start: 2024-09-19 | End: 2024-09-19

## 2024-09-19 RX ORDER — DIVALPROEX SODIUM 250 MG/1
500 TABLET, FILM COATED, EXTENDED RELEASE ORAL DAILY
Status: DISCONTINUED | OUTPATIENT
Start: 2024-09-19 | End: 2024-09-20 | Stop reason: HOSPADM

## 2024-09-19 RX ORDER — IOPAMIDOL 755 MG/ML
100 INJECTION, SOLUTION INTRAVASCULAR
Status: COMPLETED | OUTPATIENT
Start: 2024-09-19 | End: 2024-09-19

## 2024-09-19 RX ORDER — FUROSEMIDE 20 MG
20 TABLET ORAL DAILY
Status: DISCONTINUED | OUTPATIENT
Start: 2024-09-19 | End: 2024-09-20 | Stop reason: HOSPADM

## 2024-09-19 RX ORDER — ROSUVASTATIN CALCIUM 10 MG/1
10 TABLET, COATED ORAL NIGHTLY
Status: DISCONTINUED | OUTPATIENT
Start: 2024-09-19 | End: 2024-09-20 | Stop reason: HOSPADM

## 2024-09-19 RX ADMIN — ROSUVASTATIN 10 MG: 10 TABLET, FILM COATED ORAL at 20:54

## 2024-09-19 RX ADMIN — IOPAMIDOL 100 ML: 755 INJECTION, SOLUTION INTRAVENOUS at 17:08

## 2024-09-19 RX ADMIN — ACETAMINOPHEN 650 MG: 325 TABLET, FILM COATED ORAL at 20:54

## 2024-09-19 RX ADMIN — ASPIRIN 325 MG: 325 TABLET ORAL at 20:54

## 2024-09-19 ASSESSMENT — ENCOUNTER SYMPTOMS
SHORTNESS OF BREATH: 0
CHEST TIGHTNESS: 0

## 2024-09-19 ASSESSMENT — PAIN DESCRIPTION - DESCRIPTORS: DESCRIPTORS: ACHING

## 2024-09-19 ASSESSMENT — PAIN SCALES - GENERAL: PAINLEVEL_OUTOF10: 7

## 2024-09-19 ASSESSMENT — VISUAL ACUITY
OS: 20/70
OD: 20/50
OU: 20/50

## 2024-09-19 ASSESSMENT — PAIN DESCRIPTION - LOCATION: LOCATION: HEAD

## 2024-09-19 ASSESSMENT — PAIN - FUNCTIONAL ASSESSMENT: PAIN_FUNCTIONAL_ASSESSMENT: NONE - DENIES PAIN

## 2024-09-20 VITALS
BODY MASS INDEX: 33.13 KG/M2 | RESPIRATION RATE: 16 BRPM | HEART RATE: 75 BPM | WEIGHT: 187 LBS | OXYGEN SATURATION: 96 % | SYSTOLIC BLOOD PRESSURE: 113 MMHG | DIASTOLIC BLOOD PRESSURE: 60 MMHG | TEMPERATURE: 98.1 F

## 2024-09-20 LAB
EKG ATRIAL RATE: 73 BPM
EKG P AXIS: 70 DEGREES
EKG P-R INTERVAL: 188 MS
EKG Q-T INTERVAL: 400 MS
EKG QRS DURATION: 76 MS
EKG QTC CALCULATION (BAZETT): 440 MS
EKG R AXIS: 22 DEGREES
EKG T AXIS: 72 DEGREES
EKG VENTRICULAR RATE: 73 BPM

## 2024-09-20 PROCEDURE — 93010 ELECTROCARDIOGRAM REPORT: CPT | Performed by: INTERNAL MEDICINE

## 2024-09-20 PROCEDURE — 93306 TTE W/DOPPLER COMPLETE: CPT | Performed by: INTERNAL MEDICINE

## 2024-09-23 ENCOUNTER — TELEPHONE (OUTPATIENT)
Dept: FAMILY MEDICINE CLINIC | Age: 79
End: 2024-09-23

## 2024-09-27 ENCOUNTER — OFFICE VISIT (OUTPATIENT)
Dept: FAMILY MEDICINE CLINIC | Age: 79
End: 2024-09-27

## 2024-09-27 ENCOUNTER — HOSPITAL ENCOUNTER (OUTPATIENT)
Age: 79
Discharge: HOME | End: 2024-09-27
Payer: MEDICARE

## 2024-09-27 VITALS
SYSTOLIC BLOOD PRESSURE: 130 MMHG | BODY MASS INDEX: 33.49 KG/M2 | HEIGHT: 63 IN | HEART RATE: 72 BPM | DIASTOLIC BLOOD PRESSURE: 78 MMHG | WEIGHT: 189 LBS

## 2024-09-27 DIAGNOSIS — E83.42 HYPOMAGNESEMIA: ICD-10-CM

## 2024-09-27 DIAGNOSIS — H53.2 DIPLOPIA: ICD-10-CM

## 2024-09-27 DIAGNOSIS — I10 PRIMARY HYPERTENSION: ICD-10-CM

## 2024-09-27 DIAGNOSIS — I63.9 ACUTE CVA (CEREBROVASCULAR ACCIDENT) (HCC): Primary | ICD-10-CM

## 2024-09-27 DIAGNOSIS — Z09 HOSPITAL DISCHARGE FOLLOW-UP: ICD-10-CM

## 2024-09-27 LAB
ANION GAP SERPL CALCULATED.3IONS-SCNC: 9 MMOL/L (ref 9–17)
BUN SERPL-MCNC: 18 MG/DL (ref 8–23)
BUN/CREAT SERPL: 18 (ref 9–20)
CALCIUM SERPL-MCNC: 8.8 MG/DL (ref 8.6–10.4)
CHLORIDE SERPL-SCNC: 101 MMOL/L (ref 98–107)
CO2 SERPL-SCNC: 31 MMOL/L (ref 20–31)
CREAT SERPL-MCNC: 1 MG/DL (ref 0.5–0.9)
GFR, ESTIMATED: 58 ML/MIN/1.73M2
GLUCOSE SERPL-MCNC: 146 MG/DL (ref 70–99)
MAGNESIUM SERPL-MCNC: 1.7 MG/DL (ref 1.6–2.6)
POTASSIUM SERPL-SCNC: 3.4 MMOL/L (ref 3.7–5.3)
SODIUM SERPL-SCNC: 141 MMOL/L (ref 135–144)

## 2024-09-27 PROCEDURE — 83735 ASSAY OF MAGNESIUM: CPT

## 2024-09-27 PROCEDURE — 80048 BASIC METABOLIC PNL TOTAL CA: CPT

## 2024-09-27 PROCEDURE — 36415 COLL VENOUS BLD VENIPUNCTURE: CPT

## 2024-09-27 RX ORDER — POTASSIUM CHLORIDE 750 MG/1
30 TABLET, EXTENDED RELEASE ORAL DAILY
Qty: 90 TABLET | Refills: 11 | Status: SHIPPED | OUTPATIENT
Start: 2024-09-27

## 2024-09-29 DIAGNOSIS — E78.00 HYPERCHOLESTEROLEMIA: ICD-10-CM

## 2024-09-30 RX ORDER — ROSUVASTATIN CALCIUM 10 MG/1
10 TABLET, COATED ORAL NIGHTLY
Qty: 90 TABLET | Refills: 1 | Status: SHIPPED | OUTPATIENT
Start: 2024-09-30

## 2024-10-30 DIAGNOSIS — J43.9 PULMONARY EMPHYSEMA, UNSPECIFIED EMPHYSEMA TYPE (HCC): ICD-10-CM

## 2024-10-30 RX ORDER — FLUTICASONE FUROATE, UMECLIDINIUM BROMIDE AND VILANTEROL TRIFENATATE 200; 62.5; 25 UG/1; UG/1; UG/1
1 POWDER RESPIRATORY (INHALATION) DAILY
Qty: 1 EACH | Refills: 5 | Status: SHIPPED | OUTPATIENT
Start: 2024-10-30

## 2024-12-19 NOTE — PATIENT INSTRUCTIONS
----- Message from Roney Mcneill MD sent at 12/19/2024  8:46 AM CST -----  Please have patient complete the testing ordered prior to next appt. Thank you  ----- Message -----  From: SYSTEM  Sent: 12/18/2024   1:07 AM CST  To: Roney Mcneill MD   Survey: You may be receiving a survey from [employer] regarding your visit today. You may get this in the mail, through your MyChart or in your email. Please complete the survey to enable us to provide the highest quality of care to you and your family. Please also, mention our names. If you cannot score us as very good (5 Stars) on any question, please feel free to call the office to discuss how we could have made your experience exceptional.      Thank You! MD Bran Rivera, [address] [place] [address], [address] [place] [address], [facility]    [facility]    1. Need for influenza vaccination  Cecy Young was given an influenza vaccine today. - INFLUENZA, QUADV, 3 YRS AND OLDER, IM PF, PREFILL SYR OR SDV, 0.5ML (AFLURIA QUADV, PF)    2. Elevated glucose  HgbA1C today - 5.5 today. - POCT glycosylated hemoglobin (Hb A1C)    3. Essential hypertension  Controlled on the current medication. 4. Pulmonary emphysema, unspecified emphysema type (HCC)  No increase in SOB. 5. Nonrheumatic aortic valve stenosis  Following with Dr. Kasandra Mohr in Cardiology. 6. Vitamin D deficiency  Controlled on Vitamin D.    7. Seizure disorder ([address]  No issues with Sz's. Follows with Neurology. 8. Microhematuria  Resolved. 9. Mixed hyperlipidemia  Controlled on statin. 10. Chronic constipation  Controlled on Linzess. 11. Racing heart beat  Set up for cardiac event monitor.  - Cardiac event monitor; Future    12. Hair loss  Labs normal including TSH. Recommend a hair and nail vitamin. Ghanshyam Mckeon was instructed to follow up in the clinic in 3 months for check up or as needed with any medical issues.

## 2025-01-25 DIAGNOSIS — I10 PRIMARY HYPERTENSION: ICD-10-CM

## 2025-01-27 RX ORDER — FUROSEMIDE 20 MG/1
20 TABLET ORAL DAILY
Qty: 30 TABLET | Refills: 5 | Status: SHIPPED | OUTPATIENT
Start: 2025-01-27

## 2025-02-17 ENCOUNTER — OFFICE VISIT (OUTPATIENT)
Dept: FAMILY MEDICINE CLINIC | Age: 80
End: 2025-02-17

## 2025-02-17 VITALS
WEIGHT: 186 LBS | BODY MASS INDEX: 32.96 KG/M2 | DIASTOLIC BLOOD PRESSURE: 76 MMHG | SYSTOLIC BLOOD PRESSURE: 134 MMHG | HEIGHT: 63 IN | HEART RATE: 66 BPM

## 2025-02-17 DIAGNOSIS — E55.9 VITAMIN D DEFICIENCY: ICD-10-CM

## 2025-02-17 DIAGNOSIS — I25.10 CORONARY ARTERY DISEASE INVOLVING NATIVE CORONARY ARTERY OF NATIVE HEART WITHOUT ANGINA PECTORIS: ICD-10-CM

## 2025-02-17 DIAGNOSIS — G40.909 SEIZURE DISORDER (HCC): ICD-10-CM

## 2025-02-17 DIAGNOSIS — I48.0 PAROXYSMAL ATRIAL FIBRILLATION (HCC): ICD-10-CM

## 2025-02-17 DIAGNOSIS — J43.9 PULMONARY EMPHYSEMA, UNSPECIFIED EMPHYSEMA TYPE (HCC): ICD-10-CM

## 2025-02-17 DIAGNOSIS — I10 PRIMARY HYPERTENSION: Primary | ICD-10-CM

## 2025-02-17 DIAGNOSIS — J44.1 COPD EXACERBATION (HCC): ICD-10-CM

## 2025-02-17 DIAGNOSIS — E78.2 MIXED HYPERLIPIDEMIA: ICD-10-CM

## 2025-02-17 RX ORDER — FLUTICASONE FUROATE, UMECLIDINIUM BROMIDE AND VILANTEROL TRIFENATATE 200; 62.5; 25 UG/1; UG/1; UG/1
1 POWDER RESPIRATORY (INHALATION) DAILY
Qty: 2 EACH | Refills: 0 | Status: SHIPPED | COMMUNITY
Start: 2025-02-17

## 2025-02-17 RX ORDER — DIVALPROEX SODIUM 250 MG/1
TABLET, DELAYED RELEASE ORAL
COMMUNITY
Start: 2025-02-04

## 2025-02-17 RX ORDER — PREDNISONE 20 MG/1
40 TABLET ORAL DAILY
Qty: 10 TABLET | Refills: 0 | Status: SHIPPED | OUTPATIENT
Start: 2025-02-17 | End: 2025-02-22

## 2025-02-17 SDOH — ECONOMIC STABILITY: FOOD INSECURITY: WITHIN THE PAST 12 MONTHS, YOU WORRIED THAT YOUR FOOD WOULD RUN OUT BEFORE YOU GOT MONEY TO BUY MORE.: NEVER TRUE

## 2025-02-17 SDOH — ECONOMIC STABILITY: FOOD INSECURITY: WITHIN THE PAST 12 MONTHS, THE FOOD YOU BOUGHT JUST DIDN'T LAST AND YOU DIDN'T HAVE MONEY TO GET MORE.: NEVER TRUE

## 2025-02-17 ASSESSMENT — ENCOUNTER SYMPTOMS
NAUSEA: 0
WHEEZING: 1
CHEST TIGHTNESS: 0
COUGH: 1
SHORTNESS OF BREATH: 0
ABDOMINAL PAIN: 0
RHINORRHEA: 0
BLOOD IN STOOL: 0
SORE THROAT: 0
CONSTIPATION: 0
DIARRHEA: 0

## 2025-02-17 ASSESSMENT — PATIENT HEALTH QUESTIONNAIRE - PHQ9
2. FEELING DOWN, DEPRESSED OR HOPELESS: NOT AT ALL
SUM OF ALL RESPONSES TO PHQ9 QUESTIONS 1 & 2: 0
SUM OF ALL RESPONSES TO PHQ QUESTIONS 1-9: 0
1. LITTLE INTEREST OR PLEASURE IN DOING THINGS: NOT AT ALL
SUM OF ALL RESPONSES TO PHQ QUESTIONS 1-9: 0

## 2025-02-17 NOTE — ASSESSMENT & PLAN NOTE
Episode of chest tightness and SOB but I feel this is likely to a chest infection / COPD exacerbation.  Follows with Dr. Warren in May.

## 2025-02-17 NOTE — PROGRESS NOTES
Geri Parks (:  1945) is a 79 y.o. female,Established patient, here for evaluation of the following chief complaint(s):  Hypertension (6 month check up. ), Hyperlipidemia, Atrial Fibrillation, Seizures (Epilepsy. Follows with Neurology. ), and Cerebrovascular Accident (CVA 2024.)         Assessment & Plan  Primary hypertension   Controlled on Lasix.  No change in medication.         Pulmonary emphysema, unspecified emphysema type (HCC)   Uses albuterol as needed.  Has not been taking Trelegy.         Paroxysmal atrial fibrillation (HCC)   NSR today.  Continues on Eliquis and follows with Dr. Warren.         Seizure disorder (HCC)   Follows with Neurology.  Continue on Depakote.         Vitamin D deficiency   Controlled on replacement.         Coronary artery disease involving native coronary artery of native heart without angina pectoris   Episode of chest tightness and SOB but I feel this is likely to a chest infection / COPD exacerbation.  Follows with Dr. Warren in May.         Mixed hyperlipidemia   Controlled on Crestor.  Dr. Warren has labs before appt.          COPD exacerbation (HCC)   Take Augmentin 875 mg two times a day x 7 days.  Take Prednisone 20 m tablets by mouth daily x 5 days.  Use Trelegy 1 puff a day (rinse after use / sample given).      Orders:    fluticasone-umeclidin-vilant (TRELEGY ELLIPTA) 200-62.5-25 MCG/ACT AEPB inhaler; Inhale 1 puff into the lungs daily    amoxicillin-clavulanate (AUGMENTIN) 875-125 MG per tablet; Take 1 tablet by mouth 2 times daily for 7 days    predniSONE (DELTASONE) 20 MG tablet; Take 2 tablets by mouth daily for 5 days    Geri Parks was instructed to follow up in the clinic in 3 months for check up or as needed with any medical issues.                 Subjective   Geri presents for a check up on her medical conditions HTN, Hyperlipidemia, atrial fib, Sz D/O, H/O CVA.  Geri denies new problems.  Medications were reviewed with

## 2025-02-17 NOTE — PATIENT INSTRUCTIONS
Survey:     You may be receiving a survey from [facility] regarding your visit today.     You may get this in the mail, through your MyChart or in your email.      Please complete the survey to enable us to provide the highest quality of care to you and your family. Please also, mention our names.     If you cannot score us as very good (5 Stars) on any question, please feel free to call the office to discuss how we could have made your experience exceptional.      Thank You!        Dr. Young, MD Duvall, KRISTI Deutsch, ASHISH Ruff, APRN CATRACHITO Frankel, CMA    Serenity, CMA       Assessment & Plan  Primary hypertension   Controlled on Lasix.  No change in medication.         Pulmonary emphysema, unspecified emphysema type (HCC)   Uses albuterol as needed.  Has not been taking Trelegy.         Paroxysmal atrial fibrillation (HCC)   NSR today.  Continues on Eliquis and follows with Dr. Warren.         Seizure disorder (HCC)   Follows with Neurology.  Continue on Depakote.         Vitamin D deficiency   Controlled on replacement.         Coronary artery disease involving native coronary artery of native heart without angina pectoris   Episode of chest tightness and SOB but I feel this is likely to a chest infection / COPD exacerbation.  Follows with Dr. Warren in May.         Mixed hyperlipidemia   Controlled on Crestor.  Dr. Warren has labs before appt.          COPD exacerbation (HCC)   Take Augmentin 875 mg two times a day x 7 days.  Take Prednisone 20 m tablets by mouth daily x 5 days.  Use Trelegy 1 puff a day (rinse after use / sample given).      Orders:    fluticasone-umeclidin-vilant (TRELEGY ELLIPTA) 200-62.5-25 MCG/ACT AEPB inhaler; Inhale 1 puff into the lungs daily    amoxicillin-clavulanate (AUGMENTIN) 875-125 MG per tablet; Take 1 tablet by mouth 2 times daily for 7 days    predniSONE (DELTASONE) 20 MG tablet; Take 2 tablets by mouth daily for 5 days    Geri Parks was instructed to

## 2025-03-03 RX ORDER — APIXABAN 5 MG/1
5 TABLET, FILM COATED ORAL 2 TIMES DAILY
Qty: 180 TABLET | Refills: 1 | Status: SHIPPED | OUTPATIENT
Start: 2025-03-03

## 2025-03-26 DIAGNOSIS — E78.00 HYPERCHOLESTEROLEMIA: ICD-10-CM

## 2025-03-27 RX ORDER — ROSUVASTATIN CALCIUM 10 MG/1
10 TABLET, COATED ORAL NIGHTLY
Qty: 90 TABLET | Refills: 1 | Status: SHIPPED | OUTPATIENT
Start: 2025-03-27

## 2025-04-16 ENCOUNTER — OFFICE VISIT (OUTPATIENT)
Dept: FAMILY MEDICINE CLINIC | Age: 80
End: 2025-04-16

## 2025-04-16 VITALS
WEIGHT: 178 LBS | HEIGHT: 63 IN | OXYGEN SATURATION: 96 % | SYSTOLIC BLOOD PRESSURE: 132 MMHG | DIASTOLIC BLOOD PRESSURE: 74 MMHG | HEART RATE: 70 BPM | BODY MASS INDEX: 31.54 KG/M2

## 2025-04-16 DIAGNOSIS — J44.1 COPD EXACERBATION (HCC): ICD-10-CM

## 2025-04-16 DIAGNOSIS — R07.9 CHEST PAIN, UNSPECIFIED TYPE: Primary | ICD-10-CM

## 2025-04-16 DIAGNOSIS — J20.9 BRONCHITIS WITH BRONCHOSPASM: ICD-10-CM

## 2025-04-16 RX ORDER — BENZONATATE 200 MG/1
200 CAPSULE ORAL 3 TIMES DAILY PRN
Qty: 30 CAPSULE | Refills: 0 | Status: SHIPPED | OUTPATIENT
Start: 2025-04-16 | End: 2025-04-26

## 2025-04-16 RX ORDER — PREDNISONE 20 MG/1
40 TABLET ORAL DAILY
Qty: 10 TABLET | Refills: 0 | Status: SHIPPED | OUTPATIENT
Start: 2025-04-16 | End: 2025-04-21

## 2025-04-16 RX ORDER — AZITHROMYCIN 250 MG/1
TABLET, FILM COATED ORAL
Qty: 1 PACKET | Refills: 0 | Status: SHIPPED | OUTPATIENT
Start: 2025-04-16 | End: 2025-04-26

## 2025-04-16 ASSESSMENT — ENCOUNTER SYMPTOMS
WHEEZING: 1
NAUSEA: 0
COUGH: 1
RHINORRHEA: 1
SHORTNESS OF BREATH: 1
ABDOMINAL PAIN: 0
SORE THROAT: 0

## 2025-04-16 NOTE — PATIENT INSTRUCTIONS
Survey:     You may be receiving a survey from [employer] regarding your visit today.     You may get this in the mail, through your MyChart or in your email.      Please complete the survey to enable us to provide the highest quality of care to you and your family. Please also, mention our names.     If you cannot score us as very good (5 Stars) on any question, please feel free to call the office to discuss how we could have made your experience exceptional.      Thank You!        Dr. Young, MD Duvall, KRISTI Deutsch, ASHISH Ruff, JUDE Frankel, ASHISH Benton, ASHISH

## 2025-04-16 NOTE — PROGRESS NOTES
which is helping. She states cough is worse at night when she lays down. She also complains of episode of chest pain 2 days ago. She states she had some chest discomfort and felt fatigued when she was in a store. She states she felt \"like I was going to pass out\". She states when she burped she felt better. She states the episode was less than 30 minutes. She states she kept walking around the store and drinking soda to help her burp. She then went home and went to sleep. She has not had chest pain since. She does following with cardiology and has upcoming appt. She has echo and EKG ordered.    Cold Symptoms   This is a new problem. The current episode started 1 to 4 weeks ago. There has been no fever. Associated symptoms include congestion, coughing, headaches, rhinorrhea and wheezing. Pertinent negatives include no abdominal pain, chest pain, ear pain, nausea or sore throat. She has tried acetaminophen for the symptoms. The treatment provided mild relief.       Review of Systems   Constitutional:  Negative for chills and fever.   HENT:  Positive for congestion and rhinorrhea. Negative for ear pain and sore throat.    Respiratory:  Positive for cough, shortness of breath and wheezing.    Cardiovascular:  Negative for chest pain and palpitations.   Gastrointestinal:  Negative for abdominal pain and nausea.   Musculoskeletal:  Negative for myalgias.   Neurological:  Positive for headaches. Negative for dizziness and light-headedness.          Objective   Physical Exam  Vitals and nursing note reviewed.   Constitutional:       Appearance: Normal appearance.   HENT:      Head: Normocephalic and atraumatic.      Right Ear: Tympanic membrane, ear canal and external ear normal.      Left Ear: Tympanic membrane, ear canal and external ear normal.   Cardiovascular:      Rate and Rhythm: Normal rate and regular rhythm.      Heart sounds: Normal heart sounds.   Pulmonary:      Effort: Pulmonary effort is normal.      Breath

## 2025-04-18 DIAGNOSIS — J44.1 COPD EXACERBATION (HCC): ICD-10-CM

## 2025-04-18 RX ORDER — FLUTICASONE FUROATE, UMECLIDINIUM BROMIDE AND VILANTEROL TRIFENATATE 200; 62.5; 25 UG/1; UG/1; UG/1
1 POWDER RESPIRATORY (INHALATION) DAILY
Qty: 1 EACH | Refills: 2 | Status: SHIPPED | OUTPATIENT
Start: 2025-04-18

## 2025-04-22 ENCOUNTER — OFFICE VISIT (OUTPATIENT)
Dept: FAMILY MEDICINE CLINIC | Age: 80
End: 2025-04-22

## 2025-04-22 VITALS
OXYGEN SATURATION: 94 % | WEIGHT: 176 LBS | DIASTOLIC BLOOD PRESSURE: 64 MMHG | BODY MASS INDEX: 31.18 KG/M2 | SYSTOLIC BLOOD PRESSURE: 139 MMHG | HEART RATE: 70 BPM | HEIGHT: 63 IN

## 2025-04-22 DIAGNOSIS — J44.1 COPD EXACERBATION (HCC): Primary | ICD-10-CM

## 2025-04-22 DIAGNOSIS — J20.9 ACUTE BRONCHITIS, UNSPECIFIED ORGANISM: ICD-10-CM

## 2025-04-22 RX ORDER — PREDNISONE 20 MG/1
TABLET ORAL
Qty: 15 TABLET | Refills: 0 | Status: SHIPPED | OUTPATIENT
Start: 2025-04-22 | End: 2025-05-02

## 2025-04-22 RX ORDER — IPRATROPIUM BROMIDE AND ALBUTEROL SULFATE 2.5; .5 MG/3ML; MG/3ML
1 SOLUTION RESPIRATORY (INHALATION) EVERY 4 HOURS
Qty: 360 ML | Refills: 1 | Status: SHIPPED | OUTPATIENT
Start: 2025-04-22

## 2025-04-22 ASSESSMENT — ENCOUNTER SYMPTOMS
SINUS PAIN: 1
BLOOD IN STOOL: 0
SHORTNESS OF BREATH: 0
WHEEZING: 1
CONSTIPATION: 0
CHEST TIGHTNESS: 0
DIARRHEA: 0
RHINORRHEA: 0
COUGH: 1
NAUSEA: 0
ABDOMINAL PAIN: 0
SORE THROAT: 1

## 2025-04-22 NOTE — PATIENT INSTRUCTIONS
Survey:     You may be receiving a survey from [employer] Mathews regarding your visit today.     You may get this in the mail, through your MyChart or in your email.      Please complete the survey to enable us to provide the highest quality of care to you and your family. Please also, mention our names.     If you cannot score us as very good (5 Stars) on any question, please feel free to call the office to discuss how we could have made your experience exceptional.      Thank You!        Dr. Young, MD Duvall, KRISTI Deutsch, ASHISH Ruff, APRN CATRACHITO Frankel, ASHISH Benton, CMA       Assessment & Plan  COPD exacerbation (HCC)   Take Augmentin 875 mg two times a day x 10 days.  Take the prednisone taper as directed on the prescription.  The prednisone is very bitter so swallow the tablets quickly to prevent  dissolving in the mouth.  After taking, don't lay  down for 2 hours to help prevent reflux.  DME order for nebulizer:  Use Duoneb 4 times a day or every 4 hours as needed.  Continue on Trelegy inhaler.      Orders:    DME Order for Nebulizer as OP    ipratropium 0.5 mg-albuterol 2.5 mg (DUONEB) 0.5-2.5 (3) MG/3ML SOLN nebulizer solution; Inhale 3 mLs into the lungs every 4 hours    amoxicillin-clavulanate (AUGMENTIN) 875-125 MG per tablet; Take 1 tablet by mouth 2 times daily for 10 days    predniSONE (DELTASONE) 20 MG tablet; 3 tablets daily x 3 days then 2 tablets daily x 2 days then 1 tablet daily x 2 days.    Acute bronchitis, unspecified organism     Geri is instructed to return to the clinic if the symptoms continue or worsen.  Geri  was also instructed to go to the emergency room department if the symptoms significantly worsen before an appointment can be made.

## 2025-04-22 NOTE — PROGRESS NOTES
Geri Parks (:  1945) is a 80 y.o. female,Established patient, here for evaluation of the following chief complaint(s):  Cold Symptoms (Cough, congestion, wheezing, sinus drainage persists. Denies fever. 25 treated on 5 days prednisone, z-pack. Continues on tessalon. )         Assessment & Plan  COPD exacerbation (HCC)   Take Augmentin 875 mg two times a day x 10 days.  Take the prednisone taper as directed on the prescription.  The prednisone is very bitter so swallow the tablets quickly to prevent  dissolving in the mouth.  After taking, don't lay  down for 2 hours to help prevent reflux.  DME order for nebulizer:  Use Duoneb 4 times a day or every 4 hours as needed.  Continue on Trelegy inhaler.      Orders:    DME Order for Nebulizer as OP    ipratropium 0.5 mg-albuterol 2.5 mg (DUONEB) 0.5-2.5 (3) MG/3ML SOLN nebulizer solution; Inhale 3 mLs into the lungs every 4 hours    amoxicillin-clavulanate (AUGMENTIN) 875-125 MG per tablet; Take 1 tablet by mouth 2 times daily for 10 days    predniSONE (DELTASONE) 20 MG tablet; 3 tablets daily x 3 days then 2 tablets daily x 2 days then 1 tablet daily x 2 days.    Acute bronchitis, unspecified organism     Geri is instructed to return to the clinic if the symptoms continue or worsen.  Geri  was also instructed to go to the emergency room department if the symptoms significantly worsen before an appointment can be made.                  Subjective   Cold Symptoms   This is a new problem. Episode onset: 2 weeks. The problem has been waxing and waning. Associated symptoms include congestion, coughing, sinus pain, a sore throat and wheezing. Pertinent negatives include no abdominal pain, chest pain, diarrhea, dysuria, ear pain, nausea, neck pain, rhinorrhea or sneezing. Treatments tried: Zpak and Prednisone. The treatment provided mild relief.       Review of Systems   Constitutional: Negative.    HENT:  Positive for congestion, sinus pain and sore

## 2025-04-26 ENCOUNTER — HOSPITAL ENCOUNTER (OUTPATIENT)
Age: 80
Discharge: HOME | End: 2025-04-26
Payer: MEDICARE

## 2025-04-26 ENCOUNTER — TELEPHONE (OUTPATIENT)
Dept: INTERNAL MEDICINE CLINIC | Age: 80
End: 2025-04-26

## 2025-04-26 DIAGNOSIS — R31.0 GROSS HEMATURIA: Primary | ICD-10-CM

## 2025-04-26 DIAGNOSIS — R31.0 GROSS HEMATURIA: ICD-10-CM

## 2025-04-26 LAB
-: ABNORMAL
BACTERIA URNS QL MICRO: ABNORMAL
BILIRUB UR QL STRIP: NEGATIVE
CLARITY UR: CLEAR
COLOR UR: YELLOW
COMMENT: ABNORMAL
EPI CELLS #/AREA URNS HPF: ABNORMAL /HPF
GLUCOSE UR STRIP-MCNC: NEGATIVE MG/DL
HGB UR QL STRIP.AUTO: ABNORMAL
KETONES UR STRIP-MCNC: NEGATIVE MG/DL
LEUKOCYTE ESTERASE UR QL STRIP: ABNORMAL
NITRITE UR QL STRIP: NEGATIVE
PH UR STRIP: 6 [PH] (ref 5–8)
PROT UR STRIP-MCNC: NEGATIVE MG/DL
RBC #/AREA URNS HPF: ABNORMAL /HPF (ref 0–2)
SP GR UR STRIP: 1.01 (ref 1–1.03)
UROBILINOGEN UR STRIP-ACNC: NORMAL EU/DL (ref 0–1)
WBC #/AREA URNS HPF: ABNORMAL /HPF

## 2025-04-26 PROCEDURE — 81001 URINALYSIS AUTO W/SCOPE: CPT

## 2025-04-26 PROCEDURE — 87086 URINE CULTURE/COLONY COUNT: CPT

## 2025-04-27 LAB
MICROORGANISM SPEC CULT: NO GROWTH
SERVICE CMNT-IMP: NORMAL
SPECIMEN DESCRIPTION: NORMAL

## 2025-04-28 ENCOUNTER — RESULTS FOLLOW-UP (OUTPATIENT)
Dept: INTERNAL MEDICINE CLINIC | Age: 80
End: 2025-04-28

## 2025-04-28 DIAGNOSIS — R31.0 GROSS HEMATURIA: Primary | ICD-10-CM

## 2025-05-01 ENCOUNTER — HOSPITAL ENCOUNTER (OUTPATIENT)
Dept: NON INVASIVE DIAGNOSTICS | Age: 80
Discharge: HOME | End: 2025-05-01
Payer: MEDICARE

## 2025-05-01 ENCOUNTER — HOSPITAL ENCOUNTER (OUTPATIENT)
Dept: GENERAL RADIOLOGY | Age: 80
Discharge: HOME | End: 2025-05-03
Payer: MEDICARE

## 2025-05-01 ENCOUNTER — HOSPITAL ENCOUNTER (OUTPATIENT)
Age: 80
Discharge: HOME | End: 2025-05-01
Payer: MEDICARE

## 2025-05-01 DIAGNOSIS — I10 PRIMARY HYPERTENSION: ICD-10-CM

## 2025-05-01 DIAGNOSIS — E55.9 VITAMIN D DEFICIENCY: ICD-10-CM

## 2025-05-01 DIAGNOSIS — I10 ESSENTIAL HYPERTENSION: ICD-10-CM

## 2025-05-01 DIAGNOSIS — E78.2 MIXED HYPERLIPIDEMIA: ICD-10-CM

## 2025-05-01 DIAGNOSIS — Z95.2 S/P TAVR (TRANSCATHETER AORTIC VALVE REPLACEMENT): ICD-10-CM

## 2025-05-01 DIAGNOSIS — I35.0 NONRHEUMATIC AORTIC VALVE STENOSIS: ICD-10-CM

## 2025-05-01 LAB
25(OH)D3 SERPL-MCNC: 99.1 NG/ML (ref 30–100)
ALBUMIN SERPL-MCNC: 3.7 G/DL (ref 3.5–5.2)
ALBUMIN/GLOB SERPL: 1.7 {RATIO} (ref 1–2.5)
ALP SERPL-CCNC: 43 U/L (ref 35–104)
ALT SERPL-CCNC: 9 U/L (ref 5–33)
ANION GAP SERPL CALCULATED.3IONS-SCNC: 10 MMOL/L (ref 9–17)
AST SERPL-CCNC: 17 U/L
BASOPHILS # BLD: 0.01 K/UL (ref 0–0.2)
BASOPHILS NFR BLD: 0 % (ref 0–2)
BILIRUB SERPL-MCNC: 0.5 MG/DL (ref 0.3–1.2)
BUN SERPL-MCNC: 22 MG/DL (ref 8–23)
CALCIUM SERPL-MCNC: 8.9 MG/DL (ref 8.6–10.4)
CHLORIDE SERPL-SCNC: 101 MMOL/L (ref 98–107)
CHOLEST SERPL-MCNC: 141 MG/DL (ref 0–199)
CHOLESTEROL/HDL RATIO: 2.5
CO2 SERPL-SCNC: 31 MMOL/L (ref 20–31)
CREAT SERPL-MCNC: 1.2 MG/DL (ref 0.5–0.9)
EOSINOPHIL # BLD: 0.06 K/UL (ref 0–0.4)
EOSINOPHILS RELATIVE PERCENT: 1 % (ref 0–5)
ERYTHROCYTE [DISTWIDTH] IN BLOOD BY AUTOMATED COUNT: 13.8 % (ref 12.1–15.2)
GFR, ESTIMATED: 46 ML/MIN/1.73M2
GLUCOSE SERPL-MCNC: 109 MG/DL (ref 70–99)
HCT VFR BLD AUTO: 39.8 % (ref 36–46)
HDLC SERPL-MCNC: 57 MG/DL
HGB BLD-MCNC: 13.3 G/DL (ref 12–16)
IMM GRANULOCYTES # BLD AUTO: 0.02 K/UL (ref 0–0.3)
IMM GRANULOCYTES NFR BLD: 0 % (ref 0–5)
LDLC SERPL CALC-MCNC: 64 MG/DL (ref 0–100)
LYMPHOCYTES NFR BLD: 1.64 K/UL (ref 1–4.8)
LYMPHOCYTES RELATIVE PERCENT: 21 % (ref 15–40)
MAGNESIUM SERPL-MCNC: 2.2 MG/DL (ref 1.6–2.6)
MCH RBC QN AUTO: 30 PG (ref 26–34)
MCHC RBC AUTO-ENTMCNC: 33.4 G/DL (ref 31–37)
MCV RBC AUTO: 89.8 FL (ref 80–100)
MONOCYTES NFR BLD: 0.71 K/UL (ref 0–1)
MONOCYTES NFR BLD: 9 % (ref 4–8)
NEUTROPHILS NFR BLD: 69 % (ref 47–75)
NEUTS SEG NFR BLD: 5.36 K/UL (ref 2.5–7)
PLATELET # BLD AUTO: 134 K/UL (ref 140–450)
PMV BLD AUTO: 9.3 FL (ref 6–12)
POTASSIUM SERPL-SCNC: 4.1 MMOL/L (ref 3.7–5.3)
PROT SERPL-MCNC: 5.9 G/DL (ref 6.4–8.3)
RBC # BLD AUTO: 4.43 M/UL (ref 4–5.2)
SODIUM SERPL-SCNC: 142 MMOL/L (ref 135–144)
TRIGL SERPL-MCNC: 101 MG/DL
TSH SERPL DL<=0.05 MIU/L-ACNC: 3.11 UIU/ML (ref 0.27–4.2)
VLDLC SERPL CALC-MCNC: 20 MG/DL (ref 1–30)
WBC OTHER # BLD: 7.8 K/UL (ref 3.5–11)

## 2025-05-01 PROCEDURE — 93005 ELECTROCARDIOGRAM TRACING: CPT

## 2025-05-01 PROCEDURE — 83735 ASSAY OF MAGNESIUM: CPT

## 2025-05-01 PROCEDURE — 85025 COMPLETE CBC W/AUTO DIFF WBC: CPT

## 2025-05-01 PROCEDURE — 82306 VITAMIN D 25 HYDROXY: CPT

## 2025-05-01 PROCEDURE — 36415 COLL VENOUS BLD VENIPUNCTURE: CPT

## 2025-05-01 PROCEDURE — 71046 X-RAY EXAM CHEST 2 VIEWS: CPT

## 2025-05-01 PROCEDURE — 84443 ASSAY THYROID STIM HORMONE: CPT

## 2025-05-01 PROCEDURE — 80053 COMPREHEN METABOLIC PANEL: CPT

## 2025-05-01 PROCEDURE — 80061 LIPID PANEL: CPT

## 2025-05-02 LAB
EKG ATRIAL RATE: 81 BPM
EKG P AXIS: 46 DEGREES
EKG P-R INTERVAL: 156 MS
EKG Q-T INTERVAL: 344 MS
EKG QRS DURATION: 76 MS
EKG QTC CALCULATION (BAZETT): 399 MS
EKG R AXIS: 17 DEGREES
EKG T AXIS: 83 DEGREES
EKG VENTRICULAR RATE: 81 BPM

## 2025-05-06 DIAGNOSIS — J44.1 COPD EXACERBATION (HCC): ICD-10-CM

## 2025-05-06 DIAGNOSIS — J20.9 BRONCHITIS WITH BRONCHOSPASM: ICD-10-CM

## 2025-05-06 RX ORDER — ALBUTEROL SULFATE 90 UG/1
2 INHALANT RESPIRATORY (INHALATION) EVERY 4 HOURS PRN
Qty: 18 G | Refills: 5 | Status: SHIPPED | OUTPATIENT
Start: 2025-05-06

## 2025-05-06 RX ORDER — BENZONATATE 200 MG/1
200 CAPSULE ORAL 3 TIMES DAILY PRN
Qty: 30 CAPSULE | Refills: 0 | Status: SHIPPED | OUTPATIENT
Start: 2025-05-06 | End: 2025-05-16

## 2025-05-08 ENCOUNTER — HOSPITAL ENCOUNTER (OUTPATIENT)
Age: 80
Discharge: HOME | End: 2025-05-08
Payer: MEDICARE

## 2025-05-08 ENCOUNTER — RESULTS FOLLOW-UP (OUTPATIENT)
Dept: INTERNAL MEDICINE CLINIC | Age: 80
End: 2025-05-08

## 2025-05-08 ENCOUNTER — HOSPITAL ENCOUNTER (OUTPATIENT)
Age: 80
Discharge: HOME | End: 2025-05-10
Payer: MEDICARE

## 2025-05-08 DIAGNOSIS — Z95.2 S/P TAVR (TRANSCATHETER AORTIC VALVE REPLACEMENT): ICD-10-CM

## 2025-05-08 DIAGNOSIS — N30.00 ACUTE CYSTITIS WITHOUT HEMATURIA: Primary | ICD-10-CM

## 2025-05-08 DIAGNOSIS — R31.0 GROSS HEMATURIA: ICD-10-CM

## 2025-05-08 DIAGNOSIS — I10 ESSENTIAL HYPERTENSION: ICD-10-CM

## 2025-05-08 DIAGNOSIS — E78.2 MIXED HYPERLIPIDEMIA: ICD-10-CM

## 2025-05-08 DIAGNOSIS — E55.9 VITAMIN D DEFICIENCY: ICD-10-CM

## 2025-05-08 DIAGNOSIS — I35.0 NONRHEUMATIC AORTIC VALVE STENOSIS: ICD-10-CM

## 2025-05-08 DIAGNOSIS — I10 PRIMARY HYPERTENSION: ICD-10-CM

## 2025-05-08 LAB
-: ABNORMAL
BACTERIA URNS QL MICRO: ABNORMAL
BILIRUB UR QL STRIP: NEGATIVE
CLARITY UR: CLEAR
COLOR UR: YELLOW
COMMENT: ABNORMAL
EPI CELLS #/AREA URNS HPF: ABNORMAL /HPF
GLUCOSE UR STRIP-MCNC: NEGATIVE MG/DL
HGB UR QL STRIP.AUTO: ABNORMAL
KETONES UR STRIP-MCNC: ABNORMAL MG/DL
LEUKOCYTE ESTERASE UR QL STRIP: ABNORMAL
NITRITE UR QL STRIP: NEGATIVE
PH UR STRIP: 6 [PH] (ref 5–8)
PROT UR STRIP-MCNC: ABNORMAL MG/DL
RBC #/AREA URNS HPF: ABNORMAL /HPF (ref 0–2)
SP GR UR STRIP: 1.02 (ref 1–1.03)
UROBILINOGEN UR STRIP-ACNC: ABNORMAL EU/DL (ref 0–1)
WBC #/AREA URNS HPF: ABNORMAL /HPF

## 2025-05-08 PROCEDURE — 81001 URINALYSIS AUTO W/SCOPE: CPT

## 2025-05-08 PROCEDURE — 93306 TTE W/DOPPLER COMPLETE: CPT

## 2025-05-08 RX ORDER — CEPHALEXIN 500 MG/1
500 CAPSULE ORAL 2 TIMES DAILY
Qty: 20 CAPSULE | Refills: 0 | Status: SHIPPED | OUTPATIENT
Start: 2025-05-08

## 2025-05-11 LAB
ECHO AO ROOT DIAM: 3.4 CM
ECHO AV AREA PEAK VELOCITY: 2 CM2
ECHO AV AREA VTI: 2.2 CM2
ECHO AV MEAN GRADIENT: 9 MMHG
ECHO AV MEAN VELOCITY: 1.4 M/S
ECHO AV PEAK GRADIENT: 18 MMHG
ECHO AV PEAK VELOCITY: 2.1 M/S
ECHO AV VELOCITY RATIO: 0.67
ECHO AV VTI: 42.9 CM
ECHO EST RA PRESSURE: 3 MMHG
ECHO LA DIAMETER: 3.9 CM
ECHO LA TO AORTIC ROOT RATIO: 1.15
ECHO LA VOL A-L A2C: 35 ML (ref 22–52)
ECHO LA VOL A-L A4C: 35 ML (ref 22–52)
ECHO LA VOL BP: 35 ML (ref 22–52)
ECHO LA VOL MOD A2C: 33 ML (ref 22–52)
ECHO LA VOL MOD A4C: 32 ML (ref 22–52)
ECHO LA VOLUME AREA LENGTH: 37 ML
ECHO LV E' LATERAL VELOCITY: 5.35 CM/S
ECHO LV E' SEPTAL VELOCITY: 6.16 CM/S
ECHO LV EDV A2C: 52 ML
ECHO LV EDV A4C: 63 ML
ECHO LV EDV BP: 58 ML (ref 56–104)
ECHO LV EF PHYSICIAN: 60 %
ECHO LV EJECTION FRACTION A2C: 61 %
ECHO LV EJECTION FRACTION A4C: 59 %
ECHO LV EJECTION FRACTION BIPLANE: 60 % (ref 55–100)
ECHO LV ESV A2C: 20 ML
ECHO LV ESV A4C: 25 ML
ECHO LV ESV BP: 23 ML (ref 19–49)
ECHO LV FRACTIONAL SHORTENING: 28 % (ref 28–44)
ECHO LV INTERNAL DIMENSION DIASTOLIC: 3.6 CM (ref 3.9–5.3)
ECHO LV INTERNAL DIMENSION SYSTOLIC: 2.6 CM
ECHO LV IVSD: 1.2 CM (ref 0.6–0.9)
ECHO LV MASS 2D: 141.5 G (ref 67–162)
ECHO LV POSTERIOR WALL DIASTOLIC: 1.2 CM (ref 0.6–0.9)
ECHO LV RELATIVE WALL THICKNESS RATIO: 0.67
ECHO LVOT AREA: 3.1 CM2
ECHO LVOT AV VTI INDEX: 0.68
ECHO LVOT DIAM: 2 CM
ECHO LVOT MEAN GRADIENT: 4 MMHG
ECHO LVOT PEAK GRADIENT: 7 MMHG
ECHO LVOT PEAK VELOCITY: 1.4 M/S
ECHO LVOT SV: 91.7 ML
ECHO LVOT VTI: 29.2 CM
ECHO MV A VELOCITY: 1.24 M/S
ECHO MV AREA VTI: 1.9 CM2
ECHO MV E DECELERATION TIME (DT): 356.7 MS
ECHO MV E VELOCITY: 0.93 M/S
ECHO MV E/A RATIO: 0.75
ECHO MV E/E' LATERAL: 17.38
ECHO MV E/E' RATIO (AVERAGED): 16.24
ECHO MV E/E' SEPTAL: 15.1
ECHO MV LVOT VTI INDEX: 1.67
ECHO MV MAX VELOCITY: 1.5 M/S
ECHO MV MEAN GRADIENT: 4 MMHG
ECHO MV MEAN VELOCITY: 1 M/S
ECHO MV PEAK GRADIENT: 9 MMHG
ECHO MV VTI: 48.9 CM
ECHO RA VOLUME: 47 ML
ECHO RIGHT VENTRICULAR SYSTOLIC PRESSURE (RVSP): 23 MMHG
ECHO RV MID DIMENSION: 2.9 CM
ECHO TV REGURGITANT MAX VELOCITY: 2.26 M/S
ECHO TV REGURGITANT PEAK GRADIENT: 20 MMHG

## 2025-05-13 ENCOUNTER — OFFICE VISIT (OUTPATIENT)
Dept: CARDIOLOGY CLINIC | Age: 80
End: 2025-05-13
Payer: MEDICARE

## 2025-05-13 VITALS
SYSTOLIC BLOOD PRESSURE: 128 MMHG | WEIGHT: 175 LBS | OXYGEN SATURATION: 93 % | RESPIRATION RATE: 16 BRPM | HEART RATE: 83 BPM | BODY MASS INDEX: 31 KG/M2 | DIASTOLIC BLOOD PRESSURE: 68 MMHG

## 2025-05-13 DIAGNOSIS — Z98.61 POST PTCA: ICD-10-CM

## 2025-05-13 DIAGNOSIS — I10 ESSENTIAL HYPERTENSION: Primary | ICD-10-CM

## 2025-05-13 DIAGNOSIS — Z95.2 S/P TAVR (TRANSCATHETER AORTIC VALVE REPLACEMENT): ICD-10-CM

## 2025-05-13 DIAGNOSIS — E55.9 VITAMIN D DEFICIENCY: ICD-10-CM

## 2025-05-13 DIAGNOSIS — I35.0 NONRHEUMATIC AORTIC VALVE STENOSIS: ICD-10-CM

## 2025-05-13 DIAGNOSIS — E78.2 MIXED HYPERLIPIDEMIA: ICD-10-CM

## 2025-05-13 PROCEDURE — 1036F TOBACCO NON-USER: CPT | Performed by: NURSE PRACTITIONER

## 2025-05-13 PROCEDURE — G8427 DOCREV CUR MEDS BY ELIG CLIN: HCPCS | Performed by: NURSE PRACTITIONER

## 2025-05-13 PROCEDURE — 1160F RVW MEDS BY RX/DR IN RCRD: CPT | Performed by: NURSE PRACTITIONER

## 2025-05-13 PROCEDURE — 3074F SYST BP LT 130 MM HG: CPT | Performed by: NURSE PRACTITIONER

## 2025-05-13 PROCEDURE — 3078F DIAST BP <80 MM HG: CPT | Performed by: NURSE PRACTITIONER

## 2025-05-13 PROCEDURE — 99214 OFFICE O/P EST MOD 30 MIN: CPT | Performed by: NURSE PRACTITIONER

## 2025-05-13 PROCEDURE — 1159F MED LIST DOCD IN RCRD: CPT | Performed by: NURSE PRACTITIONER

## 2025-05-13 PROCEDURE — 1123F ACP DISCUSS/DSCN MKR DOCD: CPT | Performed by: NURSE PRACTITIONER

## 2025-05-13 PROCEDURE — G8399 PT W/DXA RESULTS DOCUMENT: HCPCS | Performed by: NURSE PRACTITIONER

## 2025-05-13 PROCEDURE — G8417 CALC BMI ABV UP PARAM F/U: HCPCS | Performed by: NURSE PRACTITIONER

## 2025-05-13 PROCEDURE — 1090F PRES/ABSN URINE INCON ASSESS: CPT | Performed by: NURSE PRACTITIONER

## 2025-05-13 NOTE — PATIENT INSTRUCTIONS
Will STOP Vitamin D OTC.  Repeat Vitamin D level in 3 months - will call results.  Will follow up in 1 year.

## 2025-05-13 NOTE — PROGRESS NOTES
Nancy Palomares CNP 1 year follow up   With echo  3 episodes walking feels weak  Feels like passing out.   No chest pain or sob  One episode bp low   Other at store   Did tell  Back.  Wili leg edema    Slammed rt hand in a door   Yesterday   Very swollen and bruised.   Able to use hand     
9 05/01/2025    AST 17 05/01/2025     05/01/2025    K 4.1 05/01/2025     05/01/2025    CREATININE 1.2 (H) 05/01/2025    BUN 22 05/01/2025    CO2 31 05/01/2025    TSH 3.11 05/01/2025    INR 1.2 09/19/2024    LABA1C 5.3 04/17/2023       Encounter Date: 05/01/25   EKG 12 Lead   Result Value    Ventricular Rate 81    Atrial Rate 81    P-R Interval 156    QRS Duration 76    Q-T Interval 344    QTc Calculation (Bazett) 399    P Axis 46    R Axis 17    T Axis 83    Narrative    Sinus rhythm with occasional Premature ventricular complexes  Nonspecific T wave abnormality  Abnormal ECG  When compared with ECG of 19-Sep-2024 16:42,  Premature ventricular complexes are now Present      Chest X-ray completed on 05/01/2025:  No acute findings. Status post aortic valve replacement.     05/08/25    ECHO (TTE) COMPLETE (PRN CONTRAST/BUBBLE/STRAIN/3D) 05/11/2025  9:31 PM (Final)    Interpretation Summary    Left Ventricle: Normal left ventricular systolic function with a visually estimated EF of 55 - 60%. EF by visual approximation is 60%. EF by 2D Simpsons Biplane is 60%. Left ventricle is smaller than normal. Mildly increased wall thickness. Normal wall motion.    Right Ventricle: Not well visualized. Right ventricle size appears normal. RV Mid Dimension is 2.9 cm. Unable to assess systolic function.    Aortic Valve: Sapian ultra bioprosthetic valve that is well-seated with a size of 26 mm. No stenosis. AV Peak Gradient is 18 mmHg. AV Mean Gradient is 9 mmHg. AV Peak Velocity is 2.1 m/s.    Mitral Valve: Trace regurgitation.    Tricuspid Valve: Mild regurgitation. RVSP is 23 mmHg.    Image quality is adequate.    Normal functioning #26 Sapian Ultra bioprosthetic AV replacement with mean gradient 9 mmHg  Normal LV function, EF 60-65%  Repeat echo in 1 year.    Signed by: Ankit Warren MD on 5/11/2025  9:31 PM     IMPRESSION:     1.  Functional class I.  2.  Catheterization on 01/28/2022, that showed 75% disease in the

## 2025-05-19 ENCOUNTER — OFFICE VISIT (OUTPATIENT)
Dept: FAMILY MEDICINE CLINIC | Age: 80
End: 2025-05-19
Payer: MEDICARE

## 2025-05-19 VITALS
SYSTOLIC BLOOD PRESSURE: 124 MMHG | BODY MASS INDEX: 30.83 KG/M2 | HEART RATE: 76 BPM | DIASTOLIC BLOOD PRESSURE: 62 MMHG | WEIGHT: 174 LBS | HEIGHT: 63 IN

## 2025-05-19 DIAGNOSIS — S60.221A CONTUSION OF RIGHT HAND, INITIAL ENCOUNTER: ICD-10-CM

## 2025-05-19 DIAGNOSIS — J18.9 PNEUMONIA OF RIGHT LOWER LOBE DUE TO INFECTIOUS ORGANISM: Primary | ICD-10-CM

## 2025-05-19 DIAGNOSIS — E55.9 VITAMIN D DEFICIENCY: ICD-10-CM

## 2025-05-19 DIAGNOSIS — R31.29 MICROSCOPIC HEMATURIA: ICD-10-CM

## 2025-05-19 DIAGNOSIS — N18.31 STAGE 3A CHRONIC KIDNEY DISEASE (HCC): ICD-10-CM

## 2025-05-19 DIAGNOSIS — I48.0 PAROXYSMAL ATRIAL FIBRILLATION (HCC): ICD-10-CM

## 2025-05-19 DIAGNOSIS — I10 PRIMARY HYPERTENSION: ICD-10-CM

## 2025-05-19 DIAGNOSIS — E78.2 MIXED HYPERLIPIDEMIA: ICD-10-CM

## 2025-05-19 PROCEDURE — 99214 OFFICE O/P EST MOD 30 MIN: CPT | Performed by: INTERNAL MEDICINE

## 2025-05-19 PROCEDURE — G8399 PT W/DXA RESULTS DOCUMENT: HCPCS | Performed by: INTERNAL MEDICINE

## 2025-05-19 PROCEDURE — 3078F DIAST BP <80 MM HG: CPT | Performed by: INTERNAL MEDICINE

## 2025-05-19 PROCEDURE — 1123F ACP DISCUSS/DSCN MKR DOCD: CPT | Performed by: INTERNAL MEDICINE

## 2025-05-19 PROCEDURE — 1159F MED LIST DOCD IN RCRD: CPT | Performed by: INTERNAL MEDICINE

## 2025-05-19 PROCEDURE — G8427 DOCREV CUR MEDS BY ELIG CLIN: HCPCS | Performed by: INTERNAL MEDICINE

## 2025-05-19 PROCEDURE — 1036F TOBACCO NON-USER: CPT | Performed by: INTERNAL MEDICINE

## 2025-05-19 PROCEDURE — 1090F PRES/ABSN URINE INCON ASSESS: CPT | Performed by: INTERNAL MEDICINE

## 2025-05-19 PROCEDURE — G8417 CALC BMI ABV UP PARAM F/U: HCPCS | Performed by: INTERNAL MEDICINE

## 2025-05-19 PROCEDURE — 1160F RVW MEDS BY RX/DR IN RCRD: CPT | Performed by: INTERNAL MEDICINE

## 2025-05-19 PROCEDURE — 3074F SYST BP LT 130 MM HG: CPT | Performed by: INTERNAL MEDICINE

## 2025-05-19 RX ORDER — CEPHALEXIN 500 MG/1
500 CAPSULE ORAL 2 TIMES DAILY
Qty: 20 CAPSULE | Refills: 0 | Status: SHIPPED | OUTPATIENT
Start: 2025-05-19

## 2025-05-19 ASSESSMENT — ENCOUNTER SYMPTOMS
SHORTNESS OF BREATH: 0
RHINORRHEA: 0
NAUSEA: 0
DIARRHEA: 0
CONSTIPATION: 0
BLOOD IN STOOL: 0
SORE THROAT: 0
ABDOMINAL PAIN: 0
CHEST TIGHTNESS: 0
COLOR CHANGE: 1
COUGH: 1

## 2025-05-19 ASSESSMENT — COPD QUESTIONNAIRES: COPD: 1

## 2025-05-19 NOTE — PROGRESS NOTES
months for check up or as needed with any medical issues.               Subjective   Geri presents for a check up on her medical conditions HTN, Hyperlipidemia, Atrial fib, CAD, COPD.  Geri denies new problems.  She has a cough that is improving.  Medications were reviewed with Geri, she is  tolerating the medication.  Bowels are regular.  There has not been rectal bleeding.  Geri denies urinary complications, the urine stream is good.  Geri denies chest pain and denies increasing shortness of breath.  Labs from 5/25 reviewed.  CXR from 5/25 reviewed.      She was having cramping in her hands and toes but have improved after Cardiology stopped Vitamin D.      She closed  her right hand in a door.  Severe bruising but moving it well with no increase in pain.     Past Medical History:  No date: Aortic stenosis  No date: Arthritis  No date: Benign cyst of right breast in female  No date: Breast cyst  No date: Cardiac murmur  No date: Cataract  08/2013: Cerebral brain hemorrhage (HCC)      Comment:  Right thalamic  05/03/2022: Chronic renal disease, stage III ([facility]) [222070]  04/07/2022: Coronary artery disease involving native coronary artery   of native heart without angina pectoris  2016: Deep vein blood clot of left lower extremity ([facility])  No date: Diverticulosis  No date: Head injury  No date: Hx of blood clots  No date: Hyperlipidemia  No date: Hypertension  No date: Migraine headache  10/05/2020: Pulmonary emphysema ([facility])  No date: Seizures ([facility])  No date: Stroke (cerebrum) ([facility])  No date: Unspecified cerebral artery occlusion with cerebral   infarction      Comment:  TIA    Past Surgical History:  No date: BLEPHAROPLASTY  No date: BREAST SURGERY      Comment:  b/l breast cyst removal   No date: CATARACT REMOVAL  2009: COLONOSCOPY  8/28/2018:  COLONOSCOPY BIOPSY/STOMA; N/A      Comment:  COLONOSCOPY BIOPSY/STOMA, Dx: External Hemorrhoids and                Severe Diverticulosis performed by Audi Young

## 2025-05-19 NOTE — ASSESSMENT & PLAN NOTE
Creatinine up to 1.2.  She does have hematuria (microscopic) with the concerns of possible stones vs primary kidney disease from HTN.  Will check a CT scan of the abdomen and pelvis without contrast.   BMP in 3 months.    Orders:    Basic Metabolic Panel; Future    CT ABDOMEN PELVIS WO CONTRAST Additional Contrast? None; Future

## 2025-05-19 NOTE — PATIENT INSTRUCTIONS
Survey:     You may be receiving a survey from [employer] regarding your visit today.     You may get this in the mail, through your MyChart or in your email.      Please complete the survey to enable us to provide the highest quality of care to you and your family. Please also, mention our names.     If you cannot score us as very good (5 Stars) on any question, please feel free to call the office to discuss how we could have made your experience exceptional.      Thank You!        Dr. Young, MD Duvall, KRISTI Deutsch, ASHISH Ruff, APRN CATRACHITO Frankel, ASHISH Benton, CMA       Assessment & Plan  Pneumonia of right lower lobe due to infectious organism   Increase rales in right lower lobe with continued cough.  Will treat with Keflex 500 mg two times a day x 10 days.    Orders:    cephALEXin (KEFLEX) 500 MG capsule; Take 1 capsule by mouth 2 times daily    Paroxysmal atrial fibrillation (HCC)   NSR today.    Continues on Eliquis.   Follows with Cardiology.         Vitamin D deficiency   Recently discontinued with a level of 99.  Will have her start taking Vitamin D on Monday, Wednesday, and Friday only.          Primary hypertension   Controlled on Lasix.  No change in medication.         Mixed hyperlipidemia   Controlled on Crestor.  No change in dose.         Contusion of right hand, initial encounter   Doing well with no concerns of fracture at this time.         Stage 3a chronic kidney disease (HCC)   Creatinine up to 1.2.  She does have hematuria (microscopic) with the concerns of possible stones vs primary kidney disease from HTN.  Will check a CT scan of the abdomen and pelvis without contrast.   BMP in 3 months.    Orders:    Basic Metabolic Panel; Future    CT ABDOMEN PELVIS WO CONTRAST Additional Contrast? None; Future    Microscopic hematuria   Will check a CT scan of the abdomen and pelvis without contrast.     Orders:    CT ABDOMEN PELVIS WO CONTRAST Additional Contrast? None; Future      Geri HARVEY

## 2025-05-19 NOTE — ASSESSMENT & PLAN NOTE
Recently discontinued with a level of 99.  Will have her start taking Vitamin D on Monday, Wednesday, and Friday only.

## 2025-05-21 ENCOUNTER — HOSPITAL ENCOUNTER (OUTPATIENT)
Age: 80
Discharge: HOME | End: 2025-05-21
Payer: MEDICARE

## 2025-05-21 DIAGNOSIS — N30.00 ACUTE CYSTITIS WITHOUT HEMATURIA: ICD-10-CM

## 2025-05-21 LAB
-: ABNORMAL
BACTERIA URNS QL MICRO: ABNORMAL
BILIRUB UR QL STRIP: NEGATIVE
CLARITY UR: CLEAR
COLOR UR: ABNORMAL
COMMENT: ABNORMAL
EPI CELLS #/AREA URNS HPF: ABNORMAL /HPF
GLUCOSE UR STRIP-MCNC: NEGATIVE MG/DL
HGB UR QL STRIP.AUTO: ABNORMAL
KETONES UR STRIP-MCNC: NEGATIVE MG/DL
LEUKOCYTE ESTERASE UR QL STRIP: NEGATIVE
NITRITE UR QL STRIP: NEGATIVE
PH UR STRIP: 6 [PH] (ref 5–8)
PROT UR STRIP-MCNC: ABNORMAL MG/DL
RBC #/AREA URNS HPF: ABNORMAL /HPF (ref 0–2)
SP GR UR STRIP: 1.03 (ref 1–1.03)
UROBILINOGEN UR STRIP-ACNC: NORMAL EU/DL (ref 0–1)
WBC #/AREA URNS HPF: ABNORMAL /HPF

## 2025-05-21 PROCEDURE — 81001 URINALYSIS AUTO W/SCOPE: CPT

## 2025-05-27 ENCOUNTER — HOSPITAL ENCOUNTER (OUTPATIENT)
Age: 80
Discharge: HOME | End: 2025-05-27
Attending: INTERNAL MEDICINE
Payer: MEDICARE

## 2025-05-27 ENCOUNTER — RESULTS FOLLOW-UP (OUTPATIENT)
Dept: INTERNAL MEDICINE CLINIC | Age: 80
End: 2025-05-27

## 2025-05-27 ENCOUNTER — HOSPITAL ENCOUNTER (OUTPATIENT)
Dept: CT IMAGING | Age: 80
Discharge: HOME | End: 2025-05-29
Attending: INTERNAL MEDICINE
Payer: MEDICARE

## 2025-05-27 DIAGNOSIS — N18.31 STAGE 3A CHRONIC KIDNEY DISEASE (HCC): ICD-10-CM

## 2025-05-27 DIAGNOSIS — R31.29 MICROSCOPIC HEMATURIA: ICD-10-CM

## 2025-05-27 DIAGNOSIS — N28.89 LEFT KIDNEY MASS: Primary | ICD-10-CM

## 2025-05-27 PROCEDURE — 74176 CT ABD & PELVIS W/O CONTRAST: CPT

## 2025-05-29 ENCOUNTER — HOSPITAL ENCOUNTER (OUTPATIENT)
Dept: CT IMAGING | Age: 80
Discharge: HOME | End: 2025-05-31
Attending: INTERNAL MEDICINE
Payer: MEDICARE

## 2025-05-29 ENCOUNTER — RESULTS FOLLOW-UP (OUTPATIENT)
Dept: INTERNAL MEDICINE CLINIC | Age: 80
End: 2025-05-29

## 2025-05-29 DIAGNOSIS — N28.89 LEFT KIDNEY MASS: ICD-10-CM

## 2025-05-29 PROCEDURE — 6360000004 HC RX CONTRAST MEDICATION: Performed by: INTERNAL MEDICINE

## 2025-05-29 PROCEDURE — 74170 CT ABD WO CNTRST FLWD CNTRST: CPT

## 2025-05-29 RX ORDER — IOPAMIDOL 755 MG/ML
75 INJECTION, SOLUTION INTRAVASCULAR
Status: COMPLETED | OUTPATIENT
Start: 2025-05-29 | End: 2025-05-29

## 2025-05-29 RX ADMIN — IOPAMIDOL 75 ML: 755 INJECTION, SOLUTION INTRAVENOUS at 09:19

## 2025-06-02 ENCOUNTER — HOSPITAL ENCOUNTER (OUTPATIENT)
Age: 80
Setting detail: SPECIMEN
Discharge: HOME | End: 2025-06-02
Payer: MEDICARE

## 2025-06-02 ENCOUNTER — CLINICAL SUPPORT (OUTPATIENT)
Dept: FAMILY MEDICINE CLINIC | Age: 80
End: 2025-06-02
Payer: MEDICARE

## 2025-06-02 DIAGNOSIS — I62.9 INTRACRANIAL BLEED (HCC): Primary | ICD-10-CM

## 2025-06-02 DIAGNOSIS — N18.31 STAGE 3A CHRONIC KIDNEY DISEASE (HCC): ICD-10-CM

## 2025-06-02 LAB
ANION GAP SERPL CALCULATED.3IONS-SCNC: 10 MMOL/L (ref 9–17)
BUN SERPL-MCNC: 14 MG/DL (ref 8–23)
CALCIUM SERPL-MCNC: 8.9 MG/DL (ref 8.6–10.4)
CHLORIDE SERPL-SCNC: 103 MMOL/L (ref 98–107)
CO2 SERPL-SCNC: 29 MMOL/L (ref 20–31)
CREAT SERPL-MCNC: 1 MG/DL (ref 0.5–0.9)
GFR, ESTIMATED: 57 ML/MIN/1.73M2
GLUCOSE SERPL-MCNC: 91 MG/DL (ref 70–99)
POTASSIUM SERPL-SCNC: 4.3 MMOL/L (ref 3.7–5.3)
SODIUM SERPL-SCNC: 142 MMOL/L (ref 135–144)

## 2025-06-02 PROCEDURE — 36415 COLL VENOUS BLD VENIPUNCTURE: CPT | Performed by: INTERNAL MEDICINE

## 2025-06-02 PROCEDURE — 36415 COLL VENOUS BLD VENIPUNCTURE: CPT

## 2025-06-02 PROCEDURE — 80048 BASIC METABOLIC PNL TOTAL CA: CPT

## 2025-06-09 ENCOUNTER — HOSPITAL ENCOUNTER (INPATIENT)
Age: 80
LOS: 3 days | Discharge: NURSING, REHAB OR LONG-TERM CARE | DRG: 191 | End: 2025-06-12
Attending: EMERGENCY MEDICINE | Admitting: INTERNAL MEDICINE
Payer: MEDICARE

## 2025-06-09 ENCOUNTER — APPOINTMENT (OUTPATIENT)
Dept: CT IMAGING | Age: 80
DRG: 191 | End: 2025-06-09
Payer: MEDICARE

## 2025-06-09 ENCOUNTER — APPOINTMENT (OUTPATIENT)
Dept: GENERAL RADIOLOGY | Age: 80
DRG: 191 | End: 2025-06-09
Payer: MEDICARE

## 2025-06-09 ENCOUNTER — OFFICE VISIT (OUTPATIENT)
Dept: FAMILY MEDICINE CLINIC | Age: 80
End: 2025-06-09
Payer: MEDICARE

## 2025-06-09 VITALS
SYSTOLIC BLOOD PRESSURE: 139 MMHG | DIASTOLIC BLOOD PRESSURE: 76 MMHG | OXYGEN SATURATION: 94 % | HEART RATE: 96 BPM | TEMPERATURE: 100.8 F

## 2025-06-09 DIAGNOSIS — E86.0 DEHYDRATION: ICD-10-CM

## 2025-06-09 DIAGNOSIS — I24.9 ACUTE CORONARY SYNDROME (HCC): ICD-10-CM

## 2025-06-09 DIAGNOSIS — R53.1 GENERALIZED WEAKNESS: ICD-10-CM

## 2025-06-09 DIAGNOSIS — I48.91 ATRIAL FIBRILLATION WITH RAPID VENTRICULAR RESPONSE (HCC): ICD-10-CM

## 2025-06-09 DIAGNOSIS — J44.1 COPD EXACERBATION (HCC): ICD-10-CM

## 2025-06-09 DIAGNOSIS — J18.9 PNEUMONIA OF BOTH LOWER LOBES DUE TO INFECTIOUS ORGANISM: Primary | ICD-10-CM

## 2025-06-09 DIAGNOSIS — R06.02 SHORTNESS OF BREATH: Primary | ICD-10-CM

## 2025-06-09 LAB
-: NORMAL
ALBUMIN SERPL-MCNC: 3.7 G/DL (ref 3.5–5.2)
ALBUMIN/GLOB SERPL: 1 {RATIO} (ref 1–2.5)
ALLEN TEST: POSITIVE
ALP SERPL-CCNC: 49 U/L (ref 35–104)
ALT SERPL-CCNC: 6 U/L (ref 5–33)
ANION GAP SERPL CALCULATED.3IONS-SCNC: 13 MMOL/L (ref 9–17)
AST SERPL-CCNC: 22 U/L
BASOPHILS # BLD: 0.01 K/UL (ref 0–0.2)
BASOPHILS NFR BLD: 0 % (ref 0–2)
BILIRUB SERPL-MCNC: 0.7 MG/DL (ref 0.3–1.2)
BILIRUB UR QL STRIP: NEGATIVE
BNP SERPL-MCNC: 2119 PG/ML
BUN SERPL-MCNC: 23 MG/DL (ref 8–23)
CALCIUM SERPL-MCNC: 9 MG/DL (ref 8.6–10.4)
CHLORIDE SERPL-SCNC: 98 MMOL/L (ref 98–107)
CLARITY UR: CLEAR
CO2 SERPL-SCNC: 24 MMOL/L (ref 20–31)
COLOR UR: ABNORMAL
COMMENT: ABNORMAL
CREAT SERPL-MCNC: 1.1 MG/DL (ref 0.5–0.9)
EOSINOPHIL # BLD: 0.01 K/UL (ref 0–0.4)
EOSINOPHILS RELATIVE PERCENT: 0 % (ref 0–5)
EPI CELLS #/AREA URNS HPF: NORMAL /HPF
ERYTHROCYTE [DISTWIDTH] IN BLOOD BY AUTOMATED COUNT: 14.3 % (ref 12.1–15.2)
FIO2: 35
FLUAV AG SPEC QL: NEGATIVE
FLUBV AG SPEC QL: NEGATIVE
GFR, ESTIMATED: 51 ML/MIN/1.73M2
GLUCOSE SERPL-MCNC: 158 MG/DL (ref 70–99)
GLUCOSE UR STRIP-MCNC: NEGATIVE MG/DL
HCT VFR BLD AUTO: 42 % (ref 36–46)
HGB BLD-MCNC: 14.3 G/DL (ref 12–16)
HGB UR QL STRIP.AUTO: ABNORMAL
IMM GRANULOCYTES # BLD AUTO: 0.04 K/UL (ref 0–0.3)
IMM GRANULOCYTES NFR BLD: 1 % (ref 0–5)
KETONES UR STRIP-MCNC: ABNORMAL MG/DL
LACTATE BLDV-SCNC: 2.3 MMOL/L (ref 0.5–1.9)
LEUKOCYTE ESTERASE UR QL STRIP: NEGATIVE
LYMPHOCYTES NFR BLD: 0.75 K/UL (ref 1–4.8)
LYMPHOCYTES RELATIVE PERCENT: 13 % (ref 15–40)
MCH RBC QN AUTO: 30.4 PG (ref 26–34)
MCHC RBC AUTO-ENTMCNC: 34 G/DL (ref 31–37)
MCV RBC AUTO: 89.2 FL (ref 80–100)
MODE: ABNORMAL
MONOCYTES NFR BLD: 1.21 K/UL (ref 0–1)
MONOCYTES NFR BLD: 20 % (ref 4–8)
NEUTROPHILS NFR BLD: 66 % (ref 47–75)
NEUTS SEG NFR BLD: 3.94 K/UL (ref 2.5–7)
NITRITE UR QL STRIP: NEGATIVE
O2 DELIVERY DEVICE: ABNORMAL
PH UR STRIP: 6 [PH] (ref 5–8)
PLATELET # BLD AUTO: 95 K/UL (ref 140–450)
PMV BLD AUTO: 11.2 FL (ref 6–12)
POC HCO3: 22.8 MMOL/L (ref 21–28)
POC O2 SATURATION: 98.1 % (ref 94–98)
POC PCO2: 29.1 MM HG (ref 35–48)
POC PH: 7.5 (ref 7.35–7.45)
POC PO2: 93.5 MM HG (ref 83–108)
POSITIVE BASE EXCESS, ART: 0.4 MMOL/L (ref 0–3)
POTASSIUM SERPL-SCNC: 3.7 MMOL/L (ref 3.7–5.3)
PROT SERPL-MCNC: 7.3 G/DL (ref 6.4–8.3)
PROT UR STRIP-MCNC: ABNORMAL MG/DL
RBC # BLD AUTO: 4.71 M/UL (ref 4–5.2)
RBC #/AREA URNS HPF: NORMAL /HPF (ref 0–2)
SAMPLE SITE: ABNORMAL
SARS-COV-2 RDRP RESP QL NAA+PROBE: NOT DETECTED
SODIUM SERPL-SCNC: 135 MMOL/L (ref 135–144)
SP GR UR STRIP: 1.02 (ref 1–1.03)
SPECIMEN DESCRIPTION: NORMAL
TROPONIN I SERPL HS-MCNC: 51 NG/L (ref 0–14)
TROPONIN I SERPL HS-MCNC: 53 NG/L (ref 0–14)
TROPONIN I SERPL HS-MCNC: 53 NG/L (ref 0–14)
UROBILINOGEN UR STRIP-ACNC: ABNORMAL EU/DL (ref 0–1)
WBC #/AREA URNS HPF: NORMAL /HPF
WBC OTHER # BLD: 6 K/UL (ref 3.5–11)

## 2025-06-09 PROCEDURE — 5A09357 ASSISTANCE WITH RESPIRATORY VENTILATION, LESS THAN 24 CONSECUTIVE HOURS, CONTINUOUS POSITIVE AIRWAY PRESSURE: ICD-10-PCS | Performed by: INTERNAL MEDICINE

## 2025-06-09 PROCEDURE — 3075F SYST BP GE 130 - 139MM HG: CPT | Performed by: INTERNAL MEDICINE

## 2025-06-09 PROCEDURE — 71250 CT THORAX DX C-: CPT

## 2025-06-09 PROCEDURE — 83880 ASSAY OF NATRIURETIC PEPTIDE: CPT

## 2025-06-09 PROCEDURE — 2580000003 HC RX 258: Performed by: EMERGENCY MEDICINE

## 2025-06-09 PROCEDURE — 51701 INSERT BLADDER CATHETER: CPT

## 2025-06-09 PROCEDURE — 36415 COLL VENOUS BLD VENIPUNCTURE: CPT

## 2025-06-09 PROCEDURE — 2580000003 HC RX 258: Performed by: INTERNAL MEDICINE

## 2025-06-09 PROCEDURE — 3078F DIAST BP <80 MM HG: CPT | Performed by: INTERNAL MEDICINE

## 2025-06-09 PROCEDURE — 2500000003 HC RX 250 WO HCPCS: Performed by: INTERNAL MEDICINE

## 2025-06-09 PROCEDURE — 93005 ELECTROCARDIOGRAM TRACING: CPT | Performed by: EMERGENCY MEDICINE

## 2025-06-09 PROCEDURE — 2000000000 HC ICU R&B

## 2025-06-09 PROCEDURE — 2500000003 HC RX 250 WO HCPCS: Performed by: EMERGENCY MEDICINE

## 2025-06-09 PROCEDURE — 84484 ASSAY OF TROPONIN QUANT: CPT

## 2025-06-09 PROCEDURE — 99285 EMERGENCY DEPT VISIT HI MDM: CPT

## 2025-06-09 PROCEDURE — 1123F ACP DISCUSS/DSCN MKR DOCD: CPT | Performed by: INTERNAL MEDICINE

## 2025-06-09 PROCEDURE — 94761 N-INVAS EAR/PLS OXIMETRY MLT: CPT

## 2025-06-09 PROCEDURE — 1090F PRES/ABSN URINE INCON ASSESS: CPT | Performed by: INTERNAL MEDICINE

## 2025-06-09 PROCEDURE — 96375 TX/PRO/DX INJ NEW DRUG ADDON: CPT

## 2025-06-09 PROCEDURE — 1159F MED LIST DOCD IN RCRD: CPT | Performed by: INTERNAL MEDICINE

## 2025-06-09 PROCEDURE — G8399 PT W/DXA RESULTS DOCUMENT: HCPCS | Performed by: INTERNAL MEDICINE

## 2025-06-09 PROCEDURE — 80053 COMPREHEN METABOLIC PANEL: CPT

## 2025-06-09 PROCEDURE — 6360000002 HC RX W HCPCS: Performed by: EMERGENCY MEDICINE

## 2025-06-09 PROCEDURE — 1036F TOBACCO NON-USER: CPT | Performed by: INTERNAL MEDICINE

## 2025-06-09 PROCEDURE — 94660 CPAP INITIATION&MGMT: CPT

## 2025-06-09 PROCEDURE — 82803 BLOOD GASES ANY COMBINATION: CPT

## 2025-06-09 PROCEDURE — 81001 URINALYSIS AUTO W/SCOPE: CPT

## 2025-06-09 PROCEDURE — 83605 ASSAY OF LACTIC ACID: CPT

## 2025-06-09 PROCEDURE — 87635 SARS-COV-2 COVID-19 AMP PRB: CPT

## 2025-06-09 PROCEDURE — 99213 OFFICE O/P EST LOW 20 MIN: CPT | Performed by: INTERNAL MEDICINE

## 2025-06-09 PROCEDURE — 2700000000 HC OXYGEN THERAPY PER DAY

## 2025-06-09 PROCEDURE — 85025 COMPLETE CBC W/AUTO DIFF WBC: CPT

## 2025-06-09 PROCEDURE — G8427 DOCREV CUR MEDS BY ELIG CLIN: HCPCS | Performed by: INTERNAL MEDICINE

## 2025-06-09 PROCEDURE — 6370000000 HC RX 637 (ALT 250 FOR IP): Performed by: INTERNAL MEDICINE

## 2025-06-09 PROCEDURE — 87804 INFLUENZA ASSAY W/OPTIC: CPT

## 2025-06-09 PROCEDURE — 96374 THER/PROPH/DIAG INJ IV PUSH: CPT

## 2025-06-09 PROCEDURE — 6370000000 HC RX 637 (ALT 250 FOR IP): Performed by: EMERGENCY MEDICINE

## 2025-06-09 PROCEDURE — 71045 X-RAY EXAM CHEST 1 VIEW: CPT

## 2025-06-09 PROCEDURE — 87040 BLOOD CULTURE FOR BACTERIA: CPT

## 2025-06-09 PROCEDURE — G8417 CALC BMI ABV UP PARAM F/U: HCPCS | Performed by: INTERNAL MEDICINE

## 2025-06-09 PROCEDURE — 36600 WITHDRAWAL OF ARTERIAL BLOOD: CPT

## 2025-06-09 RX ORDER — SODIUM CHLORIDE 0.9 % (FLUSH) 0.9 %
5-40 SYRINGE (ML) INJECTION EVERY 12 HOURS SCHEDULED
Status: DISCONTINUED | OUTPATIENT
Start: 2025-06-09 | End: 2025-06-12 | Stop reason: HOSPADM

## 2025-06-09 RX ORDER — ALBUTEROL SULFATE 90 UG/1
2 INHALANT RESPIRATORY (INHALATION) EVERY 4 HOURS PRN
Status: DISCONTINUED | OUTPATIENT
Start: 2025-06-09 | End: 2025-06-09

## 2025-06-09 RX ORDER — FUROSEMIDE 20 MG/1
20 TABLET ORAL DAILY
Status: DISCONTINUED | OUTPATIENT
Start: 2025-06-10 | End: 2025-06-12 | Stop reason: HOSPADM

## 2025-06-09 RX ORDER — DIVALPROEX SODIUM 250 MG/1
750 TABLET, DELAYED RELEASE ORAL DAILY
Status: DISCONTINUED | OUTPATIENT
Start: 2025-06-10 | End: 2025-06-12 | Stop reason: HOSPADM

## 2025-06-09 RX ORDER — ACETAMINOPHEN 650 MG/1
650 SUPPOSITORY RECTAL EVERY 6 HOURS PRN
Status: DISCONTINUED | OUTPATIENT
Start: 2025-06-09 | End: 2025-06-12 | Stop reason: HOSPADM

## 2025-06-09 RX ORDER — ROSUVASTATIN CALCIUM 10 MG/1
10 TABLET, COATED ORAL NIGHTLY
Status: DISCONTINUED | OUTPATIENT
Start: 2025-06-09 | End: 2025-06-12 | Stop reason: HOSPADM

## 2025-06-09 RX ORDER — POTASSIUM CHLORIDE 7.45 MG/ML
10 INJECTION INTRAVENOUS PRN
Status: DISCONTINUED | OUTPATIENT
Start: 2025-06-09 | End: 2025-06-12 | Stop reason: HOSPADM

## 2025-06-09 RX ORDER — 0.9 % SODIUM CHLORIDE 0.9 %
1000 INTRAVENOUS SOLUTION INTRAVENOUS ONCE
Status: COMPLETED | OUTPATIENT
Start: 2025-06-09 | End: 2025-06-09

## 2025-06-09 RX ORDER — METOPROLOL TARTRATE 25 MG/1
25 TABLET, FILM COATED ORAL 2 TIMES DAILY
Status: DISCONTINUED | OUTPATIENT
Start: 2025-06-09 | End: 2025-06-10

## 2025-06-09 RX ORDER — IBUPROFEN 200 MG
400 TABLET ORAL EVERY 6 HOURS PRN
Status: DISCONTINUED | OUTPATIENT
Start: 2025-06-09 | End: 2025-06-12 | Stop reason: HOSPADM

## 2025-06-09 RX ORDER — SODIUM CHLORIDE 0.9 % (FLUSH) 0.9 %
5-40 SYRINGE (ML) INJECTION PRN
Status: DISCONTINUED | OUTPATIENT
Start: 2025-06-09 | End: 2025-06-12 | Stop reason: HOSPADM

## 2025-06-09 RX ORDER — BUDESONIDE AND FORMOTEROL FUMARATE DIHYDRATE 160; 4.5 UG/1; UG/1
2 AEROSOL RESPIRATORY (INHALATION)
Status: DISCONTINUED | OUTPATIENT
Start: 2025-06-09 | End: 2025-06-12 | Stop reason: HOSPADM

## 2025-06-09 RX ORDER — POTASSIUM CHLORIDE 750 MG/1
40 TABLET, EXTENDED RELEASE ORAL PRN
Status: DISCONTINUED | OUTPATIENT
Start: 2025-06-09 | End: 2025-06-12 | Stop reason: HOSPADM

## 2025-06-09 RX ORDER — ACETAMINOPHEN 325 MG/1
650 TABLET ORAL EVERY 6 HOURS PRN
Status: DISCONTINUED | OUTPATIENT
Start: 2025-06-09 | End: 2025-06-12 | Stop reason: HOSPADM

## 2025-06-09 RX ORDER — ONDANSETRON 2 MG/ML
4 INJECTION INTRAMUSCULAR; INTRAVENOUS EVERY 6 HOURS PRN
Status: DISCONTINUED | OUTPATIENT
Start: 2025-06-09 | End: 2025-06-12 | Stop reason: HOSPADM

## 2025-06-09 RX ORDER — DILTIAZEM HCL IN NACL,ISO-OSM 125 MG/125
2.5-15 PLASTIC BAG, INJECTION (ML) INTRAVENOUS CONTINUOUS
Status: DISCONTINUED | OUTPATIENT
Start: 2025-06-09 | End: 2025-06-11

## 2025-06-09 RX ORDER — IPRATROPIUM BROMIDE AND ALBUTEROL SULFATE 2.5; .5 MG/3ML; MG/3ML
1 SOLUTION RESPIRATORY (INHALATION) EVERY 4 HOURS PRN
Status: DISCONTINUED | OUTPATIENT
Start: 2025-06-09 | End: 2025-06-09

## 2025-06-09 RX ORDER — MAGNESIUM SULFATE IN WATER 40 MG/ML
2000 INJECTION, SOLUTION INTRAVENOUS PRN
Status: DISCONTINUED | OUTPATIENT
Start: 2025-06-09 | End: 2025-06-12 | Stop reason: HOSPADM

## 2025-06-09 RX ORDER — POTASSIUM CHLORIDE 750 MG/1
30 TABLET, EXTENDED RELEASE ORAL DAILY
Status: DISCONTINUED | OUTPATIENT
Start: 2025-06-10 | End: 2025-06-12 | Stop reason: HOSPADM

## 2025-06-09 RX ORDER — ALBUTEROL SULFATE 0.83 MG/ML
2.5 SOLUTION RESPIRATORY (INHALATION) EVERY 4 HOURS PRN
Status: DISCONTINUED | OUTPATIENT
Start: 2025-06-09 | End: 2025-06-12 | Stop reason: HOSPADM

## 2025-06-09 RX ORDER — SODIUM CHLORIDE 9 MG/ML
INJECTION, SOLUTION INTRAVENOUS PRN
Status: DISCONTINUED | OUTPATIENT
Start: 2025-06-09 | End: 2025-06-12 | Stop reason: HOSPADM

## 2025-06-09 RX ORDER — ASPIRIN 81 MG/1
81 TABLET ORAL DAILY
Status: DISCONTINUED | OUTPATIENT
Start: 2025-06-10 | End: 2025-06-12 | Stop reason: HOSPADM

## 2025-06-09 RX ORDER — DILTIAZEM HYDROCHLORIDE 5 MG/ML
10 INJECTION INTRAVENOUS ONCE
Status: COMPLETED | OUTPATIENT
Start: 2025-06-09 | End: 2025-06-09

## 2025-06-09 RX ORDER — ALBUTEROL SULFATE 0.83 MG/ML
2.5 SOLUTION RESPIRATORY (INHALATION)
Status: DISCONTINUED | OUTPATIENT
Start: 2025-06-09 | End: 2025-06-10

## 2025-06-09 RX ORDER — ONDANSETRON 4 MG/1
4 TABLET, ORALLY DISINTEGRATING ORAL EVERY 8 HOURS PRN
Status: DISCONTINUED | OUTPATIENT
Start: 2025-06-09 | End: 2025-06-12 | Stop reason: HOSPADM

## 2025-06-09 RX ORDER — ACETAMINOPHEN 325 MG/1
650 TABLET ORAL ONCE
Status: COMPLETED | OUTPATIENT
Start: 2025-06-09 | End: 2025-06-09

## 2025-06-09 RX ORDER — METOPROLOL TARTRATE 1 MG/ML
2.5 INJECTION, SOLUTION INTRAVENOUS EVERY 4 HOURS PRN
Status: DISCONTINUED | OUTPATIENT
Start: 2025-06-09 | End: 2025-06-12 | Stop reason: HOSPADM

## 2025-06-09 RX ORDER — SODIUM CHLORIDE 9 MG/ML
INJECTION, SOLUTION INTRAVENOUS CONTINUOUS
Status: DISCONTINUED | OUTPATIENT
Start: 2025-06-09 | End: 2025-06-10

## 2025-06-09 RX ORDER — POLYETHYLENE GLYCOL 3350 17 G/17G
17 POWDER, FOR SOLUTION ORAL DAILY PRN
Status: DISCONTINUED | OUTPATIENT
Start: 2025-06-09 | End: 2025-06-12 | Stop reason: HOSPADM

## 2025-06-09 RX ADMIN — METOPROLOL TARTRATE 25 MG: 25 TABLET, FILM COATED ORAL at 23:13

## 2025-06-09 RX ADMIN — WATER 1000 MG: 1 INJECTION INTRAMUSCULAR; INTRAVENOUS; SUBCUTANEOUS at 20:25

## 2025-06-09 RX ADMIN — SODIUM CHLORIDE 1000 ML: 0.9 INJECTION, SOLUTION INTRAVENOUS at 17:32

## 2025-06-09 RX ADMIN — DILTIAZEM HYDROCHLORIDE 10 MG: 5 INJECTION, SOLUTION INTRAVENOUS at 18:38

## 2025-06-09 RX ADMIN — SODIUM CHLORIDE: 0.9 INJECTION, SOLUTION INTRAVENOUS at 22:52

## 2025-06-09 RX ADMIN — ACETAMINOPHEN 650 MG: 325 TABLET, FILM COATED ORAL at 18:30

## 2025-06-09 RX ADMIN — SODIUM CHLORIDE, PRESERVATIVE FREE 10 ML: 5 INJECTION INTRAVENOUS at 23:18

## 2025-06-09 RX ADMIN — Medication 5 MG/HR: at 22:57

## 2025-06-09 RX ADMIN — ROSUVASTATIN 10 MG: 10 TABLET, FILM COATED ORAL at 23:13

## 2025-06-09 RX ADMIN — APIXABAN 5 MG: 5 TABLET, FILM COATED ORAL at 23:13

## 2025-06-09 ASSESSMENT — ENCOUNTER SYMPTOMS
RHINORRHEA: 0
ABDOMINAL PAIN: 0
COUGH: 1
NAUSEA: 0
DIARRHEA: 0
SHORTNESS OF BREATH: 1
SORE THROAT: 1
CONSTIPATION: 0
BLOOD IN STOOL: 0
CHEST TIGHTNESS: 0

## 2025-06-09 ASSESSMENT — PAIN - FUNCTIONAL ASSESSMENT: PAIN_FUNCTIONAL_ASSESSMENT: NONE - DENIES PAIN

## 2025-06-09 NOTE — PROGRESS NOTES
Geri aPrks (:  1945) is a 80 y.o. female,Established patient, here for evaluation of the following chief complaint(s):  Cough and Fatigue         Assessment & Plan  Pneumonia of both lower lobes due to infectious organism   Rales posteriorly about 1/2 way up to suggest bilateral pneumonia.           Dehydration   Secondary to poor oral intake.  She has been nauseated.           Generalized weakness   Likely from dehydration / pneumonia.        With her worsening condition Geri will be sent to the ER for further work up and possible admission to the hospital.                  Subjective   Cough  This is a new problem. Episode onset: 3 days. The problem has been rapidly worsening. The cough is Productive of purulent sputum. Associated symptoms include chills, ear pain, a fever, a sore throat and shortness of breath. Pertinent negatives include no chest pain, myalgias or rhinorrhea. Associated symptoms comments: Severe weakness  . Nothing aggravates the symptoms. She has tried a beta-agonist inhaler for the symptoms. The treatment provided no relief. Her past medical history is significant for emphysema.       Review of Systems   Constitutional:  Positive for chills and fever.   HENT:  Positive for ear pain and sore throat. Negative for congestion, rhinorrhea and sneezing.    Eyes:  Negative for visual disturbance.   Respiratory:  Positive for cough and shortness of breath. Negative for chest tightness.    Cardiovascular:  Negative for chest pain and palpitations.   Gastrointestinal:  Negative for abdominal pain, blood in stool, constipation, diarrhea and nausea.   Genitourinary:  Negative for difficulty urinating, dysuria, frequency, menstrual problem and urgency.   Musculoskeletal:  Negative for arthralgias, joint swelling, myalgias and neck pain.   Skin: Negative.    Neurological:  Negative for syncope.   Psychiatric/Behavioral: Negative.            Objective   Physical Exam  Constitutional:

## 2025-06-09 NOTE — ED PROVIDER NOTES
EMERGENCY DEPARTMENT ENCOUNTER      CHIEF COMPLAINT    Chief Complaint   Patient presents with    Shortness of Breath     Sent from Dr. Young for possible pneumonia.  states sick for a week. Worsened Thursday. Fatigue and weakness       HPI    Geri Parks is a 80 y.o. female who presentsto ED with shortness of breath.  Patient was referred to ED from her family physician's office.  Patient has been having shortness of breath and fatigue.  Symptoms started about a week ago.  Progressively getting worse for the past 4 days.  On arrival to ED patient's respirations mildly labored.  Patient short of breath.  Patient was placed on BiPAP on arrival.  PAST MEDICAL HISTORY    Past Medical History:   Diagnosis Date    Aortic stenosis     Arthritis     Benign cyst of right breast in female     Breast cyst     Cardiac murmur     Cataract     Cerebral brain hemorrhage (HCC) 08/2013    Right thalamic    Chronic renal disease, stage III ([facility]) [424779] 05/03/2022    Coronary artery disease involving native coronary artery of native heart without angina pectoris 04/07/2022    Deep vein blood clot of left lower extremity ([facility]) 2016    Diverticulosis     Head injury     Hx of blood clots     Hyperlipidemia     Hypertension     Migraine headache     Pulmonary emphysema ([facility]) 10/05/2020    Seizures ([facility])     Stroke (cerebrum) ([facility])     Unspecified cerebral artery occlusion with cerebral infarction     TIA       SURGICAL HISTORY    Past Surgical History:   Procedure Laterality Date    BLEPHAROPLASTY      BREAST SURGERY      b/l breast cyst removal     CATARACT REMOVAL      COLONOSCOPY  2009     COLONOSCOPY BIOPSY/STOMA N/A 8/28/2018    COLONOSCOPY BIOPSY/STOMA, Dx: External Hemorrhoids and Severe Diverticulosis performed by Audi Young MD at [facility] OR    HYSTERECTOMY (CERVIX STATUS UNKNOWN)      MAMMO IMPLANT DIGITAL DIAG BI      TUBAL LIGATION         CURRENT MEDICATIONS          ALLERGIES    Allergies   Allergen Reactions

## 2025-06-09 NOTE — ED NOTES
Adult Non-Invasive Positive Pressure Ventilation for Acute Respiratory Distress  Patient & Family Education Note     Patient: Geri Parks  Age: 80 y.o.     The patient and/or family has been educated on the following items and have verbalized understanding and agreement:    []Patient and/or family have been educated on the purpose and advantages of NIV and have verbalized understanding and agreement.  []Patient and/or family is in agreement that the patient will be NPO (Nothing by Mouth) for the duration of NIV use.  []Patient and/or  family is in agreement that NIV will not be routinely disrupted except to complete oral care.  []Patient and/or family have been educated on the level of care, vital signs frequency and monitoring requirements for NIV and are in agreement.  []Patient and/or family have been educated on reporting any nausea, chest discomfort, sudden increase in shortness of breath, or a severe headache to the staff immediately.

## 2025-06-09 NOTE — PATIENT INSTRUCTIONS
Survey:     You may be receiving a survey from [facility] regarding your visit today.     You may get this in the mail, through your MyChart or in your email.      Please complete the survey to enable us to provide the highest quality of care to you and your family. Please also, mention our names.     If you cannot score us as very good (5 Stars) on any question, please feel free to call the office to discuss how we could have made your experience exceptional.      Thank You!        Dr. Young, MD Duvall, KRISTI Deutsch, CMA    Elzbieta, APRN CNP    Marlys, CMA    Serenity, CMA       Assessment & Plan  Pneumonia of both lower lobes due to infectious organism   Rales posteriorly about 1/2 way up to suggest bilateral pneumonia.           Dehydration   Secondary to poor oral intake.  She has been nauseated.           Generalized weakness   Likely from dehydration / pneumonia.        With her worsening condition Geri will be sent to the ER for further work up and possible admission to the hospital.

## 2025-06-09 NOTE — ED NOTES
Patient HR went to 180s an BP decreased. Physician notified. Patient moved to room 2. RT at bedside to place patient on BiPAp. Collected ABG. 5mg of cardizem given.

## 2025-06-10 LAB
ANION GAP SERPL CALCULATED.3IONS-SCNC: 11 MMOL/L (ref 9–17)
B PARAP IS1001 DNA NPH QL NAA+NON-PROBE: NOT DETECTED
B PERT DNA SPEC QL NAA+PROBE: NOT DETECTED
BASOPHILS # BLD: 0.01 K/UL (ref 0–0.2)
BASOPHILS NFR BLD: 0 % (ref 0–2)
BUN SERPL-MCNC: 18 MG/DL (ref 8–23)
C PNEUM DNA NPH QL NAA+NON-PROBE: NOT DETECTED
CALCIUM SERPL-MCNC: 7.8 MG/DL (ref 8.6–10.4)
CHLORIDE SERPL-SCNC: 105 MMOL/L (ref 98–107)
CO2 SERPL-SCNC: 21 MMOL/L (ref 20–31)
CREAT SERPL-MCNC: 0.8 MG/DL (ref 0.5–0.9)
EKG ATRIAL RATE: 81 BPM
EKG P AXIS: 81 DEGREES
EKG P-R INTERVAL: 170 MS
EKG Q-T INTERVAL: 228 MS
EKG Q-T INTERVAL: 350 MS
EKG QRS DURATION: 72 MS
EKG QRS DURATION: 76 MS
EKG QTC CALCULATION (BAZETT): 349 MS
EKG QTC CALCULATION (BAZETT): 406 MS
EKG R AXIS: -22 DEGREES
EKG R AXIS: -25 DEGREES
EKG T AXIS: 216 DEGREES
EKG T AXIS: 41 DEGREES
EKG VENTRICULAR RATE: 141 BPM
EKG VENTRICULAR RATE: 81 BPM
EOSINOPHIL # BLD: 0.01 K/UL (ref 0–0.4)
EOSINOPHILS RELATIVE PERCENT: 0 % (ref 0–5)
ERYTHROCYTE [DISTWIDTH] IN BLOOD BY AUTOMATED COUNT: 14.5 % (ref 12.1–15.2)
FLUAV RNA NPH QL NAA+NON-PROBE: NOT DETECTED
FLUBV RNA NPH QL NAA+NON-PROBE: NOT DETECTED
GFR, ESTIMATED: 75 ML/MIN/1.73M2
GLUCOSE SERPL-MCNC: 106 MG/DL (ref 70–99)
HADV DNA NPH QL NAA+NON-PROBE: NOT DETECTED
HCOV 229E RNA NPH QL NAA+NON-PROBE: NOT DETECTED
HCOV HKU1 RNA NPH QL NAA+NON-PROBE: NOT DETECTED
HCOV NL63 RNA NPH QL NAA+NON-PROBE: NOT DETECTED
HCOV OC43 RNA NPH QL NAA+NON-PROBE: NOT DETECTED
HCT VFR BLD AUTO: 35.7 % (ref 36–46)
HGB BLD-MCNC: 11.9 G/DL (ref 12–16)
HMPV RNA NPH QL NAA+NON-PROBE: NOT DETECTED
HPIV1 RNA NPH QL NAA+NON-PROBE: NOT DETECTED
HPIV2 RNA NPH QL NAA+NON-PROBE: NOT DETECTED
HPIV3 RNA NPH QL NAA+NON-PROBE: NOT DETECTED
HPIV4 RNA NPH QL NAA+NON-PROBE: NOT DETECTED
IMM GRANULOCYTES # BLD AUTO: 0.04 K/UL (ref 0–0.3)
IMM GRANULOCYTES NFR BLD: 1 % (ref 0–5)
LYMPHOCYTES NFR BLD: 0.94 K/UL (ref 1–4.8)
LYMPHOCYTES RELATIVE PERCENT: 16 % (ref 15–40)
M PNEUMO DNA NPH QL NAA+NON-PROBE: NOT DETECTED
MCH RBC QN AUTO: 30.3 PG (ref 26–34)
MCHC RBC AUTO-ENTMCNC: 33.3 G/DL (ref 31–37)
MCV RBC AUTO: 90.8 FL (ref 80–100)
MONOCYTES NFR BLD: 1.24 K/UL (ref 0–1)
MONOCYTES NFR BLD: 20 % (ref 4–8)
NEUTROPHILS NFR BLD: 63 % (ref 47–75)
NEUTS SEG NFR BLD: 3.84 K/UL (ref 2.5–7)
PLATELET # BLD AUTO: 70 K/UL (ref 140–450)
PMV BLD AUTO: 10.4 FL (ref 6–12)
POTASSIUM SERPL-SCNC: 3.6 MMOL/L (ref 3.7–5.3)
RBC # BLD AUTO: 3.93 M/UL (ref 4–5.2)
RSV RNA NPH QL NAA+NON-PROBE: NOT DETECTED
RV+EV RNA NPH QL NAA+NON-PROBE: NOT DETECTED
SARS-COV-2 RNA NPH QL NAA+NON-PROBE: NOT DETECTED
SODIUM SERPL-SCNC: 137 MMOL/L (ref 135–144)
SPECIMEN DESCRIPTION: NORMAL
TROPONIN I SERPL HS-MCNC: 50 NG/L (ref 0–14)
WBC OTHER # BLD: 6.1 K/UL (ref 3.5–11)

## 2025-06-10 PROCEDURE — 6360000002 HC RX W HCPCS: Performed by: INTERNAL MEDICINE

## 2025-06-10 PROCEDURE — 94669 MECHANICAL CHEST WALL OSCILL: CPT

## 2025-06-10 PROCEDURE — 93010 ELECTROCARDIOGRAM REPORT: CPT | Performed by: INTERNAL MEDICINE

## 2025-06-10 PROCEDURE — 6370000000 HC RX 637 (ALT 250 FOR IP): Performed by: INTERNAL MEDICINE

## 2025-06-10 PROCEDURE — 99222 1ST HOSP IP/OBS MODERATE 55: CPT | Performed by: INTERNAL MEDICINE

## 2025-06-10 PROCEDURE — 80048 BASIC METABOLIC PNL TOTAL CA: CPT

## 2025-06-10 PROCEDURE — 1200000000 HC SEMI PRIVATE

## 2025-06-10 PROCEDURE — 2500000003 HC RX 250 WO HCPCS: Performed by: INTERNAL MEDICINE

## 2025-06-10 PROCEDURE — 94761 N-INVAS EAR/PLS OXIMETRY MLT: CPT

## 2025-06-10 PROCEDURE — 36415 COLL VENOUS BLD VENIPUNCTURE: CPT

## 2025-06-10 PROCEDURE — 85025 COMPLETE CBC W/AUTO DIFF WBC: CPT

## 2025-06-10 PROCEDURE — 93005 ELECTROCARDIOGRAM TRACING: CPT | Performed by: INTERNAL MEDICINE

## 2025-06-10 PROCEDURE — 0202U NFCT DS 22 TRGT SARS-COV-2: CPT

## 2025-06-10 PROCEDURE — 2700000000 HC OXYGEN THERAPY PER DAY

## 2025-06-10 PROCEDURE — 94640 AIRWAY INHALATION TREATMENT: CPT

## 2025-06-10 PROCEDURE — 97162 PT EVAL MOD COMPLEX 30 MIN: CPT

## 2025-06-10 PROCEDURE — 94664 DEMO&/EVAL PT USE INHALER: CPT

## 2025-06-10 RX ORDER — LEVALBUTEROL INHALATION SOLUTION 0.63 MG/3ML
0.63 SOLUTION RESPIRATORY (INHALATION) 4 TIMES DAILY
Status: DISCONTINUED | OUTPATIENT
Start: 2025-06-10 | End: 2025-06-12 | Stop reason: HOSPADM

## 2025-06-10 RX ORDER — HYDROCODONE BITARTRATE AND HOMATROPINE METHYLBROMIDE ORAL SOLUTION 5; 1.5 MG/5ML; MG/5ML
5 LIQUID ORAL EVERY 4 HOURS PRN
Refills: 0 | Status: DISCONTINUED | OUTPATIENT
Start: 2025-06-10 | End: 2025-06-12 | Stop reason: HOSPADM

## 2025-06-10 RX ORDER — METOPROLOL TARTRATE 25 MG/1
25 TABLET, FILM COATED ORAL 2 TIMES DAILY
Status: DISCONTINUED | OUTPATIENT
Start: 2025-06-10 | End: 2025-06-12 | Stop reason: HOSPADM

## 2025-06-10 RX ORDER — GUAIFENESIN 600 MG/1
600 TABLET, EXTENDED RELEASE ORAL 2 TIMES DAILY
Status: DISCONTINUED | OUTPATIENT
Start: 2025-06-10 | End: 2025-06-12 | Stop reason: HOSPADM

## 2025-06-10 RX ADMIN — LEVALBUTEROL HYDROCHLORIDE 0.63 MG: 0.63 SOLUTION RESPIRATORY (INHALATION) at 09:40

## 2025-06-10 RX ADMIN — DIVALPROEX SODIUM 750 MG: 250 TABLET, DELAYED RELEASE ORAL at 08:27

## 2025-06-10 RX ADMIN — SODIUM CHLORIDE, PRESERVATIVE FREE 10 ML: 5 INJECTION INTRAVENOUS at 17:25

## 2025-06-10 RX ADMIN — DICLOFENAC SODIUM 4 G: 10 GEL TOPICAL at 17:37

## 2025-06-10 RX ADMIN — ROSUVASTATIN 10 MG: 10 TABLET, FILM COATED ORAL at 20:40

## 2025-06-10 RX ADMIN — FUROSEMIDE 20 MG: 20 TABLET ORAL at 08:28

## 2025-06-10 RX ADMIN — APIXABAN 5 MG: 5 TABLET, FILM COATED ORAL at 20:40

## 2025-06-10 RX ADMIN — BUDESONIDE AND FORMOTEROL FUMARATE DIHYDRATE 2 PUFF: 160; 4.5 AEROSOL RESPIRATORY (INHALATION) at 09:40

## 2025-06-10 RX ADMIN — GUAIFENESIN 600 MG: 600 TABLET ORAL at 20:40

## 2025-06-10 RX ADMIN — POTASSIUM CHLORIDE 30 MEQ: 750 TABLET, FILM COATED, EXTENDED RELEASE ORAL at 08:28

## 2025-06-10 RX ADMIN — TIOTROPIUM BROMIDE INHALATION SPRAY 2 PUFF: 3.12 SPRAY, METERED RESPIRATORY (INHALATION) at 09:40

## 2025-06-10 RX ADMIN — HYDROCODONE BITARTRATE AND HOMATROPINE METHYLBROMIDE 5 ML: 1.5; 5 SOLUTION ORAL at 10:04

## 2025-06-10 RX ADMIN — ALBUTEROL SULFATE 2.5 MG: 2.5 SOLUTION RESPIRATORY (INHALATION) at 05:45

## 2025-06-10 RX ADMIN — DICLOFENAC SODIUM 4 G: 10 GEL TOPICAL at 08:38

## 2025-06-10 RX ADMIN — APIXABAN 5 MG: 5 TABLET, FILM COATED ORAL at 08:28

## 2025-06-10 RX ADMIN — GUAIFENESIN 600 MG: 600 TABLET ORAL at 08:28

## 2025-06-10 RX ADMIN — WATER 40 MG: 1 INJECTION INTRAMUSCULAR; INTRAVENOUS; SUBCUTANEOUS at 17:25

## 2025-06-10 RX ADMIN — DICLOFENAC SODIUM 4 G: 10 GEL TOPICAL at 13:46

## 2025-06-10 RX ADMIN — SODIUM CHLORIDE, PRESERVATIVE FREE 10 ML: 5 INJECTION INTRAVENOUS at 20:39

## 2025-06-10 RX ADMIN — METOPROLOL TARTRATE 25 MG: 25 TABLET, FILM COATED ORAL at 20:40

## 2025-06-10 RX ADMIN — LEVALBUTEROL HYDROCHLORIDE 0.63 MG: 0.63 SOLUTION RESPIRATORY (INHALATION) at 15:35

## 2025-06-10 RX ADMIN — METOPROLOL TARTRATE 25 MG: 25 TABLET, FILM COATED ORAL at 10:37

## 2025-06-10 RX ADMIN — BUDESONIDE AND FORMOTEROL FUMARATE DIHYDRATE 2 PUFF: 160; 4.5 AEROSOL RESPIRATORY (INHALATION) at 20:16

## 2025-06-10 RX ADMIN — WATER 40 MG: 1 INJECTION INTRAMUSCULAR; INTRAVENOUS; SUBCUTANEOUS at 08:28

## 2025-06-10 RX ADMIN — SODIUM CHLORIDE, PRESERVATIVE FREE 10 ML: 5 INJECTION INTRAVENOUS at 08:32

## 2025-06-10 RX ADMIN — WATER 1000 MG: 1 INJECTION INTRAMUSCULAR; INTRAVENOUS; SUBCUTANEOUS at 20:39

## 2025-06-10 RX ADMIN — SODIUM CHLORIDE, PRESERVATIVE FREE 10 ML: 5 INJECTION INTRAVENOUS at 08:33

## 2025-06-10 RX ADMIN — ASPIRIN 81 MG: 81 TABLET, COATED ORAL at 08:28

## 2025-06-10 RX ADMIN — LEVALBUTEROL HYDROCHLORIDE 0.63 MG: 0.63 SOLUTION RESPIRATORY (INHALATION) at 20:16

## 2025-06-10 ASSESSMENT — PAIN - FUNCTIONAL ASSESSMENT
PAIN_FUNCTIONAL_ASSESSMENT: NONE - DENIES PAIN
PAIN_FUNCTIONAL_ASSESSMENT: NONE - DENIES PAIN

## 2025-06-10 NOTE — CARE COORDINATION
Case Management Assessment  Initial Evaluation    Date/Time of Evaluation: 6/10/2025 11:34 AM  Assessment Completed by: Fausto Donahue RN    If patient is discharged prior to next notation, then this note serves as note for discharge by case management.    Patient Name: Geri Parks                   YOB: 1945  Diagnosis: Atrial fibrillation with rapid ventricular response (HCC) [I48.91]                   Date / Time: 6/9/2025  4:48 PM    Patient Admission Status: Inpatient   Readmission Risk (Low < 19, Mod (19-27), High > 27): Readmission Risk Score: 17.3    Current PCP: Audi Young MD  PCP verified by CM? (P) Yes (Dr Young)    Chart Reviewed: Yes      History Provided by: (P) Patient  Patient Orientation: (P) Alert and Oriented, Person, Place, Situation, Self    Patient Cognition: (P) Alert    Hospitalization in the last 30 days (Readmission):  No    If yes, Readmission Assessment in  Navigator will be completed.    Advance Directives:      Code Status: Full Code   Patient's Primary Decision Maker is: (P) Legal Next of Kin    Primary Decision Maker: Nav Parks - Spouse - [phone]    Secondary Decision Maker: KeenanSwati - Child - [phone]    Discharge Planning:    Patient lives with: (P) Spouse/Significant Other Type of Home: (P) House  Primary Care Giver: (P) Self  Patient Support Systems include: (P) Spouse/Significant Other, Family Members, Children   Current Financial resources: (P) Medicare  Current community resources: (P) None  Current services prior to admission: (P) None            Current DME:              Type of Home Care services:  (P) None    ADLS  Prior functional level: (P) Independent in ADLs/IADLs  Current functional level: (P) Assistance with the following:, Mobility    PT AM-PAC:   /24  OT AM-PAC:   /24    Family can provide assistance at DC: (P) Yes  Would you like Case Management to discuss the discharge plan with any other family members/significant

## 2025-06-10 NOTE — H&P
History & Physical    Patient:  Geri Parks  YOB: 1945  Date of Service: 6/10/2025  MRN: [id number]   Acct:   [id number]   Primary Care Physician: Audi Young MD    Chief Complaint:   Chief Complaint   Patient presents with    Shortness of Breath     Sent from Dr. Young for possible pneumonia.  states sick for a week. Worsened Thursday. Fatigue and weakness       History of Present Illness:     History obtained from chart review and the patient.    The patient is a 80 y.o. female with history of CAD, hypertension, hyperlipidemia, aortic stenosis s/p TAVR, h/o factor V Leiden mutation, history of CVA, COPD presented to the emergency room for evaluation of shortness of breath, cough, fatigue/weakness.  According to the patient her symptoms started more than a week ago and progressively became worse.  Reports coughing up some yellowish sputum.  Has been more short of breath and wheezing.  She went to see her PCP  and was sent to the emergency room for evaluation.  Patient reports no chest pain, no palpitations, no nausea or vomiting.  She believes she had fevers and chills at home.  Workup in the emergency room revealed fever 102.7, respirations 32, heart rate 147, BP 97/72, she was 82% on room air.  BMP was unremarkable except creatinine 1.1.  Lactic acid was 2.3.  Troponin was 50 followed by 53, proBNP 2, 119.  LFTs normal.  ABGs revealed pH 7.5, [id number], , bicarb 22.8.  WBC count was normal at 6.0, H&H normal 14.3/42.0, platelets 95.  Influenza A and B antigens negative, COVID-19 was negative.  UA unremarkable.  EKG revealed A-fib with RVR rate of 141.  ST abnormality possible inferior subendocardial injury.  Portable chest x-ray showed new pulmonary venous congestion suggestive of mild CHF, cardiomegaly.  CT chest without contrast revealed:    IMPRESSION:     Findings of mild posterior right lower lobe atelectasis and possible mild   interstitial edema, which may correlate  Delivery Device BIPAP     Sanjay Test POSITIVE     Sample Site Right Radial Artery     Mode Bi-Level Ventilation     FIO2 35.0    Urinalysis    Collection Time: 06/09/25  7:18 PM   Result Value Ref Range    Color, UA JESSE (A) Yellow    Turbidity UA Clear Clear    Glucose, Ur NEGATIVE NEGATIVE mg/dL    Bilirubin, Urine NEGATIVE NEGATIVE    Ketones, Urine TRACE (A) NEGATIVE mg/dL    Specific Gravity, UA 1.025 1.005 - 1.030    Urine Hgb 3+ (A) NEGATIVE    pH, Urine 6.0 5.0 - 8.0    Protein, UA 1+ (A) NEGATIVE mg/dL    Urobilinogen, Urine 4 mg/dL 0.0 - 1.0 EU/dL    Nitrite, Urine NEGATIVE NEGATIVE    Leukocyte Esterase, Urine NEGATIVE NEGATIVE    Comment         Microscopic Urinalysis    Collection Time: 06/09/25  7:18 PM   Result Value Ref Range    WBC, UA None Seen 0 /HPF    RBC, UA 20 TO 50 0 - 2 /HPF    Epithelial Cells, UA 0 TO 2 /HPF    -         Troponin    Collection Time: 06/09/25  9:41 PM   Result Value Ref Range    Troponin, High Sensitivity 51 (H) 0 - 14 ng/L   Troponin    Collection Time: 06/09/25 10:49 PM   Result Value Ref Range    Troponin, High Sensitivity 53 (HH) 0 - 14 ng/L   Basic Metabolic Panel w/ Reflex to MG    Collection Time: 06/10/25  4:13 AM   Result Value Ref Range    Sodium 137 135 - 144 mmol/L    Potassium 3.6 (L) 3.7 - 5.3 mmol/L    Chloride 105 98 - 107 mmol/L    CO2 21 20 - 31 mmol/L    Anion Gap 11 9 - 17 mmol/L    Glucose 106 (H) 70 - 99 mg/dL    BUN 18 8 - 23 mg/dL    Creatinine 0.8 0.5 - 0.9 mg/dL    Est, Glom Filt Rate 75 >60 mL/min/1.73m2    Calcium 7.8 (L) 8.6 - 10.4 mg/dL   CBC with Auto Differential    Collection Time: 06/10/25  4:13 AM   Result Value Ref Range    WBC 6.1 3.5 - 11.0 k/uL    RBC 3.93 (L) 4.00 - 5.20 m/uL    Hemoglobin 11.9 (L) 12.0 - 16.0 g/dL    Hematocrit 35.7 (L) 36.0 - 46.0 %    MCV 90.8 80.0 - 100.0 fL    MCH 30.3 26.0 - 34.0 pg    MCHC 33.3 31.0 - 37.0 g/dL    RDW 14.5 12.1 - 15.2 %    Platelets 70 (L) 140 - 450 k/uL    MPV 10.4 6.0 - 12.0 fL

## 2025-06-10 NOTE — PROGRESS NOTES
keeley Longoria and  at bedside.  Dr. Roberson arrives and in to see patient and speaks with pt and family.  Pt's main c/o this am is \"cough\".

## 2025-06-10 NOTE — ED NOTES
Daughter, Swati, left emergency department. Daughter asked RN to pass along to the nurses upstairs that she lives in [place] and is the closest contact is anything is needed. RN verified correct phone number in chart.

## 2025-06-10 NOTE — PLAN OF CARE
[facility]  Inpatient Physical Therapy  Plan of Care  Date: 6/10/2025  Patient Name: Geri Parks        : 1945  (80 y.o.)  Referring Practitioner: Dr. Olsen  Admission Date: 2025  Referral Date : 06/10/25  Diagnosis: A-fib, exacerbation COPD  Treatment Diagnosis: generalized weakness  PT Orders Received and Evaluation Complete  Identified Problem Areas:  Body Structures, Functions, Activity Limitations Requiring Skilled Therapeutic Intervention: Decreased functional mobility , Decreased strength, Decreased balance  Therapy Prognosis: Good    Justification for Skilled Services:  [x] Reduce Falls   [x] Improve Ambulation  [x]  Complete Daily Tasks Safely   [x] Improve Balance   [x] Improve LE strength  [x]  Return to Prior Level of Function  [x] Improve Functional Mobility   []  Family/Caregiver Education  [x] Patient Education: [x]Assistive Devices []Home Exercise Program and Progression    Plan  Frequency: 1-2x/day Daily M-F, 1x/day Sat-Sun    Duration: 5 days  [] Modalities:  [x] Therapeutic Exercise   [x] Gait Training  [x] Therapeutic Activity    [x] Home Safety Evaluation         [] Massage                        [x] Neuromuscular Re-education [] Back Education             [x] Patient Education [x] Home Exercise Program       Rehab Potential:  [x]  Good [] Fair   []  Poor    Goals  Short Term Goals  Time Frame for Short Term Goals: 5 days  Short Term Goal 1: Patient to transfer sit to stand and stand to sit modified independent  Short Term Goal 2: Patient to ambulate with w.walker 50 ft x2 with SBA  Short Term Goal 3: Patient to have good dynamic standing balance for safe completion of ADLs  Short Term Goal 4: Patient to ambulate up and down 3 stair step with handrails and SBA    Long Term Goals  Time Frame for Long Term Goals : NA    Upon Swingbed Transfer this Plan of Care will be followed until revision is complete.    Diane Sepulveda, PT, PT   Date: 6/10/2025

## 2025-06-10 NOTE — CONSULTS
[facility] Cardiology  [address] [place] [address]  Phone: [phone], Fax: [phone]     Patient: Geri Parks  : 1945  Date of Visit: Chyna 10, 2025    REASON FOR VISIT / CONSULTATION: Hospital admit for pneumonia and paroxysmal atrial fibrillation with rapid ventricular response   -coronary artery disease and TAVR      History of Present Illness:        Ms. Parks is a pleasant 80 y.o. female known to us with a history of severe aortic stenosis status post TAVR, CKD stage III, CAD, left lower extremity DVT, hyperlipidemia, hypertension, COPD, CVA, intracranial hemorrhage , and arthritis who was admitted to [facility] last evening via emergency department for shortness of breath, fatigue and weakness- being sick for a week.  Lactic acid elevated 2.3, NT proBNP 2119, troponin elevated 53, CT of chest showed pulmonary venous congestion with mild congestive heart failure, cardiomegaly unchanged, torturous aorta, prominent right main pulmonary artery 3.7 cm, mild dilatation of the ascending aorta 4.3 cm prior TAVR prosthesis unchanged, and severe left atrial enlargement.  Her  and daughter who is a nurse practitioner note that she has had a cough for 2 months.  It is somewhat productive but they are not sure what color it was.  She has been on multiple antibiotic courses without improvement.  She apparently had atrial fibrillation initially after lung resection.    Heart rate in ED was 145 with atrial fibrillation rapid ventricular spots.  Blood pressure 112/88.  Pulse oximetry 92%.  Temperature 102.7 °F.  I was notified by the nurse last evening and this admission we put the patient on IV Cardizem and subsequently around midnight per my conversation with the nurse the patient converted to normal sinus rhythm where she remains today.    Cardiac history:  She had a history of a pulmonary nodule in the right lower lobe and a PET scan positive and was pending

## 2025-06-10 NOTE — RT PROTOCOL NOTE
RESPIRATORY ASSESSMENT PROTOCOL                                                                                              Patient Name: Geri Parks Room#: [id number] : 1945     Admitting diagnosis: Atrial fibrillation with rapid ventricular response ([facility]) [I48.91]       Medical History:   Past Medical History:   Diagnosis Date    Aortic stenosis     Arthritis     Benign cyst of right breast in female     Breast cyst     Cardiac murmur     Cataract     Cerebral brain hemorrhage (HCC) 2013    Right thalamic    Chronic renal disease, stage III ([facility]) [660294] 2022    Coronary artery disease involving native coronary artery of native heart without angina pectoris 2022    Deep vein blood clot of left lower extremity ([facility]) 2016    Diverticulosis     Head injury     Hx of blood clots     Hyperlipidemia     Hypertension     Migraine headache     Pulmonary emphysema ([facility]) 10/05/2020    Seizures ([facility])     Stroke (cerebrum) ([facility])     Unspecified cerebral artery occlusion with cerebral infarction     TIA       PATIENT ASSESSMENT    LABORATORY DATA  Hematology:   Lab Results   Component Value Date/Time    WBC 6.0 2025 05:13 PM    RBC 4.71 2025 05:13 PM    HGB 14.3 2025 05:13 PM    HCT 42.0 2025 05:13 PM    PLT 95 2025 05:13 PM     Chemistry:    Lab Results   Component Value Date/Time    PBEA 0.4 2025 07:18 PM       VITALS  Pulse: (!) 136   Respirations: 24  BP: 120/71  SpO2: 98 % O2 Device: Nasal cannula  Temp: 98.7 °F (37.1 °C)    SKIN COLOR  [x] Normal  [] Pale  [] Dusky  [] Cyanotic    RESPIRATORY PATTERN  [] Normal  [x] Dyspnea  [] Cheyne-Mares  [] Kussmaul  [] Biots    AMBULATORY  [x] Yes  [] No  [x] With Assistance    PEAK FLOW  Predicted:     Personal Best:            Patient Acuity 0 1 2 3 4 Score   Level of Consciousness (LOC) [x]  Alert & Oriented or Pt normal LOC []  Confused;follows directions []  Confused & uncooper-ative []  Obtunded []  Comatose

## 2025-06-10 NOTE — RT PROTOCOL NOTE
RESPIRATORY ASSESSMENT PROTOCOL                                                                                              Patient Name: Geri Parks Room#: [id number] : 1945     Admitting diagnosis: Atrial fibrillation with rapid ventricular response ([facility]) [I48.91]       Medical History:   Past Medical History:   Diagnosis Date    Aortic stenosis     Arthritis     Benign cyst of right breast in female     Breast cyst     Cardiac murmur     Cataract     Cerebral brain hemorrhage (HCC) 2013    Right thalamic    Chronic renal disease, stage III ([facility]) [424501] 2022    Coronary artery disease involving native coronary artery of native heart without angina pectoris 2022    Deep vein blood clot of left lower extremity ([facility]) 2016    Diverticulosis     Head injury     Hx of blood clots     Hyperlipidemia     Hypertension     Migraine headache     Pulmonary emphysema ([facility]) 10/05/2020    Seizures ([facility])     Stroke (cerebrum) ([facility])     Unspecified cerebral artery occlusion with cerebral infarction     TIA       PATIENT ASSESSMENT    LABORATORY DATA  Hematology:   Lab Results   Component Value Date/Time    WBC 6.1 06/10/2025 04:13 AM    RBC 3.93 06/10/2025 04:13 AM    HGB 11.9 06/10/2025 04:13 AM    HCT 35.7 06/10/2025 04:13 AM    PLT 70 06/10/2025 04:13 AM     Chemistry:    Lab Results   Component Value Date/Time    PBEA 0.4 2025 07:18 PM       VITALS  Pulse: 98   Respirations: 28  BP: 104/62  SpO2: 96 % O2 Device: Nasal cannula  Temp: 98.5 °F (36.9 °C)    SKIN COLOR  [x] Normal  [] Pale  [] Dusky  [] Cyanotic    RESPIRATORY PATTERN  [] Normal  [x] Dyspnea  [] Cheyne-Mares  [] Kussmaul  [] Biots    AMBULATORY  [x] Yes  [] No  [x] With Assistance    PEAK FLOW  Predicted:     Personal Best:            Patient Acuity 0 1 2 3 4 Score   Level of Consciousness (LOC) [x]  Alert & Oriented or Pt normal LOC []  Confused;follows directions []  Confused & uncooper-ative []  Obtunded []  Comatose 0

## 2025-06-10 NOTE — ACP (ADVANCE CARE PLANNING)
Advance Care Planning     Advance Care Planning Activator (Inpatient)  Conversation Note      Date of ACP Conversation: 6/10/2025     Conversation Conducted with: Patient with Decision Making Capacity    ACP Activator: Misty Escudero, MSW, LSW        Health Care Decision Maker:     Current Designated Health Care Decision Maker:     Primary Decision Maker: Nav Parks - Spouse - [phone]    Secondary Decision Maker: Swati Hussein - Child - [phone]  Click here to complete Healthcare Decision Makers including section of the Healthcare Decision Maker Relationship (ie \"Primary\")  Today we documented Decision Maker(s) consistent with Legal Next of Kin hierarchy.    Care Preferences    Ventilation:  \"If you were in your present state of health and suddenly became very ill and were unable to breathe on your own, what would your preference be about the use of a ventilator (breathing machine) if it were available to you?\"      Would the patient desire the use of ventilator (breathing machine)?: yes    \"If your health worsens and it becomes clear that your chance of recovery is unlikely, what would your preference be about the use of a ventilator (breathing machine) if it were available to you?\"     Would the patient desire the use of ventilator (breathing machine)?: No      Resuscitation  \"CPR works best to restart the heart when there is a sudden event, like a heart attack, in someone who is otherwise healthy. Unfortunately, CPR does not typically restart the heart for people who have serious health conditions or who are very sick.\"    \"In the event your heart stopped as a result of an underlying serious health condition, would you want attempts to be made to restart your heart (answer \"yes\" for attempt to resuscitate) or would you prefer a natural death (answer \"no\" for do not attempt to resuscitate)?\" yes       [] Yes   [x] No   Educated Patient / Decision Maker regarding differences between Advance

## 2025-06-10 NOTE — THERAPY EVALUATION
[facility]  Physical Therapy  Evaluation  Date: 6/10/2025  Patient Name: Geri Parks        MRN: [id number]    : 1945  (80 y.o.)  Gender: female   Referring Practitioner: Dr. Olsen  Diagnosis: A-fib, exacerbation COPD  Additional Pertinent Hx: Patient to ED on 25 due to SOB and cough. Patient admit for A-fib and COPD exacerbation. Had fever- checked for covid, but was negative.   Past Medical History:   Diagnosis Date    Aortic stenosis     Arthritis     Benign cyst of right breast in female     Breast cyst     Cardiac murmur     Cataract     Cerebral brain hemorrhage (HCC) 2013    Right thalamic    Chronic renal disease, stage III ([facility]) [443069] 2022    Coronary artery disease involving native coronary artery of native heart without angina pectoris 2022    Deep vein blood clot of left lower extremity ([facility]) 2016    Diverticulosis     Head injury     Hx of blood clots     Hyperlipidemia     Hypertension     Migraine headache     Pulmonary emphysema ([facility]) 10/05/2020    Seizures ([facility])     Stroke (cerebrum) ([facility])     Unspecified cerebral artery occlusion with cerebral infarction     TIA     Past Surgical History:   Procedure Laterality Date    BLEPHAROPLASTY      BREAST SURGERY      b/l breast cyst removal     CATARACT REMOVAL      COLONOSCOPY       COLONOSCOPY BIOPSY/STOMA N/A 2018    COLONOSCOPY BIOPSY/STOMA, Dx: External Hemorrhoids and Severe Diverticulosis performed by Audi Young MD at [facility] OR    HYSTERECTOMY (CERVIX STATUS UNKNOWN)      MAMMO IMPLANT DIGITAL DIAG BI      TUBAL LIGATION         Restrictions  Restrictions/Precautions: Contact Precautions, Fall Risk      Subjective              Orientation       Home Living / Prior Level of Function  Social/Functional History  Lives With: Spouse  Type of Home: House  Home Layout: Able to Live on Main level with bedroom/bathroom  Bathroom Equipment: Grab bars in shower, Grab bars around toilet, Shower chair  Home  call bell.  Therapy Prognosis: Good           Plan  Frequency: 1-2x/day Daily M-F, 1x/day Sat-Sun    Duration: 5 days     Goals  Short Term Goals  Time Frame for Short Term Goals: 5 days  Short Term Goal 1: Patient to transfer sit to stand and stand to sit modified independent  Short Term Goal 2: Patient to ambulate with w.walker 50 ft x2 with SBA  Short Term Goal 3: Patient to have good dynamic standing balance for safe completion of ADLs  Short Term Goal 4: Patient to ambulate up and down 3 stair step with handrails and SBA    Long Term Goals  Time Frame for Long Term Goals : NA          Time In: 14:35  Time Out: 15:00  Timed Coded Minutes:   Total Treatment Time: 25     Diane Sepulveda, PT, PT 6/10/2025

## 2025-06-10 NOTE — PROGRESS NOTES
RN case manager and SW met with pt and spouse to complete assessment. Pt is alert and oriented and pleasant with assessment. Pt is a 80 year old  female admitted for atrial fib with RVR. Pt lives with her spouse in their home in [facility]. Pt has a shower chair and grab bars in the bathroom at home but no other DME. Pt was not using any community services prior to admission. Pt relies on her spouse for transportation.     Pt is a full code and follows with Dr Young as PCP. Pt does not have advance directives on file but reports that her spouse and daughter would be her decision makers if needed. ACP note completed with pt. Pt reports that her medications are covered by insurance. Pt reports no concerns with food, housing, or transportation. Pt plans to return home at discharge and identifies no discharge needs or concerns at this time other than possibly needing a walker. SW will follow and remain available as needed. Misty Escudero, MSW, LSW 6/10/2025

## 2025-06-11 LAB
ANION GAP SERPL CALCULATED.3IONS-SCNC: 11 MMOL/L (ref 9–17)
BASOPHILS # BLD: 0 K/UL (ref 0–0.2)
BASOPHILS NFR BLD: 0 % (ref 0–2)
BUN SERPL-MCNC: 21 MG/DL (ref 8–23)
CALCIUM SERPL-MCNC: 8.4 MG/DL (ref 8.6–10.4)
CHLORIDE SERPL-SCNC: 104 MMOL/L (ref 98–107)
CO2 SERPL-SCNC: 22 MMOL/L (ref 20–31)
CREAT SERPL-MCNC: 0.7 MG/DL (ref 0.5–0.9)
EOSINOPHIL # BLD: 0 K/UL (ref 0–0.4)
EOSINOPHILS RELATIVE PERCENT: 0 % (ref 0–5)
ERYTHROCYTE [DISTWIDTH] IN BLOOD BY AUTOMATED COUNT: 14 % (ref 12.1–15.2)
GFR, ESTIMATED: 88 ML/MIN/1.73M2
GLUCOSE SERPL-MCNC: 226 MG/DL (ref 70–99)
HCT VFR BLD AUTO: 32.2 % (ref 36–46)
HGB BLD-MCNC: 10.7 G/DL (ref 12–16)
IMM GRANULOCYTES # BLD AUTO: 0.04 K/UL (ref 0–0.3)
IMM GRANULOCYTES NFR BLD: 1 % (ref 0–5)
LYMPHOCYTES NFR BLD: 0.36 K/UL (ref 1–4.8)
LYMPHOCYTES RELATIVE PERCENT: 9 % (ref 15–40)
MCH RBC QN AUTO: 30 PG (ref 26–34)
MCHC RBC AUTO-ENTMCNC: 33.2 G/DL (ref 31–37)
MCV RBC AUTO: 90.2 FL (ref 80–100)
MONOCYTES NFR BLD: 0.19 K/UL (ref 0–1)
MONOCYTES NFR BLD: 5 % (ref 4–8)
MORPHOLOGY: ABNORMAL
NEUTROPHILS NFR BLD: 85 % (ref 47–75)
NEUTS SEG NFR BLD: 3.45 K/UL (ref 2.5–7)
PLATELET # BLD AUTO: 76 K/UL (ref 140–450)
PMV BLD AUTO: 10.6 FL (ref 6–12)
POTASSIUM SERPL-SCNC: 4.2 MMOL/L (ref 3.7–5.3)
RBC # BLD AUTO: 3.57 M/UL (ref 4–5.2)
SODIUM SERPL-SCNC: 137 MMOL/L (ref 135–144)
WBC OTHER # BLD: 4 K/UL (ref 3.5–11)

## 2025-06-11 PROCEDURE — 1200000000 HC SEMI PRIVATE

## 2025-06-11 PROCEDURE — 94640 AIRWAY INHALATION TREATMENT: CPT

## 2025-06-11 PROCEDURE — 2500000003 HC RX 250 WO HCPCS: Performed by: INTERNAL MEDICINE

## 2025-06-11 PROCEDURE — 93005 ELECTROCARDIOGRAM TRACING: CPT | Performed by: INTERNAL MEDICINE

## 2025-06-11 PROCEDURE — 97110 THERAPEUTIC EXERCISES: CPT

## 2025-06-11 PROCEDURE — 99232 SBSQ HOSP IP/OBS MODERATE 35: CPT | Performed by: INTERNAL MEDICINE

## 2025-06-11 PROCEDURE — 6360000002 HC RX W HCPCS: Performed by: INTERNAL MEDICINE

## 2025-06-11 PROCEDURE — 6370000000 HC RX 637 (ALT 250 FOR IP): Performed by: INTERNAL MEDICINE

## 2025-06-11 PROCEDURE — 85025 COMPLETE CBC W/AUTO DIFF WBC: CPT

## 2025-06-11 PROCEDURE — 36415 COLL VENOUS BLD VENIPUNCTURE: CPT

## 2025-06-11 PROCEDURE — 94761 N-INVAS EAR/PLS OXIMETRY MLT: CPT

## 2025-06-11 PROCEDURE — 2700000000 HC OXYGEN THERAPY PER DAY

## 2025-06-11 PROCEDURE — 94669 MECHANICAL CHEST WALL OSCILL: CPT

## 2025-06-11 PROCEDURE — 97166 OT EVAL MOD COMPLEX 45 MIN: CPT

## 2025-06-11 PROCEDURE — 80048 BASIC METABOLIC PNL TOTAL CA: CPT

## 2025-06-11 RX ADMIN — WATER 40 MG: 1 INJECTION INTRAMUSCULAR; INTRAVENOUS; SUBCUTANEOUS at 16:27

## 2025-06-11 RX ADMIN — HYDROCODONE BITARTRATE AND HOMATROPINE METHYLBROMIDE 5 ML: 1.5; 5 SOLUTION ORAL at 20:55

## 2025-06-11 RX ADMIN — LEVALBUTEROL HYDROCHLORIDE 0.63 MG: 0.63 SOLUTION RESPIRATORY (INHALATION) at 20:38

## 2025-06-11 RX ADMIN — BUDESONIDE AND FORMOTEROL FUMARATE DIHYDRATE 2 PUFF: 160; 4.5 AEROSOL RESPIRATORY (INHALATION) at 20:38

## 2025-06-11 RX ADMIN — WATER 40 MG: 1 INJECTION INTRAMUSCULAR; INTRAVENOUS; SUBCUTANEOUS at 08:05

## 2025-06-11 RX ADMIN — WATER 1000 MG: 1 INJECTION INTRAMUSCULAR; INTRAVENOUS; SUBCUTANEOUS at 20:55

## 2025-06-11 RX ADMIN — DIVALPROEX SODIUM 750 MG: 250 TABLET, DELAYED RELEASE ORAL at 08:04

## 2025-06-11 RX ADMIN — APIXABAN 5 MG: 5 TABLET, FILM COATED ORAL at 08:04

## 2025-06-11 RX ADMIN — FUROSEMIDE 20 MG: 20 TABLET ORAL at 08:04

## 2025-06-11 RX ADMIN — LEVALBUTEROL HYDROCHLORIDE 0.63 MG: 0.63 SOLUTION RESPIRATORY (INHALATION) at 05:25

## 2025-06-11 RX ADMIN — SODIUM CHLORIDE, PRESERVATIVE FREE 10 ML: 5 INJECTION INTRAVENOUS at 08:04

## 2025-06-11 RX ADMIN — TIOTROPIUM BROMIDE INHALATION SPRAY 2 PUFF: 3.12 SPRAY, METERED RESPIRATORY (INHALATION) at 09:34

## 2025-06-11 RX ADMIN — WATER 40 MG: 1 INJECTION INTRAMUSCULAR; INTRAVENOUS; SUBCUTANEOUS at 01:35

## 2025-06-11 RX ADMIN — POTASSIUM CHLORIDE 30 MEQ: 750 TABLET, FILM COATED, EXTENDED RELEASE ORAL at 08:03

## 2025-06-11 RX ADMIN — LEVALBUTEROL HYDROCHLORIDE 0.63 MG: 0.63 SOLUTION RESPIRATORY (INHALATION) at 16:13

## 2025-06-11 RX ADMIN — APIXABAN 5 MG: 5 TABLET, FILM COATED ORAL at 20:55

## 2025-06-11 RX ADMIN — BUDESONIDE AND FORMOTEROL FUMARATE DIHYDRATE 2 PUFF: 160; 4.5 AEROSOL RESPIRATORY (INHALATION) at 09:31

## 2025-06-11 RX ADMIN — ASPIRIN 81 MG: 81 TABLET, COATED ORAL at 08:04

## 2025-06-11 RX ADMIN — GUAIFENESIN 600 MG: 600 TABLET ORAL at 08:04

## 2025-06-11 RX ADMIN — GUAIFENESIN 600 MG: 600 TABLET ORAL at 20:55

## 2025-06-11 RX ADMIN — HYDROCODONE BITARTRATE AND HOMATROPINE METHYLBROMIDE 5 ML: 1.5; 5 SOLUTION ORAL at 01:46

## 2025-06-11 RX ADMIN — LEVALBUTEROL HYDROCHLORIDE 0.63 MG: 0.63 SOLUTION RESPIRATORY (INHALATION) at 10:33

## 2025-06-11 RX ADMIN — SODIUM CHLORIDE, PRESERVATIVE FREE 10 ML: 5 INJECTION INTRAVENOUS at 20:55

## 2025-06-11 RX ADMIN — ROSUVASTATIN 10 MG: 10 TABLET, FILM COATED ORAL at 20:55

## 2025-06-11 RX ADMIN — DICLOFENAC SODIUM 4 G: 10 GEL TOPICAL at 08:05

## 2025-06-11 ASSESSMENT — PAIN SCALES - GENERAL
PAINLEVEL_OUTOF10: 0

## 2025-06-11 ASSESSMENT — PAIN DESCRIPTION - DESCRIPTORS: DESCRIPTORS: ACHING

## 2025-06-11 ASSESSMENT — PAIN DESCRIPTION - LOCATION: LOCATION: BACK

## 2025-06-11 ASSESSMENT — PAIN - FUNCTIONAL ASSESSMENT: PAIN_FUNCTIONAL_ASSESSMENT: NONE - DENIES PAIN

## 2025-06-11 NOTE — PROGRESS NOTES
RN case manager and SW met with pt and spouse to discuss further discharge planning as pt's dtr had spoke with hospitalist about possibility of swing bed for rehab prior to returning home. Discussed rehab and options with pt for this and spouse reports as long as she is staying in the hospital they think it would be ok and choose swing bed. Swing bed coordinator notified. Pt requesting a rollator walker at discharge and will assist with this closer to discharge to home date. SW following. Misty Escudero, MSW, LSW 6/11/2025

## 2025-06-11 NOTE — PROGRESS NOTES
Nutrition Education    Educated on low sodium nutrition therapy  Learners: Patient  Readiness: Acceptance  Method: Handout  Response: Verbalizes Understanding  Contact name and number provided.    BETTYE BENDER RD, LD  Contact Number: [phone]

## 2025-06-11 NOTE — THERAPY EVALUATION
[facility]  Phone: [phone]    Occupational Therapy Evaluation    Date: 2025  Patient Name: Geri Parks        MRN: [id number]    : 1945  (80 y.o.)  Gender: female   Referring Practitioner: Dr. Olsen  Diagnosis: Atrial fibrillation with RVR  Additional Pertinent Hx: Admitted to [facility] on 2025 due to atrial fibrillation with RVR. Referred to OT services to assess ability to care for self.    Past Medical History:   Diagnosis Date    Aortic stenosis     Arthritis     Benign cyst of right breast in female     Breast cyst     Cardiac murmur     Cataract     Cerebral brain hemorrhage (HCC) 2013    Right thalamic    Chronic renal disease, stage III ([facility]) [[id number]] 2022    Coronary artery disease involving native coronary artery of native heart without angina pectoris 2022    Deep vein blood clot of left lower extremity ([facility]) 2016    Diverticulosis     Head injury     Hx of blood clots     Hyperlipidemia     Hypertension     Migraine headache     Pulmonary emphysema ([facility]) 10/05/2020    Seizures ([facility])     Stroke (cerebrum) ([facility])     Unspecified cerebral artery occlusion with cerebral infarction     TIA     Past Surgical History:   Procedure Laterality Date    BLEPHAROPLASTY      BREAST SURGERY      b/l breast cyst removal     CATARACT REMOVAL      COLONOSCOPY       COLONOSCOPY BIOPSY/STOMA N/A 2018    COLONOSCOPY BIOPSY/STOMA, Dx: External Hemorrhoids and Severe Diverticulosis performed by Audi Young MD at [facility] OR    HYSTERECTOMY (CERVIX STATUS UNKNOWN)      MAMMO IMPLANT DIGITAL DIAG BI      TUBAL LIGATION       Subjective:     Subjective: Patient was sitting in recliner with  present upon OT arrival. Was agreeable to OT evaluation.    Objective:     Social/Functional History:  Lives With: Spouse  Type of Home: House  Home Layout: Able to Live on Main level with bedroom/bathroom  Bathroom Shower/Tub: Tub/Shower unit  Bathroom Toilet:  upper body dressing and/or bathing while using adaptive equipment/techniques PRN with SUP/SETUP.  Short Term Goal 3: Patient will complete lower body dressing and/or bathing while using adaptive equipment/techniques PRN with SBA.  Short Term Goal 4: To increase UE strength for ADLs and functional transfers, patient will engage in 10 minutes of BUE ther ex without a rest break.  Short Term Goal 5: To increase independence and safety with I/ADLs, patient will tolerate static/dynamic standing x5 minutes without LOB.    Long term goals:  Long Term Goals  Time Frame for Long Term Goals : STG=LTG    Plan:     Times Per Week: 2-5x.  Discharge recommendation: Home with home health services         Time In: 1152  Time Out: 1206  Timed Coded Minutes: 0  Total Treatment Time: 14    KARLA Gonzales/KIMMY      Date: 6/11/2025

## 2025-06-11 NOTE — PROGRESS NOTES
Hospitalist Progress Note  6/11/2025 7:35 AM  Subjective:   Admit Date: 6/9/2025  PCP: Audi Young MD    Interval History:     The patient is up in chair, states feels better, however has generalized weakness.  Has intermittent cough, still feels short of breath with activities.  Had no fevers overnight.  Heart rate remained stable.    Diet: ADULT DIET; Regular  Medications:   Scheduled Meds:   levalbuterol  0.63 mg Nebulization 4x Daily    metoprolol tartrate  25 mg Oral BID    methylPREDNISolone  40 mg IntraVENous Q8H    guaiFENesin  600 mg Oral BID    aspirin  81 mg Oral Daily    diclofenac sodium  4 g Topical 4x Daily    divalproex  750 mg Oral Daily    apixaban  5 mg Oral BID    budesonide-formoterol  2 puff Inhalation BID RT    And    tiotropium  2 puff Inhalation Daily RT    furosemide  20 mg Oral Daily    potassium chloride  30 mEq Oral Daily    rosuvastatin  10 mg Oral Nightly    sodium chloride flush  5-40 mL IntraVENous 2 times per day    cefTRIAXone (ROCEPHIN) IV  1,000 mg IntraVENous Q24H     Continuous Infusions:   sodium chloride      dilTIAZem Stopped (06/10/25 0028)     PRN Medications: HYDROcodone homatropine, sodium chloride flush, sodium chloride, potassium chloride **OR** potassium alternative oral replacement **OR** potassium chloride, magnesium sulfate, ondansetron **OR** ondansetron, polyethylene glycol, acetaminophen **OR** acetaminophen, ibuprofen, metoprolol, albuterol    Objective:   Vitals: BP (!) 107/52   Pulse 63   Temp 97.7 °F (36.5 °C) (Temporal)   Resp 18   Ht 1.6 m (5' 3\")   Wt 77.4 kg (170 lb 10.2 oz)   LMP  (LMP Unknown)   SpO2 97%   BMI 30.23 kg/m²   BMI: Body mass index is 30.23 kg/m².    CBC:   Recent Labs     06/09/25  1713 06/10/25  0413 06/11/25  0422   WBC 6.0 6.1 4.0   HGB 14.3 11.9* 10.7*   PLT 95* 70* 76*     BMP:    Recent Labs     06/09/25  1712 06/10/25  0413 06/11/25  0422    137 137   K 3.7 3.6* 4.2   CL 98 105 104   CO2 24 21 22   BUN 23 18 21  normal sinus rhythm.  Heart rate improved.  Already on Eliquis due to history of factor V Leiden mutation.  Started on Lopressor 25 mg twice daily however will hold due to heart rate staying in 60s.  Was evaluated by cardiology Dr. Roberson.  Echo pending  2.  COPD exacerbation with hypoxia  -continue on IV Solu-Medrol, nebulizer treatments, on IV Rocephin, Symbicort, Mucinex  3.  Fever -most likely secondary to viral illness.  Resolved.  COVID-19, influenza A/B were negative, respiratory panel negative, blood cultures pending.   4.  Hypoxia due to COPD exacerbation and mild pulmonary edema -initially was placed on BiPAP, respiratory status improved, wean off oxygen as tolerates  5. S/P TAVR on 3/2/2022  6.  History of CAD, s/p angioplasty of the LAD on 4/5/2022  7.  Hypertension -blood pressure stable  8.  Hyperlipidemia -controlled, on statin  9.  Hypokalemia-replaced  10.  History of factor V Leiden mutation-on Eliquis  11.  Thrombocytopenia -appears to be chronic, worse from baseline.  Possibly due to viral illness.  Will monitor.     Orders this Encounter     Labs/imaging ordered this DOS: [Y/N]  Updates/Changes to Drug Management this DOS: [Y/N]  Medication(s) ordered/changed/continued/considered this DOS:      Code status and discussions:       Medical Necessity:  Inpatient is appropriate for this patient due to need for treatment A-fib with RVR, fever, hypotension     Estimated length of stay: 3 days.        The beneficiary may reasonably be expected to be discharged or transferred to a hospital within 96 hours after admission.             DVT prophylaxis:              [] Lovenox              [] SCDs              [] SQ Heparin              [] Encourage ambulation, low risk for DVT, no chemical or mechanical                          prophylaxis necessary                 [x] Already on Anticoagulation        Documentation of the Current Medications in the Medical Record    ( x)  I have utilized all available

## 2025-06-11 NOTE — PROGRESS NOTES
Phone: [phone] [facility]  Date: 2025  Fax: [phone]      Physical Therapy    Daily Note    Patient Name: Geri Parks      : 1945  (80 y.o.)  MRN: [id number]      Assessment                                                   Assessment: Patient seated up in chair with  also in room. Patient initally declines working with PTA stating that she had done enough this morning. She is encouraged by nurse and PTA and does agree to participate. She completes seated B LE exercises as outlined above. Plan to progress with exercises and gait as able. Remains up in chair with call light in reach and chair alarm attached.   remains in room following.  Safety Devices  Type of Devices: Call light within reach, Chair alarm in place, Gait belt, Left in chair          Call light in reach, Phone in reach, Chair alarm, Family Present, and Left in chair  Time In: 1104  Time Out: 1125  Timed Coded Minutes: 21  Total Treatment Time: 21       Exercises:  See Flowsheets    Plan  Cont Per Plan Of Care    Goals  Short Term Goals  Time Frame for Short Term Goals: 5 days (Ex. 6/15/25)  Short Term Goal 1: Patient to transfer sit to stand and stand to sit modified independent  Short Term Goal 2: Patient to ambulate with w.walker 50 ft x2 with SBA  Short Term Goal 3: Patient to have good dynamic standing balance for safe completion of ADLs  Short Term Goal 4: Patient to ambulate up and down 3 stair step with handrails and SBA       Long Term Goals  Time Frame for Long Term Goals : NISHA Herzog License Number: [id number]    Date: 2025

## 2025-06-11 NOTE — PROGRESS NOTES
Pt HR running low, alarmed for as low as 28.DOes not sustain at that rate, but is running john 40-50's. EKG ordered. Physician updated

## 2025-06-11 NOTE — CARE COORDINATION
06/11/25 0935   IMM Letter   IMM Letter given to Patient/Family/Significant other/Guardian/POA/by: second IMM letter given to patient per Fausto Donahue RN   IMM Letter date given: 06/11/25   IMM Letter time given: 0915

## 2025-06-11 NOTE — PLAN OF CARE
Problem: Chronic Conditions and Co-morbidities  Goal: Patient's chronic conditions and co-morbidity symptoms are monitored and maintained or improved  Recent Flowsheet Documentation  Taken 6/11/2025 0900 by Jesica Norman RN  Care Plan - Patient's Chronic Conditions and Co-Morbidity Symptoms are Monitored and Maintained or Improved:   Monitor and assess patient's chronic conditions and comorbid symptoms for stability, deterioration, or improvement   Collaborate with multidisciplinary team to address chronic and comorbid conditions and prevent exacerbation or deterioration   Update acute care plan with appropriate goals if chronic or comorbid symptoms are exacerbated and prevent overall improvement and discharge     Problem: Discharge Planning  Goal: Discharge to home or other facility with appropriate resources  Outcome: Progressing     Problem: Skin/Tissue Integrity  Goal: Skin integrity remains intact  Description: 1.  Monitor for areas of redness and/or skin breakdown2.  Assess vascular access sites hourly3.  Every 4-6 hours minimum:  Change oxygen saturation probe site4.  Every 4-6 hours:  If on nasal continuous positive airway pressure, respiratory therapy assess nares and determine need for appliance change or resting period  Outcome: Progressing     Problem: Respiratory - Adult  Goal: Achieves optimal ventilation and oxygenation  Outcome: Progressing     Problem: Skin/Tissue Integrity - Adult  Goal: Skin integrity remains intact  Description: 1.  Monitor for areas of redness and/or skin breakdown2.  Assess vascular access sites hourly3.  Every 4-6 hours minimum:  Change oxygen saturation probe site4.  Every 4-6 hours:  If on nasal continuous positive airway pressure, respiratory therapy assess nares and determine need for appliance change or resting period  Outcome: Progressing     Problem: Musculoskeletal - Adult  Goal: Return mobility to safest level of function  Recent Flowsheet Documentation  Taken

## 2025-06-11 NOTE — PROGRESS NOTES
Phone: [phone] [facility]  Date: 2025  Fax: [phone]      Physical Therapy    Daily Note    Patient Name: Geri Parks      : 1945  (80 y.o.)  MRN: [id number]      Assessment                                   Assessment: Patient seated up in chair with  also in room. Patient initally declines working with PTA stating that she had done enough this morning. She is encouraged by nurse and PTA and does agree to participate. She completes seated B LE exercises as outlined above. Plan to progress with exercises and gait as able. Remains up in chair with call light in reach and chair alarm attached.   remains in room following.  Safety Devices  Type of Devices: Bed alarm in place, Call light within reach, Left in bed, Nurse notified          Call light in reach, Phone in reach, Bed alarm, Nurse Notified, and Left in bed  Time In: 1414  Time Out: 1435  Timed Coded Minutes: 21  Total Treatment Time: 21       Exercises:  See Flowsheets    Plan  Cont Per Plan Of Care    Goals  Short Term Goals  Time Frame for Short Term Goals: 5 days (Ex. 6/15/25)  Short Term Goal 1: Patient to transfer sit to stand and stand to sit modified independent  Short Term Goal 2: Patient to ambulate with w.walker 50 ft x2 with SBA  Short Term Goal 3: Patient to have good dynamic standing balance for safe completion of ADLs  Short Term Goal 4: Patient to ambulate up and down 3 stair step with handrails and SBA       Long Term Goals  Time Frame for Long Term Goals : [facility] [facility] License Number: [id number]    Date: 2025

## 2025-06-11 NOTE — PROGRESS NOTES
wall thickness. Normal wall motion.    Right Ventricle: Not well visualized. Right ventricle size appears normal. RV Mid Dimension is 2.9 cm. Unable to assess systolic function.    Aortic Valve: Sapian ultra bioprosthetic valve that is well-seated with a size of 26 mm. No stenosis. AV Peak Gradient is 18 mmHg. AV Mean Gradient is 9 mmHg. AV Peak Velocity is 2.1 m/s.    Mitral Valve: Trace regurgitation.    Tricuspid Valve: Mild regurgitation. RVSP is 23 mmHg.    Image quality is adequate.    Normal functioning #26 Sapian Ultra bioprosthetic AV replacement with mean gradient 9 mmHg  Normal LV function, EF 60-65%  Repeat echo in 1 year.    Signed by: Ankit Warren MD on 5/11/2025  9:31 PM     IMPRESSION:   Acute respiratory insufficiency  Possible pneumonia/sepsis with elevated lactic acid and fever upon arrival of 102.7  Possible CHF with elevated NT proBNP > 2K exacerbated by atrial fibrillation with rapid ventricular response    Elevated troponin  Likely demand ischemia due to possible sepsis and atrial fibrillation with RVR    Paroxysmal atrial fibrillation with rapid ventricular response  On Eliquis prior to admission    CAD  -Catheterization on 01/28/2022, that showed 75% disease in the mid LAD with unremarkable right coronary artery and circumflex, EF 60% with severe aortic stenosis with an aortic valve area of 0.54 cm2.  -Angioplasty of the LAD on 04/05/2022, placing 3.5 x 18 mm Orsiro drug-eluting stent.    Status post severe aortic stenosis  TAVR procedure on 03/02/2022, placing 26 mm JAME 3 Ultra valve.    Histoplasmosis  Lung nodule on the right lower lobe resected by Dr. Mariscal , = histoplasmosis with no further treatment necessary.    Hypertension,  episodes of hypotension due to autonomic dysfunction with her off her antihypertensive medication at this time.    Hyperlipidemia  LDL over target at 72, 2022  Recheck    factor V Leiden mutation.  On Eliquis for above and CVA (prior to AF)    CVA, 2013  and 2017    Chronic bronchitis   following with Dr. Young and taking Duoneb treatments.    RECOMMENDATIONS:      1.  Continue anticoagulation rate control for paroxysmal atrial fibrillation  2.  Check echocardiogram for regional wall motion abnormality and decreased LV function as well as TAVR function  3.  If echo is unchanged will follow as OP       Thank you very much for allowing us the privilege of seeing Ms. Parks.  If you have any questions on our  thoughts, please do not hesitate to contact us.     Sincerely,  Tobias Roberson, DO  [facility]  [address] [place] [id number]  Phone: [phone], Fax: [phone]

## 2025-06-11 NOTE — RT PROTOCOL NOTE
RESPIRATORY ASSESSMENT PROTOCOL                                                                                              Patient Name: Geri Parks Room#: 0263/0263-01 : 1945     Admitting diagnosis: Atrial fibrillation with rapid ventricular response ([facility]) [I48.91]       Medical History:   Past Medical History:   Diagnosis Date    Aortic stenosis     Arthritis     Benign cyst of right breast in female     Breast cyst     Cardiac murmur     Cataract     Cerebral brain hemorrhage (HCC) 2013    Right thalamic    Chronic renal disease, stage III ([facility]) [204030] 2022    Coronary artery disease involving native coronary artery of native heart without angina pectoris 2022    Deep vein blood clot of left lower extremity ([facility]) 2016    Diverticulosis     Head injury     Hx of blood clots     Hyperlipidemia     Hypertension     Migraine headache     Pulmonary emphysema ([facility]) 10/05/2020    Seizures ([facility])     Stroke (cerebrum) ([facility])     Unspecified cerebral artery occlusion with cerebral infarction     TIA       PATIENT ASSESSMENT    LABORATORY DATA  Hematology:   Lab Results   Component Value Date/Time    WBC 4.0 2025 04:22 AM    RBC 3.57 2025 04:22 AM    HGB 10.7 2025 04:22 AM    HCT 32.2 2025 04:22 AM    PLT 76 2025 04:22 AM     Chemistry:    Lab Results   Component Value Date/Time    PBEA 0.4 2025 07:18 PM       VITALS  Pulse: 68   Respirations: 18  BP: (!) 130/54  SpO2: 97 % O2 Device: None (Room air)  Temp: 97.8 °F (36.6 °C)    SKIN COLOR  [x] Normal  [] Pale  [] Dusky  [] Cyanotic    RESPIRATORY PATTERN  [x] Normal  [] Dyspnea  [] Cheyne-Mares  [] Kussmaul  [] Biots    AMBULATORY  [x] Yes  [] No  [x] With Assistance    PEAK FLOW  Predicted:     Personal Best:            Patient Acuity 0 1 2 3 4 Score   Level of Consciousness (LOC) [x]  Alert & Oriented or Pt normal LOC []  Confused;follows directions []  Confused & uncooper-ative []  Obtunded []  Comatose

## 2025-06-11 NOTE — PLAN OF CARE
[facility]  Phone: [phone]    Inpatient Occupational Therapy Plan of Care  OT Orders Received and Evaluation Complete    Date: 2025  Patient Name: Geri Parks        MRN: [id number]    : 1945  (80 y.o.)  Referring Practitioner: Dr. Olsen  Diagnosis: Atrial fibrillation with RVR  Treatment Diagnosis: Atrial fibrillation with RVR    Identified Problem Areas:     Performance deficits / Impairments: Decreased functional mobility , Decreased ADL status, Decreased strength, Decreased cognition, Decreased endurance, Decreased balance, Decreased high-level IADLs, Decreased fine motor control, Decreased coordination, Decreased posture     Justification for Skilled Services:     [x] Complete Daily Tasks Safely  [x] Improve Balance   [x]  Return to Prior Level of Function  [x] Family/Caregiver Education    [x] Improve UE strength  [x] Patient Education: [x]Adaptive Equipment   [x]Home Exercise Program and Progression    Treatment Plan:     Times Per Week: 2-5x.  Discharge recommendation: Home with home health services    [] Modalities:  [x] Therapeutic Exercise   [x] Therapeutic Activity  [] Splinting:     [] Home Safety Evaluation         [x] ADL Retraining                       [] Muscle Re-education [] Cognitive Retraining            [] Sensory Integration  [x] Patient Education [x] Home Exercise Program [x] Fine Motor Coordination    Goals:     Short term goals:  Time Frame for Short Term Goals: 3 days (2025)  Short Term Goal 1: Patient will complete a commode transfer while using DME PRN with SUP/SETUP.  Short Term Goal 2: Patient will complete upper body dressing and/or bathing while using adaptive equipment/techniques PRN with SUP/SETUP.  Short Term Goal 3: Patient will complete lower body dressing and/or bathing while using adaptive equipment/techniques PRN with SBA.  Short Term Goal 4: To increase UE strength for ADLs and functional transfers, patient will engage in 10  minutes of BUE ther ex without a rest break.  Short Term Goal 5: To increase independence and safety with I/ADLs, patient will tolerate static/dynamic standing x5 minutes without LOB.    Long term goals:  STGs=LTGs         Upon swingbed transfer, this plan of care will be followed until revision is completed.    Cristiana Justice, OTR/L                      Date: 6/11/2025

## 2025-06-11 NOTE — PROGRESS NOTES
Comprehensive Nutrition Assessment    Type and Reason for Visit:  Initial    Nutrition Recommendations/Plan:   Continue current diet.   Provided low sodium nutrition therapy and heart healthy food label.      Malnutrition Assessment:  Malnutrition Status:  At risk for malnutrition (06/11/25 0742)    Context:  Acute Illness     Findings of the 6 clinical characteristics of malnutrition:  Energy Intake:  Mild decrease in energy intake (eats once daily)  Weight Loss:  Mild weight loss (4# x 1 month)     Body Fat Loss:  Mild body fat loss     Muscle Mass Loss:  Mild muscle mass loss    Fluid Accumulation:  Mild Extremities (+ 1 pitting BLE)   Strength:  Not Performed    Nutrition Assessment:    Altered nutrition related labs r/t endocrine dysfunction aeb glucose range is 106-226, on steroid therapy. Pt reports eats once daily, sleeps late and has lunch, sometimes snacks later. Pt encouraged to eat meals more consistently for health status. Pt requested information on low sodium diet, which was provided. Will monitor oral intakes to add ONS    Nutrition Related Findings:    no bowel sound data. + 1 pitting BLE edema. Wound Type: None       Current Nutrition Intake & Therapies:    Average Meal Intake: 51-75%  Average Supplements Intake: None Ordered  ADULT DIET; Regular    Anthropometric Measures:  Height: 160 cm (5' 3\")  Ideal Body Weight (IBW): 115 lbs (52 kg)    Admission Body Weight: 77.4 kg (170 lb 10.2 oz)  Current Body Weight: 77.4 kg (170 lb 10.2 oz), 148.4 % IBW. Weight Source: Bed scale  Current BMI (kg/m2): 30.2  Usual Body Weight: 85.7 kg (189 lb) (9/27/24)     % Weight Change (Calculated): -9.7  Weight Adjustment For: No Adjustment                 BMI Categories: Obese Class 1 (BMI 30.0-34.9)    Estimated Daily Nutrient Needs:  Energy Requirements Based On: Kcal/kg  Weight Used for Energy Requirements: Current  Energy (kcal/day): 3196-4050 (18-21/kg)  Weight Used for Protein Requirements: Ideal  Protein

## 2025-06-12 ENCOUNTER — APPOINTMENT (OUTPATIENT)
Age: 80
DRG: 191 | End: 2025-06-12
Payer: MEDICARE

## 2025-06-12 ENCOUNTER — HOSPITAL ENCOUNTER (INPATIENT)
Age: 80
LOS: 7 days | Discharge: HOME | DRG: 948 | End: 2025-06-19
Attending: INTERNAL MEDICINE | Admitting: INTERNAL MEDICINE
Payer: MEDICARE

## 2025-06-12 VITALS
HEIGHT: 63 IN | HEART RATE: 67 BPM | OXYGEN SATURATION: 94 % | SYSTOLIC BLOOD PRESSURE: 119 MMHG | WEIGHT: 170 LBS | BODY MASS INDEX: 30.12 KG/M2 | RESPIRATION RATE: 18 BRPM | DIASTOLIC BLOOD PRESSURE: 54 MMHG | TEMPERATURE: 97 F

## 2025-06-12 PROBLEM — R53.1 GENERALIZED WEAKNESS: Status: ACTIVE | Noted: 2025-06-12

## 2025-06-12 LAB
ANION GAP SERPL CALCULATED.3IONS-SCNC: 6 MMOL/L (ref 9–17)
BASOPHILS # BLD: 0 K/UL (ref 0–0.2)
BASOPHILS NFR BLD: 0 % (ref 0–2)
BUN SERPL-MCNC: 22 MG/DL (ref 8–23)
CALCIUM SERPL-MCNC: 8.6 MG/DL (ref 8.6–10.4)
CHLORIDE SERPL-SCNC: 104 MMOL/L (ref 98–107)
CO2 SERPL-SCNC: 26 MMOL/L (ref 20–31)
CREAT SERPL-MCNC: 0.8 MG/DL (ref 0.5–0.9)
EOSINOPHIL # BLD: 0 K/UL (ref 0–0.4)
EOSINOPHILS RELATIVE PERCENT: 0 % (ref 0–5)
ERYTHROCYTE [DISTWIDTH] IN BLOOD BY AUTOMATED COUNT: 13.9 % (ref 12.1–15.2)
GFR, ESTIMATED: 75 ML/MIN/1.73M2
GLUCOSE SERPL-MCNC: 283 MG/DL (ref 70–99)
HCT VFR BLD AUTO: 33.2 % (ref 36–46)
HGB BLD-MCNC: 10.9 G/DL (ref 12–16)
IMM GRANULOCYTES # BLD AUTO: 0.08 K/UL (ref 0–0.3)
IMM GRANULOCYTES NFR BLD: 1 % (ref 0–5)
LYMPHOCYTES NFR BLD: 0.48 K/UL (ref 1–4.8)
LYMPHOCYTES RELATIVE PERCENT: 8 % (ref 15–40)
MCH RBC QN AUTO: 29.9 PG (ref 26–34)
MCHC RBC AUTO-ENTMCNC: 32.8 G/DL (ref 31–37)
MCV RBC AUTO: 91.2 FL (ref 80–100)
MONOCYTES NFR BLD: 0.22 K/UL (ref 0–1)
MONOCYTES NFR BLD: 4 % (ref 4–8)
MORPHOLOGY: ABNORMAL
NEUTROPHILS NFR BLD: 87 % (ref 47–75)
NEUTS SEG NFR BLD: 5.09 K/UL (ref 2.5–7)
PLATELET # BLD AUTO: 85 K/UL (ref 140–450)
PMV BLD AUTO: 10.7 FL (ref 6–12)
POTASSIUM SERPL-SCNC: 4.4 MMOL/L (ref 3.7–5.3)
RBC # BLD AUTO: 3.64 M/UL (ref 4–5.2)
SODIUM SERPL-SCNC: 136 MMOL/L (ref 135–144)
WBC OTHER # BLD: 5.9 K/UL (ref 3.5–11)

## 2025-06-12 PROCEDURE — 6370000000 HC RX 637 (ALT 250 FOR IP): Performed by: INTERNAL MEDICINE

## 2025-06-12 PROCEDURE — 6360000002 HC RX W HCPCS: Performed by: INTERNAL MEDICINE

## 2025-06-12 PROCEDURE — 80048 BASIC METABOLIC PNL TOTAL CA: CPT

## 2025-06-12 PROCEDURE — 36415 COLL VENOUS BLD VENIPUNCTURE: CPT

## 2025-06-12 PROCEDURE — 94640 AIRWAY INHALATION TREATMENT: CPT

## 2025-06-12 PROCEDURE — 94761 N-INVAS EAR/PLS OXIMETRY MLT: CPT

## 2025-06-12 PROCEDURE — 85025 COMPLETE CBC W/AUTO DIFF WBC: CPT

## 2025-06-12 PROCEDURE — 2500000003 HC RX 250 WO HCPCS: Performed by: INTERNAL MEDICINE

## 2025-06-12 PROCEDURE — 97110 THERAPEUTIC EXERCISES: CPT

## 2025-06-12 PROCEDURE — 94669 MECHANICAL CHEST WALL OSCILL: CPT

## 2025-06-12 PROCEDURE — 93308 TTE F-UP OR LMTD: CPT

## 2025-06-12 PROCEDURE — 1200000002 HC SEMI PRIVATE SWING BED

## 2025-06-12 RX ORDER — ALBUTEROL SULFATE 0.83 MG/ML
2.5 SOLUTION RESPIRATORY (INHALATION) EVERY 4 HOURS PRN
Status: CANCELLED | OUTPATIENT
Start: 2025-06-12

## 2025-06-12 RX ORDER — POTASSIUM CHLORIDE 7.45 MG/ML
10 INJECTION INTRAVENOUS PRN
Status: DISCONTINUED | OUTPATIENT
Start: 2025-06-12 | End: 2025-06-19 | Stop reason: HOSPADM

## 2025-06-12 RX ORDER — ROSUVASTATIN CALCIUM 10 MG/1
10 TABLET, COATED ORAL NIGHTLY
Status: DISCONTINUED | OUTPATIENT
Start: 2025-06-12 | End: 2025-06-19 | Stop reason: HOSPADM

## 2025-06-12 RX ORDER — SODIUM CHLORIDE 0.9 % (FLUSH) 0.9 %
5-40 SYRINGE (ML) INJECTION PRN
Status: CANCELLED | OUTPATIENT
Start: 2025-06-12

## 2025-06-12 RX ORDER — SODIUM CHLORIDE 0.9 % (FLUSH) 0.9 %
5-40 SYRINGE (ML) INJECTION EVERY 12 HOURS SCHEDULED
Status: CANCELLED | OUTPATIENT
Start: 2025-06-12

## 2025-06-12 RX ORDER — ASPIRIN 81 MG/1
81 TABLET ORAL DAILY
Status: CANCELLED | OUTPATIENT
Start: 2025-06-12

## 2025-06-12 RX ORDER — ALBUTEROL SULFATE 0.83 MG/ML
2.5 SOLUTION RESPIRATORY (INHALATION) EVERY 4 HOURS PRN
Status: DISCONTINUED | OUTPATIENT
Start: 2025-06-12 | End: 2025-06-19 | Stop reason: HOSPADM

## 2025-06-12 RX ORDER — FUROSEMIDE 20 MG/1
20 TABLET ORAL DAILY
Status: CANCELLED | OUTPATIENT
Start: 2025-06-12

## 2025-06-12 RX ORDER — ACETAMINOPHEN 650 MG/1
650 SUPPOSITORY RECTAL EVERY 6 HOURS PRN
Status: CANCELLED | OUTPATIENT
Start: 2025-06-12

## 2025-06-12 RX ORDER — LEVALBUTEROL INHALATION SOLUTION 0.63 MG/3ML
0.63 SOLUTION RESPIRATORY (INHALATION) 4 TIMES DAILY
Status: DISCONTINUED | OUTPATIENT
Start: 2025-06-12 | End: 2025-06-14

## 2025-06-12 RX ORDER — POTASSIUM CHLORIDE 7.45 MG/ML
10 INJECTION INTRAVENOUS PRN
Status: CANCELLED | OUTPATIENT
Start: 2025-06-12

## 2025-06-12 RX ORDER — ROSUVASTATIN CALCIUM 10 MG/1
10 TABLET, COATED ORAL NIGHTLY
Status: CANCELLED | OUTPATIENT
Start: 2025-06-12

## 2025-06-12 RX ORDER — BUDESONIDE AND FORMOTEROL FUMARATE DIHYDRATE 160; 4.5 UG/1; UG/1
2 AEROSOL RESPIRATORY (INHALATION)
Status: DISCONTINUED | OUTPATIENT
Start: 2025-06-12 | End: 2025-06-19 | Stop reason: HOSPADM

## 2025-06-12 RX ORDER — ACETAMINOPHEN 325 MG/1
650 TABLET ORAL EVERY 6 HOURS PRN
Status: CANCELLED | OUTPATIENT
Start: 2025-06-12

## 2025-06-12 RX ORDER — MAGNESIUM SULFATE IN WATER 40 MG/ML
2000 INJECTION, SOLUTION INTRAVENOUS PRN
Status: CANCELLED | OUTPATIENT
Start: 2025-06-12

## 2025-06-12 RX ORDER — SODIUM CHLORIDE 9 MG/ML
INJECTION, SOLUTION INTRAVENOUS PRN
Status: DISCONTINUED | OUTPATIENT
Start: 2025-06-12 | End: 2025-06-19 | Stop reason: HOSPADM

## 2025-06-12 RX ORDER — GUAIFENESIN 600 MG/1
600 TABLET, EXTENDED RELEASE ORAL 2 TIMES DAILY
Status: CANCELLED | OUTPATIENT
Start: 2025-06-12

## 2025-06-12 RX ORDER — SODIUM CHLORIDE 0.9 % (FLUSH) 0.9 %
5-40 SYRINGE (ML) INJECTION EVERY 12 HOURS SCHEDULED
Status: DISCONTINUED | OUTPATIENT
Start: 2025-06-12 | End: 2025-06-14

## 2025-06-12 RX ORDER — ONDANSETRON 2 MG/ML
4 INJECTION INTRAMUSCULAR; INTRAVENOUS EVERY 6 HOURS PRN
Status: CANCELLED | OUTPATIENT
Start: 2025-06-12

## 2025-06-12 RX ORDER — METOPROLOL TARTRATE 1 MG/ML
2.5 INJECTION, SOLUTION INTRAVENOUS EVERY 4 HOURS PRN
Status: CANCELLED | OUTPATIENT
Start: 2025-06-12

## 2025-06-12 RX ORDER — MAGNESIUM SULFATE IN WATER 40 MG/ML
2000 INJECTION, SOLUTION INTRAVENOUS PRN
Status: DISCONTINUED | OUTPATIENT
Start: 2025-06-12 | End: 2025-06-19 | Stop reason: HOSPADM

## 2025-06-12 RX ORDER — POTASSIUM CHLORIDE 750 MG/1
30 TABLET, EXTENDED RELEASE ORAL DAILY
Status: CANCELLED | OUTPATIENT
Start: 2025-06-12

## 2025-06-12 RX ORDER — ASPIRIN 81 MG/1
81 TABLET ORAL DAILY
Status: DISCONTINUED | OUTPATIENT
Start: 2025-06-13 | End: 2025-06-19 | Stop reason: HOSPADM

## 2025-06-12 RX ORDER — METOPROLOL TARTRATE 25 MG/1
25 TABLET, FILM COATED ORAL 2 TIMES DAILY
Status: CANCELLED | OUTPATIENT
Start: 2025-06-12

## 2025-06-12 RX ORDER — POTASSIUM CHLORIDE 750 MG/1
40 TABLET, EXTENDED RELEASE ORAL PRN
Status: DISCONTINUED | OUTPATIENT
Start: 2025-06-12 | End: 2025-06-19 | Stop reason: HOSPADM

## 2025-06-12 RX ORDER — POLYETHYLENE GLYCOL 3350 17 G/17G
17 POWDER, FOR SOLUTION ORAL DAILY PRN
Status: CANCELLED | OUTPATIENT
Start: 2025-06-12

## 2025-06-12 RX ORDER — ONDANSETRON 4 MG/1
4 TABLET, ORALLY DISINTEGRATING ORAL EVERY 8 HOURS PRN
Status: CANCELLED | OUTPATIENT
Start: 2025-06-12

## 2025-06-12 RX ORDER — METOPROLOL TARTRATE 1 MG/ML
2.5 INJECTION, SOLUTION INTRAVENOUS EVERY 4 HOURS PRN
Status: DISCONTINUED | OUTPATIENT
Start: 2025-06-12 | End: 2025-06-19 | Stop reason: HOSPADM

## 2025-06-12 RX ORDER — ONDANSETRON 2 MG/ML
4 INJECTION INTRAMUSCULAR; INTRAVENOUS EVERY 6 HOURS PRN
Status: DISCONTINUED | OUTPATIENT
Start: 2025-06-12 | End: 2025-06-19 | Stop reason: HOSPADM

## 2025-06-12 RX ORDER — METOPROLOL TARTRATE 25 MG/1
25 TABLET, FILM COATED ORAL 2 TIMES DAILY
Status: DISCONTINUED | OUTPATIENT
Start: 2025-06-12 | End: 2025-06-19 | Stop reason: HOSPADM

## 2025-06-12 RX ORDER — POTASSIUM CHLORIDE 750 MG/1
40 TABLET, EXTENDED RELEASE ORAL PRN
Status: CANCELLED | OUTPATIENT
Start: 2025-06-12

## 2025-06-12 RX ORDER — BUDESONIDE AND FORMOTEROL FUMARATE DIHYDRATE 160; 4.5 UG/1; UG/1
2 AEROSOL RESPIRATORY (INHALATION)
Status: CANCELLED | OUTPATIENT
Start: 2025-06-12

## 2025-06-12 RX ORDER — FUROSEMIDE 20 MG/1
20 TABLET ORAL DAILY
Status: DISCONTINUED | OUTPATIENT
Start: 2025-06-13 | End: 2025-06-19 | Stop reason: HOSPADM

## 2025-06-12 RX ORDER — ONDANSETRON 4 MG/1
4 TABLET, ORALLY DISINTEGRATING ORAL EVERY 8 HOURS PRN
Status: DISCONTINUED | OUTPATIENT
Start: 2025-06-12 | End: 2025-06-19 | Stop reason: HOSPADM

## 2025-06-12 RX ORDER — SODIUM CHLORIDE 0.9 % (FLUSH) 0.9 %
5-40 SYRINGE (ML) INJECTION PRN
Status: DISCONTINUED | OUTPATIENT
Start: 2025-06-12 | End: 2025-06-19 | Stop reason: HOSPADM

## 2025-06-12 RX ORDER — HYDROCODONE BITARTRATE AND HOMATROPINE METHYLBROMIDE ORAL SOLUTION 5; 1.5 MG/5ML; MG/5ML
5 LIQUID ORAL EVERY 4 HOURS PRN
Refills: 0 | Status: DISCONTINUED | OUTPATIENT
Start: 2025-06-12 | End: 2025-06-19 | Stop reason: HOSPADM

## 2025-06-12 RX ORDER — DIVALPROEX SODIUM 250 MG/1
750 TABLET, DELAYED RELEASE ORAL DAILY
Status: CANCELLED | OUTPATIENT
Start: 2025-06-12

## 2025-06-12 RX ORDER — ACETAMINOPHEN 650 MG/1
650 SUPPOSITORY RECTAL EVERY 6 HOURS PRN
Status: DISCONTINUED | OUTPATIENT
Start: 2025-06-12 | End: 2025-06-19 | Stop reason: HOSPADM

## 2025-06-12 RX ORDER — POTASSIUM CHLORIDE 750 MG/1
30 TABLET, EXTENDED RELEASE ORAL DAILY
Status: DISCONTINUED | OUTPATIENT
Start: 2025-06-13 | End: 2025-06-19 | Stop reason: HOSPADM

## 2025-06-12 RX ORDER — PREDNISONE 20 MG/1
20 TABLET ORAL DAILY
Status: COMPLETED | OUTPATIENT
Start: 2025-06-12 | End: 2025-06-16

## 2025-06-12 RX ORDER — GUAIFENESIN 600 MG/1
600 TABLET, EXTENDED RELEASE ORAL 2 TIMES DAILY
Status: DISCONTINUED | OUTPATIENT
Start: 2025-06-12 | End: 2025-06-19 | Stop reason: HOSPADM

## 2025-06-12 RX ORDER — ACETAMINOPHEN 325 MG/1
650 TABLET ORAL EVERY 6 HOURS PRN
Status: DISCONTINUED | OUTPATIENT
Start: 2025-06-12 | End: 2025-06-19 | Stop reason: HOSPADM

## 2025-06-12 RX ORDER — POLYETHYLENE GLYCOL 3350 17 G/17G
17 POWDER, FOR SOLUTION ORAL DAILY PRN
Status: DISCONTINUED | OUTPATIENT
Start: 2025-06-12 | End: 2025-06-19 | Stop reason: HOSPADM

## 2025-06-12 RX ORDER — DIVALPROEX SODIUM 250 MG/1
750 TABLET, DELAYED RELEASE ORAL DAILY
Status: DISCONTINUED | OUTPATIENT
Start: 2025-06-13 | End: 2025-06-19 | Stop reason: HOSPADM

## 2025-06-12 RX ORDER — PREDNISONE 20 MG/1
20 TABLET ORAL DAILY
Status: CANCELLED | OUTPATIENT
Start: 2025-06-12 | End: 2025-06-17

## 2025-06-12 RX ORDER — LEVALBUTEROL INHALATION SOLUTION 0.63 MG/3ML
0.63 SOLUTION RESPIRATORY (INHALATION) 4 TIMES DAILY
Status: CANCELLED | OUTPATIENT
Start: 2025-06-12

## 2025-06-12 RX ORDER — IBUPROFEN 200 MG
400 TABLET ORAL EVERY 6 HOURS PRN
Status: DISCONTINUED | OUTPATIENT
Start: 2025-06-12 | End: 2025-06-19 | Stop reason: HOSPADM

## 2025-06-12 RX ORDER — HYDROCODONE BITARTRATE AND HOMATROPINE METHYLBROMIDE ORAL SOLUTION 5; 1.5 MG/5ML; MG/5ML
5 LIQUID ORAL EVERY 4 HOURS PRN
Refills: 0 | Status: CANCELLED | OUTPATIENT
Start: 2025-06-12

## 2025-06-12 RX ORDER — SODIUM CHLORIDE 9 MG/ML
INJECTION, SOLUTION INTRAVENOUS PRN
Status: CANCELLED | OUTPATIENT
Start: 2025-06-12

## 2025-06-12 RX ORDER — IBUPROFEN 200 MG
400 TABLET ORAL EVERY 6 HOURS PRN
Status: CANCELLED | OUTPATIENT
Start: 2025-06-12

## 2025-06-12 RX ADMIN — TIOTROPIUM BROMIDE INHALATION SPRAY 2 PUFF: 3.12 SPRAY, METERED RESPIRATORY (INHALATION) at 09:49

## 2025-06-12 RX ADMIN — BUDESONIDE AND FORMOTEROL FUMARATE DIHYDRATE 2 PUFF: 160; 4.5 AEROSOL RESPIRATORY (INHALATION) at 20:11

## 2025-06-12 RX ADMIN — ROSUVASTATIN 10 MG: 10 TABLET, FILM COATED ORAL at 19:59

## 2025-06-12 RX ADMIN — LEVALBUTEROL HYDROCHLORIDE 0.63 MG: 0.63 SOLUTION RESPIRATORY (INHALATION) at 05:37

## 2025-06-12 RX ADMIN — GUAIFENESIN 600 MG: 600 TABLET ORAL at 08:56

## 2025-06-12 RX ADMIN — DIVALPROEX SODIUM 750 MG: 250 TABLET, DELAYED RELEASE ORAL at 08:56

## 2025-06-12 RX ADMIN — BUDESONIDE AND FORMOTEROL FUMARATE DIHYDRATE 2 PUFF: 160; 4.5 AEROSOL RESPIRATORY (INHALATION) at 09:50

## 2025-06-12 RX ADMIN — WATER 40 MG: 1 INJECTION INTRAMUSCULAR; INTRAVENOUS; SUBCUTANEOUS at 08:55

## 2025-06-12 RX ADMIN — DICLOFENAC SODIUM 4 G: 10 GEL TOPICAL at 09:05

## 2025-06-12 RX ADMIN — SODIUM CHLORIDE, PRESERVATIVE FREE 10 ML: 5 INJECTION INTRAVENOUS at 19:59

## 2025-06-12 RX ADMIN — DICLOFENAC SODIUM 4 G: 10 GEL TOPICAL at 20:00

## 2025-06-12 RX ADMIN — LEVALBUTEROL HYDROCHLORIDE 0.63 MG: 0.63 SOLUTION RESPIRATORY (INHALATION) at 15:58

## 2025-06-12 RX ADMIN — SODIUM CHLORIDE, PRESERVATIVE FREE 10 ML: 5 INJECTION INTRAVENOUS at 08:57

## 2025-06-12 RX ADMIN — APIXABAN 5 MG: 5 TABLET, FILM COATED ORAL at 08:57

## 2025-06-12 RX ADMIN — DICLOFENAC SODIUM 4 G: 10 GEL TOPICAL at 16:29

## 2025-06-12 RX ADMIN — ASPIRIN 81 MG: 81 TABLET, COATED ORAL at 08:57

## 2025-06-12 RX ADMIN — METOPROLOL TARTRATE 25 MG: 25 TABLET, FILM COATED ORAL at 19:59

## 2025-06-12 RX ADMIN — APIXABAN 5 MG: 5 TABLET, FILM COATED ORAL at 19:59

## 2025-06-12 RX ADMIN — WATER 1000 MG: 1 INJECTION INTRAMUSCULAR; INTRAVENOUS; SUBCUTANEOUS at 19:59

## 2025-06-12 RX ADMIN — LEVALBUTEROL HYDROCHLORIDE 0.63 MG: 0.63 SOLUTION RESPIRATORY (INHALATION) at 09:51

## 2025-06-12 RX ADMIN — FUROSEMIDE 20 MG: 20 TABLET ORAL at 08:57

## 2025-06-12 RX ADMIN — LEVALBUTEROL HYDROCHLORIDE 0.63 MG: 0.63 SOLUTION RESPIRATORY (INHALATION) at 20:11

## 2025-06-12 RX ADMIN — GUAIFENESIN 600 MG: 600 TABLET, EXTENDED RELEASE ORAL at 19:59

## 2025-06-12 RX ADMIN — WATER 40 MG: 1 INJECTION INTRAMUSCULAR; INTRAVENOUS; SUBCUTANEOUS at 00:05

## 2025-06-12 RX ADMIN — POTASSIUM CHLORIDE 30 MEQ: 750 TABLET, FILM COATED, EXTENDED RELEASE ORAL at 08:56

## 2025-06-12 RX ADMIN — PREDNISONE 20 MG: 20 TABLET ORAL at 12:09

## 2025-06-12 ASSESSMENT — PAIN SCALES - GENERAL
PAINLEVEL_OUTOF10: 0
PAINLEVEL_OUTOF10: 0

## 2025-06-12 NOTE — PLAN OF CARE
Problem: Chronic Conditions and Co-morbidities  Goal: Patient's chronic conditions and co-morbidity symptoms are monitored and maintained or improved  6/12/2025 1110 by Lissett Norman RN  Outcome: Progressing  6/11/2025 2258 by Jorge Luis Christian RN  Outcome: Progressing  Flowsheets (Taken 6/11/2025 0900 by Jesica Norman RN)  Care Plan - Patient's Chronic Conditions and Co-Morbidity Symptoms are Monitored and Maintained or Improved:   Monitor and assess patient's chronic conditions and comorbid symptoms for stability, deterioration, or improvement   Collaborate with multidisciplinary team to address chronic and comorbid conditions and prevent exacerbation or deterioration   Update acute care plan with appropriate goals if chronic or comorbid symptoms are exacerbated and prevent overall improvement and discharge     Problem: Discharge Planning  Goal: Discharge to home or other facility with appropriate resources  6/12/2025 1110 by Lissett Norman RN  Outcome: Progressing  6/11/2025 2258 by Jorge Luis Christian RN  Outcome: Progressing     Problem: Skin/Tissue Integrity  Goal: Skin integrity remains intact  Description: 1.  Monitor for areas of redness and/or skin breakdown2.  Assess vascular access sites hourly3.  Every 4-6 hours minimum:  Change oxygen saturation probe site4.  Every 4-6 hours:  If on nasal continuous positive airway pressure, respiratory therapy assess nares and determine need for appliance change or resting period  6/12/2025 1110 by Lissett Norman RN  Outcome: Progressing  6/11/2025 2258 by Jorge Luis Christian RN  Outcome: Progressing     Problem: Safety - Adult  Goal: Free from fall injury  6/12/2025 1110 by Lissett Norman RN  Outcome: Progressing  6/11/2025 2258 by Jorge Luis Christian RN  Outcome: Progressing     Problem: Neurosensory - Adult  Goal: Achieves maximal functionality and self care  6/12/2025 1110 by Lissett Norman RN  Outcome: Progressing  6/11/2025 2258 by Jorge Luis Christian RN  Outcome:

## 2025-06-12 NOTE — PROGRESS NOTES
RESPIRATORY ASSESSMENT PROTOCOL                                                                                              Patient Name: Geri Parks Room#: 0263/0263-01 : 1945     Admitting diagnosis: Atrial fibrillation with rapid ventricular response ([facility]) [I48.91]       Medical History:   Past Medical History:   Diagnosis Date    Aortic stenosis     Arthritis     Benign cyst of right breast in female     Breast cyst     Cardiac murmur     Cataract     Cerebral brain hemorrhage (HCC) 2013    Right thalamic    Chronic renal disease, stage III ([facility]) [592237] 2022    Coronary artery disease involving native coronary artery of native heart without angina pectoris 2022    Deep vein blood clot of left lower extremity ([facility]) 2016    Diverticulosis     Head injury     Hx of blood clots     Hyperlipidemia     Hypertension     Migraine headache     Pulmonary emphysema ([facility]) 10/05/2020    Seizures ([facility])     Stroke (cerebrum) ([facility])     Unspecified cerebral artery occlusion with cerebral infarction     TIA       PATIENT ASSESSMENT    LABORATORY DATA  Hematology:   Lab Results   Component Value Date/Time    WBC 5.9 2025 04:18 AM    RBC 3.64 2025 04:18 AM    HGB 10.9 2025 04:18 AM    HCT 33.2 2025 04:18 AM    PLT 85 2025 04:18 AM     Chemistry:    Lab Results   Component Value Date/Time    PBEA 0.4 2025 07:18 PM       VITALS  Pulse: 67   Respirations: 16  BP: (!) 120/52  SpO2: 94 % O2 Device: None (Room air)  Temp: 97.5 °F (36.4 °C)    SKIN COLOR  [x] Normal  [] Pale  [] Dusky  [] Cyanotic    RESPIRATORY PATTERN  [x] Normal  [] Dyspnea  [] Cheyne-Mares  [] Kussmaul  [] Biots    AMBULATORY  [x] Yes  [] No  [x] With Assistance    PEAK FLOW  Predicted: -    Personal Best: -           Patient Acuity 0 1 2 3 4 Score   Level of Consciousness (LOC) [x]  Alert & Oriented or Pt normal LOC []  Confused;follows directions []  Confused & uncooper-ative []  Obtunded  THERAPY with  [physician-ordered bronchodilator(s)] q 4 & Albuterol PRN q2 hrs.   Breath-Actuated Neb if BBS Acuity = 4, and pt. can use MP. Notify physician if condition deteriorates.  HHN AEROSOL THERAPY with  [physician-ordered bronchodilator(s)]  QID and Albuterol PRN q4 hrs.   Breath-Actuated Neb if BBS Acuity = 4, and pt. can use MP.  Notify physician if condition deteriorates. MDI THERAPY with  2 actuations of [physician-ordered bronchodilator(s)] via spacer TID and Albuterol PRN q4 hrs.   If unable to utilize MDI: HHN [physician-ordered bronchodilator(s)] TID and Albuterol PRN q4 hrs.   Notify physician if condition deteriorates. MDI THERAPY with  [physician-ordered bronchodilator(s)] via spacer TID PRN.   If unable to utilize MDI: HHN [physician-ordered bronchodilator(s)] TID PRN.   Notify physician if condition deteriorates.         If Acuity Level is 2, 3, or 4 in any of the following:    [x] COUGH     [] SURGICAL HISTORY (SURG HX)  [] CHEST XRAY (CXR)    Goal: Improvement in sputum mobilization in patients with ineffective airway clearance. Reverse atelectasis.    [] Bronchopulmonary Hygiene Protocol      Total Acuity:   14-28  []  Secondary Assessment in 24 hrs Total Acuity:  9-13  [x]  Secondary Assessment in 24 hrs Total Acuity:  4-8  []  Secondary Assessment in 48 hrs Total Acuity:  0-3  []  Secondary Assessment in 48 hrs      PEP therapy- EZPAP QID, Oscillatory Therapy (Acapella) QID if CXR Acuity = 4, consider METANEB QID with physician-  ordered medications. NT Sxn PRN for ineffective cough    PEP therapy-(EZPAP)  QID  And Oscillatory therapy- Acapella QID  NT Sxn PRN for ineffective cough PD&P, or Oscillatory Therapy (Acapella) TID and PRN and PEP (EZPAP) TID & PRN Instruct patient to self-perform IS q1hr WA       If Acuity Level is 2 or above in the following:    [x] PULMONARY HISTORY (PULM HX)    Goal: Assist patient in quitting smoking to slow or stop the progression of lung disease.    []

## 2025-06-12 NOTE — PROGRESS NOTES
Swing Bed Packet of information reviewed with Patient at this time and Patient signs in acknowledgement of receipt of same.  Patient is a 80 year old , white female, admitted with a diagnosis of A fib with RVR.  She is alert and oriented, pleasant and cooperative with this assessment.  Voices understanding of [facility]BU program and intends to return home following this [facility] stay.    Patient resides with her  in [place] in [place].  She uses a shower chair and grab bars for assistance at home.  No outside resources or services currently being utilized, per Patient report.  She relies on family for her transportation needs.    PCP is Dr. Audi Young.  Patient has medical insurance and denies needing financial assistance with the cost of her prescription medications.      Discharge plan is home when stable.  Patient remains a 'Full Code' status and has no medical directives on file.  Designates her spouse and her daughter as her decision makers if/as needed.  LSW to monitor and assist with discharge planning as appropriate.    BRAYAN Quiles  6/12/2025

## 2025-06-12 NOTE — PROGRESS NOTES
Phone: [phone] [facility]  Date: 2025  Fax: [phone]      Physical Therapy    Daily Note    Patient Name: Geri Parks      : 1945  (80 y.o.)  MRN: [id number]      Assessment    Sit to stand: SB/CGA  Ambulation: FWW, CGA, x30'    Assessment: Patient is seated in recliner finishing breakfast upon entry with  present. Patient agrees to work with PTA. Sit to stand from recliner is CGA. Patient ambulates with FWW and CGA to just outside of doorway and back to recliner (x30' total). Patient completes seated LE strengthening ex while in recliner. After a short rest break patient stands up in front of recliner with SBA and completes standing LE strengthening ex. Patient returns to recliner with call light in reach and chair alarm in place. Patient's  remains in room also. After walking patient's O2 sats remained in the mid 90s.  Safety Devices  Type of Devices: Bed alarm in place, Call light within reach, Left in bed, Nurse notified    Call light in reach, Phone in reach, Use of Gait belt, Chair alarm, Family Present, and Left in chair  Time In: 0842  Time Out: 917  Timed Coded Minutes: 35  Total Treatment Time: 35    Exercises:  See Flowsheets    Plan  Cont Per Plan Of Care    Goals  Short Term Goals  Time Frame for Short Term Goals: 5 days (Ex. 6/15/25)  Short Term Goal 1: Patient to transfer sit to stand and stand to sit modified independent  Short Term Goal 2: Patient to ambulate with w.walker 50 ft x2 with SBA  Short Term Goal 3: Patient to have good dynamic standing balance for safe completion of ADLs  Short Term Goal 4: Patient to ambulate up and down 3 stair step with handrails and SBA    Long Term Goals  Time Frame for Long Term Goals : NISHA Tillman PTA Therapy License Number: [id number]    Date: 2025

## 2025-06-12 NOTE — PROGRESS NOTES
[facility]  Physical Therapy    Date: 2025  Patient Name: Geri Parks        : 1945       [x] Pt Refusal        Pt in chair sleeping. Awakened pt who states she is too tired this afternoon and does not wish to participate in therapy.Will resume therapy in am.         Janie Castillo, PTA Date: 2025

## 2025-06-12 NOTE — PLAN OF CARE
Problem: Chronic Conditions and Co-morbidities  Goal: Patient's chronic conditions and co-morbidity symptoms are monitored and maintained or improved  6/12/2025 1116 by Christina Cross RN  Outcome: Completed  6/12/2025 1110 by Lissett Norman RN  Outcome: Progressing  6/11/2025 2258 by Jorge Luis Christian RN  Outcome: Progressing  Flowsheets (Taken 6/11/2025 0900 by Jesica Norman RN)  Care Plan - Patient's Chronic Conditions and Co-Morbidity Symptoms are Monitored and Maintained or Improved:   Monitor and assess patient's chronic conditions and comorbid symptoms for stability, deterioration, or improvement   Collaborate with multidisciplinary team to address chronic and comorbid conditions and prevent exacerbation or deterioration   Update acute care plan with appropriate goals if chronic or comorbid symptoms are exacerbated and prevent overall improvement and discharge     Problem: Discharge Planning  Goal: Discharge to home or other facility with appropriate resources  6/12/2025 1116 by Christina Cross RN  Outcome: Completed  6/12/2025 1110 by Lissett Norman RN  Outcome: Progressing  6/11/2025 2258 by Jorge Luis Christian RN  Outcome: Progressing     Problem: Skin/Tissue Integrity  Goal: Skin integrity remains intact  Description: 1.  Monitor for areas of redness and/or skin breakdown2.  Assess vascular access sites hourly3.  Every 4-6 hours minimum:  Change oxygen saturation probe site4.  Every 4-6 hours:  If on nasal continuous positive airway pressure, respiratory therapy assess nares and determine need for appliance change or resting period  6/12/2025 1116 by Christina Cross RN  Outcome: Completed  6/12/2025 1110 by Lissett Norman RN  Outcome: Progressing  6/11/2025 2258 by Jorge Luis Christian RN  Outcome: Progressing     Problem: Safety - Adult  Goal: Free from fall injury  6/12/2025 1116 by Christina Cross RN  Outcome: Completed  6/12/2025 1110 by Lissett Norman RN  Outcome: Progressing  6/11/2025 2258 by  as needed  Goal: Return ADL status to a safe level of function  6/12/2025 1116 by Christina Cross RN  Outcome: Completed  6/12/2025 1110 by Lissett Norman RN  Outcome: Progressing  6/11/2025 2258 by Jorge Luis Christian RN  Outcome: Progressing     Problem: Infection - Adult  Goal: Absence of infection at discharge  6/12/2025 1116 by Christina Cross RN  Outcome: Completed  6/12/2025 1110 by Lissett Norman RN  Outcome: Progressing  6/11/2025 2258 by Jorge Luis Christian RN  Outcome: Progressing     Problem: Nutrition Deficit:  Goal: Optimize nutritional status  6/12/2025 1116 by Christina Cross RN  Outcome: Completed  6/12/2025 1110 by Lissett Norman RN  Outcome: Progressing  6/11/2025 2258 by Jorge Luis Christian RN  Outcome: Progressing

## 2025-06-12 NOTE — PROGRESS NOTES
[facility]  Occupational Therapy    Date: 2025  Patient Name: Geri Parks        : 1945       [] Pt Refusal           [x] Pt Unavailable: with respiratory therapy.         Cristiana Justice, OTR/L Date: 2025

## 2025-06-12 NOTE — DISCHARGE SUMMARY
Hospitalist Discharge Summary    Patient:  Geri Parks  YOB: 1945    MRN: [id number]   Acct: [id number]    Primary Care Physician: Audi Young MD    Admit date:  6/9/2025    Discharge date:  6/12/2025       Discharge Diagnoses:   1.Atrial fibrillation with RVR (HCC)  2.  COPD exacerbation  3.  Hypoxia due to COPD exacerbation  4.  Fever, resolved  5.  Generalized weakness  6.  S/p TAVR on 3/2/2022  7.  History of CAD, s/p angioplasty of the LAD on 4/5/2022  8.  Hypertension  9.  Hyperlipidemia  10.  Hypokalemia  11.  Factor V Leiden mutation  12.  Chronic thrombocytopenia    Discharge Medications:         Medication List        ASK your doctor about these medications      acetaminophen 500 MG tablet  Commonly known as: TYLENOL     albuterol sulfate  (90 Base) MCG/ACT inhaler  Commonly known as: Ventolin HFA  Inhale 2 puffs into the lungs every 4 hours as needed for Wheezing or Shortness of Breath     aspirin 81 MG EC tablet     diclofenac sodium 1 % Gel  Commonly known as: VOLTAREN  Apply 4 g topically 4 times daily     * divalproex 500 MG DR tablet  Commonly known as: DEPAKOTE     * divalproex 250 MG DR tablet  Commonly known as: DEPAKOTE     Eliquis 5 MG Tabs tablet  Generic drug: apixaban  TAKE 1 TABLET BY MOUTH TWICE DAILY     furosemide 20 MG tablet  Commonly known as: LASIX  TAKE 1 TABLET BY MOUTH DAILY     ipratropium 0.5 mg-albuterol 2.5 mg 0.5-2.5 (3) MG/3ML Soln nebulizer solution  Commonly known as: DUONEB  Inhale 3 mLs into the lungs every 4 hours     potassium chloride 10 MEQ extended release tablet  Commonly known as: KLOR-CON M  Take 3 tablets by mouth daily     rosuvastatin 10 MG tablet  Commonly known as: CRESTOR  TAKE 1 TABLET BY MOUTH EVERY NIGHT     Trelegy Ellipta 200-62.5-25 MCG/ACT Aepb inhaler  Generic drug: fluticasone-umeclidin-vilant  Inhale 1 puff into the lungs daily     triamcinolone 0.1 % cream  Commonly known as: KENALOG  Apply topically 2 times daily    rate dropped into 60s and blood pressure was borderline low.  2D echo was done and results still pending.  Patient was also started on IV Solu-Medrol, nebulizer treatments, Symbicort, Mucinex, IV Rocephin for COPD exacerbation.  Her fever resolved.  Hypoxia improved.  She developed generalized weakness and was evaluated by PT and OT.  Recommended to continue rehab.  Patient and her family requested to be admitted to our swing bed program to continue working with PT and OT.  She is medically stable for discharge today.  IV Solu-Medrol will be switched to p.o. prednisone.  Continue close monitoring on telemetry.  Will follow-up with her on  swing bed        Disposition: Swing bed    Condition: Stable    Time Spent: 35 minutes      Electronically signed by Greg Olsen MD on 6/12/2025 at 7:53 AM  Discharging Hospitalist

## 2025-06-13 LAB
ANION GAP SERPL CALCULATED.3IONS-SCNC: 9 MMOL/L (ref 9–17)
BUN SERPL-MCNC: 21 MG/DL (ref 8–23)
CALCIUM SERPL-MCNC: 8.5 MG/DL (ref 8.6–10.4)
CHLORIDE SERPL-SCNC: 105 MMOL/L (ref 98–107)
CO2 SERPL-SCNC: 25 MMOL/L (ref 20–31)
CREAT SERPL-MCNC: 0.8 MG/DL (ref 0.5–0.9)
GFR, ESTIMATED: 75 ML/MIN/1.73M2
GLUCOSE BLD-MCNC: 212 MG/DL (ref 65–99)
GLUCOSE BLD-MCNC: 234 MG/DL (ref 65–99)
GLUCOSE SERPL-MCNC: 222 MG/DL (ref 70–99)
POTASSIUM SERPL-SCNC: 4.3 MMOL/L (ref 3.7–5.3)
SODIUM SERPL-SCNC: 139 MMOL/L (ref 135–144)

## 2025-06-13 PROCEDURE — 6370000000 HC RX 637 (ALT 250 FOR IP): Performed by: INTERNAL MEDICINE

## 2025-06-13 PROCEDURE — 94761 N-INVAS EAR/PLS OXIMETRY MLT: CPT

## 2025-06-13 PROCEDURE — 80048 BASIC METABOLIC PNL TOTAL CA: CPT

## 2025-06-13 PROCEDURE — 94640 AIRWAY INHALATION TREATMENT: CPT

## 2025-06-13 PROCEDURE — 94669 MECHANICAL CHEST WALL OSCILL: CPT

## 2025-06-13 PROCEDURE — 97530 THERAPEUTIC ACTIVITIES: CPT

## 2025-06-13 PROCEDURE — 2500000003 HC RX 250 WO HCPCS: Performed by: INTERNAL MEDICINE

## 2025-06-13 PROCEDURE — 97116 GAIT TRAINING THERAPY: CPT

## 2025-06-13 PROCEDURE — 36415 COLL VENOUS BLD VENIPUNCTURE: CPT

## 2025-06-13 PROCEDURE — 1200000002 HC SEMI PRIVATE SWING BED

## 2025-06-13 PROCEDURE — 97110 THERAPEUTIC EXERCISES: CPT

## 2025-06-13 PROCEDURE — 6360000002 HC RX W HCPCS: Performed by: INTERNAL MEDICINE

## 2025-06-13 PROCEDURE — 82947 ASSAY GLUCOSE BLOOD QUANT: CPT

## 2025-06-13 RX ORDER — INSULIN LISPRO 100 [IU]/ML
0-4 INJECTION, SOLUTION INTRAVENOUS; SUBCUTANEOUS
Status: DISCONTINUED | OUTPATIENT
Start: 2025-06-13 | End: 2025-06-17

## 2025-06-13 RX ORDER — DEXTROSE MONOHYDRATE 100 MG/ML
INJECTION, SOLUTION INTRAVENOUS CONTINUOUS PRN
Status: DISCONTINUED | OUTPATIENT
Start: 2025-06-13 | End: 2025-06-19 | Stop reason: HOSPADM

## 2025-06-13 RX ORDER — GLUCAGON 1 MG/ML
1 KIT INJECTION PRN
Status: DISCONTINUED | OUTPATIENT
Start: 2025-06-13 | End: 2025-06-19 | Stop reason: HOSPADM

## 2025-06-13 RX ORDER — INSULIN GLARGINE 100 [IU]/ML
15 INJECTION, SOLUTION SUBCUTANEOUS NIGHTLY
Status: DISCONTINUED | OUTPATIENT
Start: 2025-06-13 | End: 2025-06-14

## 2025-06-13 RX ADMIN — METOPROLOL TARTRATE 25 MG: 25 TABLET, FILM COATED ORAL at 08:17

## 2025-06-13 RX ADMIN — ASPIRIN 81 MG: 81 TABLET, COATED ORAL at 08:17

## 2025-06-13 RX ADMIN — POTASSIUM CHLORIDE 30 MEQ: 750 TABLET, FILM COATED, EXTENDED RELEASE ORAL at 08:16

## 2025-06-13 RX ADMIN — LEVALBUTEROL HYDROCHLORIDE 0.63 MG: 0.63 SOLUTION RESPIRATORY (INHALATION) at 09:36

## 2025-06-13 RX ADMIN — FUROSEMIDE 20 MG: 20 TABLET ORAL at 08:17

## 2025-06-13 RX ADMIN — APIXABAN 5 MG: 5 TABLET, FILM COATED ORAL at 20:37

## 2025-06-13 RX ADMIN — SODIUM CHLORIDE, PRESERVATIVE FREE 10 ML: 5 INJECTION INTRAVENOUS at 20:37

## 2025-06-13 RX ADMIN — GUAIFENESIN 600 MG: 600 TABLET, EXTENDED RELEASE ORAL at 20:37

## 2025-06-13 RX ADMIN — HYDROCODONE BITARTRATE AND HOMATROPINE METHYLBROMIDE 5 ML: 1.5; 5 SOLUTION ORAL at 01:07

## 2025-06-13 RX ADMIN — BUDESONIDE AND FORMOTEROL FUMARATE DIHYDRATE 2 PUFF: 160; 4.5 AEROSOL RESPIRATORY (INHALATION) at 09:19

## 2025-06-13 RX ADMIN — LEVALBUTEROL HYDROCHLORIDE 0.63 MG: 0.63 SOLUTION RESPIRATORY (INHALATION) at 15:23

## 2025-06-13 RX ADMIN — DICLOFENAC SODIUM 4 G: 10 GEL TOPICAL at 08:26

## 2025-06-13 RX ADMIN — INSULIN LISPRO 1 UNITS: 100 INJECTION, SOLUTION INTRAVENOUS; SUBCUTANEOUS at 20:36

## 2025-06-13 RX ADMIN — INSULIN LISPRO 1 UNITS: 100 INJECTION, SOLUTION INTRAVENOUS; SUBCUTANEOUS at 17:04

## 2025-06-13 RX ADMIN — DICLOFENAC SODIUM 4 G: 10 GEL TOPICAL at 14:31

## 2025-06-13 RX ADMIN — TIOTROPIUM BROMIDE INHALATION SPRAY 5 MCG: 3.12 SPRAY, METERED RESPIRATORY (INHALATION) at 09:19

## 2025-06-13 RX ADMIN — METOPROLOL TARTRATE 25 MG: 25 TABLET, FILM COATED ORAL at 20:37

## 2025-06-13 RX ADMIN — DIVALPROEX SODIUM 750 MG: 250 TABLET, DELAYED RELEASE ORAL at 08:21

## 2025-06-13 RX ADMIN — APIXABAN 5 MG: 5 TABLET, FILM COATED ORAL at 08:16

## 2025-06-13 RX ADMIN — ROSUVASTATIN 10 MG: 10 TABLET, FILM COATED ORAL at 20:37

## 2025-06-13 RX ADMIN — INSULIN GLARGINE 15 UNITS: 100 INJECTION, SOLUTION SUBCUTANEOUS at 20:37

## 2025-06-13 RX ADMIN — LEVALBUTEROL HYDROCHLORIDE 0.63 MG: 0.63 SOLUTION RESPIRATORY (INHALATION) at 20:23

## 2025-06-13 RX ADMIN — BUDESONIDE AND FORMOTEROL FUMARATE DIHYDRATE 2 PUFF: 160; 4.5 AEROSOL RESPIRATORY (INHALATION) at 20:22

## 2025-06-13 RX ADMIN — PREDNISONE 20 MG: 20 TABLET ORAL at 08:17

## 2025-06-13 RX ADMIN — GUAIFENESIN 600 MG: 600 TABLET, EXTENDED RELEASE ORAL at 08:17

## 2025-06-13 RX ADMIN — SODIUM CHLORIDE, PRESERVATIVE FREE 10 ML: 5 INJECTION INTRAVENOUS at 08:27

## 2025-06-13 RX ADMIN — LEVALBUTEROL HYDROCHLORIDE 0.63 MG: 0.63 SOLUTION RESPIRATORY (INHALATION) at 05:32

## 2025-06-13 ASSESSMENT — PAIN SCALES - GENERAL
PAINLEVEL_OUTOF10: 0
PAINLEVEL_OUTOF10: 1
PAINLEVEL_OUTOF10: 2

## 2025-06-13 ASSESSMENT — PAIN DESCRIPTION - LOCATION
LOCATION: KNEE
LOCATION: HIP

## 2025-06-13 ASSESSMENT — PAIN DESCRIPTION - DESCRIPTORS: DESCRIPTORS: ACHING

## 2025-06-13 ASSESSMENT — PAIN DESCRIPTION - ORIENTATION
ORIENTATION: LEFT
ORIENTATION: LEFT;RIGHT

## 2025-06-13 NOTE — PLAN OF CARE
[facility]  Swingbed Physical Therapy  Plan of Care  Date: 2025  Patient Name: Geri Parks        : 1945  (80 y.o.)     Admission Date: 2025           PT Orders Received and Evaluation Complete  Identified Problem Areas:  Assessment: Transitioned to swingbed status to progress with skilled therapy to improve functional mobility and strength to return home.    Justification for Skilled Services:  [] Reduce Falls   [x] Improve Ambulation  [x]  Complete Daily Tasks Safely   [] Improve Balance   [x] Improve LE strength  []  Return to Prior Level of Function  [x] Improve Functional Mobility   []  Family/Caregiver Education  [x] Patient Education: []Assistive Devices []Home Exercise Program and Progression    Plan  Frequency: 1-2x/day Daily M-F, 1x/day Sat-Sun    Duration: 7 days  [] Modalities:  [x] Therapeutic Exercise   [x] Gait Training  [x] Therapeutic Activity    [] Home Safety Evaluation         [] Massage                        [] Neuromuscular Re-education [] Back Education             [x] Patient Education [] Home Exercise Program       Rehab Potential:  [x]  Good [] Fair   []  Poor    Goals  Short Term Goals = Long Term Goals    Long Term Goals  Time Frame for Long Term Goals : 7 days - expires 25  Long Term Goal 1: Patient to transfer sit to stand and stand to sit modified independent to safely return home  Long Term Goal 2: Patient to ambulate with w.walker 50 ft x2 with SBA to safely negotiate home environment  Long Term Goal 3: Patient to have good dynamic standing balance for safe completion of ADLs  Long Term Goal 4: Patient to ambulate up and down 3 stair step with handrails and SBA    OSMAR ALEXANDER, PT,    Date: 2025

## 2025-06-13 NOTE — PROGRESS NOTES
[facility]  Occupational Therapy    Date: 2025  Patient Name: Geri Parks        : 1945       [x] Pt Refusal: Patient seated in recliner upon arrival, asleep but awakens easily. Refuses OT interventions at this time due to fatigue from therapy earlier this date. Declines needs at this time. RN notified. Will resume OT interventions when able.            [] Pt Unavailable due to:          STU Zheng  Date: 2025

## 2025-06-13 NOTE — PROGRESS NOTES
[facility]  Occupational Therapy    Date: 2025  Patient Name: Geri Parks        : 1945       [x] Pt Refusal: Patient in bed upon arrival, refuses OT interventions at this time due to fatigue and saying \"I have had enough therapy\". Remains in bed with call light and other personal items within reach. Bed alarm in place. Will resume OT interventions when able/as tolerated.           [] Pt Unavailable due to:          STU Zheng  Date: 2025

## 2025-06-13 NOTE — PLAN OF CARE
Problem: Chronic Conditions and Co-morbidities  Goal: Patient's chronic conditions and co-morbidity symptoms are monitored and maintained or improved  6/13/2025 0011 by Samanta Beckford RN  Outcome: Progressing  Flowsheets (Taken 6/12/2025 1945)  Care Plan - Patient's Chronic Conditions and Co-Morbidity Symptoms are Monitored and Maintained or Improved: Monitor and assess patient's chronic conditions and comorbid symptoms for stability, deterioration, or improvement  6/12/2025 1331 by Lissett Norman RN  Outcome: Progressing     Problem: Discharge Planning  Goal: Discharge to home or other facility with appropriate resources  6/13/2025 0011 by Samanta Beckford RN  Outcome: Progressing  Flowsheets (Taken 6/12/2025 1945)  Discharge to home or other facility with appropriate resources: Identify barriers to discharge with patient and caregiver  6/12/2025 1331 by Lissett Norman RN  Outcome: Progressing     Problem: Skin/Tissue Integrity  Goal: Skin integrity remains intact  Description: 1.  Monitor for areas of redness and/or skin breakdown2.  Assess vascular access sites hourly3.  Every 4-6 hours minimum:  Change oxygen saturation probe site4.  Every 4-6 hours:  If on nasal continuous positive airway pressure, respiratory therapy assess nares and determine need for appliance change or resting period  6/13/2025 0011 by Samanta Beckford RN  Outcome: Progressing  Flowsheets (Taken 6/12/2025 1945)  Skin Integrity Remains Intact: Monitor for areas of redness and/or skin breakdown  6/12/2025 1331 by Lissett Norman RN  Outcome: Progressing     Problem: Safety - Adult  Goal: Free from fall injury  6/13/2025 0011 by Samanta Beckford RN  Outcome: Progressing  6/12/2025 1331 by Lissett Norman RN  Outcome: Progressing     Problem: ABCDS Injury Assessment  Goal: Absence of physical injury  6/13/2025 0011 by Samanta Beckford RN  Outcome: Progressing  6/12/2025 1331 by Lissett Norman RN  Outcome: Progressing     Problem: Cardiovascular -

## 2025-06-13 NOTE — PROGRESS NOTES
Phone: [phone] [facility]  Date: 2025  Fax: [phone]      Physical Therapy    Daily Note    Patient Name: Geri Parks      : 1945  (80 y.o.)  MRN: [id number]      Assessment  Supine to Sit: Independent          Sit to Stand: Supervision  Stand to Sit: Supervision          WB Status: gait with wheeled walker CGA 10 ftx2 and 5ftx1        Assessment: Patient seated in chair on arrival. Patient completed therex per Doc Flow. Patient transfered sit to stand with supervision. She ambulated with w.walker 10 ft x2 with CGA  Patient transfered on and off toilet and managed clothes with supervision. She stood at sink to wash her hands and clean her dentures without LOB. Patient return to chair  and then decided to she would like to lay down in bed. Patient transfered sit to stand with supervision and ambulated with w.walker 5 ft to bed with SBA. Patient transfered stand to sit and sit to supine independently. She was able to scoot self up in bed independently. Patient given call bell and bed alarm.  Safety Devices  Type of Devices: Call light within reach, Chair alarm in place, Gait belt, Left in chair, Nurse notified          Call light in reach and Bed alarm  Time In: 13:40  Time Out: 14:05  Timed Coded Minutes: 25     Exercises:  See Flowsheets    Plan  Cont Per Plan Of Care    Goals  Short Term Goals  Time Frame for Short Term Goals: -  Short Term Goal 1: -  Short Term Goal 2: -  Short Term Goal 3: -  Short Term Goal 4: -       Long Term Goals  Time Frame for Long Term Goals : 7 days - expires 25  Long Term Goal 1: Patient to transfer sit to stand and stand to sit modified independent to safely return home  Long Term Goal 2: Patient to ambulate with w.walker 50 ft x2 with SBA to safely negotiate home environment  Long Term Goal 3: Patient to have good dynamic standing balance for safe completion of ADLs  Long Term Goal 4: Patient to ambulate up and down 3 stair step with handrails

## 2025-06-13 NOTE — H&P
History & Physical    Patient:  Geri Parks  YOB: 1945  Date of Service: 6/13/2025  MRN: [id number]   Acct:   [id number]   Primary Care Physician: Audi Young MD    Chief Complaint: Generalized weakness    History of Present Illness:     The patient is a 80 y.o. female with history of CAD, hypertension, hyperlipidemia, aortic stenosis s/p TAVR, h/o factor V Leiden mutation, history of CVA, COPD presented to the emergency room for evaluation of shortness of breath, cough, fatigue/weakness.  According to the patient her symptoms started more than a week ago and progressively became worse.  Reports coughing up some yellowish sputum.  Has been more short of breath and wheezing.  She went to see her PCP  and was sent to the emergency room for evaluation.  Patient reports no chest pain, no palpitations, no nausea or vomiting.  She believes she had fevers and chills at home.  Workup in the emergency room revealed fever 102.7, respirations 32, heart rate 147, BP 97/72, she was 82% on room air.  BMP was unremarkable except creatinine 1.1.  Lactic acid was 2.3.  Troponin was 50 followed by 53, proBNP 2, 119.  LFTs normal.  ABGs revealed pH 7.5, GMM744, , bicarb 22.8.  WBC count was normal at 6.0, H&H normal 14.3/42.0, platelets 95.  Influenza A and B antigens negative, COVID-19 was negative.  UA unremarkable.  EKG revealed A-fib with RVR rate of 141.  ST abnormality possible inferior subendocardial injury.  Portable chest x-ray showed new pulmonary venous congestion suggestive of mild CHF, cardiomegaly.  CT chest without contrast revealed:     IMPRESSION:     Findings of mild posterior right lower lobe atelectasis and possible mild   interstitial edema, which may correlate with the patient's reported shortness   of breath.     Severe left atrial enlargement (5.3 cm), prominent right main pulmonary artery   (3.7 cm), and mild dilatation of the ascending aorta (4.3 cm).      These findings

## 2025-06-13 NOTE — PROGRESS NOTES
Comprehensive Nutrition Assessment    Type and Reason for Visit:  Initial    Nutrition Recommendations/Plan:   Encourage oral intakes and 3 meals daily  Encourage protein foods and oral fluids     Malnutrition Assessment:  Malnutrition Status:  At risk for malnutrition (06/13/25 1035)    Context:  Acute Illness     Findings of the 6 clinical characteristics of malnutrition:  Energy Intake:  Mild decrease in energy intake  Weight Loss:  Mild weight loss     Body Fat Loss:  No body fat loss     Muscle Mass Loss:  No muscle mass loss    Fluid Accumulation:  Mild Extremities   Strength:  Not Performed    Nutrition Assessment:    Altered nutrition related labs r/t altered endocrine function, AEB hyperglcyemia in presence of steroid. Fair PO intakes reported but eating more frequently than in home environment (reported previously she eats just once a day). Mild weight losses from usual but stable (1# increase) since acute admission. Educated on lower sodium as inpatient. Encouraged protein foods and oral fluids upon visit.    Nutrition Related Findings:    active b/s. soft bm. +1 pitting BLE edema. Wound Type: None       Current Nutrition Intake & Therapies:    Average Meal Intake: 51-75%  Average Supplements Intake: None Ordered  ADULT DIET; Regular    Anthropometric Measures:  Height: 160 cm (5' 3\")  Ideal Body Weight (IBW): 115 lbs (52 kg)    Admission Body Weight: 77.4 kg (170 lb 11.2 oz) (acute admission)  Current Body Weight: 78 kg (171 lb 15.3 oz), 149.5 % IBW. Weight Source: Bed scale  Current BMI (kg/m2): 30.5  Usual Body Weight: 79.4 kg (175 lb) (a month ago but used to be more stable ~189# 9 months ago)     % Weight Change (Calculated): -1.7  Weight Adjustment For: No Adjustment                 BMI Categories: Obese Class 1 (BMI 30.0-34.9)    Estimated Daily Nutrient Needs:  Energy Requirements Based On: Kcal/kg  Weight Used for Energy Requirements: Current  Energy (kcal/day): 5678-2254 (18-21)  Weight Used

## 2025-06-13 NOTE — PROGRESS NOTES
[facility]  Occupational Therapy    Date: 2025  Patient Name: Geri Parks        : 1945       [] Pt Refusal           [x] Pt Unavailable due to:  still eating breakfast and waiting on biscuits and gravy. Will re-attempt OT interventions later today as able.         Cristiana Justice, OTR/L Date: 2025

## 2025-06-13 NOTE — PLAN OF CARE
Problem: Discharge Planning  Goal: Discharge to home or other facility with appropriate resources  Outcome: Progressing     Problem: Safety - Adult  Goal: Free from fall injury  Outcome: Progressing     Problem: Cardiovascular - Adult  Goal: Maintains optimal cardiac output and hemodynamic stability  Outcome: Progressing     Problem: Nutrition Deficit:  Goal: Optimize nutritional status  6/13/2025 1038 by Rafael Daily RD, LD  Outcome: Progressing  Flowsheets (Taken 6/13/2025 0623)  Nutrient intake appropriate for improving, restoring, or maintaining nutritional needs:   Monitor oral intake, labs, and treatment plans   Recommend appropriate diets, oral nutritional supplements, and vitamin/mineral supplements  Note: Nutrition Problem #1: Altered nutrition-related lab values  Intervention: Food and/or Nutrient Delivery: Continue Current Diet

## 2025-06-13 NOTE — PLAN OF CARE
[facility]  Phone: [phone]    Swingbed Occupational Therapy Plan of Care  OT Orders Received and Evaluation Complete    Date: 2025  Patient Name: Geri Parks        MRN: [id number]    : 1945  (80 y.o.)  Referring Practitioner: Dr. Olsen  Diagnosis: Atrial fibrillation with RVR  Treatment Diagnosis: Atrial fibrillation with RVR     Identified Problem Areas:     Performance deficits / Impairments: Decreased functional mobility , Decreased ADL status, Decreased strength, Decreased cognition, Decreased endurance, Decreased balance, Decreased high-level IADLs, Decreased fine motor control, Decreased coordination, Decreased posture       Justification for Skilled Services:     [x] Complete Daily Tasks Safely  [x] Improve Balance   [x] Return to Prior Level of Function  [x] Family/Caregiver Education    [x] Improve UE strength  [x] Patient Education: [x] Adaptive Equipment   [x] Home Exercise Program and Progression    Treatment Plan:     Times Per Week: 2-5x.  Discharge recommendations: Home with home health services     [] Modalities:  [x] Therapeutic Exercise   [x] Therapeutic Activity  [] Splinting:     [] Home Safety Evaluation         [x] ADL Retraining                       [] Muscle Re-education [] Cognitive Retraining            [] Sensory Integration  [x] Patient Education [x] Home Exercise Program [x] Fine Motor Coordination    Goals:     Short term goals:  Time Frame for Short Term Goals: 8 days (2025)  Short Term Goal 1: Patient will complete a commode transfer while using DME PRN with SUP/SETUP.  Short Term Goal 2: Patient will complete upper body dressing and/or bathing while using adaptive equipment/techniques PRN with SUP/SETUP.  Short Term Goal 3: Patient will complete lower body dressing and/or bathing while using adaptive equipment/techniques PRN with SBA.  Short Term Goal 4: To increase UE strength for ADLs and functional transfers, patient will engage in

## 2025-06-13 NOTE — PROGRESS NOTES
Phone: [phone] [facility]  Date: 2025  Fax: [phone]      Physical Therapy    Daily Note    Patient Name: Geri Parks      : 1945  (80 y.o.)  MRN: [id number]      Assessment                      Sit to Stand: Contact guard assistance  Stand to Sit: Contact guard assistance                WB Status: gait with wheeled walker CGA with w/c follow ~50 ft x 1; 15 ft x1                Assessment: Patient in chair upon entry to room with  also present. She initally does not want to work with PTA but is encouraged by RT and PTA. Sit to stand from chair is CGA. Ambulates with wheeled walker and CGA ~50 ft x1 and then requests to sit down in w/c. She is wheeled remaining distance to therapy room.  She completes seated B LE exercises as outlined above. Request need for bathroom and is able to ambulate into bathroom in therapy room. On/off commode with SBA/supervision and is independent with mable care. Demonstrates good standing balance to wash hands at sink and then returns to w/c. Wheeled back to room and is maricruz to ambulate from doorway back to chair with CGA. Call light in reach and chair alarm attached.  is present for full session and remains in room with patient following.  Safety Devices  Type of Devices: Call light within reach, Chair alarm in place, Gait belt, Left in chair, Nurse notified          Call light in reach, Phone in reach, Use of Gait belt, Chair alarm, Nurse Notified, Family Present, and Left in chair  Time In: 0942  Time Out: 1019  Timed Coded Minutes: 37  Total Treatment Time: 37       Exercises:  See Flowsheets    Plan  Cont Per Plan Of Care    Goals  Short Term Goals  Time Frame for Short Term Goals: 5 days (Ex. 6/15/25)  Short Term Goal 1: Patient to transfer sit to stand and stand to sit modified independent  Short Term Goal 2: Patient to ambulate with w.walker 50 ft x2 with SBA  Short Term Goal 3: Patient to have good dynamic standing balance for safe

## 2025-06-14 LAB
GLUCOSE BLD-MCNC: 151 MG/DL (ref 65–99)
GLUCOSE BLD-MCNC: 228 MG/DL (ref 65–99)
GLUCOSE BLD-MCNC: 237 MG/DL (ref 65–99)
GLUCOSE BLD-MCNC: 80 MG/DL (ref 65–99)

## 2025-06-14 PROCEDURE — 94669 MECHANICAL CHEST WALL OSCILL: CPT

## 2025-06-14 PROCEDURE — 6360000002 HC RX W HCPCS: Performed by: INTERNAL MEDICINE

## 2025-06-14 PROCEDURE — 94640 AIRWAY INHALATION TREATMENT: CPT

## 2025-06-14 PROCEDURE — 1200000002 HC SEMI PRIVATE SWING BED

## 2025-06-14 PROCEDURE — 6370000000 HC RX 637 (ALT 250 FOR IP): Performed by: INTERNAL MEDICINE

## 2025-06-14 PROCEDURE — 97116 GAIT TRAINING THERAPY: CPT

## 2025-06-14 PROCEDURE — 97110 THERAPEUTIC EXERCISES: CPT

## 2025-06-14 PROCEDURE — 82947 ASSAY GLUCOSE BLOOD QUANT: CPT

## 2025-06-14 PROCEDURE — 94761 N-INVAS EAR/PLS OXIMETRY MLT: CPT

## 2025-06-14 RX ORDER — INSULIN GLARGINE 100 [IU]/ML
10 INJECTION, SOLUTION SUBCUTANEOUS NIGHTLY
Status: DISCONTINUED | OUTPATIENT
Start: 2025-06-14 | End: 2025-06-15

## 2025-06-14 RX ORDER — LEVALBUTEROL INHALATION SOLUTION 0.63 MG/3ML
0.63 SOLUTION RESPIRATORY (INHALATION) 3 TIMES DAILY
Status: DISCONTINUED | OUTPATIENT
Start: 2025-06-15 | End: 2025-06-19 | Stop reason: HOSPADM

## 2025-06-14 RX ORDER — LACTOBACILLUS RHAMNOSUS GG 10B CELL
1 CAPSULE ORAL 2 TIMES DAILY WITH MEALS
Status: DISCONTINUED | OUTPATIENT
Start: 2025-06-14 | End: 2025-06-19 | Stop reason: HOSPADM

## 2025-06-14 RX ADMIN — METOPROLOL TARTRATE 25 MG: 25 TABLET, FILM COATED ORAL at 20:03

## 2025-06-14 RX ADMIN — ROSUVASTATIN 10 MG: 10 TABLET, FILM COATED ORAL at 20:03

## 2025-06-14 RX ADMIN — BUDESONIDE AND FORMOTEROL FUMARATE DIHYDRATE 2 PUFF: 160; 4.5 AEROSOL RESPIRATORY (INHALATION) at 09:14

## 2025-06-14 RX ADMIN — APIXABAN 5 MG: 5 TABLET, FILM COATED ORAL at 20:03

## 2025-06-14 RX ADMIN — FUROSEMIDE 20 MG: 20 TABLET ORAL at 08:37

## 2025-06-14 RX ADMIN — GUAIFENESIN 600 MG: 600 TABLET, EXTENDED RELEASE ORAL at 08:37

## 2025-06-14 RX ADMIN — INSULIN GLARGINE 10 UNITS: 100 INJECTION, SOLUTION SUBCUTANEOUS at 20:02

## 2025-06-14 RX ADMIN — METOPROLOL TARTRATE 25 MG: 25 TABLET, FILM COATED ORAL at 08:37

## 2025-06-14 RX ADMIN — PREDNISONE 20 MG: 20 TABLET ORAL at 08:37

## 2025-06-14 RX ADMIN — LEVALBUTEROL HYDROCHLORIDE 0.63 MG: 0.63 SOLUTION RESPIRATORY (INHALATION) at 11:24

## 2025-06-14 RX ADMIN — BUDESONIDE AND FORMOTEROL FUMARATE DIHYDRATE 2 PUFF: 160; 4.5 AEROSOL RESPIRATORY (INHALATION) at 20:12

## 2025-06-14 RX ADMIN — INSULIN LISPRO 1 UNITS: 100 INJECTION, SOLUTION INTRAVENOUS; SUBCUTANEOUS at 20:02

## 2025-06-14 RX ADMIN — LEVALBUTEROL HYDROCHLORIDE 0.63 MG: 0.63 SOLUTION RESPIRATORY (INHALATION) at 20:12

## 2025-06-14 RX ADMIN — Medication 1 CAPSULE: at 08:43

## 2025-06-14 RX ADMIN — DIVALPROEX SODIUM 750 MG: 250 TABLET, DELAYED RELEASE ORAL at 08:43

## 2025-06-14 RX ADMIN — LEVALBUTEROL HYDROCHLORIDE 0.63 MG: 0.63 SOLUTION RESPIRATORY (INHALATION) at 15:54

## 2025-06-14 RX ADMIN — POTASSIUM CHLORIDE 30 MEQ: 750 TABLET, FILM COATED, EXTENDED RELEASE ORAL at 08:37

## 2025-06-14 RX ADMIN — ASPIRIN 81 MG: 81 TABLET, COATED ORAL at 08:37

## 2025-06-14 RX ADMIN — APIXABAN 5 MG: 5 TABLET, FILM COATED ORAL at 08:37

## 2025-06-14 RX ADMIN — LEVALBUTEROL HYDROCHLORIDE 0.63 MG: 0.63 SOLUTION RESPIRATORY (INHALATION) at 05:19

## 2025-06-14 RX ADMIN — GUAIFENESIN 600 MG: 600 TABLET, EXTENDED RELEASE ORAL at 20:03

## 2025-06-14 RX ADMIN — Medication 1 CAPSULE: at 17:38

## 2025-06-14 RX ADMIN — INSULIN LISPRO 1 UNITS: 100 INJECTION, SOLUTION INTRAVENOUS; SUBCUTANEOUS at 17:38

## 2025-06-14 RX ADMIN — TIOTROPIUM BROMIDE INHALATION SPRAY 5 MCG: 3.12 SPRAY, METERED RESPIRATORY (INHALATION) at 09:17

## 2025-06-14 ASSESSMENT — PAIN SCALES - GENERAL
PAINLEVEL_OUTOF10: 7
PAINLEVEL_OUTOF10: 0

## 2025-06-14 NOTE — PLAN OF CARE
Problem: Chronic Conditions and Co-morbidities  Goal: Patient's chronic conditions and co-morbidity symptoms are monitored and maintained or improved  Outcome: Progressing  Flowsheets (Taken 6/13/2025 2030)  Care Plan - Patient's Chronic Conditions and Co-Morbidity Symptoms are Monitored and Maintained or Improved: Monitor and assess patient's chronic conditions and comorbid symptoms for stability, deterioration, or improvement     Problem: Discharge Planning  Goal: Discharge to home or other facility with appropriate resources  6/14/2025 0008 by Samanta Beckford RN  Outcome: Progressing  Flowsheets (Taken 6/13/2025 2030)  Discharge to home or other facility with appropriate resources: Identify barriers to discharge with patient and caregiver  6/13/2025 1622 by Emmanuelle Montiel, RN  Outcome: Progressing     Problem: Skin/Tissue Integrity  Goal: Skin integrity remains intact  Description: 1.  Monitor for areas of redness and/or skin breakdown2.  Assess vascular access sites hourly3.  Every 4-6 hours minimum:  Change oxygen saturation probe site4.  Every 4-6 hours:  If on nasal continuous positive airway pressure, respiratory therapy assess nares and determine need for appliance change or resting period  Outcome: Progressing  Flowsheets (Taken 6/13/2025 2030)  Skin Integrity Remains Intact: Monitor for areas of redness and/or skin breakdown     Problem: Safety - Adult  Goal: Free from fall injury  6/14/2025 0008 by Samanta Beckford RN  Outcome: Progressing  6/13/2025 1622 by Emmanuelle Montiel RN  Outcome: Progressing     Problem: ABCDS Injury Assessment  Goal: Absence of physical injury  Outcome: Progressing     Problem: Cardiovascular - Adult  Goal: Maintains optimal cardiac output and hemodynamic stability  6/14/2025 0008 by Samanta Beckford RN  Outcome: Progressing  Flowsheets (Taken 6/13/2025 2030)  Maintains optimal cardiac output and hemodynamic stability: Monitor blood pressure and heart rate  6/13/2025 1622 by

## 2025-06-14 NOTE — PROGRESS NOTES
Phone: [phone] [facility]  Date: 2025  Fax: [phone]      Physical Therapy    Daily Note    Patient Name: Geri Parks      : 1945  (80 y.o.)  MRN: [id number]      Assessment                  Sit to Stand: Supervision  Stand to Sit: Supervision                WB Status: with w.walker ~10 ft x1; 75 ft x2; 50 ft x1; 20 ft x1 with CGA/SBA                Assessment: Patient in chair upon arrival with daughter and  also in room. Patient requests to go to bathroom. Sit to stand from chair is independent. Ambulates with wheeled walker into bathroom with supervision.  On/off commode independent.  Demonstrates good standing balance to pull brief down and up and then to also wash hands at sink.  Ambulates with wheeled walker and superivsion ~75 ftx1 before requesting to sit down and rest.  She then stands again and ambulates ~75 ftx1 to therapy room. Completes seated exercises as outlined above but notes fatigue and declines standing exercises today. Wheeled back to room and is able to ambulate from hallway back to chair (~20 ft x1) to sit up and continue visiting with family members.  Call light in reach and chair alarm attached.  Safety Devices  Type of Devices: Call light within reach, Chair alarm in place, Gait belt, Left in chair, Nurse notified          Call light in reach, Phone in reach, Use of Gait belt, Chair alarm, Nurse Notified, Family Present, and Left in chair  Time In: 1025  Time Out: 1101  Timed Coded Minutes: 36  Total Treatment Time: 36       Exercises:  See Flowsheets    Plan  Cont Per Plan Of Care    Goals  Short Term Goals  Time Frame for Short Term Goals: -  Short Term Goal 1: -  Short Term Goal 2: -  Short Term Goal 3: -  Short Term Goal 4: -       Long Term Goals  Time Frame for Long Term Goals : 7 days - expires 25  Long Term Goal 1: Patient to transfer sit to stand and stand to sit modified independent to safely return home  Long Term Goal 2: Patient to

## 2025-06-14 NOTE — PROGRESS NOTES
Hospitalist Progress Note  6/14/2025 8:11 AM  Subjective:   Admit Date: 6/12/2025  PCP: Audi Young MD    Interval History: Geri has no complaints this morning other than being cold.  Breathing is getting back to baseline.  Continues with a cough but no production.  Appetite is okay, no nausea.  Bowels loose and she denies any trouble urinating.      Diet: ADULT DIET; Regular  Medications:   Scheduled Meds:   insulin glargine  10 Units SubCUTAneous Nightly    insulin lispro  0-4 Units SubCUTAneous 4x Daily AC & HS    aspirin  81 mg Oral Daily    diclofenac sodium  4 g Topical 4x Daily    divalproex  750 mg Oral Daily    apixaban  5 mg Oral BID    budesonide-formoterol  2 puff Inhalation BID RT    And    tiotropium  2 puff Inhalation Daily RT    furosemide  20 mg Oral Daily    potassium chloride  30 mEq Oral Daily    rosuvastatin  10 mg Oral Nightly    sodium chloride flush  5-40 mL IntraVENous 2 times per day    levalbuterol  0.63 mg Nebulization 4x Daily    metoprolol tartrate  25 mg Oral BID    guaiFENesin  600 mg Oral BID    predniSONE  20 mg Oral Daily     Continuous Infusions:   dextrose      sodium chloride         Patient's current medications documented, reviewed, and updated.      CBC:   Recent Labs     06/12/25  0418   WBC 5.9   HGB 10.9*   PLT 85*     BMP:    Recent Labs     06/12/25  0418 06/13/25  0414    139   K 4.4 4.3    105   CO2 26 25   BUN 22 21   CREATININE 0.8 0.8   GLUCOSE 283* 222*     Hepatic: No results for input(s): \"AST\", \"ALT\", \"BILITOT\", \"ALKPHOS\" in the last 72 hours.    Invalid input(s): \"ALB\"  Troponin: No results for input(s): \"TROPONINI\" in the last 72 hours.  BNP: No results for input(s): \"BNP\" in the last 72 hours.  Lipids: No results for input(s): \"CHOL\", \"HDL\" in the last 72 hours.    Invalid input(s): \"LDLCALCU\"  INR: No results for input(s): \"INR\" in the last 72 hours.      Objective:   Vitals: BP (!) 115/40   Pulse 61   Temp 98 °F (36.7 °C) (Axillary)   Resp

## 2025-06-14 NOTE — PLAN OF CARE
Problem: Safety - Adult  Goal: Free from fall injury  Outcome: Progressing     Problem: Cardiovascular - Adult  Goal: Maintains optimal cardiac output and hemodynamic stability  Outcome: Progressing     Problem: Physical Therapy - Adult  Goal: By Discharge: Performs mobility at highest level of function for planned discharge setting.  See evaluation for individualized goals.  6/14/2025 1146 by Nelli Abarca  Outcome: Progressing

## 2025-06-15 LAB
ECHO BSA: 1.85 M2
ECHO LA DIAMETER INDEX: 2.28 CM/M2
ECHO LA DIAMETER: 4.1 CM
ECHO LA VOL A-L A2C: 45 ML (ref 22–52)
ECHO LA VOL A-L A4C: 55 ML (ref 22–52)
ECHO LA VOL BP: 52 ML (ref 22–52)
ECHO LA VOL MOD A2C: 44 ML (ref 22–52)
ECHO LA VOL MOD A4C: 54 ML (ref 22–52)
ECHO LA VOL/BSA BIPLANE: 29 ML/M2 (ref 16–34)
ECHO LA VOLUME AREA LENGTH: 54 ML
ECHO LA VOLUME INDEX A-L A2C: 25 ML/M2 (ref 16–34)
ECHO LA VOLUME INDEX A-L A4C: 31 ML/M2 (ref 16–34)
ECHO LA VOLUME INDEX AREA LENGTH: 30 ML/M2 (ref 16–34)
ECHO LA VOLUME INDEX MOD A2C: 24 ML/M2 (ref 16–34)
ECHO LA VOLUME INDEX MOD A4C: 30 ML/M2 (ref 16–34)
ECHO LV EDV A2C: 52 ML
ECHO LV EDV A4C: 80 ML
ECHO LV EDV BP: 68 ML (ref 56–104)
ECHO LV EDV INDEX A4C: 44 ML/M2
ECHO LV EDV INDEX BP: 38 ML/M2
ECHO LV EDV NDEX A2C: 29 ML/M2
ECHO LV EF PHYSICIAN: 60 %
ECHO LV EJECTION FRACTION A2C: 58 %
ECHO LV EJECTION FRACTION A4C: 55 %
ECHO LV EJECTION FRACTION BIPLANE: 59 % (ref 55–100)
ECHO LV ESV A2C: 21 ML
ECHO LV ESV A4C: 36 ML
ECHO LV ESV BP: 28 ML (ref 19–49)
ECHO LV ESV INDEX A2C: 12 ML/M2
ECHO LV ESV INDEX A4C: 20 ML/M2
ECHO LV ESV INDEX BP: 16 ML/M2
ECHO LV FRACTIONAL SHORTENING: 28 % (ref 28–44)
ECHO LV INTERNAL DIMENSION DIASTOLE INDEX: 2.39 CM/M2
ECHO LV INTERNAL DIMENSION DIASTOLIC: 4.3 CM (ref 3.9–5.3)
ECHO LV INTERNAL DIMENSION SYSTOLIC INDEX: 1.72 CM/M2
ECHO LV INTERNAL DIMENSION SYSTOLIC: 3.1 CM
ECHO LV IVSD: 0.9 CM (ref 0.6–0.9)
ECHO LV MASS 2D: 132.7 G (ref 67–162)
ECHO LV MASS INDEX 2D: 73.7 G/M2 (ref 43–95)
ECHO LV POSTERIOR WALL DIASTOLIC: 1 CM (ref 0.6–0.9)
ECHO LV RELATIVE WALL THICKNESS RATIO: 0.47
ECHO TV REGURGITANT MAX VELOCITY: 2.33 M/S
ECHO TV REGURGITANT PEAK GRADIENT: 22 MMHG
EKG ATRIAL RATE: 60 BPM
EKG P AXIS: 51 DEGREES
EKG P-R INTERVAL: 184 MS
EKG Q-T INTERVAL: 386 MS
EKG QRS DURATION: 74 MS
EKG QTC CALCULATION (BAZETT): 386 MS
EKG R AXIS: -19 DEGREES
EKG T AXIS: 7 DEGREES
EKG VENTRICULAR RATE: 60 BPM
GLUCOSE BLD-MCNC: 102 MG/DL (ref 65–99)
GLUCOSE BLD-MCNC: 196 MG/DL (ref 65–99)
GLUCOSE BLD-MCNC: 200 MG/DL (ref 65–99)
GLUCOSE BLD-MCNC: 66 MG/DL (ref 65–99)
GLUCOSE BLD-MCNC: 79 MG/DL (ref 65–99)
MICROORGANISM SPEC CULT: NORMAL
MICROORGANISM SPEC CULT: NORMAL
SERVICE CMNT-IMP: NORMAL
SERVICE CMNT-IMP: NORMAL
SPECIMEN DESCRIPTION: NORMAL
SPECIMEN DESCRIPTION: NORMAL

## 2025-06-15 PROCEDURE — 94640 AIRWAY INHALATION TREATMENT: CPT

## 2025-06-15 PROCEDURE — 97110 THERAPEUTIC EXERCISES: CPT

## 2025-06-15 PROCEDURE — 97530 THERAPEUTIC ACTIVITIES: CPT

## 2025-06-15 PROCEDURE — 94669 MECHANICAL CHEST WALL OSCILL: CPT

## 2025-06-15 PROCEDURE — 6370000000 HC RX 637 (ALT 250 FOR IP): Performed by: INTERNAL MEDICINE

## 2025-06-15 PROCEDURE — 6360000002 HC RX W HCPCS: Performed by: INTERNAL MEDICINE

## 2025-06-15 PROCEDURE — 82947 ASSAY GLUCOSE BLOOD QUANT: CPT

## 2025-06-15 PROCEDURE — 93010 ELECTROCARDIOGRAM REPORT: CPT | Performed by: INTERNAL MEDICINE

## 2025-06-15 PROCEDURE — 93325 DOPPLER ECHO COLOR FLOW MAPG: CPT | Performed by: INTERNAL MEDICINE

## 2025-06-15 PROCEDURE — 93308 TTE F-UP OR LMTD: CPT | Performed by: INTERNAL MEDICINE

## 2025-06-15 PROCEDURE — 97116 GAIT TRAINING THERAPY: CPT

## 2025-06-15 PROCEDURE — 1200000002 HC SEMI PRIVATE SWING BED

## 2025-06-15 PROCEDURE — 94761 N-INVAS EAR/PLS OXIMETRY MLT: CPT

## 2025-06-15 RX ADMIN — POTASSIUM CHLORIDE 30 MEQ: 750 TABLET, FILM COATED, EXTENDED RELEASE ORAL at 09:42

## 2025-06-15 RX ADMIN — ASPIRIN 81 MG: 81 TABLET, COATED ORAL at 09:42

## 2025-06-15 RX ADMIN — Medication 1 CAPSULE: at 09:42

## 2025-06-15 RX ADMIN — DICLOFENAC SODIUM 4 G: 10 GEL TOPICAL at 09:44

## 2025-06-15 RX ADMIN — APIXABAN 5 MG: 5 TABLET, FILM COATED ORAL at 20:03

## 2025-06-15 RX ADMIN — TIOTROPIUM BROMIDE INHALATION SPRAY 5 MCG: 3.12 SPRAY, METERED RESPIRATORY (INHALATION) at 09:15

## 2025-06-15 RX ADMIN — DIVALPROEX SODIUM 750 MG: 250 TABLET, DELAYED RELEASE ORAL at 09:42

## 2025-06-15 RX ADMIN — GUAIFENESIN 600 MG: 600 TABLET, EXTENDED RELEASE ORAL at 09:42

## 2025-06-15 RX ADMIN — LEVALBUTEROL HYDROCHLORIDE 0.63 MG: 0.63 SOLUTION RESPIRATORY (INHALATION) at 20:10

## 2025-06-15 RX ADMIN — APIXABAN 5 MG: 5 TABLET, FILM COATED ORAL at 09:42

## 2025-06-15 RX ADMIN — INSULIN LISPRO 1 UNITS: 100 INJECTION, SOLUTION INTRAVENOUS; SUBCUTANEOUS at 17:19

## 2025-06-15 RX ADMIN — LEVALBUTEROL HYDROCHLORIDE 0.63 MG: 0.63 SOLUTION RESPIRATORY (INHALATION) at 15:09

## 2025-06-15 RX ADMIN — LEVALBUTEROL HYDROCHLORIDE 0.63 MG: 0.63 SOLUTION RESPIRATORY (INHALATION) at 08:59

## 2025-06-15 RX ADMIN — ROSUVASTATIN 10 MG: 10 TABLET, FILM COATED ORAL at 20:02

## 2025-06-15 RX ADMIN — BUDESONIDE AND FORMOTEROL FUMARATE DIHYDRATE 2 PUFF: 160; 4.5 AEROSOL RESPIRATORY (INHALATION) at 20:10

## 2025-06-15 RX ADMIN — GUAIFENESIN 600 MG: 600 TABLET, EXTENDED RELEASE ORAL at 20:03

## 2025-06-15 RX ADMIN — PREDNISONE 20 MG: 20 TABLET ORAL at 09:42

## 2025-06-15 RX ADMIN — Medication 1 CAPSULE: at 17:19

## 2025-06-15 RX ADMIN — FUROSEMIDE 20 MG: 20 TABLET ORAL at 09:42

## 2025-06-15 RX ADMIN — METOPROLOL TARTRATE 25 MG: 25 TABLET, FILM COATED ORAL at 09:42

## 2025-06-15 RX ADMIN — METOPROLOL TARTRATE 25 MG: 25 TABLET, FILM COATED ORAL at 20:02

## 2025-06-15 RX ADMIN — BUDESONIDE AND FORMOTEROL FUMARATE DIHYDRATE 2 PUFF: 160; 4.5 AEROSOL RESPIRATORY (INHALATION) at 09:13

## 2025-06-15 RX ADMIN — INSULIN LISPRO 1 UNITS: 100 INJECTION, SOLUTION INTRAVENOUS; SUBCUTANEOUS at 20:02

## 2025-06-15 ASSESSMENT — PAIN SCALES - GENERAL
PAINLEVEL_OUTOF10: 0
PAINLEVEL_OUTOF10: 2
PAINLEVEL_OUTOF10: 0
PAINLEVEL_OUTOF10: 0

## 2025-06-15 ASSESSMENT — PAIN DESCRIPTION - DESCRIPTORS: DESCRIPTORS: ACHING

## 2025-06-15 ASSESSMENT — PAIN DESCRIPTION - ORIENTATION: ORIENTATION: RIGHT

## 2025-06-15 ASSESSMENT — PAIN - FUNCTIONAL ASSESSMENT: PAIN_FUNCTIONAL_ASSESSMENT: PREVENTS OR INTERFERES SOME ACTIVE ACTIVITIES AND ADLS

## 2025-06-15 ASSESSMENT — PAIN DESCRIPTION - LOCATION: LOCATION: BACK;SHOULDER

## 2025-06-15 NOTE — FLOWSHEET NOTE
06/15/25 1703   Treatment Team Notification   Reason for Communication Review case   Name of Team Member Notified Dr. Young   Treatment Team Role Attending Provider   Method of Communication Secure Message   Response See orders     Dr. Young notified of SBP in 90s today, as well as, FSBS today. Hold parameters placed for metoprolol and Lantus discontinued.

## 2025-06-15 NOTE — PROGRESS NOTES
Patient's FSBS 66 this morning. Breakfast tray given to patient and encouraged to eat. Recheck FSBS 79 now.  arrives at chairside. Drinks coffee, orange juice, and eats yogurt, few bites of biscuit and gravy.

## 2025-06-15 NOTE — RT PROTOCOL NOTE
RESPIRATORY ASSESSMENT PROTOCOL                                                                                              Patient Name: Geri Parks Room#: 0267/0267-01 : 1945     Admitting diagnosis: Unspecified atrial fibrillation ([facility]) [I48.91]  Generalized weakness [R53.1]       Medical History:   Past Medical History:   Diagnosis Date    Aortic stenosis     Arthritis     Benign cyst of right breast in female     Breast cyst     Cardiac murmur     Cataract     Cerebral brain hemorrhage ([facility]) 2013    Right thalamic    Chronic renal disease, stage III ([facility]) [906500] 2022    Coronary artery disease involving native coronary artery of native heart without angina pectoris 2022    Deep vein blood clot of left lower extremity ([facility]) 2016    Diverticulosis     Head injury     Hx of blood clots     Hyperlipidemia     Hypertension     Migraine headache     Pulmonary emphysema ([facility]) 10/05/2020    Seizures ([facility])     Stroke (cerebrum) ([facility])     Unspecified cerebral artery occlusion with cerebral infarction     TIA       PATIENT ASSESSMENT    LABORATORY DATA  Hematology:   Lab Results   Component Value Date/Time    WBC 5.9 2025 04:18 AM    RBC 3.64 2025 04:18 AM    HGB 10.9 2025 04:18 AM    HCT 33.2 2025 04:18 AM    PLT 85 2025 04:18 AM     Chemistry:    Lab Results   Component Value Date/Time    PBEA 0.4 2025 07:18 PM       VITALS  Pulse: 66   Respirations: 16  BP: 105/62  SpO2: 97 % O2 Device: None (Room air)  Temp: 98.2 °F (36.8 °C)    SKIN COLOR  [x] Normal  [] Pale  [] Dusky  [] Cyanotic    RESPIRATORY PATTERN  [x] Normal  [] Dyspnea  [] Cheyne-Mares  [] Kussmaul  [] Biots    AMBULATORY  [x] Yes  [] No  [x] With Assistance    PEAK FLOW  Predicted:     Personal Best:            Patient Acuity 0 1 2 3 4 Score   Level of Consciousness (LOC) [x]  Alert & Oriented or Pt normal LOC []  Confused;follows directions []  Confused & uncooper-ative []  Obtunded

## 2025-06-15 NOTE — PROGRESS NOTES
Phone: [phone] [facility]  Date: 6/15/2025  Fax: [phone]      Physical Therapy    Daily Note    Patient Name: Geri Parks      : 1945  (80 y.o.)  MRN: [id number]      Assessment           Supine to Sit: Independent          Sit to Stand: Supervision  Stand to Sit: Supervision                WB Status: w.walker and CGA/SBA ~10 ftx1; 90 ft x 2           Stairs  # Steps : 10  Stairs Height: 4\" (and 6\")  Rails: Bilateral  Assistance: Contact guard assistance    Assessment: Patient in bed with call light on. Requests need for bathroom. Supine to sit is mod I. Sit to stand from bed is superivsion. Ambulates with wheeled walker into bathroom.  Is independent with pericare.  Demonstrates good standing balance to wash hands at sink and then ambulates to chair to sit up and eat breakfast.  Call light in reach and chair alarm attached. Will return later for exercises. Patient in chair upon arrival to room with  also present. Sit to stand from chair is supervision. Ambualtes with wheeled walker into bathroom. On/off commode with supervision. Independent with mable care. Demonstrates good standing balance to wash hands at sink. Ambulates to therapy room following with wheeled walker and CGA/SBA.  No LOB noted with gait.  comes to therapy room with patient. Patient completes seate exercises as outlined above. Up/down steps 4\" and 6\" in height with B handrails and min/CGA.  Ambulates back to room following to sit back up in chair. Call light in reach and chair alarm attached.  remains in room with patient.  Safety Devices  Type of Devices: Call light within reach, Chair alarm in place, Gait belt, Left in chair, Nurse notified          Call light in reach, Phone in reach, Use of Gait belt, Chair alarm, Nurse Notified, Family Present, and Left in chair  Time In: 08 ( 1010)  Time Out: 08 ( 1051)  Timed Coded Minutes: 47  Total Treatment Time: 47       Exercises:  See

## 2025-06-16 LAB
GLUCOSE BLD-MCNC: 118 MG/DL (ref 65–99)
GLUCOSE BLD-MCNC: 199 MG/DL (ref 65–99)
GLUCOSE BLD-MCNC: 220 MG/DL (ref 65–99)
GLUCOSE BLD-MCNC: 55 MG/DL (ref 65–99)

## 2025-06-16 PROCEDURE — 97110 THERAPEUTIC EXERCISES: CPT

## 2025-06-16 PROCEDURE — 97535 SELF CARE MNGMENT TRAINING: CPT

## 2025-06-16 PROCEDURE — 94761 N-INVAS EAR/PLS OXIMETRY MLT: CPT

## 2025-06-16 PROCEDURE — 6370000000 HC RX 637 (ALT 250 FOR IP): Performed by: INTERNAL MEDICINE

## 2025-06-16 PROCEDURE — 6360000002 HC RX W HCPCS: Performed by: INTERNAL MEDICINE

## 2025-06-16 PROCEDURE — 94669 MECHANICAL CHEST WALL OSCILL: CPT

## 2025-06-16 PROCEDURE — 82947 ASSAY GLUCOSE BLOOD QUANT: CPT

## 2025-06-16 PROCEDURE — 94640 AIRWAY INHALATION TREATMENT: CPT

## 2025-06-16 PROCEDURE — 97530 THERAPEUTIC ACTIVITIES: CPT

## 2025-06-16 PROCEDURE — 1200000002 HC SEMI PRIVATE SWING BED

## 2025-06-16 PROCEDURE — 97116 GAIT TRAINING THERAPY: CPT

## 2025-06-16 RX ADMIN — LEVALBUTEROL HYDROCHLORIDE 0.63 MG: 0.63 SOLUTION RESPIRATORY (INHALATION) at 20:36

## 2025-06-16 RX ADMIN — METOPROLOL TARTRATE 25 MG: 25 TABLET, FILM COATED ORAL at 20:30

## 2025-06-16 RX ADMIN — APIXABAN 5 MG: 5 TABLET, FILM COATED ORAL at 20:32

## 2025-06-16 RX ADMIN — DICLOFENAC SODIUM 4 G: 10 GEL TOPICAL at 17:06

## 2025-06-16 RX ADMIN — DICLOFENAC SODIUM 4 G: 10 GEL TOPICAL at 13:29

## 2025-06-16 RX ADMIN — ASPIRIN 81 MG: 81 TABLET, COATED ORAL at 08:19

## 2025-06-16 RX ADMIN — ROSUVASTATIN 10 MG: 10 TABLET, FILM COATED ORAL at 20:32

## 2025-06-16 RX ADMIN — ACETAMINOPHEN 650 MG: 325 TABLET, FILM COATED ORAL at 20:30

## 2025-06-16 RX ADMIN — GUAIFENESIN 600 MG: 600 TABLET, EXTENDED RELEASE ORAL at 08:20

## 2025-06-16 RX ADMIN — INSULIN LISPRO 1 UNITS: 100 INJECTION, SOLUTION INTRAVENOUS; SUBCUTANEOUS at 20:30

## 2025-06-16 RX ADMIN — BUDESONIDE AND FORMOTEROL FUMARATE DIHYDRATE 2 PUFF: 160; 4.5 AEROSOL RESPIRATORY (INHALATION) at 09:18

## 2025-06-16 RX ADMIN — PREDNISONE 20 MG: 20 TABLET ORAL at 08:20

## 2025-06-16 RX ADMIN — POTASSIUM CHLORIDE 30 MEQ: 750 TABLET, FILM COATED, EXTENDED RELEASE ORAL at 08:20

## 2025-06-16 RX ADMIN — APIXABAN 5 MG: 5 TABLET, FILM COATED ORAL at 08:19

## 2025-06-16 RX ADMIN — GUAIFENESIN 600 MG: 600 TABLET, EXTENDED RELEASE ORAL at 20:30

## 2025-06-16 RX ADMIN — LEVALBUTEROL HYDROCHLORIDE 0.63 MG: 0.63 SOLUTION RESPIRATORY (INHALATION) at 09:07

## 2025-06-16 RX ADMIN — DICLOFENAC SODIUM 4 G: 10 GEL TOPICAL at 08:26

## 2025-06-16 RX ADMIN — LEVALBUTEROL HYDROCHLORIDE 0.63 MG: 0.63 SOLUTION RESPIRATORY (INHALATION) at 14:02

## 2025-06-16 RX ADMIN — DIVALPROEX SODIUM 750 MG: 250 TABLET, DELAYED RELEASE ORAL at 08:19

## 2025-06-16 RX ADMIN — Medication 1 CAPSULE: at 08:31

## 2025-06-16 RX ADMIN — BUDESONIDE AND FORMOTEROL FUMARATE DIHYDRATE 2 PUFF: 160; 4.5 AEROSOL RESPIRATORY (INHALATION) at 20:36

## 2025-06-16 RX ADMIN — TIOTROPIUM BROMIDE INHALATION SPRAY 5 MCG: 3.12 SPRAY, METERED RESPIRATORY (INHALATION) at 09:20

## 2025-06-16 RX ADMIN — INSULIN LISPRO 1 UNITS: 100 INJECTION, SOLUTION INTRAVENOUS; SUBCUTANEOUS at 17:07

## 2025-06-16 RX ADMIN — INSULIN LISPRO 1 UNITS: 100 INJECTION, SOLUTION INTRAVENOUS; SUBCUTANEOUS at 08:18

## 2025-06-16 RX ADMIN — Medication 1 CAPSULE: at 17:07

## 2025-06-16 RX ADMIN — FUROSEMIDE 20 MG: 20 TABLET ORAL at 08:19

## 2025-06-16 RX ADMIN — Medication 16 G: at 12:00

## 2025-06-16 ASSESSMENT — PAIN SCALES - GENERAL
PAINLEVEL_OUTOF10: 6
PAINLEVEL_OUTOF10: 3
PAINLEVEL_OUTOF10: 0
PAINLEVEL_OUTOF10: 0
PAINLEVEL_OUTOF10: 2
PAINLEVEL_OUTOF10: 0
PAINLEVEL_OUTOF10: 4
PAINLEVEL_OUTOF10: 0

## 2025-06-16 ASSESSMENT — PAIN - FUNCTIONAL ASSESSMENT
PAIN_FUNCTIONAL_ASSESSMENT: ACTIVITIES ARE NOT PREVENTED

## 2025-06-16 ASSESSMENT — PAIN DESCRIPTION - LOCATION
LOCATION: SHOULDER
LOCATION: ABDOMEN
LOCATION: SHOULDER
LOCATION: SHOULDER

## 2025-06-16 ASSESSMENT — PAIN DESCRIPTION - ORIENTATION
ORIENTATION: LEFT;RIGHT
ORIENTATION: RIGHT;LEFT
ORIENTATION: RIGHT;UPPER
ORIENTATION: RIGHT;LEFT

## 2025-06-16 ASSESSMENT — PAIN DESCRIPTION - DESCRIPTORS
DESCRIPTORS: ACHING
DESCRIPTORS: PRESSURE
DESCRIPTORS: ACHING
DESCRIPTORS: ACHING

## 2025-06-16 NOTE — PROGRESS NOTES
[facility]  Physical Therapy    Date: 2025  Patient Name: Geri Parks        : 1945       [x] Pt Refusal Patient in therapy room after working with OT.  Asked if she would be willing to stay and work with PTA but she refuses.  Will resume with PT services in the morning and progress as able.           [] Pt Unavailable due to:          Nelli Abarca, PTA Date: 2025

## 2025-06-16 NOTE — PROGRESS NOTES
eSWING BED ORIENTATION    Patient Name: Geri Parks  : 1945   Gender: female   Diagnosis:  Unspecified atrial fibrillation (HCC) [I48.91]  Generalized weakness [R53.1] MRN: [id number]     [x] Goal is to provide you with very good care    [x] Insurance and Financial Responsibilities    [x] Changes to Expect           - Safely encourage you to learn to care for yourself     - Frequency of Doctor's Visits       - Family Training Likely  [x] Visiting Hours:           11:00am - 8:30 pm (or by special arrangement)  [x] Personal Phone Number          Located on Dry-Erase Board   [x] Swing Bed Team Meetings         Family encouraged to attend   [x]  Clothing            Bring what you normally wear  [x]  Prevention of Falls           Put call light on, and wait until OK'd to get up on your own.  [x] Therapy Expectations          Do your best to participate  []  Advanced Directives                          []  Pt has advanced directives and we have a copy on file                            []  Pt has advanced directives and we do not have a copy on file,  notified and will follow up.                             [x]   Pt does not have advanced directives.  [x] Educated on mail policy           Address provided for direct to hospital service                                                                                                                                                                                                                                                                                       Orientation Completed and agreed upon with Geri Parks and/or Family Members

## 2025-06-16 NOTE — CARE COORDINATION
06/16/25 1524   IMM Letter   Notice of Medicare Non-Coverage Letter date given: 06/16/25   Notice of Medicare Non-Coverage Time Given 1520   Notice of Medicare Non-Coverage Letter Given By Misty GARCIA LSW     Sw met with pt and provided Medicare Notice of Non coverage and reviewed this with her for skilled services to stop on 6/18/2025 and discharge to home on 6/19/2025. Pt in agreement and states that she is ready to go home. Pt signs letter and SW made copy and placed in paper chart and provided to pt. SW and pt discuss possible need for HH or outpatient therapy and pt states that she wants to talk with her  about this. Pt will also need a walker and had mentioned a rollator previously. Will check with therapy to find out which walker would be best suited for pt. SW following and will continue to work on discharge planning. Misty Escudero, RADHA, LSW 6/16/2025

## 2025-06-16 NOTE — PROGRESS NOTES
Phone: [phone] [facility]  Date: 2025  Fax: [phone]      Physical Therapy    Daily Note    Patient Name: Geri Parks      : 1945  (80 y.o.)  MRN: [id number]      Assessment                     Sit to Stand: Supervision  Stand to Sit: Supervision                WB Status: w.walker and SBA/supervision ~75 ft x1; 100 ft x1            Assessment: Patient in chair upon arrival to room. She initally does not want to work with PTA but is encouraged to do so. She agrees. Sit to stand from chair is  superivsion.  Ambulates into bathroom with wheeled walker. On/off commode with superivsion. She begins ambulating toward sink prior to pulling her brief up.  This is not the first time she has done this. Demontrates good standing balance to wash hands at sink. Ambulates with wheeled walker in hallway ~ 75 fx1 before requesting to sit down in w/c. Wheeled remaining distance and completes exercises as outlined above to help increase strength. She c/o of fatigue throughout session.  Able to ambulate full distance back to room to sit up in chair ~100 ftx1.  Call light in reach and chair alarm attached.  Safety Devices  Type of Devices: Call light within reach, Chair alarm in place, Gait belt, Left in chair, Nurse notified          Call light in reach, Phone in reach, Use of Gait belt, Chair alarm, Nurse Notified, and Left in chair  Time In: 0915  Time Out: 0956  Timed Coded Minutes: 41  Total Treatment Time: 41       Exercises:  See Flowsheets    Plan  Cont Per Plan Of Care    Goals  Short Term Goals  Time Frame for Short Term Goals: -  Short Term Goal 1: -  Short Term Goal 2: -  Short Term Goal 3: -  Short Term Goal 4: -       Long Term Goals  Time Frame for Long Term Goals : 7 days - expires 25  Long Term Goal 1: Patient to transfer sit to stand and stand to sit modified independent to safely return home  Long Term Goal 2: Patient to ambulate with w.walker 50 ft x2 with SBA to safely

## 2025-06-16 NOTE — RT PROTOCOL NOTE
RESPIRATORY ASSESSMENT PROTOCOL                                                                                              Patient Name: Geri Parks Room#: 0267/0267-01 : 1945     Admitting diagnosis: Unspecified atrial fibrillation ([facility]) [I48.91]  Generalized weakness [R53.1]       Medical History:   Past Medical History:   Diagnosis Date    Aortic stenosis     Arthritis     Benign cyst of right breast in female     Breast cyst     Cardiac murmur     Cataract     Cerebral brain hemorrhage (HCC) 2013    Right thalamic    Chronic renal disease, stage III ([facility]) [405676] 2022    Coronary artery disease involving native coronary artery of native heart without angina pectoris 2022    Deep vein blood clot of left lower extremity ([facility]) 2016    Diverticulosis     Head injury     Hx of blood clots     Hyperlipidemia     Hypertension     Migraine headache     Pulmonary emphysema ([facility]) 10/05/2020    Seizures ([facility])     Stroke (cerebrum) ([facility])     Unspecified cerebral artery occlusion with cerebral infarction     TIA       PATIENT ASSESSMENT    LABORATORY DATA  Hematology:   Lab Results   Component Value Date/Time    WBC 5.9 2025 04:18 AM    RBC 3.64 2025 04:18 AM    HGB 10.9 2025 04:18 AM    HCT 33.2 2025 04:18 AM    PLT 85 2025 04:18 AM     Chemistry:    Lab Results   Component Value Date/Time    PBEA 0.4 2025 07:18 PM       VITALS  Pulse: 56   Respirations: 18  BP: 119/72  SpO2: 97 % O2 Device: None (Room air)  Temp: 97.8 °F (36.6 °C)    SKIN COLOR  [x] Normal  [] Pale  [] Dusky  [] Cyanotic    RESPIRATORY PATTERN  [x] Normal  [] Dyspnea  [] Cheyne-Mares  [] Kussmaul  [] Biots    AMBULATORY  [x] Yes  [] No  [x] With Assistance    PEAK FLOW  Predicted:     Personal Best:            Patient Acuity 0 1 2 3 4 Score   Level of Consciousness (LOC) [x]  Alert & Oriented or Pt normal LOC []  Confused;follows directions []  Confused & uncooper-ative []  Obtunded

## 2025-06-16 NOTE — PROGRESS NOTES
Comprehensive Nutrition Assessment    Type and Reason for Visit:  Reassess    Nutrition Recommendations/Plan:   Add glucerna to stabilize glucose.      Malnutrition Assessment:  Malnutrition Status:  At risk for malnutrition (06/13/25 1035)    Context:  Acute Illness     Findings of the 6 clinical characteristics of malnutrition:  Energy Intake:  Mild decrease in energy intake  Weight Loss:  Mild weight loss     Body Fat Loss:  No body fat loss     Muscle Mass Loss:  No muscle mass loss    Fluid Accumulation:  Mild Extremities   Strength:  Not Performed    Nutrition Assessment:    Continued altered nutrition labs with some hypoglycemia noted. On ssc insulin only and reporting 55 mg/dl prior to lunch today. Was also 66 mg/dl yesterday morning. Only one unit Lispro delivered this AM. States ate breakast well which had at least 30 grams carb and protein. Oral intakes do appear good per observation. Will add Glucerna 4 oz tid to help stabilize glucose.    Nutrition Related Findings:    active b/s. soft bm. +2 BLE edema (worse) Wound Type: None       Current Nutrition Intake & Therapies:    Average Meal Intake: 51-75%  Average Supplements Intake: None Ordered  ADULT DIET; Regular  ADULT ORAL NUTRITION SUPPLEMENT; Breakfast, Lunch, Dinner; Diabetic Oral Supplement    Anthropometric Measures:  Height: 160 cm (5' 3\")  Ideal Body Weight (IBW): 115 lbs (52 kg)    Admission Body Weight: 77.4 kg (170 lb 11.2 oz) (acute admission)  Current Body Weight: 78.8 kg (173 lb 11.6 oz), 149.5 % IBW. Weight Source: Bed scale  Current BMI (kg/m2): 30.8  Usual Body Weight: 79.4 kg (175 lb) (a month ago but used to be more stable ~189# 9 months ago)     % Weight Change (Calculated): -0.7  Weight Adjustment For: No Adjustment                 BMI Categories: Obese Class 1 (BMI 30.0-34.9)    Estimated Daily Nutrient Needs:  Energy Requirements Based On: Kcal/kg  Weight Used for Energy Requirements: Current  Energy (kcal/day): 1365-0445

## 2025-06-16 NOTE — PLAN OF CARE
Problem: Chronic Conditions and Co-morbidities  Goal: Patient's chronic conditions and co-morbidity symptoms are monitored and maintained or improved  6/16/2025 1444 by Radha Villa RN  Outcome: Progressing  6/16/2025 1442 by Radha Villa RN  Outcome: Progressing     Problem: Discharge Planning  Goal: Discharge to home or other facility with appropriate resources  6/16/2025 1444 by Radha Villa RN  Outcome: Progressing  6/16/2025 1442 by Radha Villa RN  Outcome: Progressing     Problem: Skin/Tissue Integrity  Goal: Skin integrity remains intact  Description: 1.  Monitor for areas of redness and/or skin breakdown2.  Assess vascular access sites hourly3.  Every 4-6 hours minimum:  Change oxygen saturation probe site4.  Every 4-6 hours:  If on nasal continuous positive airway pressure, respiratory therapy assess nares and determine need for appliance change or resting period  6/16/2025 1444 by Radha Villa RN  Outcome: Progressing  6/16/2025 1442 by Radha Villa RN  Outcome: Progressing  Flowsheets (Taken 6/16/2025 1441)  Skin Integrity Remains Intact:   Monitor for areas of redness and/or skin breakdown   Assess vascular access sites hourly     Problem: Safety - Adult  Goal: Free from fall injury  6/16/2025 1444 by Radha Villa RN  Outcome: Progressing  6/16/2025 1442 by Radha Villa RN  Outcome: Progressing  Flowsheets (Taken 6/16/2025 1441)  Free From Fall Injury: Instruct family/caregiver on patient safety     Problem: ABCDS Injury Assessment  Goal: Absence of physical injury  6/16/2025 1444 by Radha Villa RN  Outcome: Progressing  6/16/2025 1442 by Radha Villa RN  Outcome: Progressing  Flowsheets (Taken 6/16/2025 1441)  Absence of Physical Injury: Implement safety measures based on patient assessment     Problem: Cardiovascular - Adult  Goal: Maintains optimal cardiac output and hemodynamic stability  6/16/2025 1444 by Radha Villa RN  Outcome: Progressing  6/16/2025 1442 by

## 2025-06-16 NOTE — PROGRESS NOTES
[facility]  Phone: [phone]    Occupational Therapy Skilled Daily Note  Date: 2025  Patient Name: Geri Parks        MRN: [id number]    : 1945  (80 y.o.)    Subjective:     Subjective: Patient was sitting in recliner upon OT arrival. Was agreeable to OT interventions.    Discharge recommendation: Continue to assess Pending Progress    Objective:     ADLs:  Grooming: Contact guard assistance (inserting dentures in standing)    Transfers:  Sit to stand: Contact guard assistance   Stand to sit: Contact guard assistance    Assessment:     Assessment: Patient was sitting in recliner upon OT arrival. Was agreeable to OT interventions. Completed ADLs and functional transfers as documented above. Agreed to complete therapy session in therapy room; performed functional mobility to therapy room via FWW/gait belt with CGA. Once seated in therapy room, patient engaged in 14 minutes of BUE ther ex via a 1 pound weight as outlined in the exercise flowsheet to increase UE strength for ADLs and functional transfers. Patient required maximum cues for the correct technique, but did not require any rest breaks during this exercise bout. To increase independence and safety with I/ADLs, patient tolerated dynamic standing x4 minutes while engaging in a functional reaching/resistive fine motor task unilaterally. Patient did not experience any LOB during this standing bout. Following brief seated rest break, patient performed functional mobiluity back to hospital room via FWW/gait belt with CGA. Patient remains in recliner with call light/personal items within reach and chair alarm activated upon OT departure. Will continue to address goals and progress patient as able/tolerated.    Exercises:     See Flowsheets    Goals:     Short term goals:  Time Frame for Short Term Goals: 8 days (2025)  Short Term Goal 1: Patient will complete a commode transfer while using DME PRN with SUP/SETUP.  Short Term

## 2025-06-16 NOTE — PROGRESS NOTES
[facility]  Swing Bed Evaluation for Certification for Skilled Care    Geri Parks meets skilled criteria due to the need for skilled nursing supervision or skilled rehabilitation services listed below:   [x] Therapy; physical and occupational; for decreased strength, balance and self         care activities.   [] Tpn    [] Trach care   [] IV therapy   [] Wound care    [] Other Skilled Need:        Geri Parks lives [] Alone      [x] With Spouse    [] Other:   and plans on returning there at discharge.     [x] Pt declines list of alternate providers, pt prefers to receive skilled care at [facility]  [] List of alternate providers given to patient, pt prefers to receive skilled care at [facility]  [] Not applicable, pt admitted to [facility] from Outside Facility    Geri Parks prefers this facility for skilled care and services can only be practically provided in a skilled nursing facility or swing [facility] on an inpatient basis. Geri Parks will require skilled care on a daily basis beginning 6/12/2025, if medically stable.  These services are for an ongoing condition for which Geri Parks received inpatient care for at the hospital.        Jimena Best,      Date: 6/12/2025

## 2025-06-16 NOTE — PLAN OF CARE
Problem: Chronic Conditions and Co-morbidities  Goal: Patient's chronic conditions and co-morbidity symptoms are monitored and maintained or improved  Outcome: Progressing     Problem: Discharge Planning  Goal: Discharge to home or other facility with appropriate resources  Outcome: Progressing     Problem: Skin/Tissue Integrity  Goal: Skin integrity remains intact  Description: 1.  Monitor for areas of redness and/or skin breakdown2.  Assess vascular access sites hourly3.  Every 4-6 hours minimum:  Change oxygen saturation probe site4.  Every 4-6 hours:  If on nasal continuous positive airway pressure, respiratory therapy assess nares and determine need for appliance change or resting period  Outcome: Progressing  Flowsheets (Taken 6/16/2025 1441)  Skin Integrity Remains Intact:   Monitor for areas of redness and/or skin breakdown   Assess vascular access sites hourly     Problem: Safety - Adult  Goal: Free from fall injury  Outcome: Progressing  Flowsheets (Taken 6/16/2025 1441)  Free From Fall Injury: Instruct family/caregiver on patient safety     Problem: ABCDS Injury Assessment  Goal: Absence of physical injury  Outcome: Progressing  Flowsheets (Taken 6/16/2025 1441)  Absence of Physical Injury: Implement safety measures based on patient assessment     Problem: Cardiovascular - Adult  Goal: Maintains optimal cardiac output and hemodynamic stability  Outcome: Progressing  Goal: Absence of cardiac dysrhythmias or at baseline  Outcome: Progressing     Problem: Musculoskeletal - Adult  Goal: Return mobility to safest level of function  Outcome: Progressing  Goal: Return ADL status to a safe level of function  Outcome: Progressing     Problem: Genitourinary - Adult  Goal: Absence of urinary retention  Outcome: Progressing     Problem: Nutrition Deficit:  Goal: Optimize nutritional status  6/16/2025 1442 by Radha Villa RN  Outcome: Progressing  6/16/2025 1224 by Rafael Daily, RD, LD  Outcome:

## 2025-06-16 NOTE — PROGRESS NOTES
SWINGBED PATIENT ACTIVITY PROGRAM ASSESSMENT    Patient Name: Geri Parks  : 1945   Gender: female   Diagnosis:  Unspecified atrial fibrillation (HCC) [I48.91]  Generalized weakness [R53.1] MRN: [id number]     Ability to read or write? [x] Yes   [] No  Ability to hear?   [x] Yes   [] No  Is patient confused?  [] Yes   [x] No    Enter Codes in Boxes Coding:  Very Important  Somewhat important  Not very important  Not important at all  Important but can’t do or no choice  No response or non-responsive   2 A. How important is it to you to have books, newspapers, and magazines to read?   1 B.  How important is it to you to listen to music you like?   3 C.  How important is it to you to be around animals such as pets?   1 D.  How important is it to you to keep up with the news?   2 E.  How important is it to you to do things with groups of people?   1 F.  How important is it to you to do your favorite activities?   2 G.  How important is it to you to go outside to get fresh air when the weather is good?   1 H.  How important is it to you to participate in Christianity services or practices?     Activity Care Plan:  Will participate in activity programs of choice daily with no decline throughout [facility] stay.          EVALUATOR’S SIGNATURE:  Jimena Best  Date: 2025

## 2025-06-17 LAB — GLUCOSE BLD-MCNC: 98 MG/DL (ref 65–99)

## 2025-06-17 PROCEDURE — 94640 AIRWAY INHALATION TREATMENT: CPT

## 2025-06-17 PROCEDURE — 97116 GAIT TRAINING THERAPY: CPT

## 2025-06-17 PROCEDURE — 97110 THERAPEUTIC EXERCISES: CPT

## 2025-06-17 PROCEDURE — 6370000000 HC RX 637 (ALT 250 FOR IP): Performed by: INTERNAL MEDICINE

## 2025-06-17 PROCEDURE — 6360000002 HC RX W HCPCS: Performed by: INTERNAL MEDICINE

## 2025-06-17 PROCEDURE — 94669 MECHANICAL CHEST WALL OSCILL: CPT

## 2025-06-17 PROCEDURE — 94761 N-INVAS EAR/PLS OXIMETRY MLT: CPT

## 2025-06-17 PROCEDURE — 1200000002 HC SEMI PRIVATE SWING BED

## 2025-06-17 PROCEDURE — 82947 ASSAY GLUCOSE BLOOD QUANT: CPT

## 2025-06-17 RX ADMIN — TIOTROPIUM BROMIDE INHALATION SPRAY 5 MCG: 3.12 SPRAY, METERED RESPIRATORY (INHALATION) at 09:00

## 2025-06-17 RX ADMIN — LEVALBUTEROL HYDROCHLORIDE 0.63 MG: 0.63 SOLUTION RESPIRATORY (INHALATION) at 09:00

## 2025-06-17 RX ADMIN — POTASSIUM CHLORIDE 30 MEQ: 750 TABLET, FILM COATED, EXTENDED RELEASE ORAL at 08:42

## 2025-06-17 RX ADMIN — GUAIFENESIN 600 MG: 600 TABLET, EXTENDED RELEASE ORAL at 08:42

## 2025-06-17 RX ADMIN — ROSUVASTATIN 10 MG: 10 TABLET, FILM COATED ORAL at 19:54

## 2025-06-17 RX ADMIN — LEVALBUTEROL HYDROCHLORIDE 0.63 MG: 0.63 SOLUTION RESPIRATORY (INHALATION) at 20:11

## 2025-06-17 RX ADMIN — METOPROLOL TARTRATE 25 MG: 25 TABLET, FILM COATED ORAL at 19:54

## 2025-06-17 RX ADMIN — Medication 1 CAPSULE: at 16:26

## 2025-06-17 RX ADMIN — DICLOFENAC SODIUM 4 G: 10 GEL TOPICAL at 08:44

## 2025-06-17 RX ADMIN — LEVALBUTEROL HYDROCHLORIDE 0.63 MG: 0.63 SOLUTION RESPIRATORY (INHALATION) at 14:58

## 2025-06-17 RX ADMIN — FUROSEMIDE 20 MG: 20 TABLET ORAL at 08:42

## 2025-06-17 RX ADMIN — GUAIFENESIN 600 MG: 600 TABLET, EXTENDED RELEASE ORAL at 19:54

## 2025-06-17 RX ADMIN — APIXABAN 5 MG: 5 TABLET, FILM COATED ORAL at 19:54

## 2025-06-17 RX ADMIN — DIVALPROEX SODIUM 750 MG: 250 TABLET, DELAYED RELEASE ORAL at 08:42

## 2025-06-17 RX ADMIN — Medication 1 CAPSULE: at 08:42

## 2025-06-17 RX ADMIN — APIXABAN 5 MG: 5 TABLET, FILM COATED ORAL at 08:42

## 2025-06-17 RX ADMIN — BUDESONIDE AND FORMOTEROL FUMARATE DIHYDRATE 2 PUFF: 160; 4.5 AEROSOL RESPIRATORY (INHALATION) at 20:11

## 2025-06-17 RX ADMIN — ASPIRIN 81 MG: 81 TABLET, COATED ORAL at 08:42

## 2025-06-17 RX ADMIN — BUDESONIDE AND FORMOTEROL FUMARATE DIHYDRATE 2 PUFF: 160; 4.5 AEROSOL RESPIRATORY (INHALATION) at 09:00

## 2025-06-17 ASSESSMENT — PAIN - FUNCTIONAL ASSESSMENT
PAIN_FUNCTIONAL_ASSESSMENT: NONE - DENIES PAIN
PAIN_FUNCTIONAL_ASSESSMENT: NONE - DENIES PAIN

## 2025-06-17 NOTE — PROGRESS NOTES
[facility]  Swing Bed Interdisciplinary Care Plan Conference Report   Recertification for Continued Skilled Care.    Patients name:Geri Parks  Date of Conference: 6/17/2025    The Following Information was discussed and agreed upon with the patient and/or Caregivers as listed below.    Names of Team Members and Caregivers present for meeting:  : Misty Tristan Nursing: Fausto Donahue  Therapy: Melanie Lazo, PT; KIMMY Gonzales/OTR  Dietician:   Activities: Jimena Best  Pastoral Care:   Caregivers:    [x] Spouse  [] Child/Children [] Significant Other   [] Other:     Nutrition:  Current Body Weight:   Wt Readings from Last 1 Encounters:   06/14/25 78.8 kg (173 lb 11.6 oz)     Weight Change: ~1# less than recent usual   Current Diet order:ADULT DIET; Regular  ADULT ORAL NUTRITION SUPPLEMENT; Breakfast, Lunch, Dinner; Diabetic Oral Supplement  Intakes: 51-75% meals   Diet Education Provided: Carbohydrate and protein. Adding Glucerna tid.   Goal: PO >75% meals and supplements    Spiritual:  [employer] needs met: [x] Yes  [] No  [] N/A  Notified  or  of admission: [] Yes  [] No  [x] N/A  Patient added to Communion List: [] Yes  [] No  [x] N/A  Any other concerns or comments:   Goal: Participate 2-3 times per week    Occupational Therapy:  Current ADL Status: ADL  Feeding: None  Grooming: Contact guard assistance (inserting dentures in standing)  UE Bathing: None  LE Bathing: None  UE Dressing: None  LE Dressing: None  Toileting: None  Product Used : Bath wipes  Safety: Good  Recommendations for adaptive equipment:    [] Long handled sponge   [] Long Handled Shoe Horn  [] Extended Tub Bench  Short Term Goals  Time Frame for Short Term Goals: 8 days (6/20/2025)  Short Term Goal 1: Patient will complete a commode transfer while using DME PRN with SUP/SETUP.  Short Term Goal 2: Patient will complete upper body dressing and/or bathing while using adaptive

## 2025-06-17 NOTE — PROGRESS NOTES
[facility]  Occupational Therapy    Date: 2025  Patient Name: Geri Parks        : 1945       [x] Pt Refusal: Patient just finishing with getting her hair done. Refuses OT interventions this afternoon due to fatigue level. Assisted patient back to room. Remains in recliner w/ call light and other personal items within reach. Chair alarm in place. Will resume OT interventions in the AM.           [] Pt Unavailable due to:          STU Zheng  Date: 2025

## 2025-06-17 NOTE — PROGRESS NOTES
Swing bed IDT meeting held this date regarding pt progress.  Therapy reports that pt has done very well with them and they feel that her discharge on Thursday remains reasonable.     Swing bed coordinator, RN case manager and SW met with pt and spouse to discuss above. Pt in agreement with plan to discharge to home on Thursday 6/19/2025. Discussed with pt and spouse about additional services for home. Both deny need for HH or outpatient therapies and feel that pt will do fine without. Pt would like a rollator walker for home and spouse reports that [employer] is fine to order it through as [employer] is no longer billing insurance for DME. SW faxed order and face sheet and insurance cards to [employer] and left them a message regarding walker needing to be delivered to hospital for pt tomorrow or early Thursday. SW following. Plan for discharge to home with spouse. Misty Escudero, MSW, LSW 6/17/2025

## 2025-06-17 NOTE — PLAN OF CARE
Problem: Chronic Conditions and Co-morbidities  Goal: Patient's chronic conditions and co-morbidity symptoms are monitored and maintained or improved  6/17/2025 1055 by Christina Cross RN  Outcome: Progressing  6/17/2025 0229 by Patty Carson RN  Outcome: Progressing     Problem: Discharge Planning  Goal: Discharge to home or other facility with appropriate resources  6/17/2025 1055 by Christina Cross RN  Outcome: Progressing  6/17/2025 0229 by Patty Carson RN  Outcome: Progressing     Problem: Skin/Tissue Integrity  Goal: Skin integrity remains intact  Description: 1.  Monitor for areas of redness and/or skin breakdown2.  Assess vascular access sites hourly3.  Every 4-6 hours minimum:  Change oxygen saturation probe site4.  Every 4-6 hours:  If on nasal continuous positive airway pressure, respiratory therapy assess nares and determine need for appliance change or resting period  6/17/2025 1055 by Christina Cross RN  Outcome: Progressing  6/17/2025 0229 by Patty Carson RN  Outcome: Progressing     Problem: Safety - Adult  Goal: Free from fall injury  6/17/2025 1055 by Christina Cross RN  Outcome: Progressing  6/17/2025 0229 by Patty Carson RN  Outcome: Progressing     Problem: ABCDS Injury Assessment  Goal: Absence of physical injury  6/17/2025 1055 by Christina Cross RN  Outcome: Progressing  6/17/2025 0229 by Patty Carson RN  Outcome: Progressing     Problem: Cardiovascular - Adult  Goal: Maintains optimal cardiac output and hemodynamic stability  6/17/2025 1055 by Christina Cross RN  Outcome: Progressing  6/17/2025 0229 by Patty Carson RN  Outcome: Progressing  Goal: Absence of cardiac dysrhythmias or at baseline  6/17/2025 1055 by Christina Cross RN  Outcome: Progressing  6/17/2025 0229 by Patty Carson RN  Outcome: Progressing     Problem: Musculoskeletal - Adult  Goal: Return mobility to safest level of function  6/17/2025 1055 by Christina Cross

## 2025-06-17 NOTE — PROGRESS NOTES
Phone: [phone] [facility]  Date: 2025  Fax: [phone]      Physical Therapy    Daily Note    Patient Name: Geri Parks      : 1945  (80 y.o.)  MRN: [id number]      Assessment           Supine to Sit: Independent          Sit to Stand: Supervision  Stand to Sit: Supervision                WB Status: w.walker and supervision ~100 ft x1           Stairs  # Steps : 10  Stairs Height: 4\" (and 6\")  Rails: Bilateral  Assistance: Contact guard assistance    Assessment: Pateint seated up in chair upon arrival to room. Sit to stand from chair is superivsion. Ambulates with wheeled walker to therapy room. Completes seated B LE exercises as outlined above and then remains in therapy room to have her hair washed and styled.  Safety Devices  Type of Devices: Gait belt, Nurse notified (in w/c in therapy room to have her hair washed.)          Use of Gait belt, Nurse Notified, Other Staff Present  , and Left in chair in w/c in therapy room to have her hair done  Time In: 1250  Time Out: 1313  Timed Coded Minutes: 23  Total Treatment Time: 23       Exercises:  See Flowsheets    Plan  Cont Per Plan Of Care    Goals  Short Term Goals  Time Frame for Short Term Goals: -  Short Term Goal 1: -  Short Term Goal 2: -  Short Term Goal 3: -  Short Term Goal 4: -       Long Term Goals  Time Frame for Long Term Goals : 7 days - expires 25  Long Term Goal 1: Patient to transfer sit to stand and stand to sit modified independent to safely return home  Long Term Goal 2: Patient to ambulate with w.walker 50 ft x2 with SBA to safely negotiate home environment-MET  Long Term Goal 3: Patient to have good dynamic standing balance for safe completion of ADLs-MET  Long Term Goal 4: Patient to ambulate up and down 3 stair step with handrails and SBA       Nelli Abarca Therapy License Number: [id number]    Date: 2025

## 2025-06-17 NOTE — PROGRESS NOTES
notified by this nurse of patients blood sugars since being off of steroids. New order to discontinue Humalog sliding scale and fingersticks.

## 2025-06-17 NOTE — PROGRESS NOTES
Pt walker has been approved through [facility] and they will deliver it to her room tomorrow. Dr will need to document reason for rollator walker in his note for insurance and note left for him in regards to this. SW following. Misty Escudero, MSW, LSW .td

## 2025-06-18 ENCOUNTER — APPOINTMENT (OUTPATIENT)
Dept: ULTRASOUND IMAGING | Age: 80
DRG: 948 | End: 2025-06-18
Attending: INTERNAL MEDICINE
Payer: MEDICARE

## 2025-06-18 PROCEDURE — 94761 N-INVAS EAR/PLS OXIMETRY MLT: CPT

## 2025-06-18 PROCEDURE — 76705 ECHO EXAM OF ABDOMEN: CPT

## 2025-06-18 PROCEDURE — 94669 MECHANICAL CHEST WALL OSCILL: CPT

## 2025-06-18 PROCEDURE — 6370000000 HC RX 637 (ALT 250 FOR IP): Performed by: INTERNAL MEDICINE

## 2025-06-18 PROCEDURE — 97116 GAIT TRAINING THERAPY: CPT

## 2025-06-18 PROCEDURE — 6360000002 HC RX W HCPCS: Performed by: INTERNAL MEDICINE

## 2025-06-18 PROCEDURE — 97535 SELF CARE MNGMENT TRAINING: CPT

## 2025-06-18 PROCEDURE — 97110 THERAPEUTIC EXERCISES: CPT

## 2025-06-18 PROCEDURE — 94640 AIRWAY INHALATION TREATMENT: CPT

## 2025-06-18 PROCEDURE — 1200000002 HC SEMI PRIVATE SWING BED

## 2025-06-18 RX ADMIN — LEVALBUTEROL HYDROCHLORIDE 0.63 MG: 0.63 SOLUTION RESPIRATORY (INHALATION) at 14:57

## 2025-06-18 RX ADMIN — LEVALBUTEROL HYDROCHLORIDE 0.63 MG: 0.63 SOLUTION RESPIRATORY (INHALATION) at 21:21

## 2025-06-18 RX ADMIN — GUAIFENESIN 600 MG: 600 TABLET, EXTENDED RELEASE ORAL at 09:46

## 2025-06-18 RX ADMIN — Medication 1 CAPSULE: at 09:46

## 2025-06-18 RX ADMIN — TIOTROPIUM BROMIDE INHALATION SPRAY 5 MCG: 3.12 SPRAY, METERED RESPIRATORY (INHALATION) at 09:06

## 2025-06-18 RX ADMIN — BUDESONIDE AND FORMOTEROL FUMARATE DIHYDRATE 2 PUFF: 160; 4.5 AEROSOL RESPIRATORY (INHALATION) at 09:04

## 2025-06-18 RX ADMIN — DIVALPROEX SODIUM 750 MG: 250 TABLET, DELAYED RELEASE ORAL at 09:46

## 2025-06-18 RX ADMIN — POTASSIUM CHLORIDE 30 MEQ: 750 TABLET, FILM COATED, EXTENDED RELEASE ORAL at 09:46

## 2025-06-18 RX ADMIN — LEVALBUTEROL HYDROCHLORIDE 0.63 MG: 0.63 SOLUTION RESPIRATORY (INHALATION) at 08:55

## 2025-06-18 RX ADMIN — ROSUVASTATIN 10 MG: 10 TABLET, FILM COATED ORAL at 21:34

## 2025-06-18 RX ADMIN — APIXABAN 5 MG: 5 TABLET, FILM COATED ORAL at 21:34

## 2025-06-18 RX ADMIN — BUDESONIDE AND FORMOTEROL FUMARATE DIHYDRATE 2 PUFF: 160; 4.5 AEROSOL RESPIRATORY (INHALATION) at 21:21

## 2025-06-18 RX ADMIN — Medication 1 CAPSULE: at 16:44

## 2025-06-18 RX ADMIN — ASPIRIN 81 MG: 81 TABLET, COATED ORAL at 09:47

## 2025-06-18 RX ADMIN — APIXABAN 5 MG: 5 TABLET, FILM COATED ORAL at 09:47

## 2025-06-18 RX ADMIN — GUAIFENESIN 600 MG: 600 TABLET, EXTENDED RELEASE ORAL at 21:34

## 2025-06-18 RX ADMIN — FUROSEMIDE 20 MG: 20 TABLET ORAL at 09:46

## 2025-06-18 NOTE — PROGRESS NOTES
0752 Patient made aware of NPO status for upcoming ultrasound, voices understanding. Spouse at chairside.     0905 Ultrasound Tech at bedside.    0929 Ultrasound concluded at this time. This RN assists patient to BR. Patient voids, small BM. Performs mable care independently. New brief applied. Patient requests to wear house coat, applied. Returns to recliner.     Vitals obtained, medications administered. Call to Kitchen, to inform of diet upgrade.     Lopressor held BP 97/62.

## 2025-06-18 NOTE — PROGRESS NOTES
RESPIRATORY ASSESSMENT PROTOCOL                                                                                              Patient Name: Geri Parks Room#: 0267/0267-01 : 1945     Admitting diagnosis: Unspecified atrial fibrillation (HCC) [I48.91]  Generalized weakness [R53.1]       Medical History:   Past Medical History:   Diagnosis Date    Aortic stenosis     Arthritis     Benign cyst of right breast in female     Breast cyst     Cardiac murmur     Cataract     Cerebral brain hemorrhage (HCC) 2013    Right thalamic    Chronic renal disease, stage III ([facility]) [298152] 2022    Coronary artery disease involving native coronary artery of native heart without angina pectoris 2022    Deep vein blood clot of left lower extremity ([facility]) 2016    Diverticulosis     Head injury     Hx of blood clots     Hyperlipidemia     Hypertension     Migraine headache     Pulmonary emphysema ([facility]) 10/05/2020    Seizures ([facility])     Stroke (cerebrum) ([facility])     Unspecified cerebral artery occlusion with cerebral infarction     TIA       PATIENT ASSESSMENT    LABORATORY DATA  Hematology:   Lab Results   Component Value Date/Time    WBC 5.9 2025 04:18 AM    RBC 3.64 2025 04:18 AM    HGB 10.9 2025 04:18 AM    HCT 33.2 2025 04:18 AM    PLT 85 2025 04:18 AM     Chemistry:    Lab Results   Component Value Date/Time    PBEA 0.4 2025 07:18 PM       VITALS  Pulse: 59   Respirations: 18  BP: (!) 117/52  SpO2: 96 % O2 Device: None (Room air)  Temp: 97.9 °F (36.6 °C)    SKIN COLOR  [x] Normal  [] Pale  [] Dusky  [] Cyanotic    RESPIRATORY PATTERN  [x] Normal  [] Dyspnea  [] Cheyne-Mares  [] Kussmaul  [] Biots    AMBULATORY  [x] Yes  [] No  [x] With Assistance    PEAK FLOW  Predicted:     Personal Best:            Patient Acuity 0 1 2 3 4 Score   Level of Consciousness (LOC) [x]  Alert & Oriented or Pt normal LOC []  Confused;follows directions []  Confused & uncooper-ative []  Obtunded

## 2025-06-18 NOTE — PROGRESS NOTES
[facility]  Phone: [phone]    Occupational Therapy Skilled Daily Note  Date: 2025  Patient Name: Geri Parks        MRN: [id number]    : 1945  (80 y.o.)    Subjective:     Subjective: Patient was seated in recliner upon arrival, agreeable to OT interventions.    Pt is PROGRESSING toward goals and independence of Self Care this treatment session    Discharge recommendation: Continue to assess Pending Progress    Objective:     ADLs:  UE Dressing: Modified independent   LE Dressing: Modified independent   Toileting: Supervision    Transfers:  Sit to stand: Stand by assistance   Stand to sit: Stand by assistance  Toilet Transfer: Stand by assistance      Assessment:     Assessment: Patient seated in recliner upon arrival, agreeable to OT interventions. Co-tx with PTA is completed to address higher level balance tasks to facilitiate improved engagement in IADL tasks. Engages in UB/LB dressing, and toileting tasks w/ documented above assistance. Ambulates to/from therapy room w/o RB using Rollator Walker. Requires verbal cues for transfers this date for safety to lock rollator breaks prior to standing/sitting. Once in therapy room pt engages in dynamic standing fxnl task of simulated IADL kitchen item retrieval to locate and p/u x10 objects. Engages in task for 5 min 56 seconds w/o LOB. Demos good safety awareness when grasping objects from above head or below knee level. Discussed participation in ADL tasks at home, patient nor caregiver (spouse) have any concerns at this time. Did discuss fall prevention strategies with patient and spouse verbalizing understanding. Patient remains in recliner at conclusion of session w/ call light and other personal items within reach. Chair alarm in place. Patient to discharge home tomorrow.     Exercises:     See Flowsheets    Goals:     Short term goals:  Time Frame for Short Term Goals: 8 days (2025)  Short Term Goal 1: Patient will complete

## 2025-06-18 NOTE — PROGRESS NOTES
Phone: [phone] [facility]  Date: 2025  Fax: [phone]      Physical Therapy    Daily Note    Patient Name: Geri Parks      : 1945  (80 y.o.)  MRN: [id number]      Assessment                     Sit to Stand: Supervision  Stand to Sit: Supervision           Comment: now using new rollator walker and requires cues to put breaks on and off    WB Status: with rollator walker from patient room to therapy room and back to patient room with superivsion ~100 ft x2           Stairs  # Steps : 10  Stairs Height: 4\" (and 6\")  Rails: Bilateral  Assistance: Stand by assistance, Supervision    Assessment: Patient in chair upon arrival to room with  also present. She agrees to work with PTA and [facility] this afternoon. Patient now has rollator walker and she is educated on how to work the breaks.  Sit to stand from chair is supervision/mod I. Ambulates into bathroom to use commode. On/off commode with supervision/mod I. Independent with mable care.  Demonstrates good standing balance to wash hands at sink and then ambulates to therapy room. Up/down steps 4\" and 6\" in height with B handrails and SBA/supervision. No LOB noted with steps.   is present for session and he states he feels she is back to baseline. Ambulates back to room following to sit back up in chair. Call light in reach and chair alarm attached.  Safety Devices  Type of Devices: Call light within reach, Chair alarm in place, Gait belt, Left in chair, Nurse notified          Call light in reach, Phone in reach, Use of Gait belt, Chair alarm, Nurse Notified, Family Present, and Left in chair  Time In: 1338  Time Out: 1435  Timed Coded Minutes: 28 ( co-treat with [facility])  Total Treatment Time: 57         Plan  Cont Per Plan Of Care    Goals  Short Term Goals  Time Frame for Short Term Goals: -  Short Term Goal 1: -  Short Term Goal 2: -  Short Term Goal 3: -  Short Term Goal 4: -       Long Term Goals  Time Frame for Long Term

## 2025-06-18 NOTE — PROGRESS NOTES
[facility]  Occupational Therapy    Date: 2025  Patient Name: Geri Parks        : 1945       [x] Pt Refusal: Second attempt is made to see patient this AM for OT services. Refuses therapy interventions at this time due to fatigue levels following medical testing/procedure this AM. Wishes for [facility] to return following lunch. Will check back later this AM. Remains in recliner w/ call light and other personal items within reach.            [] Pt Unavailable due to:          STU Zheng  Date: 2025

## 2025-06-18 NOTE — PLAN OF CARE
Problem: Chronic Conditions and Co-morbidities  Goal: Patient's chronic conditions and co-morbidity symptoms are monitored and maintained or improved  6/18/2025 0316 by Patty Carson RN  Outcome: Progressing     Problem: Discharge Planning  Goal: Discharge to home or other facility with appropriate resources  6/18/2025 0316 by Patty Carson, RN  Outcome: Progressing     Problem: Skin/Tissue Integrity  Goal: Skin integrity remains intact  Description: 1.  Monitor for areas of redness and/or skin breakdown2.  Assess vascular access sites hourly3.  Every 4-6 hours minimum:  Change oxygen saturation probe site4.  Every 4-6 hours:  If on nasal continuous positive airway pressure, respiratory therapy assess nares and determine need for appliance change or resting period  6/18/2025 0316 by Patty Carson, RN  Outcome: Progressing     Problem: Safety - Adult  Goal: Free from fall injury  6/18/2025 1023 by Emmanuelle Montiel RN  Outcome: Progressing  6/18/2025 1022 by Emmanuelle Montiel RN  Outcome: Progressing  6/18/2025 0316 by Patty Carson RN  Outcome: Progressing     Problem: ABCDS Injury Assessment  Goal: Absence of physical injury  6/18/2025 0316 by Patty Carson RN  Outcome: Progressing     Problem: Cardiovascular - Adult  Goal: Maintains optimal cardiac output and hemodynamic stability  6/18/2025 1023 by Emmanuelle Montiel RN  Outcome: Progressing  6/18/2025 1022 by Emmanuelle Montiel RN  Outcome: Progressing  6/18/2025 0316 by Patty Carson RN  Outcome: Progressing  Goal: Absence of cardiac dysrhythmias or at baseline  6/18/2025 1022 by Emmanuelle Montiel RN  Outcome: Progressing  6/18/2025 0316 by Patty Carson RN  Outcome: Progressing     Problem: Musculoskeletal - Adult  Goal: Return mobility to safest level of function  6/18/2025 1023 by Emmanuelle Montiel RN  Outcome: Progressing  6/18/2025 0316 by Patty Carson RN  Outcome: Progressing  Goal: Return ADL status to a

## 2025-06-18 NOTE — PROGRESS NOTES
Pt's rollator walker delivered by [facility] and pt is tickled with this and states that it will be so much help to her. SW will fax supporting documentation once signed off to complete referral. Misty Escudero, MSW, LSW 6/18/2025

## 2025-06-18 NOTE — PLAN OF CARE
Problem: Chronic Conditions and Co-morbidities  Goal: Patient's chronic conditions and co-morbidity symptoms are monitored and maintained or improved  6/18/2025 0316 by Patty Carson RN  Outcome: Progressing     Problem: Discharge Planning  Goal: Discharge to home or other facility with appropriate resources  6/18/2025 0316 by Patty Carson RN  Outcome: Progressing     Problem: Skin/Tissue Integrity  Goal: Skin integrity remains intact  Description: 1.  Monitor for areas of redness and/or skin breakdown2.  Assess vascular access sites hourly3.  Every 4-6 hours minimum:  Change oxygen saturation probe site4.  Every 4-6 hours:  If on nasal continuous positive airway pressure, respiratory therapy assess nares and determine need for appliance change or resting period  6/18/2025 0316 by Patty Carson RN  Outcome: Progressing     Problem: Safety - Adult  Goal: Free from fall injury  6/18/2025 1022 by Emmanuelle Montiel RN  Outcome: Progressing  6/18/2025 0316 by Patty Carson RN  Outcome: Progressing     Problem: ABCDS Injury Assessment  Goal: Absence of physical injury  6/18/2025 0316 by Patty Carson RN  Outcome: Progressing     Problem: Cardiovascular - Adult  Goal: Maintains optimal cardiac output and hemodynamic stability  6/18/2025 1022 by Emmanuelle Montiel RN  Outcome: Progressing  6/18/2025 0316 by Patty Carson RN  Outcome: Progressing  Goal: Absence of cardiac dysrhythmias or at baseline  6/18/2025 1022 by Emmanuelle Montiel RN  Outcome: Progressing  6/18/2025 0316 by Patty Carson RN  Outcome: Progressing     Problem: Musculoskeletal - Adult  Goal: Return mobility to safest level of function  6/18/2025 0316 by Patty Carson RN  Outcome: Progressing  Goal: Return ADL status to a safe level of function  6/18/2025 0316 by Patty Carson RN  Outcome: Progressing     Problem: Genitourinary - Adult  Goal: Absence of urinary retention  6/18/2025 0316 by

## 2025-06-18 NOTE — PROGRESS NOTES
[facility]  Occupational Therapy    Date: 2025  Patient Name: Geri Parks        : 1945       [] Pt Refusal           [x] Pt Unavailable due to:  Due to eating breakfast due to being NPO this AM for a medical procedure. Will check back when able.        STU Zheng  Date: 2025

## 2025-06-18 NOTE — PROGRESS NOTES
[facility]  Physical Therapy    Date: 2025  Patient Name: Geri Parks        : 1945       [] Pt Refusal           [x] Pt Unavailable due to:  Per [facility], patient had procedure done this morning and was NPO for it. She is now waiting to get her breakfast.  Will check back in the afternoon and progress as able.        Nelli Abarca, PTA Date: 2025

## 2025-06-19 VITALS
HEIGHT: 63 IN | SYSTOLIC BLOOD PRESSURE: 119 MMHG | TEMPERATURE: 97.3 F | HEART RATE: 59 BPM | WEIGHT: 169.75 LBS | BODY MASS INDEX: 30.08 KG/M2 | RESPIRATION RATE: 18 BRPM | DIASTOLIC BLOOD PRESSURE: 66 MMHG | OXYGEN SATURATION: 98 %

## 2025-06-19 PROCEDURE — 6370000000 HC RX 637 (ALT 250 FOR IP): Performed by: INTERNAL MEDICINE

## 2025-06-19 PROCEDURE — 94761 N-INVAS EAR/PLS OXIMETRY MLT: CPT

## 2025-06-19 PROCEDURE — 94669 MECHANICAL CHEST WALL OSCILL: CPT

## 2025-06-19 PROCEDURE — 6360000002 HC RX W HCPCS: Performed by: INTERNAL MEDICINE

## 2025-06-19 PROCEDURE — 94640 AIRWAY INHALATION TREATMENT: CPT

## 2025-06-19 RX ORDER — METOPROLOL TARTRATE 25 MG/1
12.5 TABLET, FILM COATED ORAL 2 TIMES DAILY
Qty: 30 TABLET | Refills: 3 | Status: SHIPPED | OUTPATIENT
Start: 2025-06-19

## 2025-06-19 RX ORDER — METOPROLOL TARTRATE 25 MG/1
25 TABLET, FILM COATED ORAL 2 TIMES DAILY
Qty: 60 TABLET | Refills: 3 | Status: SHIPPED | OUTPATIENT
Start: 2025-06-19 | End: 2025-06-19

## 2025-06-19 RX ORDER — LACTOBACILLUS RHAMNOSUS GG 10B CELL
1 CAPSULE ORAL DAILY
COMMUNITY
Start: 2025-06-19

## 2025-06-19 RX ADMIN — DIVALPROEX SODIUM 750 MG: 250 TABLET, DELAYED RELEASE ORAL at 08:10

## 2025-06-19 RX ADMIN — FUROSEMIDE 20 MG: 20 TABLET ORAL at 08:11

## 2025-06-19 RX ADMIN — Medication 1 CAPSULE: at 08:10

## 2025-06-19 RX ADMIN — GUAIFENESIN 600 MG: 600 TABLET, EXTENDED RELEASE ORAL at 08:10

## 2025-06-19 RX ADMIN — ASPIRIN 81 MG: 81 TABLET, COATED ORAL at 08:10

## 2025-06-19 RX ADMIN — METOPROLOL TARTRATE 25 MG: 25 TABLET, FILM COATED ORAL at 08:10

## 2025-06-19 RX ADMIN — LEVALBUTEROL HYDROCHLORIDE 0.63 MG: 0.63 SOLUTION RESPIRATORY (INHALATION) at 09:07

## 2025-06-19 RX ADMIN — APIXABAN 5 MG: 5 TABLET, FILM COATED ORAL at 08:10

## 2025-06-19 RX ADMIN — POTASSIUM CHLORIDE 30 MEQ: 750 TABLET, FILM COATED, EXTENDED RELEASE ORAL at 08:10

## 2025-06-19 ASSESSMENT — PAIN SCALES - GENERAL: PAINLEVEL_OUTOF10: 0

## 2025-06-19 NOTE — DISCHARGE INSTR - PHARMACY
Check blood pressure before each dose of metoprolol: DO NOT TAKE if the Systolic blood pressure (the top number) is below 110  Take lactobacillus with breakfast

## 2025-06-19 NOTE — PROGRESS NOTES
Patient discharging home with her  this a.m.  Discharge Summary/Progress Note FAXED to [facility] today for Rolator Walker delivered yesterday.  No other needs or concerns identified at this point.    BRAYAN Quiles  6/19/2025

## 2025-06-19 NOTE — DISCHARGE SUMMARY
Hospitalist Discharge Summary    eGri Parks  :  1945  MRN:  [id number]    Admit date:  2025  Discharge date:  25    Admitting Physician:  Greg Olsen MD    Discharge Diagnoses:    Generalized weakness - admitted to swing bed for PT / OT daily.   RUQ abdominal pain - US GB negative.  Likely muscle strain from coughing.   COPD exacerbation - improving on Prednisone, Symbicort, Xopenex, and Mucinex.  Completed course of Rocephin.   Hypoxia - resolved.  Paroxysmal atrial fib with RVR - converted back to NSR.  No Eliquis and Lopressor.   CAD - S/P PTCA and stent of LAD on 22.  On aspirin and Lopressor.   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  Hyperlipidemia - controlled on Crestor.  Factor V Leiden mutation - on Eliquis.   HTN -     Seizure disorder - controlled on Depakote.     Elevated BS on Steroids - expected response to steroids.  On Lantus and Humalog sliding scale.      Loose stools - added on Probiotic BID.    Admission Condition:  fair      Discharged Condition:  good    Hospital Course:     The patient is a 80 y.o. female with history of CAD, hypertension, hyperlipidemia, aortic stenosis s/p TAVR, h/o factor V Leiden mutation, history of CVA, COPD presented to the emergency room for evaluation of shortness of breath, cough, fatigue/weakness.  According to the patient her symptoms started more than a week ago and progressively became worse.  Reports coughing up some yellowish sputum.  Has been more short of breath and wheezing.  She went to see her PCP  and was sent to the emergency room for evaluation.  Patient reports no chest pain, no palpitations, no nausea or vomiting.  She believes she had fevers and chills at home.  Workup in the emergency room revealed fever 102.7, respirations 32, heart rate 147, BP 97/72, she was 82% on room air.  BMP was unremarkable except creatinine 1.1.  Lactic acid was 2.3.  Troponin was 50 followed by 53, proBNP 2, 119.  LFTs normal.  ABGs revealed pH 7.5,

## 2025-06-19 NOTE — PLAN OF CARE
Problem: Chronic Conditions and Co-morbidities  Goal: Patient's chronic conditions and co-morbidity symptoms are monitored and maintained or improved  6/18/2025 2224 by vE Lopez RN  Outcome: Progressing  Flowsheets (Taken 6/18/2025 2130)  Care Plan - Patient's Chronic Conditions and Co-Morbidity Symptoms are Monitored and Maintained or Improved: Monitor and assess patient's chronic conditions and comorbid symptoms for stability, deterioration, or improvement     Problem: Discharge Planning  Goal: Discharge to home or other facility with appropriate resources  6/19/2025 0820 by Jesica Norman RN  Outcome: Progressing  6/18/2025 2224 by Ev Lopez RN  Outcome: Progressing  Flowsheets (Taken 6/18/2025 2130)  Discharge to home or other facility with appropriate resources:   Identify barriers to discharge with patient and caregiver   Arrange for needed discharge resources and transportation as appropriate   Identify discharge learning needs (meds, wound care, etc)   Refer to discharge planning if patient needs post-hospital services based on physician order or complex needs related to functional status, cognitive ability or social support system     Problem: Skin/Tissue Integrity  Goal: Skin integrity remains intact  Description: 1.  Monitor for areas of redness and/or skin breakdown2.  Assess vascular access sites hourly3.  Every 4-6 hours minimum:  Change oxygen saturation probe site4.  Every 4-6 hours:  If on nasal continuous positive airway pressure, respiratory therapy assess nares and determine need for appliance change or resting period  6/18/2025 2224 by Ev Lopez RN  Outcome: Progressing  Flowsheets (Taken 6/18/2025 2130)  Skin Integrity Remains Intact: Monitor for areas of redness and/or skin breakdown     Problem: ABCDS Injury Assessment  Goal: Absence of physical injury  6/18/2025 2224 by Ev Lopez RN  Outcome: Progressing     Problem: Cardiovascular - Adult  Goal:

## 2025-06-19 NOTE — DISCHARGE INSTRUCTIONS
Discharge Instructions    Admission Date:  6/12/2025  Discharge Date:  6/19/25    Disposition:   Home.     Discharge Instructions:  Resume previous home medication.   Take Lopressor 12.5 mg two times a day.  Hold Lopressor if systolic BP is < 110.    Recommend taking a probiotic daily until the bowels are back to normal.   Activity:  As tolerated. Ambulate with a Rolator walker.   Diet:  General      Hospital follow up with Dr. Young is scheduled for Tues June 24 @ 3:45pm.

## 2025-06-19 NOTE — PLAN OF CARE
Problem: Chronic Conditions and Co-morbidities  Goal: Patient's chronic conditions and co-morbidity symptoms are monitored and maintained or improved  6/18/2025 2224 by Ev Lopez RN  Outcome: Progressing  Flowsheets (Taken 6/18/2025 2130)  Care Plan - Patient's Chronic Conditions and Co-Morbidity Symptoms are Monitored and Maintained or Improved: Monitor and assess patient's chronic conditions and comorbid symptoms for stability, deterioration, or improvement     Problem: Discharge Planning  Goal: Discharge to home or other facility with appropriate resources  6/19/2025 1043 by Jesica Norman RN  Outcome: Progressing  6/19/2025 0820 by Jesica Norman RN  Outcome: Progressing  6/18/2025 2224 by Ev Lopez RN  Outcome: Progressing  Flowsheets (Taken 6/18/2025 2130)  Discharge to home or other facility with appropriate resources:   Identify barriers to discharge with patient and caregiver   Arrange for needed discharge resources and transportation as appropriate   Identify discharge learning needs (meds, wound care, etc)   Refer to discharge planning if patient needs post-hospital services based on physician order or complex needs related to functional status, cognitive ability or social support system     Problem: Skin/Tissue Integrity  Goal: Skin integrity remains intact  Description: 1.  Monitor for areas of redness and/or skin breakdown2.  Assess vascular access sites hourly3.  Every 4-6 hours minimum:  Change oxygen saturation probe site4.  Every 4-6 hours:  If on nasal continuous positive airway pressure, respiratory therapy assess nares and determine need for appliance change or resting period  6/18/2025 2224 by Ev Lopez RN  Outcome: Progressing  Flowsheets (Taken 6/18/2025 2130)  Skin Integrity Remains Intact: Monitor for areas of redness and/or skin breakdown     Problem: ABCDS Injury Assessment  Goal: Absence of physical injury  6/18/2025 2224 by Ev Lopez

## 2025-06-19 NOTE — PROGRESS NOTES
[facility]                 Inpatient Discharge Medication Education Note                                 Patient admitted for COPD exacerbation    Medications reviewed with the patient include:    lactobacillus (CULTURELLE) capsule    metoprolol tartrate (LOPRESSOR) 25 MG tablet      Patient education provided when necessary to include potential medication related side effects with acknowledgement of understanding. Spoke to Dr Young about the metoprolol and her lower blood pressures the past few days so it was decided to cut the dose down to 12.5 mg twice daily and have her check her pressure prior to taking her dose. If her SBP < 110, she is not to receive the medication. Both patient and her  confirmed understanding with no additional questions.    Thomas Rao, PharmD 6/19/2025 8:30 AM

## 2025-06-19 NOTE — PROGRESS NOTES
Hospitalist Progress Note  6/19/2025 5:58 AM  Subjective:   Admit Date: 6/12/2025  PCP: Audi Young MD    Interval History: Geri states she is happy she is going home today.  US of the GB reviewed from yesterday.  No chest pain or SOB. Strength is better.  She obtained a Rolator walker for home.  Appetite is good, no nausea.  Bowels regular and no trouble urinating.     Diet: ADULT DIET; Regular  Medications:   Scheduled Meds:   lactobacillus  1 capsule Oral BID WC    levalbuterol  0.63 mg Nebulization TID    aspirin  81 mg Oral Daily    diclofenac sodium  4 g Topical 4x Daily    divalproex  750 mg Oral Daily    apixaban  5 mg Oral BID    budesonide-formoterol  2 puff Inhalation BID RT    And    tiotropium  2 puff Inhalation Daily RT    furosemide  20 mg Oral Daily    potassium chloride  30 mEq Oral Daily    rosuvastatin  10 mg Oral Nightly    metoprolol tartrate  25 mg Oral BID    guaiFENesin  600 mg Oral BID     Continuous Infusions:   dextrose      sodium chloride         Patient's current medications documented, reviewed, and updated.      CBC: No results for input(s): \"WBC\", \"HGB\", \"PLT\" in the last 72 hours.  BMP:  No results for input(s): \"NA\", \"K\", \"CL\", \"CO2\", \"BUN\", \"CREATININE\", \"GLUCOSE\" in the last 72 hours.  Hepatic: No results for input(s): \"AST\", \"ALT\", \"BILITOT\", \"ALKPHOS\" in the last 72 hours.    Invalid input(s): \"ALB\"  Troponin: No results for input(s): \"TROPONINI\" in the last 72 hours.  BNP: No results for input(s): \"BNP\" in the last 72 hours.  Lipids: No results for input(s): \"CHOL\", \"HDL\" in the last 72 hours.    Invalid input(s): \"LDLCALCU\"  INR: No results for input(s): \"INR\" in the last 72 hours.      Objective:   Vitals: /68   Pulse 73   Temp 96.9 °F (36.1 °C) (Temporal)   Resp 17   Ht 1.6 m (5' 3\")   Wt 78.8 kg (173 lb 11.6 oz)   LMP  (LMP Unknown)   SpO2 100%   BMI 30.77 kg/m²   General appearance: alert and cooperative with exam  HEENT: Head: Normocephalic, no lesions,

## 2025-06-20 ENCOUNTER — CARE COORDINATION (OUTPATIENT)
Dept: CARE COORDINATION | Age: 80
End: 2025-06-20

## 2025-06-20 DIAGNOSIS — I48.0 PAROXYSMAL ATRIAL FIBRILLATION (HCC): Primary | ICD-10-CM

## 2025-06-20 PROCEDURE — 1111F DSCHRG MED/CURRENT MED MERGE: CPT | Performed by: INTERNAL MEDICINE

## 2025-06-20 NOTE — CARE COORDINATION
Care Transitions Note    Initial Call - Call within 2 business days of discharge: Yes    Patient Current Location:  Home: [address] [place] [address] Road  [place] [address]    Care Transition Nurse contacted the patient by telephone to perform post hospital discharge assessment, verified name and  as identifiers.  Provided introduction to self, and explanation of the Care Transition Nurse role.    Patient: Geri Parks    Patient : 1945   MRN: [id number]    Reason for Admission: A. Fib with RVR, COPD exacerbation, weakness  Discharge Date: 25  RURS: Readmission Risk Score: 12.1      Last Discharge Facility       Date Complaint Diagnosis Description Type Department Provider    25   Admission (Discharged) [facility] Audi Romero MD            Was this an external facility discharge? No    Additional needs identified to be addressed with provider   No needs identified             Method of communication with provider: none.    Patients top risk factors for readmission: A. Fib, COPD    Interventions to address risk factors:   Education:  medical condition-Medications as ordered, monitor for worsening shortness of breath, cough, chest pain or palpitations, f/c, swelling, follow up with PCP.    Care Summary Note: Spoke with patient today for initial 24 hour follow up call. Patient came in to the hospital with c/o shortness of breath, fatigue and cough that had been going on for a week prior.  She was evaluated and treated for COPD exacerbation and A. Fib with RVR, did convert back to NSR.  She went to the Swing bed unit afterwards for therapy before returning home.  Today, she states she feels good, was out having lunch at restaurant with family during time of call. States she got her hair washed and is feeling pretty well today.  She denies any chest pains, dyspnea, cough, f/c, n/v or other s/s of infection or worsening COPD.  She got her new rollator at the hospital before discharge, did not feel she needed

## 2025-06-24 ENCOUNTER — OFFICE VISIT (OUTPATIENT)
Dept: FAMILY MEDICINE CLINIC | Age: 80
End: 2025-06-24

## 2025-06-24 VITALS
SYSTOLIC BLOOD PRESSURE: 126 MMHG | HEART RATE: 76 BPM | HEIGHT: 63 IN | WEIGHT: 174 LBS | BODY MASS INDEX: 30.83 KG/M2 | DIASTOLIC BLOOD PRESSURE: 76 MMHG

## 2025-06-24 DIAGNOSIS — J44.1 COPD EXACERBATION (HCC): ICD-10-CM

## 2025-06-24 DIAGNOSIS — R60.0 LOWER EXTREMITY EDEMA: ICD-10-CM

## 2025-06-24 DIAGNOSIS — Z09 HOSPITAL DISCHARGE FOLLOW-UP: Primary | ICD-10-CM

## 2025-06-24 DIAGNOSIS — R53.1 GENERALIZED WEAKNESS: ICD-10-CM

## 2025-06-24 NOTE — PROGRESS NOTES
Post-Discharge Transitional Care  Follow Up      Geri Parks   YOB: 1945    Date of Office Visit:  6/24/2025  Date of Hospital Admission: 6/12/25  Date of Hospital Discharge: 6/19/25  Risk of hospital readmission (high >=14%. Medium >=10%) :Readmission Risk Score: 12.1      Care management risk score Rising risk (score 2-5) and Complex Care (Scores >=6): No Risk Score On File     Non face to face  following discharge, date last encounter closed (first attempt may have been earlier): 06/20/2025    Call initiated 2 business days of discharge: Yes    ASSESSMENT/PLAN:   Hospital discharge follow-up  -     [place] DISCHARGE MEDS RECONCILED W/ CURRENT OUTPATIENT MED LIST  Generalized weakness  Lower extremity edema  COPD exacerbation (HCC)      Plan:  1. Hospital discharge follow-up  Doing well post discharge.    - [place] DISCHARGE MEDS RECONCILED W/ CURRENT OUTPATIENT MED LIST    2. Generalized weakness  Continue to walk with the walker until the strength and balance is better.     3. Lower extremity edema  Recommend taking an extra lasix and potassium for the next two days.  Recommend wearing compression stockings daily (20 to 30 mmHg).      4. COPD exacerbation (HCC)  Doing well after hospitalization.  No additional treatment needed at this time.        Medical Decision Making: moderate complexity  No follow-ups on file.    On this date 6/24/2025 I have spent 5 minutes reviewing previous notes, test results and face to face with the patient discussing the diagnosis and importance of compliance with the treatment plan as well as documenting on the day of the visit.       Subjective:   HPI:  Follow up of Hospital problems/diagnosis(es): Generalized weakness, COPD exacerbation, hypoxia, atrial fib.      Inpatient course: Discharge summary reviewed- see chart.    Interval history/Current status: Geri was discharged from [facility] after admission for COPD exacerbation.  She was discharged with a

## 2025-06-26 ENCOUNTER — CARE COORDINATION (OUTPATIENT)
Dept: CARE COORDINATION | Age: 80
End: 2025-06-26

## 2025-06-26 NOTE — CARE COORDINATION
Care Transitions Note    Follow Up Call     Patient Current Location:  Home: [address]  [place] [place] [facility] [address]    [facility] Care Coordinator contacted the patient by telephone. Verified name and  as identifiers.    Additional needs identified to be addressed with provider   No needs identified                 Method of communication with provider: none.    Care Summary Note: Writer spoke to pt for follow up call she went to hospital for sob, cough , cp,fever, chills swelling. Spouse answered the phone and handed to her. She reports that her strength is getting better and better each day states using body strength. She does not report any other symptoms. Completed hospital follow up she was advised compression stocking she states her daughter ordered her some they should arrive today.  She has on stockings on that she wears around the house. States swelling is down she slept on the couch was able to elevate legs more. Was advised on  at appointment to take extra dose of lasix and potassium for two days.She was advised to let PCP know if she has new or increased swelling. Verbalized understanding. Voiding fine, had a BM today. She voiced no needs or concerns at this time. Thanked writer for calling.     Plan of care updates since last contact:  Education: elevate compression let pcp know new or worsening swelling   Review of patient management of conditions/medications: copd swelling A-fib        Advance Care Planning:   Does patient have an Advance Directive: reviewed during previous call, see note. .    Medication Review:  Was told tot take extra lasix and potassium for the next two days at hospital follow up      Remote Patient Monitoring:  Offered patient enrollment in the Remote Patient Monitoring (RPM) program for in-home monitoring: Yes, but did not enroll at this time: already monitoring with home equipment.    Assessments:  Care Transitions Subsequent and Final Call    Subsequent and Final Calls  Care

## 2025-07-03 ENCOUNTER — CARE COORDINATION (OUTPATIENT)
Dept: CARE COORDINATION | Age: 80
End: 2025-07-03

## 2025-07-03 NOTE — CARE COORDINATION
Care Transitions Note    Follow Up Call     Patient Current Location:  Home: [address] [place] [id number]    Care Transition Nurse contacted the patient by telephone. Verified name and  as identifiers.    Additional needs identified to be addressed with provider   No needs identified                 Method of communication with provider: none.    Care Summary Note: Spoke with patient for transitional follow up call. Patient was in the hospital with A. Fib and COPD, went to the swing bed after discharge for some additional therapy and then discharged to home. She had her follow up with PCP already and when at visit had some increased swelling so additional Lasix and Potassium advised for a couple of days which she did.  Today, she is doing ok, does have some swelling to LE but states they are looking better after the additional Lasix that was taken.  She did get some more compression stockings and is currently wearing them. Notes reviewed from PCP office follow up visit, patient was doing well.  She does report fatigue and lack of energy yet but denies any other issues or concerns.     Plan of care updates since last contact:  None       Advance Care Planning:   Does patient have an Advance Directive: reviewed during previous call, see note. .    Medication Review:  Medications changed since last call, reviewed today.     Remote Patient Monitoring:  Offered patient enrollment in the Remote Patient Monitoring (RPM) program for in-home monitoring: Yes, but did not enroll at this time: already monitoring with home equipment.    Assessments:  Care Transitions Subsequent and Final Call    Schedule Follow Up Appointment with PCP: Completed  Subsequent and Final Calls  Do you have any ongoing symptoms?: Yes  Onset of Patient-reported symptoms: In the past 7 days  Patient-reported symptoms: Fatigue, Weakness, Other  Have your medications changed?: No  Do you have any questions related to your medications?: No  Do

## 2025-07-10 ENCOUNTER — CARE COORDINATION (OUTPATIENT)
Dept: CARE COORDINATION | Age: 80
End: 2025-07-10

## 2025-07-10 NOTE — CARE COORDINATION
Appointments         Provider Specialty Dept Phone    7/31/2025 1:30 PM Antonio Curtis PA-C Urology [phone]    8/19/2025 11:00 AM Audi Young MD Family Medicine [phone]    5/12/2026 10:30 AM Oneyda Palomares APRN - CNP Cardiology [phone]            [facility] Care Coordinator provided contact information.  Plan for follow-up call in 6-10 days based on severity of symptoms and risk factors.  Plan for next call: symptom management-anymore A fib episodes? How is fatigue?      Jo Huff LPN

## 2025-07-11 ENCOUNTER — APPOINTMENT (OUTPATIENT)
Dept: GENERAL RADIOLOGY | Age: 80
End: 2025-07-11
Payer: MEDICARE

## 2025-07-11 ENCOUNTER — HOSPITAL ENCOUNTER (EMERGENCY)
Age: 80
Discharge: HOME | End: 2025-07-11
Attending: EMERGENCY MEDICINE
Payer: MEDICARE

## 2025-07-11 VITALS
HEART RATE: 76 BPM | HEIGHT: 63 IN | BODY MASS INDEX: 30.71 KG/M2 | SYSTOLIC BLOOD PRESSURE: 100 MMHG | OXYGEN SATURATION: 98 % | RESPIRATION RATE: 19 BRPM | DIASTOLIC BLOOD PRESSURE: 52 MMHG | WEIGHT: 173.3 LBS | TEMPERATURE: 97.7 F

## 2025-07-11 DIAGNOSIS — R00.2 PALPITATIONS: ICD-10-CM

## 2025-07-11 DIAGNOSIS — R07.9 CHEST PAIN, UNSPECIFIED TYPE: Primary | ICD-10-CM

## 2025-07-11 LAB
ANION GAP SERPL CALCULATED.3IONS-SCNC: 13 MMOL/L (ref 9–17)
BASOPHILS # BLD: 0.01 K/UL (ref 0–0.2)
BASOPHILS NFR BLD: 0 % (ref 0–2)
BUN SERPL-MCNC: 18 MG/DL (ref 8–23)
CALCIUM SERPL-MCNC: 8.7 MG/DL (ref 8.6–10.4)
CHLORIDE SERPL-SCNC: 105 MMOL/L (ref 98–107)
CO2 SERPL-SCNC: 24 MMOL/L (ref 20–31)
CREAT SERPL-MCNC: 1.3 MG/DL (ref 0.5–0.9)
EKG ATRIAL RATE: 101 BPM
EKG ATRIAL RATE: 76 BPM
EKG P AXIS: 42 DEGREES
EKG P AXIS: 45 DEGREES
EKG P-R INTERVAL: 172 MS
EKG P-R INTERVAL: 202 MS
EKG Q-T INTERVAL: 314 MS
EKG Q-T INTERVAL: 366 MS
EKG QRS DURATION: 72 MS
EKG QRS DURATION: 76 MS
EKG QTC CALCULATION (BAZETT): 407 MS
EKG QTC CALCULATION (BAZETT): 411 MS
EKG R AXIS: -18 DEGREES
EKG R AXIS: -24 DEGREES
EKG T AXIS: -17 DEGREES
EKG T AXIS: 49 DEGREES
EKG VENTRICULAR RATE: 101 BPM
EKG VENTRICULAR RATE: 76 BPM
EOSINOPHIL # BLD: 0.02 K/UL (ref 0–0.4)
EOSINOPHILS RELATIVE PERCENT: 1 % (ref 0–5)
ERYTHROCYTE [DISTWIDTH] IN BLOOD BY AUTOMATED COUNT: 15.1 % (ref 12.1–15.2)
GFR, ESTIMATED: 42 ML/MIN/1.73M2
GLUCOSE SERPL-MCNC: 169 MG/DL (ref 70–99)
HCT VFR BLD AUTO: 36.5 % (ref 36–46)
HGB BLD-MCNC: 11.8 G/DL (ref 12–16)
IMM GRANULOCYTES # BLD AUTO: 0.02 K/UL (ref 0–0.3)
IMM GRANULOCYTES NFR BLD: 1 % (ref 0–5)
LYMPHOCYTES NFR BLD: 1.65 K/UL (ref 1–4.8)
LYMPHOCYTES RELATIVE PERCENT: 41 % (ref 15–40)
MCH RBC QN AUTO: 30.2 PG (ref 26–34)
MCHC RBC AUTO-ENTMCNC: 32.3 G/DL (ref 31–37)
MCV RBC AUTO: 93.4 FL (ref 80–100)
MONOCYTES NFR BLD: 0.54 K/UL (ref 0–1)
MONOCYTES NFR BLD: 13 % (ref 4–8)
MORPHOLOGY: ABNORMAL
MORPHOLOGY: ABNORMAL
NEUTROPHILS NFR BLD: 45 % (ref 47–75)
NEUTS SEG NFR BLD: 1.83 K/UL (ref 2.5–7)
PLATELET # BLD AUTO: 64 K/UL (ref 140–450)
PMV BLD AUTO: 9.5 FL (ref 6–12)
POTASSIUM SERPL-SCNC: 3.7 MMOL/L (ref 3.7–5.3)
RBC # BLD AUTO: 3.91 M/UL (ref 4–5.2)
SODIUM SERPL-SCNC: 142 MMOL/L (ref 135–144)
TROPONIN I SERPL HS-MCNC: 15 NG/L (ref 0–14)
TROPONIN I SERPL HS-MCNC: 18 NG/L (ref 0–14)
WBC OTHER # BLD: 4.1 K/UL (ref 3.5–11)

## 2025-07-11 PROCEDURE — 99285 EMERGENCY DEPT VISIT HI MDM: CPT

## 2025-07-11 PROCEDURE — 80048 BASIC METABOLIC PNL TOTAL CA: CPT

## 2025-07-11 PROCEDURE — 2580000003 HC RX 258: Performed by: EMERGENCY MEDICINE

## 2025-07-11 PROCEDURE — 96360 HYDRATION IV INFUSION INIT: CPT

## 2025-07-11 PROCEDURE — 84484 ASSAY OF TROPONIN QUANT: CPT

## 2025-07-11 PROCEDURE — 36415 COLL VENOUS BLD VENIPUNCTURE: CPT

## 2025-07-11 PROCEDURE — 93005 ELECTROCARDIOGRAM TRACING: CPT | Performed by: EMERGENCY MEDICINE

## 2025-07-11 PROCEDURE — 71045 X-RAY EXAM CHEST 1 VIEW: CPT

## 2025-07-11 PROCEDURE — 85025 COMPLETE CBC W/AUTO DIFF WBC: CPT

## 2025-07-11 RX ORDER — 0.9 % SODIUM CHLORIDE 0.9 %
1000 INTRAVENOUS SOLUTION INTRAVENOUS ONCE
Status: COMPLETED | OUTPATIENT
Start: 2025-07-11 | End: 2025-07-11

## 2025-07-11 RX ADMIN — SODIUM CHLORIDE 1000 ML: 0.9 INJECTION, SOLUTION INTRAVENOUS at 16:57

## 2025-07-11 ASSESSMENT — HEART SCORE: ECG: NON-SPECIFC REPOLARIZATION DISTURBANCE/LBTB/PM

## 2025-07-11 NOTE — ED PROVIDER NOTES
EMERGENCY DEPARTMENT ENCOUNTER      CHIEF COMPLAINT    Chief Complaint   Patient presents with    Irregular Heart Beat     Pt states she started feeling \"off\" on their way back from [place]. Pt gave her 1/2 tab of Metoprolol. History of a-fib       HPI    Geri Parks is a 80 y.o. female who presentsto ED with chest pain.  Patient was \"feeling off\".  Patient has prior history of CVA and coronary disease.  Chest pain-free at the time of exam.  Patient denies radiation of the pain.  Patient states the pain was mostly epigastric radiating to her chest.  Patient was given extra half a tablet of metoprolol. Patient has history of atrial fibrillation.  PAST MEDICAL HISTORY    Past Medical History:   Diagnosis Date    Aortic stenosis     Arthritis     Benign cyst of right breast in female     Breast cyst     Cardiac murmur     Cataract     Cerebral brain hemorrhage (HCC) 08/2013    Right thalamic    Chronic renal disease, stage III ([facility]) [[id number]] 05/03/2022    Coronary artery disease involving native coronary artery of native heart without angina pectoris 04/07/2022    Deep vein blood clot of left lower extremity ([facility]) 2016    Diverticulosis     Head injury     Hx of blood clots     Hyperlipidemia     Hypertension     Migraine headache     Pulmonary emphysema ([facility]) 10/05/2020    Seizures ([facility])     Stroke (cerebrum) ([facility])     Unspecified cerebral artery occlusion with cerebral infarction     TIA       SURGICAL HISTORY    Past Surgical History:   Procedure Laterality Date    BLEPHAROPLASTY      BREAST SURGERY      b/l breast cyst removal     CATARACT REMOVAL      COLONOSCOPY  2009     COLONOSCOPY BIOPSY/STOMA N/A 8/28/2018    COLONOSCOPY BIOPSY/STOMA, Dx: External Hemorrhoids and Severe Diverticulosis performed by Audi Young MD at [facility] OR    HYSTERECTOMY (CERVIX STATUS UNKNOWN)      MAMMO IMPLANT DIGITAL DIAG BI      TUBAL LIGATION         CURRENT MEDICATIONS    Current Outpatient Rx   Medication Sig Dispense Refill  Result   Stable mild cardiomegaly.   No radiographic evidence of congestive heart failure or pneumonia.             PROCEDURES    Procedures    Labs  Labs Reviewed   CBC WITH AUTO DIFFERENTIAL - Abnormal; Notable for the following components:       Result Value    RBC 3.91 (*)     Hemoglobin 11.8 (*)     Platelets 64 (*)     Neutrophils % 45 (*)     Lymphocytes % 41 (*)     Monocytes % 13 (*)     Neutrophils Absolute 1.83 (*)     All other components within normal limits   BASIC METABOLIC PANEL - Abnormal; Notable for the following components:    Glucose 169 (*)     Creatinine 1.3 (*)     Est, Glom Filt Rate 42 (*)     All other components within normal limits   TROPONIN - Abnormal; Notable for the following components:    Troponin, High Sensitivity 15 (*)     All other components within normal limits   TROPONIN - Abnormal; Notable for the following components:    Troponin, High Sensitivity 18 (*)     All other components within normal limits       MIPS  Not applicable      Total Critical Care time was:    EMERGENCY DEPARTMENT COURSE and DIFFERENTIAL DIAGNOSIS/MDM:    Patient Course: 80-year-old female presents to ED with epigastric chest pain rating to the chest.  Patient has prior history of coronary disease and CVA.  Patient is in sinus rhythm.  Nonspecific EKG.  Repeat EKG with no acute findings.  Patient was mildly hypertensive on arrival.  Patient is given 1 L of normal saline in ED with improvement of blood pressure.  Patient remained chest pain-free in ED.    Number and complexity of problems:    Differential Diagnosis: Gastrointestinal pain, coronary disease, acute MI    Pertinent Comorbid Conditions: History of coronary disease and CVA, history of     2)  Data Reviewed    Decision Rules/Scores utilized: Heart score 7.    Labs/tests: Labs were discussed with the patient and her family    EKG/rhythm strips: Reviewed    Radiology interpretation/review: Reviewed          3)  Treatment and

## 2025-07-14 LAB
EKG ATRIAL RATE: 101 BPM
EKG ATRIAL RATE: 76 BPM
EKG P AXIS: 42 DEGREES
EKG P AXIS: 45 DEGREES
EKG P-R INTERVAL: 172 MS
EKG P-R INTERVAL: 202 MS
EKG Q-T INTERVAL: 314 MS
EKG Q-T INTERVAL: 366 MS
EKG QRS DURATION: 72 MS
EKG QRS DURATION: 76 MS
EKG QTC CALCULATION (BAZETT): 407 MS
EKG QTC CALCULATION (BAZETT): 411 MS
EKG R AXIS: -18 DEGREES
EKG R AXIS: -24 DEGREES
EKG T AXIS: -17 DEGREES
EKG T AXIS: 49 DEGREES
EKG VENTRICULAR RATE: 101 BPM
EKG VENTRICULAR RATE: 76 BPM

## 2025-07-14 PROCEDURE — 93010 ELECTROCARDIOGRAM REPORT: CPT | Performed by: INTERNAL MEDICINE

## 2025-07-15 ENCOUNTER — CARE COORDINATION (OUTPATIENT)
Dept: CARE COORDINATION | Age: 80
End: 2025-07-15

## 2025-07-15 NOTE — CARE COORDINATION
Care Transitions Note    Follow Up Call     Patient Current Location:  Home: [address] [place] [address] Road  [facility] [address]    Care Transition Nurse contacted the patient by telephone. Verified name and  as identifiers.    Additional needs identified to be addressed with provider   No needs identified                 Method of communication with provider: none.    Care Summary Note: Spoke with patient for ED follow up. Patient states she was feeling \"off\" when coming back from [place], went in the ED for evaluation.  She was seen for some epigastric pain to her chest, was mildly hypertensive while in the ED but was in NSR, was chest pain free while in the ED before leaving. She was given NS bolus and discharged back to home. Today, she was out and about with spouse, they were in the car during call. She states she is feeling ok today, reports she continues to have leg swelling and has been using compression stockings at home. She did not have stockings on today and reports they do swell more without them. She has her follow up with cardiology tomorrow, denies any new needs or concerns at this time.     Plan of care updates since last contact:  None       Advance Care Planning:   Does patient have an Advance Directive: reviewed during previous call, see note. .    Medication Review:  No changes since last call.     Remote Patient Monitoring:  Offered patient enrollment in the Remote Patient Monitoring (RPM) program for in-home monitoring: Yes, but did not enroll at this time: declined to enroll in the program becausedoesn't want.    Assessments:  Care Transitions ED Follow Up    Care Transitions Interventions  Schedule Follow Up Appointment with Physician: Completed  Do you have any ongoing symptoms?: Yes   Onset of Patient-reported symptoms: In the past 7 days   Patient-reported symptoms: Other (Comment: leg swelling, no worse than before)   Did you call your PCP prior to going to the ED?: No - Did not call PCP   Do you

## 2025-07-16 ENCOUNTER — OFFICE VISIT (OUTPATIENT)
Dept: CARDIOLOGY CLINIC | Age: 80
End: 2025-07-16
Payer: MEDICARE

## 2025-07-16 VITALS
WEIGHT: 173 LBS | DIASTOLIC BLOOD PRESSURE: 70 MMHG | SYSTOLIC BLOOD PRESSURE: 119 MMHG | HEART RATE: 79 BPM | BODY MASS INDEX: 30.65 KG/M2 | OXYGEN SATURATION: 95 %

## 2025-07-16 DIAGNOSIS — E55.9 VITAMIN D DEFICIENCY: ICD-10-CM

## 2025-07-16 DIAGNOSIS — Z95.2 S/P TAVR (TRANSCATHETER AORTIC VALVE REPLACEMENT): ICD-10-CM

## 2025-07-16 DIAGNOSIS — I10 ESSENTIAL HYPERTENSION: Primary | ICD-10-CM

## 2025-07-16 DIAGNOSIS — I10 PRIMARY HYPERTENSION: ICD-10-CM

## 2025-07-16 DIAGNOSIS — E78.2 MIXED HYPERLIPIDEMIA: ICD-10-CM

## 2025-07-16 DIAGNOSIS — Z98.61 POST PTCA: ICD-10-CM

## 2025-07-16 PROCEDURE — G8417 CALC BMI ABV UP PARAM F/U: HCPCS | Performed by: NURSE PRACTITIONER

## 2025-07-16 PROCEDURE — 1160F RVW MEDS BY RX/DR IN RCRD: CPT | Performed by: NURSE PRACTITIONER

## 2025-07-16 PROCEDURE — G8427 DOCREV CUR MEDS BY ELIG CLIN: HCPCS | Performed by: NURSE PRACTITIONER

## 2025-07-16 PROCEDURE — 1036F TOBACCO NON-USER: CPT | Performed by: NURSE PRACTITIONER

## 2025-07-16 PROCEDURE — 3074F SYST BP LT 130 MM HG: CPT | Performed by: NURSE PRACTITIONER

## 2025-07-16 PROCEDURE — G8399 PT W/DXA RESULTS DOCUMENT: HCPCS | Performed by: NURSE PRACTITIONER

## 2025-07-16 PROCEDURE — 1111F DSCHRG MED/CURRENT MED MERGE: CPT | Performed by: NURSE PRACTITIONER

## 2025-07-16 PROCEDURE — 99214 OFFICE O/P EST MOD 30 MIN: CPT | Performed by: NURSE PRACTITIONER

## 2025-07-16 PROCEDURE — 1090F PRES/ABSN URINE INCON ASSESS: CPT | Performed by: NURSE PRACTITIONER

## 2025-07-16 PROCEDURE — 1123F ACP DISCUSS/DSCN MKR DOCD: CPT | Performed by: NURSE PRACTITIONER

## 2025-07-16 PROCEDURE — 3078F DIAST BP <80 MM HG: CPT | Performed by: NURSE PRACTITIONER

## 2025-07-16 PROCEDURE — 1159F MED LIST DOCD IN RCRD: CPT | Performed by: NURSE PRACTITIONER

## 2025-07-16 NOTE — PROGRESS NOTES
Nancy Palomares CNP  Was in ED on 7/12  With chest pain and   Irregular heart beat   Started that day.   No chest pain or irregular  Heart beat  since   No sob  Having ankle edema

## 2025-07-17 NOTE — PROGRESS NOTES
[facility]  [address] [place], [place] [address]  Phone: [phone], Fax: [phone]     Patient: Geri Parks  : 1945  Date of Visit: 2025    REASON FOR VISIT / CONSULTATION:  Coronary Artery Disease and TAVR - 1 Year Follow Up      History of Present Illness:        Dear Audi Young MD    We had the pleasure of seeing Geri Parks and her , Nav, in our office today. Ms. Parks is a pleasant 80 y.o. female Ms. Parks who had a history of moderate-to-severe aortic stenosis.  She had a pulmonary nodule in the right lower lobe and a PET scan positive and was pending having biopsy and possible resection.     In preparation, she had a stress test as well as an echocardiogram.  The stress test was abnormal, this prompted a left and right catheterization by Dr. Webster on 2022.  Her right coronary artery and circumflex were unremarkable and she had a 75% lesion in the mid LAD, also severe aortic stenosis with an aortic valve area of 0.52 cm2.  She had a TAVR procedure on 2022, placing a 26 mm JAME 3 Ultra valve.     She was then brought back for angioplasty and stent placement in her LAD placing a 3.5 x 18 mm Orsiro drug-eluting stent on 2022, with a good end result.     Right lower lung nodule with resection on 2022, with a robotic-assisted right lower lobe wedge resection, negative for malignancy with histoplasmosis diagnosed. She was in the emergency room at [facility] one time due to lung infection that had to be drained.    She has had 3 episodes with walking where she feels weak and feels like passing out.  Denies any chest pain or shortness of breath with episodes but does feel her heart beating fast.  She will sit down for a few minutes and goes away.  One of the episodes her blood pressure was low.  Others were at store and did not check her BP.    On 2024, she was seen in [facility] ER with double vision

## 2025-07-22 ENCOUNTER — CARE COORDINATION (OUTPATIENT)
Dept: CARE COORDINATION | Age: 80
End: 2025-07-22

## 2025-07-22 NOTE — CARE COORDINATION
Care Transitions Note    Final Call     Patient Current Location:  Home: [address] [place] [address] Road  [facility] [address]    [facility] Care Coordinator contacted the patient, spouse/partner  by telephone. Verified name and  as identifiers.    Patient graduated from the Care Transitions program on 25.  Patient/family progressing towards self-management. .      Advance Care Planning:   Does patient have an Advance Directive: reviewed during previous call, see note. .    Handoff:   Patient/family agreeable to [facility] outreach. , Condition management for copd ckd HTN.     Care Summary Note:  Spoke to pt and spouse on speaker phone they report they are just leaving from having lunch. Pt has a cough denies sick contacts denies fever/chills states she has had a lingering cough advised to let PCP know if cough does not improve she states she is not bringing anything up. Writer asked if she had some water she took a drink and cough seemed to improved. Since last call she completed cardiology appointment notes reviewed. She is to continue ASA 81 mg daily, metoprolol  tartrate 25 mg 1/2 tab twice daily, Crestor-discussed muscle aches while on medication. Advised to stop medication if this happens.  ER call office if  worsening or persistent cp increased sob.  Verbalized understanding. Pt denies cp,sob increased swelling-has support hose on. States B/B okay. They voiced no needs or concerns agreeable to continued care coordination will refer to [facility] . Will resolve CTN episode.     Assessments:  Care Transitions Subsequent and Final Call    Subsequent and Final Calls  Do you have any ongoing symptoms?: No  Have your medications changed?: No  Do you have any questions related to your medications?: No  Do you currently have any active services?: No  Do you have any needs or concerns that I can assist you with?: No  Care Transitions Interventions     Other Services: Completed (Comment: acm)   Other Interventions:              Upcoming

## 2025-07-25 DIAGNOSIS — I10 PRIMARY HYPERTENSION: ICD-10-CM

## 2025-07-25 RX ORDER — FUROSEMIDE 20 MG/1
20 TABLET ORAL DAILY
Qty: 30 TABLET | Refills: 2 | Status: SHIPPED | OUTPATIENT
Start: 2025-07-25

## 2025-07-28 ENCOUNTER — CARE COORDINATION (OUTPATIENT)
Dept: CARE COORDINATION | Age: 80
End: 2025-07-28

## 2025-07-28 NOTE — CARE COORDINATION
Ambulatory Care Coordination Note     2025      Patient Current Location:  Home: [address]  [place] [facility] [address]     This patient was received as a referral from Care Transition Nurse.    ACM contacted the patient by telephone. Verified name and  with patient as identifiers. Provided introduction to self, and explanation of the ACM role.   Patient accepted care management services at this time.          ACM: Misty Nagy RN     Challenges to be reviewed by the provider   Additional needs identified to be addressed with provider No       Method of communication with provider: none.    Utilization: Initial Call - N/A    Care Summary Note: Spoke with patient and spouse. Reports she is doing ok. Introduction made and aware handoff from CTN. Contact number was saved into patient phone with help from spouse. Reports that her leg swelling is well controlled as long as she wears compression stocking- states she puts them on in the morning and takes off before bed. Has not had any heart palpitations, fluttering or abnormal heart beats since ED visit. States she is getting around well. Denied any issues with breathing/COPD. Reports she mainly stays in the A/C and avoids outside/heat. Denied any new needs today.     Offered patient enrollment in the Remote Patient Monitoring (RPM) program for in-home monitoring: Yes, but did not enroll at this time: already monitoring with home equipment.    Medications Reviewed:   Patient denies any changes with medications and reports taking all medications as prescribed.    PCP/Specialist follow up:   Future Appointments         Provider Specialty Dept Phone    2025 1:30 PM Antonio Curtis PA-C Urology [phone]    2025 11:00 AM Audi Young MD Family Medicine [phone]    2026 10:00 AM Oneyda Palomares APRN - CNP Cardiology [phone]    2026 10:30 AM Oneyda Palomares APRN - CNP Cardiology [phone]            Follow Up:   Plan for next

## 2025-07-29 DIAGNOSIS — J44.1 COPD EXACERBATION (HCC): ICD-10-CM

## 2025-07-29 RX ORDER — FLUTICASONE FUROATE, UMECLIDINIUM BROMIDE AND VILANTEROL TRIFENATATE 200; 62.5; 25 UG/1; UG/1; UG/1
1 POWDER RESPIRATORY (INHALATION) DAILY
Qty: 1 EACH | Refills: 5 | Status: SHIPPED | OUTPATIENT
Start: 2025-07-29

## 2025-07-31 ENCOUNTER — OFFICE VISIT (OUTPATIENT)
Dept: UROLOGY | Age: 80
End: 2025-07-31
Payer: MEDICARE

## 2025-07-31 VITALS
BODY MASS INDEX: 23.57 KG/M2 | SYSTOLIC BLOOD PRESSURE: 120 MMHG | DIASTOLIC BLOOD PRESSURE: 80 MMHG | WEIGHT: 133 LBS | HEIGHT: 63 IN

## 2025-07-31 DIAGNOSIS — K59.09 OTHER CONSTIPATION: ICD-10-CM

## 2025-07-31 DIAGNOSIS — R31.29 MICROHEMATURIA: Primary | ICD-10-CM

## 2025-07-31 PROCEDURE — 1036F TOBACCO NON-USER: CPT | Performed by: PHYSICIAN ASSISTANT

## 2025-07-31 PROCEDURE — 3079F DIAST BP 80-89 MM HG: CPT | Performed by: PHYSICIAN ASSISTANT

## 2025-07-31 PROCEDURE — G8420 CALC BMI NORM PARAMETERS: HCPCS | Performed by: PHYSICIAN ASSISTANT

## 2025-07-31 PROCEDURE — 99214 OFFICE O/P EST MOD 30 MIN: CPT | Performed by: PHYSICIAN ASSISTANT

## 2025-07-31 PROCEDURE — G8399 PT W/DXA RESULTS DOCUMENT: HCPCS | Performed by: PHYSICIAN ASSISTANT

## 2025-07-31 PROCEDURE — 1123F ACP DISCUSS/DSCN MKR DOCD: CPT | Performed by: PHYSICIAN ASSISTANT

## 2025-07-31 PROCEDURE — 1159F MED LIST DOCD IN RCRD: CPT | Performed by: PHYSICIAN ASSISTANT

## 2025-07-31 PROCEDURE — 3074F SYST BP LT 130 MM HG: CPT | Performed by: PHYSICIAN ASSISTANT

## 2025-07-31 PROCEDURE — G8427 DOCREV CUR MEDS BY ELIG CLIN: HCPCS | Performed by: PHYSICIAN ASSISTANT

## 2025-07-31 PROCEDURE — 1090F PRES/ABSN URINE INCON ASSESS: CPT | Performed by: PHYSICIAN ASSISTANT

## 2025-08-01 ENCOUNTER — HOSPITAL ENCOUNTER (OUTPATIENT)
Age: 80
Setting detail: SPECIMEN
Discharge: HOME | End: 2025-08-01
Payer: MEDICARE

## 2025-08-01 DIAGNOSIS — R31.29 MICROHEMATURIA: ICD-10-CM

## 2025-08-01 LAB
-: ABNORMAL
AMORPH SED URNS QL MICRO: ABNORMAL
BACTERIA URNS QL MICRO: ABNORMAL
BILIRUB UR QL STRIP: NEGATIVE
CLARITY UR: CLEAR
COLOR UR: YELLOW
COMMENT: ABNORMAL
EPI CELLS #/AREA URNS HPF: ABNORMAL /HPF
GLUCOSE UR STRIP-MCNC: NEGATIVE MG/DL
HGB UR QL STRIP.AUTO: ABNORMAL
KETONES UR STRIP-MCNC: NEGATIVE MG/DL
LEUKOCYTE ESTERASE UR QL STRIP: NEGATIVE
NITRITE UR QL STRIP: NEGATIVE
PH UR STRIP: 7 [PH] (ref 5–8)
PROT UR STRIP-MCNC: NEGATIVE MG/DL
RBC #/AREA URNS HPF: ABNORMAL /HPF (ref 0–2)
SP GR UR STRIP: 1.01 (ref 1–1.03)
UROBILINOGEN UR STRIP-ACNC: ABNORMAL EU/DL (ref 0–1)
WBC #/AREA URNS HPF: ABNORMAL /HPF

## 2025-08-01 PROCEDURE — 81001 URINALYSIS AUTO W/SCOPE: CPT

## 2025-08-04 ENCOUNTER — HOSPITAL ENCOUNTER (OUTPATIENT)
Age: 80
Discharge: HOME | End: 2025-08-04
Payer: MEDICARE

## 2025-08-04 LAB
ANION GAP SERPL CALCULATED.3IONS-SCNC: 8 MMOL/L (ref 9–17)
BUN SERPL-MCNC: 13 MG/DL (ref 8–23)
CALCIUM SERPL-MCNC: 8.8 MG/DL (ref 8.6–10.4)
CHLORIDE SERPL-SCNC: 106 MMOL/L (ref 98–107)
CO2 SERPL-SCNC: 29 MMOL/L (ref 20–31)
CREAT SERPL-MCNC: 0.9 MG/DL (ref 0.5–0.9)
GFR, ESTIMATED: 65 ML/MIN/1.73M2
GLUCOSE SERPL-MCNC: 96 MG/DL (ref 70–99)
POTASSIUM SERPL-SCNC: 4.1 MMOL/L (ref 3.7–5.3)
SODIUM SERPL-SCNC: 143 MMOL/L (ref 135–144)

## 2025-08-04 PROCEDURE — 80048 BASIC METABOLIC PNL TOTAL CA: CPT

## 2025-08-11 ENCOUNTER — CARE COORDINATION (OUTPATIENT)
Dept: CARE COORDINATION | Age: 80
End: 2025-08-11

## 2025-08-19 ENCOUNTER — OFFICE VISIT (OUTPATIENT)
Dept: FAMILY MEDICINE CLINIC | Age: 80
End: 2025-08-19

## 2025-08-19 VITALS
HEIGHT: 63 IN | WEIGHT: 173.2 LBS | BODY MASS INDEX: 30.69 KG/M2 | SYSTOLIC BLOOD PRESSURE: 122 MMHG | HEART RATE: 58 BPM | DIASTOLIC BLOOD PRESSURE: 68 MMHG

## 2025-08-19 DIAGNOSIS — E78.2 MIXED HYPERLIPIDEMIA: ICD-10-CM

## 2025-08-19 DIAGNOSIS — I10 PRIMARY HYPERTENSION: ICD-10-CM

## 2025-08-19 DIAGNOSIS — Z12.31 OTHER SCREENING MAMMOGRAM: ICD-10-CM

## 2025-08-19 DIAGNOSIS — I73.9 CLAUDICATION: ICD-10-CM

## 2025-08-19 DIAGNOSIS — J43.9 PULMONARY EMPHYSEMA, UNSPECIFIED EMPHYSEMA TYPE (HCC): ICD-10-CM

## 2025-08-19 DIAGNOSIS — E55.9 VITAMIN D DEFICIENCY: ICD-10-CM

## 2025-08-19 DIAGNOSIS — Z00.00 MEDICARE ANNUAL WELLNESS VISIT, SUBSEQUENT: Primary | ICD-10-CM

## 2025-08-19 DIAGNOSIS — G40.909 SEIZURE DISORDER (HCC): ICD-10-CM

## 2025-08-19 DIAGNOSIS — F09 COGNITIVE DYSFUNCTION: ICD-10-CM

## 2025-08-19 DIAGNOSIS — I48.0 PAROXYSMAL ATRIAL FIBRILLATION (HCC): ICD-10-CM

## 2025-08-19 SDOH — ECONOMIC STABILITY: FOOD INSECURITY: WITHIN THE PAST 12 MONTHS, YOU WORRIED THAT YOUR FOOD WOULD RUN OUT BEFORE YOU GOT MONEY TO BUY MORE.: NEVER TRUE

## 2025-08-19 SDOH — ECONOMIC STABILITY: FOOD INSECURITY: WITHIN THE PAST 12 MONTHS, THE FOOD YOU BOUGHT JUST DIDN'T LAST AND YOU DIDN'T HAVE MONEY TO GET MORE.: NEVER TRUE

## 2025-08-19 SDOH — HEALTH STABILITY: PHYSICAL HEALTH: ON AVERAGE, HOW MANY DAYS PER WEEK DO YOU ENGAGE IN MODERATE TO STRENUOUS EXERCISE (LIKE A BRISK WALK)?: 0 DAYS

## 2025-08-19 SDOH — HEALTH STABILITY: PHYSICAL HEALTH: ON AVERAGE, HOW MANY MINUTES DO YOU ENGAGE IN EXERCISE AT THIS LEVEL?: 0 MIN

## 2025-08-19 ASSESSMENT — PATIENT HEALTH QUESTIONNAIRE - PHQ9
SUM OF ALL RESPONSES TO PHQ QUESTIONS 1-9: 0
SUM OF ALL RESPONSES TO PHQ QUESTIONS 1-9: 0
1. LITTLE INTEREST OR PLEASURE IN DOING THINGS: NOT AT ALL
SUM OF ALL RESPONSES TO PHQ QUESTIONS 1-9: 0
2. FEELING DOWN, DEPRESSED OR HOPELESS: NOT AT ALL
SUM OF ALL RESPONSES TO PHQ QUESTIONS 1-9: 0

## 2025-08-21 ENCOUNTER — HOSPITAL ENCOUNTER (OUTPATIENT)
Dept: VASCULAR LAB | Age: 80
Discharge: HOME | End: 2025-08-23
Attending: INTERNAL MEDICINE
Payer: MEDICARE

## 2025-08-21 DIAGNOSIS — I73.9 CLAUDICATION: ICD-10-CM

## 2025-08-21 LAB
VAS LEFT ABI: 0.98
VAS LEFT ARM BP: 127 MMHG
VAS LEFT DORSALIS PEDIS BP: 124 MMHG
VAS LEFT PTA BP: 124 MMHG
VAS RIGHT ABI: 0.99
VAS RIGHT ARM BP: 115 MMHG
VAS RIGHT DORSALIS PEDIS BP: 110 MMHG
VAS RIGHT PTA BP: 126 MMHG

## 2025-08-21 PROCEDURE — 93922 UPR/L XTREMITY ART 2 LEVELS: CPT

## 2025-08-22 RX ORDER — DIVALPROEX SODIUM 250 MG/1
250 TABLET, DELAYED RELEASE ORAL 3 TIMES DAILY
Qty: 270 TABLET | Refills: 1 | Status: SHIPPED | OUTPATIENT
Start: 2025-08-22

## 2025-08-25 RX ORDER — APIXABAN 5 MG/1
5 TABLET, FILM COATED ORAL 2 TIMES DAILY
Qty: 180 TABLET | Refills: 1 | Status: SHIPPED | OUTPATIENT
Start: 2025-08-25

## 2025-08-27 ENCOUNTER — HOSPITAL ENCOUNTER (OUTPATIENT)
Dept: MAMMOGRAPHY | Age: 80
Discharge: HOME | End: 2025-08-29
Payer: MEDICARE

## 2025-08-27 DIAGNOSIS — Z12.31 OTHER SCREENING MAMMOGRAM: ICD-10-CM

## 2025-08-27 PROCEDURE — 77063 BREAST TOMOSYNTHESIS BI: CPT

## 2025-08-28 DIAGNOSIS — R92.8 ABNORMAL MAMMOGRAM: Primary | ICD-10-CM

## 2025-08-28 DIAGNOSIS — Z92.89 HISTORY OF DIAGNOSTIC MAMMOGRAPHY: Primary | ICD-10-CM

## 2025-08-28 DIAGNOSIS — R92.8 ABNORMAL MAMMOGRAM OF LEFT BREAST: ICD-10-CM

## 2025-09-03 ENCOUNTER — CARE COORDINATION (OUTPATIENT)
Dept: CARE COORDINATION | Age: 80
End: 2025-09-03

## 2025-09-06 PROBLEM — I63.81 THALAMIC INFARCTION (HCC): Status: RESOLVED | Noted: 2017-04-03 | Resolved: 2025-09-06

## (undated) DEVICE — MEDI-VAC NON-CONDUCTIVE SUCTION TUBING 6MM X 6.1M (20 FT.) L: Brand: CARDINAL HEALTH

## (undated) DEVICE — FORCEPS BX L240CM JAW DIA2.2MM RAD JAW 4 HOT DISP

## (undated) DEVICE — GOWN,AURORA,NONRNF,XL,30/CS: Brand: MEDLINE